# Patient Record
Sex: FEMALE | Race: WHITE | NOT HISPANIC OR LATINO | Employment: OTHER | URBAN - METROPOLITAN AREA
[De-identification: names, ages, dates, MRNs, and addresses within clinical notes are randomized per-mention and may not be internally consistent; named-entity substitution may affect disease eponyms.]

---

## 2017-01-12 ENCOUNTER — OFFICE VISIT (OUTPATIENT)
Dept: URGENT CARE | Facility: CLINIC | Age: 72
End: 2017-01-12
Payer: MEDICARE

## 2017-01-12 VITALS
RESPIRATION RATE: 18 BRPM | BODY MASS INDEX: 32.94 KG/M2 | HEIGHT: 62 IN | DIASTOLIC BLOOD PRESSURE: 100 MMHG | HEART RATE: 74 BPM | OXYGEN SATURATION: 95 % | WEIGHT: 179 LBS | SYSTOLIC BLOOD PRESSURE: 140 MMHG | TEMPERATURE: 97.2 F

## 2017-01-12 DIAGNOSIS — Z86.19 HISTORY OF CANDIDAL VULVOVAGINITIS: ICD-10-CM

## 2017-01-12 DIAGNOSIS — M54.50 ACUTE BILATERAL LOW BACK PAIN WITHOUT SCIATICA: ICD-10-CM

## 2017-01-12 DIAGNOSIS — K57.52 DIVERTICULITIS OF BOTH SMALL AND LARGE INTESTINE WITHOUT PERFORATION OR ABSCESS WITHOUT BLEEDING: Primary | ICD-10-CM

## 2017-01-12 PROCEDURE — 99214 OFFICE O/P EST MOD 30 MIN: CPT | Performed by: PHYSICIAN ASSISTANT

## 2017-01-12 RX ORDER — FLUCONAZOLE 150 MG/1
150 TABLET ORAL ONCE
Qty: 1 TAB | Refills: 0 | Status: SHIPPED | OUTPATIENT
Start: 2017-01-12 | End: 2017-01-12

## 2017-01-12 RX ORDER — OXYCODONE HYDROCHLORIDE AND ACETAMINOPHEN 5; 325 MG/1; MG/1
1-2 TABLET ORAL EVERY 4 HOURS PRN
Qty: 15 TAB | Refills: 0 | Status: SHIPPED | OUTPATIENT
Start: 2017-01-12 | End: 2017-01-15

## 2017-01-12 RX ORDER — CIPROFLOXACIN 500 MG/1
500 TABLET, FILM COATED ORAL 2 TIMES DAILY
Qty: 10 TAB | Refills: 0 | Status: SHIPPED | OUTPATIENT
Start: 2017-01-12 | End: 2017-01-17

## 2017-01-12 RX ORDER — METRONIDAZOLE 500 MG/1
500 TABLET ORAL EVERY 8 HOURS
Qty: 30 TAB | Refills: 0 | Status: SHIPPED | OUTPATIENT
Start: 2017-01-12 | End: 2017-01-22

## 2017-01-12 ASSESSMENT — ENCOUNTER SYMPTOMS: ABDOMINAL PAIN: 1

## 2017-01-12 ASSESSMENT — CROHNS DISEASE ACTIVITY INDEX (CDAI): CDAI SCORE: 0

## 2017-01-12 NOTE — PATIENT INSTRUCTIONS
Diverticulitis  Diverticulitis is inflammation or infection of small pouches in your colon that form when you have a condition called diverticulosis. The pouches in your colon are called diverticula. Your colon, or large intestine, is where water is absorbed and stool is formed.  Complications of diverticulitis can include:  · Bleeding.  · Severe infection.  · Severe pain.  · Perforation of your colon.  · Obstruction of your colon.  CAUSES   Diverticulitis is caused by bacteria.  Diverticulitis happens when stool becomes trapped in diverticula. This allows bacteria to grow in the diverticula, which can lead to inflammation and infection.  RISK FACTORS  People with diverticulosis are at risk for diverticulitis. Eating a diet that does not include enough fiber from fruits and vegetables may make diverticulitis more likely to develop.  SYMPTOMS   Symptoms of diverticulitis may include:  · Abdominal pain and tenderness. The pain is normally located on the left side of the abdomen, but may occur in other areas.  · Fever and chills.  · Bloating.  · Cramping.  · Nausea.  · Vomiting.  · Constipation.  · Diarrhea.  · Blood in your stool.  DIAGNOSIS   Your health care provider will ask you about your medical history and do a physical exam. You may need to have tests done because many medical conditions can cause the same symptoms as diverticulitis. Tests may include:  · Blood tests.  · Urine tests.  · Imaging tests of the abdomen, including X-rays and CT scans.  When your condition is under control, your health care provider may recommend that you have a colonoscopy. A colonoscopy can show how severe your diverticula are and whether something else is causing your symptoms.  TREATMENT   Most cases of diverticulitis are mild and can be treated at home. Treatment may include:  · Taking over-the-counter pain medicines.  · Following a clear liquid diet.  · Taking antibiotic medicines by mouth for 7-10 days.  More severe cases may  be treated at a hospital. Treatment may include:  · Not eating or drinking.  · Taking prescription pain medicine.  · Receiving antibiotic medicines through an IV tube.  · Receiving fluids and nutrition through an IV tube.  · Surgery.  HOME CARE INSTRUCTIONS   · Follow your health care provider's instructions carefully.  · Follow a full liquid diet or other diet as directed by your health care provider. After your symptoms improve, your health care provider may tell you to change your diet. He or she may recommend you eat a high-fiber diet. Fruits and vegetables are good sources of fiber. Fiber makes it easier to pass stool.  · Take fiber supplements or probiotics as directed by your health care provider.  · Only take medicines as directed by your health care provider.  · Keep all your follow-up appointments.  SEEK MEDICAL CARE IF:   · Your pain does not improve.  · You have a hard time eating food.  · Your bowel movements do not return to normal.  SEEK IMMEDIATE MEDICAL CARE IF:   · Your pain becomes worse.  · Your symptoms do not get better.  · Your symptoms suddenly get worse.  · You have a fever.  · You have repeated vomiting.  · You have bloody or black, tarry stools.  MAKE SURE YOU:   · Understand these instructions.  · Will watch your condition.  · Will get help right away if you are not doing well or get worse.     This information is not intended to replace advice given to you by your health care provider. Make sure you discuss any questions you have with your health care provider.     Document Released: 09/27/2006 Document Revised: 12/23/2014 Document Reviewed: 11/12/2014  [employer] Interactive Patient Education ©2016 [employer]

## 2017-01-12 NOTE — PROGRESS NOTES
Subjective:      PT is a 71 y.o. female who presents with Abdominal Pain            Abdominal Pain  This is a recurrent problem. The current episode started yesterday. The onset quality is sudden. The problem occurs constantly. The problem has been gradually worsening. The pain is located in the generalized abdominal region. The pain is at a severity of 6/10. The pain is moderate. The quality of the pain is dull, a sensation of fullness and cramping. The abdominal pain radiates to the back, left flank and right flank. The pain is aggravated by palpation, eating and deep breathing. The pain is relieved by nothing. She has tried antacids for the symptoms. The treatment provided no relief. Her past medical history is significant for abdominal surgery and gallstones. There is no history of colon cancer, Crohn's disease, GERD, irritable bowel syndrome, pancreatitis, PUD or ulcerative colitis. diverticulitis   Pt states she feels she triggered another bout of diverticulitis which she states she has had a PMH with. Pt notes nausea and low back pain. She states it is more powerful then prior episodes and notes she has been hospitalized with SBO in the past. Pt states she defers going to ED or getting outpt CT today. Pt has not taken any Rx medications for this condition. Pt states the pain is a 7/10, aching in nature and worse at night. Pt denies CP, SOB, paresthesias, headaches, dizziness, change in vision, hives, or other joint pain. The pt's medication list, problem list, and allergies have been evaluated and reviewed during today's visit. Pt notes hx of candidal infection of vagina on abx therapy.    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 at SURGERY [facility]   • Mikala by laparoscopy  2015     Performed by Gaudencio Johnson M.D. at SURGERY SAME DAY [facility]       Fam Hx:    family history includes Cancer in her father, mother, and sister; Cancer (age of onset: 40) in her sister; Diabetes in her father; GI in her sister; Lung Disease in her mother.  Family Status   Relation Status Death Age   • Mother     • Father     • Sister     • Sister     • Sister Alive        Soc HX:  Social History     Social History   • Marital Status:      Spouse Name: N/A   • Number of Children: 2   • Years of Education: HS     Occupational History   •  Other     Social History Main Topics   • Smoking status: Former Smoker -- 1.00 packs/day for 45 years     Types: Cigarettes     Start date: 1964     Quit date: 2012   • Smokeless tobacco: Never Used   • Alcohol Use: No   • Drug Use: Yes     Special: Oral, Marijuana      Comment: Pot daily   • Sexual Activity:     Partners: Female     Other Topics Concern   • Not on file     Social History Narrative    Moved to [place] after couldn't find work in [place]. Then mother had increasing health problems. Now stays in [place] to care for mom. Siblings caregive on Weekends. 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    Spouse (female) 25 years.    2 children. 1 grandchild.     : mother .           Medications:    Current outpatient prescriptions:   •  fluconazole (DIFLUCAN) 150 MG tablet, Take 1 Tab by mouth Once for 1 dose., Disp: 1 Tab, Rfl: 0  •  metronidazole (FLAGYL) 500 MG Tab, Take 1 Tab by mouth every 8 hours for 10 days., Disp: 30 Tab, Rfl: 0  •  ciprofloxacin (CIPRO) 500 MG Tab, Take 1 Tab by mouth 2 times a day for 5 days., Disp: 10 Tab, Rfl: 0  •  oxycodone-acetaminophen (PERCOCET) 5-325 MG Tab, Take 1-2 Tabs by mouth every four hours as needed for up to 3 days., Disp: 15 Tab, Rfl: 0  •  lorazepam (ATIVAN) 1 MG Tab, Take 1 Tab by mouth at bedtime as needed., Disp: 30 Tab, Rfl: 1  •  sertraline (ZOLOFT) 50 MG Tab, Take 3 Tabs by mouth every day., Disp: 135 Tab, Rfl: 1  •  pantoprazole (PROTONIX) 40 MG Tablet Delayed Response, Take 1 Tab by mouth every day., Disp: 90 Tab, Rfl: 3  •  vitamin D (CHOLECALCIFEROL) 1000 UNIT TABS, Take 1,000 Units by mouth every day., Disp: , Rfl:   •  Probiotic Product (PROBIOTIC PO), Take 1 Cap by mouth every day., Disp: , Rfl:   •  meloxicam (MOBIC) 15 MG tablet, Take 1 Tab by mouth every day., Disp: 30 Tab, Rfl: 5  •  alendronate (FOSAMAX) 70 MG Tab, Take 1 Tab by mouth every 7 days. Take on empty stomach, with full glass water. Don't lay down or take any food or drink for next 30 minutes, Disp: 12 Tab, Rfl: 3      Allergies:  Penicillins; Codeine; Morphine; and Vicodin        Review of Systems   Gastrointestinal: Positive for abdominal pain.   Constitutional: Negative for fever, chills and malaise/fatigue.   HENT: Negative for congestion and sore throat.    Eyes: Negative for blurred vision, double vision and photophobia.   Respiratory: Negative for cough and shortness of breath.    Cardiovascular: Negative for chest pain and palpitations.   Gastrointestinal continued: POS nausea, vomiting, abdominal pain, NEG diarrhea and constipation.   Genitourinary: Negative for dysuria and flank pain.   Musculoskeletal: Negative for joint pain and myalgias. +LBP  Skin:  "Negative for itching and rash.   Neurological: Negative for dizziness, tingling and headaches.   Endo/Heme/Allergies: Does not bruise/bleed easily.   Psychiatric/Behavioral: Negative for depression. The patient is not nervous/anxious.             Objective:     /100 mmHg  Pulse 74  Temp(Src) 36.2 °C (97.2 °F)  Resp 18  Ht 1.562 m (5' 1.5\")  Wt 81.194 kg (179 lb)  BMI 33.28 kg/m2  SpO2 95%  LMP 07/12/1986     Physical Exam   Abdominal: Soft. Bowel sounds are normal. She exhibits no shifting dullness, no distension, no pulsatile liver, no fluid wave, no abdominal bruit, no ascites, no pulsatile midline mass and no mass. There is no hepatosplenomegaly. There is generalized tenderness. There is guarding. There is no rigidity, no rebound, no CVA tenderness, no tenderness at McBurney's point and negative Vincent's sign. No hernia.              Constitutional: PT is oriented to person, place, and time. PT appears well-developed and well-nourished. No distress.   HENT:   Head: Normocephalic and atraumatic.   Mouth/Throat: Oropharynx is clear and moist. No oropharyngeal exudate.   Eyes: Conjunctivae normal and EOM are normal. Pupils are equal, round, and reactive to light.   Neck: Normal range of motion. Neck supple. No thyromegaly present.   Cardiovascular: Normal rate, regular rhythm, normal heart sounds and intact distal pulses.  Exam reveals no gallop and no friction rub.    No murmur heard.  Pulmonary/Chest: Effort normal and breath sounds normal. No respiratory distress. PT has no wheezes. PT has no rales. Pt exhibits no tenderness.   Musculoskeletal: Normal range of motion. PT exhibits no edema and no tenderness. +TTP low back region  Neurological: PT is alert and oriented to person, place, and time. PT has normal reflexes. No cranial nerve deficit.   Skin: Skin is warm and dry. No rash noted. PT is not diaphoretic. No erythema.       Psychiatric: PT has a normal mood and affect. PT behavior is normal. " Judgment and thought content normal.        Assessment/Plan:     1. Diverticulitis of both small and large intestine without perforation or abscess without bleeding    - metronidazole (FLAGYL) 500 MG Tab; Take 1 Tab by mouth every 8 hours for 10 days.  Dispense: 30 Tab; Refill: 0  - ciprofloxacin (CIPRO) 500 MG Tab; Take 1 Tab by mouth 2 times a day for 5 days.  Dispense: 10 Tab; Refill: 0  - oxycodone-acetaminophen (PERCOCET) 5-325 MG Tab; Take 1-2 Tabs by mouth every four hours as needed for up to 3 days.  Dispense: 15 Tab; Refill: 0    2. History of candidal vulvovaginitis    - fluconazole (DIFLUCAN) 150 MG tablet; Take 1 Tab by mouth Once for 1 dose.  Dispense: 1 Tab; Refill: 0    3. Acute bilateral low back pain without sciatica    PT defers ED or outpt CT scan  STRICT ER precautions discussed  [facility] Aware web site evaluation: I have obtained and reviewed patient utilization report from [facility] pharmacy database prior to writing prescription for controlled substance II, III or IV per [place] bill . Based on the report and my clinical assessment the prescription is medically necessary.   NSAIDs for pain 1-5, Percocet for pain 6-10 or to help get to sleep.  Rest, fluids encouraged.  BRAT diet  AVS with medical info given.  Pt was in full understanding and agreement with the plan.  Follow-up as needed if symptoms worsen or fail to improve.

## 2017-01-12 NOTE — MR AVS SNAPSHOT
"        Dina Adorno Mars   2017 8:15 AM   Office Visit   MRN: [id number]    Department:  [facility]   Dept Phone:  [phone]    Description:  Female : 1945   Provider:  Leonidas Jacobson PA-C           Reason for Visit     Abdominal Pain lower back pain, started last night, nausea      Allergies as of 2017     Allergen Noted Reactions    Penicillins 2010   Rash, Unspecified    Itchy rash    Codeine 2014   Vomiting    Morphine 2010   Rash    Localized red rash after morphine administration    Vicodin [Hydrocodone-Acetaminophen] 10/09/2012   Itching      You were diagnosed with     Diverticulitis of both small and large intestine without perforation or abscess without bleeding   [783077]  -  Primary     History of candidal vulvovaginitis   [503377]       Acute bilateral low back pain without sciatica   [8112656]         Vital Signs     Blood Pressure Pulse Temperature Respirations Height Weight    140/100 mmHg 74 36.2 °C (97.2 °F) 18 1.562 m (5' 1.5\") 81.194 kg (179 lb)    Body Mass Index Oxygen Saturation Last Menstrual Period Smoking Status          33.28 kg/m2 95% 1986 Former Smoker        Basic Information     Date Of Birth Sex Race Ethnicity Preferred Language    1945 Female White Non- English      Your appointments     2017  3:00 PM   New Patient with Marivel Ball M.D.   [facility] (--)    [address], [address]  [place] [id number] [phone]           Please bring Photo ID, Insurance Cards, All Medication Bottles and copies of any legal documents (such as Living Will, Power of ) If speaking a language besides English please bring an adult . Please arrive 30 minutes prior for check in and registration. You will be receiving a confirmation call a few days before your appointment from our automated call confirmation system.            2017  9:30 AM   Established Patient with Maria G Díaz, " M.D.   [address] ([facility])    [address] [place] [id number] [phone]           You will be receiving a confirmation call a few days before your appointment from our automated call confirmation system.              Problem List              ICD-10-CM Priority Class Noted - Resolved    Vitamin D insufficiency E55.9   4/25/2011 - Present    Preventative health care Z00.00   4/25/2011 - Present    Personal history of skin cancer Z85.828   4/25/2011 - Present    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   10/19/2011 - Present    Hyperlipidemia E78.5   10/19/2011 - Present    GERD (gastroesophageal reflux disease) K21.9   10/29/2011 - Present    Major depression F32.9   7/16/2012 - Present    Osteoporosis, post-menopausal M81.0   8/12/2012 - Present    H/O diverticulitis of colon Z87.19   4/24/2013 - Present    JUNIOR (generalized anxiety disorder) F41.1   12/3/2013 - Present    S/P partial colectomy Z90.49   4/22/2014 - Present    Asthma, mild intermittent J45.20   6/20/2014 - Present    Obesity (BMI 30.0-34.9) E66.9   10/22/2014 - Present    Sleep disturbances G47.9   5/7/2015 - Present    Depression with anxiety F41.8   11/9/2015 - Present    IBS (irritable bowel syndrome) K58.9   11/9/2015 - Present    Hx of adenomatous colonic polyps Z86.010   12/5/2015 - Present    Neuropathy (HCC) G62.9   1/14/2016 - Present    Hair loss L65.9   3/17/2016 - Present    H/O small bowel obstruction Z87.19   5/3/2016 - Present      Health Maintenance        Date Due Completion Dates    IMM DTaP/Tdap/Td Vaccine (1 - Tdap) 7/12/1966 ---    IMM ZOSTER VACCINE 7/12/2007 ---    IMM INFLUENZA (1) 9/1/2016 ---    MAMMOGRAM 4/8/2017 4/8/2016, 11/7/2013, 8/8/2012    BONE DENSITY 8/9/2018 8/9/2016, 8/8/2012    COLONOSCOPY 12/2/2020 12/2/2015            Current Immunizations     13-VALENT PCV PREVNAR 8/1/2016    Pneumococcal polysaccharide vaccine (PPSV-23) 12/7/2012    Tetanus Vaccine 4/2/2010       Below and/or attached are the medications your provider expects you to take. Review all of your home medications and newly ordered medications with your provider and/or pharmacist. Follow medication instructions as directed by your provider and/or pharmacist. Please keep your medication list with you and share with your provider. Update the information when medications are discontinued, doses are changed, or new medications (including over-the-counter products) are added; and carry medication information at all times in the event of emergency situations     Allergies:  PENICILLINS - Rash,Unspecified     CODEINE - Vomiting     MORPHINE - Rash     VICODIN - Itching               Medications  Valid as of: January 12, 2017 -  8:45 AM    Generic Name Brand Name Tablet Size Instructions for use    Alendronate Sodium (Tab) FOSAMAX 70 MG Take 1 Tab by mouth every 7 days. Take on empty stomach, with full glass water. Don't lay down or take any food or drink for next 30 minutes        Cholecalciferol (Tab) cholecalciferol 1000 UNIT Take 1,000 Units by mouth every day.        Ciprofloxacin HCl (Tab) CIPRO 500 MG Take 1 Tab by mouth 2 times a day for 5 days.        Fluconazole (Tab) DIFLUCAN 150 MG Take 1 Tab by mouth Once for 1 dose.        LORazepam (Tab) ATIVAN 1 MG Take 1 Tab by mouth at bedtime as needed.        Meloxicam (Tab) MOBIC 15 MG Take 1 Tab by mouth every day.        MetroNIDAZOLE (Tab) FLAGYL 500 MG Take 1 Tab by mouth every 8 hours for 10 days.        Oxycodone-Acetaminophen (Tab) PERCOCET 5-325 MG Take 1-2 Tabs by mouth every four hours as needed for up to 3 days.        Pantoprazole Sodium (Tablet Delayed Response) PROTONIX 40 MG Take 1 Tab by mouth every day.        Probiotic Product   Take 1 Cap by mouth every day.        Sertraline HCl (Tab) ZOLOFT 50 MG Take 3 Tabs by mouth every day.        .                 Medicines prescribed today were sent to:     [facility] - [place], [place] - [address]    [address] [place] [address]    Phone: [phone] Fax: [phone]    Open 24 Hours?: No      Medication refill instructions:       If your prescription bottle indicates you have medication refills left, it is not necessary to call your provider’s office. Please contact your pharmacy and they will refill your medication.    If your prescription bottle indicates you do not have any refills left, you may request refills at any time through one of the following ways: The online [employer] system (except Urgent Care), by calling your provider’s office, or by asking your pharmacy to contact your provider’s office with a refill request. Medication refills are processed only during regular business hours and may not be available until the next business day. Your provider may request additional information or to have a follow-up visit with you prior to refilling your medication.   *Please Note: Medication refills are assigned a new Rx number when refilled electronically. Your pharmacy may indicate that no refills were authorized even though a new prescription for the same medication is available at the pharmacy. Please request the medicine by name with the pharmacy before contacting your provider for a refill.        Instructions    Diverticulitis  Diverticulitis is inflammation or infection of small pouches in your colon that form when you have a condition called diverticulosis. The pouches in your colon are called diverticula. Your colon, or large intestine, is where water is absorbed and stool is formed.  Complications of diverticulitis can include:  · Bleeding.  · Severe infection.  · Severe pain.  · Perforation of your colon.  · Obstruction of your colon.  CAUSES   Diverticulitis is caused by bacteria.  Diverticulitis happens when stool becomes trapped in diverticula. This allows bacteria to grow in the diverticula, which can lead to inflammation and infection.  RISK FACTORS  People with  diverticulosis are at risk for diverticulitis. Eating a diet that does not include enough fiber from fruits and vegetables may make diverticulitis more likely to develop.  SYMPTOMS   Symptoms of diverticulitis may include:  · Abdominal pain and tenderness. The pain is normally located on the left side of the abdomen, but may occur in other areas.  · Fever and chills.  · Bloating.  · Cramping.  · Nausea.  · Vomiting.  · Constipation.  · Diarrhea.  · Blood in your stool.  DIAGNOSIS   Your health care provider will ask you about your medical history and do a physical exam. You may need to have tests done because many medical conditions can cause the same symptoms as diverticulitis. Tests may include:  · Blood tests.  · Urine tests.  · Imaging tests of the abdomen, including X-rays and CT scans.  When your condition is under control, your health care provider may recommend that you have a colonoscopy. A colonoscopy can show how severe your diverticula are and whether something else is causing your symptoms.  TREATMENT   Most cases of diverticulitis are mild and can be treated at home. Treatment may include:  · Taking over-the-counter pain medicines.  · Following a clear liquid diet.  · Taking antibiotic medicines by mouth for 7-10 days.  More severe cases may be treated at a hospital. Treatment may include:  · Not eating or drinking.  · Taking prescription pain medicine.  · Receiving antibiotic medicines through an IV tube.  · Receiving fluids and nutrition through an IV tube.  · Surgery.  HOME CARE INSTRUCTIONS   · Follow your health care provider's instructions carefully.  · Follow a full liquid diet or other diet as directed by your health care provider. After your symptoms improve, your health care provider may tell you to change your diet. He or she may recommend you eat a high-fiber diet. Fruits and vegetables are good sources of fiber. Fiber makes it easier to pass stool.  · Take fiber supplements or probiotics  as directed by your health care provider.  · Only take medicines as directed by your health care provider.  · Keep all your follow-up appointments.  SEEK MEDICAL CARE IF:   · Your pain does not improve.  · You have a hard time eating food.  · Your bowel movements do not return to normal.  SEEK IMMEDIATE MEDICAL CARE IF:   · Your pain becomes worse.  · Your symptoms do not get better.  · Your symptoms suddenly get worse.  · You have a fever.  · You have repeated vomiting.  · You have bloody or black, tarry stools.  MAKE SURE YOU:   · Understand these instructions.  · Will watch your condition.  · Will get help right away if you are not doing well or get worse.     This information is not intended to replace advice given to you by your health care provider. Make sure you discuss any questions you have with your health care provider.     Document Released: 09/27/2006 Document Revised: 12/23/2014 Document Reviewed: 11/12/2014  [employer] Interactive Patient Education ©2016 [employer]            [employer] Access Code: Activation code not generated  Current V I O Status: Active

## 2017-01-19 ENCOUNTER — OFFICE VISIT (OUTPATIENT)
Dept: RHEUMATOLOGY | Facility: PHYSICIAN GROUP | Age: 72
End: 2017-01-19
Payer: MEDICARE

## 2017-01-19 VITALS
OXYGEN SATURATION: 95 % | WEIGHT: 183 LBS | DIASTOLIC BLOOD PRESSURE: 84 MMHG | HEART RATE: 76 BPM | SYSTOLIC BLOOD PRESSURE: 140 MMHG | RESPIRATION RATE: 12 BRPM | BODY MASS INDEX: 33.68 KG/M2 | HEIGHT: 62 IN | TEMPERATURE: 98 F

## 2017-01-19 DIAGNOSIS — M25.50 ARTHRALGIA, UNSPECIFIED JOINT: ICD-10-CM

## 2017-01-19 DIAGNOSIS — E55.9 VITAMIN D INSUFFICIENCY: ICD-10-CM

## 2017-01-19 DIAGNOSIS — M81.0 OSTEOPOROSIS, POST-MENOPAUSAL: ICD-10-CM

## 2017-01-19 PROCEDURE — 99205 OFFICE O/P NEW HI 60 MIN: CPT | Performed by: INTERNAL MEDICINE

## 2017-01-19 RX ORDER — MELOXICAM 7.5 MG/1
TABLET ORAL
Qty: 60 TAB | Refills: 0 | Status: SHIPPED | OUTPATIENT
Start: 2017-01-19 | End: 2017-03-31 | Stop reason: SDUPTHER

## 2017-01-19 RX ORDER — IBUPROFEN 600 MG/1
600 TABLET ORAL EVERY 6 HOURS PRN
COMMUNITY
End: 2017-03-31

## 2017-01-19 NOTE — MR AVS SNAPSHOT
"        Dina Adorno Mars   2017 3:00 PM   Office Visit   MRN: [id number]    Department:  Rheumatology [facility]   Dept Phone:  [phone]    Description:  Female : 1945   Provider:  Marivel Ball M.D.           Reason for Visit     New Patient new patient      Allergies as of 2017     Allergen Noted Reactions    Penicillins 2010   Rash, Unspecified    Itchy rash    Codeine 2014   Vomiting    Morphine 2010   Rash    Localized red rash after morphine administration    Vicodin [Hydrocodone-Acetaminophen] 10/09/2012   Itching      You were diagnosed with     Arthralgia, unspecified joint   [7741426]       Osteoporosis, post-menopausal   [[id number]]       Vitamin D insufficiency   [216496]         Vital Signs     Blood Pressure Pulse Temperature Respirations Height Weight    140/84 mmHg 76 36.7 °C (98 °F) 12 1.575 m (5' 2\") 83.008 kg (183 lb)    Body Mass Index Oxygen Saturation Last Menstrual Period Smoking Status          33.46 kg/m2 95% 1986 Former Smoker        Basic Information     Date Of Birth Sex Race Ethnicity Preferred Language    1945 Female White Non- English      Your appointments     2017  2:30 PM   Follow Up Visit with Marivel Ball M.D.   [facility] (--)    [address] [place] [id number] [phone]           You will be receiving a confirmation call a few days before your appointment from our automated call confirmation system.              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   10/19/2011 - Present    Hyperlipidemia E78.5   10/19/2011 - Present    GERD (gastroesophageal reflux disease) K21.9   10/29/2011 - Present    Major depression F32.9   2012 " - Present    Osteoporosis, post-menopausal M81.0   8/12/2012 - Present    H/O diverticulitis of colon Z87.19   4/24/2013 - Present    JUNIOR (generalized anxiety disorder) F41.1   12/3/2013 - Present    S/P partial colectomy Z90.49   4/22/2014 - Present    Asthma, mild intermittent J45.20   6/20/2014 - Present    Obesity (BMI 30.0-34.9) E66.9   10/22/2014 - Present    Sleep disturbances G47.9   5/7/2015 - Present    Depression with anxiety F41.8   11/9/2015 - Present    IBS (irritable bowel syndrome) K58.9   11/9/2015 - Present    Hx of adenomatous colonic polyps Z86.010   12/5/2015 - Present    Neuropathy (CMS-HCC) G62.9   1/14/2016 - Present    Hair loss L65.9   3/17/2016 - Present    H/O small bowel obstruction Z87.19   5/3/2016 - Present      Health Maintenance        Date Due Completion Dates    IMM DTaP/Tdap/Td Vaccine (1 - Tdap) 7/12/1966 ---    IMM ZOSTER VACCINE 7/12/2007 ---    IMM INFLUENZA (1) 9/1/2016 ---    MAMMOGRAM 4/8/2017 4/8/2016, 11/7/2013, 8/8/2012    BONE DENSITY 8/9/2018 8/9/2016, 8/8/2012    COLONOSCOPY 12/2/2020 12/2/2015            Current Immunizations     13-VALENT PCV PREVNAR 8/1/2016    Pneumococcal polysaccharide vaccine (PPSV-23) 12/7/2012    Tetanus Vaccine 4/2/2010      Below and/or attached are the medications your provider expects you to take. Review all of your home medications and newly ordered medications with your provider and/or pharmacist. Follow medication instructions as directed by your provider and/or pharmacist. Please keep your medication list with you and share with your provider. Update the information when medications are discontinued, doses are changed, or new medications (including over-the-counter products) are added; and carry medication information at all times in the event of emergency situations     Allergies:  PENICILLINS - Rash,Unspecified     CODEINE - Vomiting     MORPHINE - Rash     VICODIN - Itching               Medications  Valid as of: January 19, 2017  -  4:00 PM    Generic Name Brand Name Tablet Size Instructions for use    Alendronate Sodium (Tab) FOSAMAX 70 MG Take 1 Tab by mouth every 7 days. Take on empty stomach, with full glass water. Don't lay down or take any food or drink for next 30 minutes        Cholecalciferol (Tab) cholecalciferol 1000 UNIT Take 1,000 Units by mouth every day.        Ibuprofen (Tab) MOTRIN 600 MG Take 600 mg by mouth every 6 hours as needed.        LORazepam (Tab) ATIVAN 1 MG Take 1 Tab by mouth at bedtime as needed.        Meloxicam (Tab) MOBIC 15 MG Take 1 Tab by mouth every day.        MetroNIDAZOLE (Tab) FLAGYL 500 MG Take 1 Tab by mouth every 8 hours for 10 days.        Pantoprazole Sodium (Tablet Delayed Response) PROTONIX 40 MG Take 1 Tab by mouth every day.        Probiotic Product   Take 1 Cap by mouth every day.        Sertraline HCl (Tab) ZOLOFT 50 MG Take 3 Tabs by mouth every day.        .                 Medicines prescribed today were sent to:     [facility] [address] [place] [address], [place] [address]    [address] [facility] [address]    Phone: [phone] Fax: [phone]    Open 24 Hours?: No      Medication refill instructions:       If your prescription bottle indicates you have medication refills left, it is not necessary to call your provider’s office. Please contact your pharmacy and they will refill your medication.    If your prescription bottle indicates you do not have any refills left, you may request refills at any time through one of the following ways: The online eXenSa system (except Urgent Care), by calling your provider’s office, or by asking your pharmacy to contact your provider’s office with a refill request. Medication refills are processed only during regular business hours and may not be available until the next business day. Your provider may request additional information or to have a follow-up visit with you prior to refilling your medication.   *Please Note: Medication refills  are assigned a new Rx number when refilled electronically. Your pharmacy may indicate that no refills were authorized even though a new prescription for the same medication is available at the pharmacy. Please request the medicine by name with the pharmacy before contacting your provider for a refill.        Your To Do List     Future Labs/Procedures Complete By Expires    CRP QUANTITIVE (NON-CARDIAC)  1/31/2017 1/19/2018    DX-JOINT SURVEY-FEET SINGLE VIEW  As directed 1/19/2018    DX-JOINT SURVEY-HANDS SINGLE VIEW  As directed 1/19/2018    FOLATE  As directed 1/19/2018    RHEUMATOID ARTHRITIS FACTOR  As directed 1/19/2018    SSA 52 AND 60 (RO)(MARK) AB, IGG  As directed 1/19/2018    SSB(LA)(MARK)IGG AB  As directed 1/19/2018    VITAMIN B12  As directed 1/19/2018    WESTERGREN SED RATE  As directed 1/19/2018         TapTalentst Access Code: Activation code not generated  Current PubNub Status: Active

## 2017-01-19 NOTE — Clinical Note
[facility]   [address]  [place] [address]  Phone: [phone]  Fax: [phone]              Encounter Date: 1/19/2017    Dear Dr. Díaz,    It was a pleasure seeing your patient, Dina Crews, on 1/19/2017. Diagnoses of Arthralgia, unspecified joint, Osteoporosis, post-menopausal, and Vitamin D insufficiency were pertinent to this visit.     Please find attached progress note which includes the history I obtained from Ms. Crews, my physical examination findings, my impression and recommendations.      Once again, it was a pleasure participating in your patient's care.  Please feel free to contact me if you have any questions or if I can be of any further assistance to your patients.      Sincerely,    Marivel Ball M.D.  Electronically Signed          PROGRESS NOTE:  Chief Complaint- polyarthralgia    Chief Complaint   Patient presents with   • New Patient     new patient     Dina Crews is a 71 y.o. female here as a new Rheumatology Consult referred by Maria G Díaz M.D. for consultation regarding polyarthralgia    HPI:   Ms. Dina Crews presents with arthralgias with multiple sites.    She reports a chronic history of finger swelling and hurting with sharp pains through the fingers.  She notes morning stiffness that is variable lasting 1 hour.  She also reports limited .  In her feet she also is noting sharp pains.  With increase use her hand pain does progress.  For her hand swelling she notes this occurs daily in the morning.  She also notes pain in her feet.  She describes this as neuropathy with stinging pain in her toes,  The pain is worse at night or when her feet gets cold.  She denies dactylitis.  She also reports back pain in the lumbar region and often with no aggravated activity she will have increase pain.      To manage her pain she will take ibuprofen.  It does help.  She has also been started meloxicam but does not take  it.  For the ibuprofen she takes 600 mg po qHS.      Osteoporosis  Started alendronate but cause bone pain and so has stopped.  Vitamin D was 23 in 2016  She take vitamin D.  She does not take calcium because it constipates her .    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 Hysterectomy, primary  with repeat . Shoulder arthroscopy, cholecystectomy by laparoscopy.  She had two miscarriage that was her first pregnancy and ended within the first trimester.  The also had a second miscarriage in the first trimester.    Family history: Father had cancer, mother had heart failure. She had a sibling with cancer. She reports arthritis is a family history.  Father use to get gold shots for his arthritis.  She has cousins with Rheumatoid arthritis    Social History: does not smoke, does not drink, she is retired in her job previously she used her hands in crafts, electronics,     She reports morning stiffness and the pain does seem to be worse at the end of the day. She reports stiff hands. She reports swollen joints. She reports unusual hair loss. She denies any sensitivity,eye inflammation, skin rash, pleurisy. She admits to night sweats. She notes weight gain, dizziness, weakness, blurred vision that is corrected with glasses, cavities, bleeding gums, sinus trouble, drainage in back of throat, soreness of her neck, swollen glands most prominent on the right side, constipation and diarrhea (has IBS).    She does endorse only dry mouth no dry eyes.  She manages her xerostomia with water and mouthwash.  She denies rashes.  She also reports dizziness  and feels with standing or rapid movements she has transient dizziness but no vertigo manifestations.  She denies Raynauds.  Her hair loss has been ongoing x 8 months. At time she has tingling in her feet      Current medicines (including changes today)  Current Outpatient Prescriptions   Medication Sig Dispense Refill   • ibuprofen (MOTRIN) 600 MG Tab Take 600 mg by mouth every 6 hours as needed.     • meloxicam (MOBIC) 7.5 MG Tab Take 1-2 tabs as needed daily.  DO NOT take more or other NSAIDS (aleve, motrin, ibuprofen etc) 60 Tab 0   • lorazepam (ATIVAN) 1 MG Tab Take 1 Tab by mouth at bedtime as needed. 30 Tab 1   • sertraline (ZOLOFT) 50 MG Tab Take 3 Tabs by mouth every day. 135 Tab 1   • pantoprazole (PROTONIX) 40 MG Tablet Delayed Response Take 1 Tab by mouth every day. 90 Tab 3   • vitamin D (CHOLECALCIFEROL) 1000 UNIT TABS Take 1,000 Units by mouth every day.     • Probiotic Product (PROBIOTIC PO) Take 1 Cap by mouth every day.     • metronidazole (FLAGYL) 500 MG Tab Take 1 Tab by mouth every 8 hours for 10 days. 30 Tab 0   • alendronate (FOSAMAX) 70 MG Tab Take 1 Tab by mouth every 7 days. Take on empty stomach, with full glass water. Don't lay down or take any food or drink for next 30 minutes 12 Tab 3     No current facility-administered medications for this visit.     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  She  has past surgical history that includes hysterectomy,  "vaginal; primary c section; repeat c section; shoulder arthroscopy; low anterior resection robotic (2014); and malik by laparoscopy (2015).  Family History   Problem Relation Age of Onset   • Lung Disease Mother      COPD   • Cancer Mother      Thyroid cancer   • Cancer Father      d. 72 Stomach cancer   • Diabetes Father    • Cancer Sister 40     Breast cancer   • GI Sister      diverticulitis   • Cancer Sister      breast     Family Status   Relation Status Death Age   • Mother     • Father     • Sister     • Sister     • Sister Alive      Social History   Substance Use Topics   • Smoking status: Former Smoker -- 1.00 packs/day for 45 years     Types: Cigarettes     Start date: 1964     Quit date: 2012   • Smokeless tobacco: Never Used   • Alcohol Use: No     Social History     Social History Narrative    Moved to [place] after couldn't find work in [place]. Then mother had increasing health problems. Now stays in [place] to care for mom. Siblings caregive on Weekends. 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    Spouse (female) 25 years.    2 children. 1 grandchild.     : mother .          ROS  Constitutional ROS: Positive for fatigue , weight gain  Eye ROS: No eye pain, redness, discharge  Ear ROS: No recent change in hearing  Mouth/Throat ROS: dry mouth  Neck ROS: Positive for swollen glands  Pulmonary ROS: No shortness of breath, No recent change in breathing  Cardiovascular ROS: No chest pain, No shortness of breath  Gastrointestinal ROS: No change in bowel habits  Musculoskeletal/Extremities ROS: Positive for arthritis , backache , stiffness , swelling   Hematologic/Lymphatic ROS: Positive for swollen nodes   Skin/Integumentary ROS: color, texture and temperature normal  Neurologic ROS: Normal development       Objective:     Blood pressure 140/84, pulse 76, temperature 36.7 °C (98 °F), resp. rate 12, height 1.575 m (5' 2\"), weight 83.008 kg (183 " "lb), last menstrual period 07/12/1986, SpO2 95 %. Body mass index is 33.46 kg/(m^2).  Physical Exam:    Filed Vitals:    01/19/17 1445   BP: 140/84   Pulse: 76   Temp: 36.7 °C (98 °F)   Resp: 12   Height: 1.575 m (5' 2\")   Weight: 83.008 kg (183 lb)   SpO2: 95%    Body mass index is 33.46 kg/(m^2).    General/Constitutional: NAD not diaphoretic, comfortable  Eyes: clear conjunctiva, no scleral icterus, EOMI, PERRL  Ears, Nose, Mouth,Throat: no oral ulcers, poor dentition, moderate dry mucous membranes, no discharge from ears bilaterally  Cardiovascular: regular rate and rhythm.  No murmurs, gallops, rubs  Respiratory: normal effort, unlabored respiration.  On auscultation no wheezes, rales, rhonchi.  Clear to auscultation.  GI: soft, NTTP no hepatosplenomegaly, nondistended  Musculoskeletal  Axial:  Thoracic: no midline tenderness  Lumbar no midline tenderness  Upper Extremities:  No synovitis of the PIP, DIP, MCP  [place]bon nodes appreciated at the DIP.  No notable unruly nodes  There is a nodule on the right hand third digit at the radial aspect of the PIP  Wrists and Elbows have good ROM  Muscle Strength: 5/5 in deltoids, biceps, triceps, finger , wrists extension bilateral  Lower Extremities:  No knee effusion bilateral, No crepitus bilateral  No MTP tenderness bilateral  Muscle Strength: 5/5 in dorsiflexion, plantarflexion, knee extension, knee flexion, and hip flexion bilateral  Gait is normal  Skin: Limited skin exam.  no rashes, no digital ulcerations, no alopecia, no tophi, no skin thickening, no nodules.  On the lower extremity she has mild excoriations bilateral  Neuro: CN II-XII grossly intact, Alert, Oriented x 3, moves all four extremities  Psych: normal affect, normal mood, judgement appropriate, follows commands, responses are appropritae  Heme/Lymph: tenderness on palpation for cervical adenopath    Labs:     No results found for: QNTTBGOLD  No results found for: HEPBCORTOT, HEPBCORIGM, " HEPBSAG  Lab Results   Component Value Date/Time    HEPATITIS C ANTIBODY Negative 03/11/2015 11:00 AM     Lab Results   Component Value Date/Time    SODIUM 142 05/05/2016 07:25 AM    POTASSIUM 3.6 05/05/2016 07:25 AM    CHLORIDE 107 05/05/2016 07:25 AM    CO2 29 05/05/2016 07:25 AM    GLUCOSE 108* 05/05/2016 07:25 AM    BUN <5* 05/05/2016 07:25 AM    CREATININE 0.76 05/05/2016 07:25 AM      Lab Results   Component Value Date/Time    WBC 7.7 10/07/2016 02:32 PM    RBC 4.66 10/07/2016 02:32 PM    HEMOGLOBIN 13.9 10/07/2016 02:32 PM    HEMATOCRIT 40.1 10/07/2016 02:32 PM    MCV 86.1 10/07/2016 02:32 PM    MCH 29.8 10/07/2016 02:32 PM    MCHC 34.7 10/07/2016 02:32 PM    MPV 10.1 10/07/2016 02:32 PM    NEUTROPHILS-POLYS 66.70 10/07/2016 02:32 PM    LYMPHOCYTES 24.40 10/07/2016 02:32 PM    MONOCYTES 5.80 10/07/2016 02:32 PM    EOSINOPHILS 2.20 10/07/2016 02:32 PM    BASOPHILS 0.60 10/07/2016 02:32 PM    HYPOCHROMIA 1+ 01/24/2014 09:53 AM      Lab Results   Component Value Date/Time    CALCIUM 9.1 05/05/2016 07:25 AM    AST(SGOT) 37 05/04/2016 03:55 AM    ALT(SGPT) 38 05/04/2016 03:55 AM    ALKALINE PHOSPHATASE 82 05/04/2016 03:55 AM    TOTAL BILIRUBIN 1.1 05/04/2016 03:55 AM    ALBUMIN 4.0 05/05/2016 07:25 AM    TOTAL PROTEIN 6.9 05/04/2016 03:55 AM     Lab Results   Component Value Date/Time    CCP ANTIBODIES 3 10/07/2016 02:32 PM    ANTINUCLEAR ANTIBODY None Detected 10/07/2016 02:31 PM     Lab Results   Component Value Date/Time    SED RATE WESTERGREN 29 10/07/2016 02:32 PM    STAT C-REACTIVE PROTEIN 0.68 10/07/2016 02:32 PM     No results found for: RUSSELVIPER, DRVVTINTERP  No results found for: H4UBUUMOYAN, H0CSAUHPSWM, IGGCARDIOLI, IGMCARDIOLI, IGACARDIOLI, CRYOGLOBULIN  No results found for: ANADIRECT, ANTIDNADS, RNPAB, SMITHAB, EOFPEAJ64, SSAROAB, SSBLAAB, NMTHXY7AR, CENTROMBAB  No results found for: ANTIDNADS, DSDNA, AGBMAB, GBMABA, ANCAIGG, G4VZCLKYYXR, JO1AB, RNPAB, ANTISSASJ, ANTISSBSJ  Lab Results      Component Value Date/Time    COLOR Yellow 05/03/2016 02:30 AM    SPECIFIC GRAVITY 1.023 05/03/2016 02:30 AM    PH 5.0 05/03/2016 02:30 AM    GLUCOSE Negative 05/03/2016 02:30 AM    KETONES Trace* 05/03/2016 02:30 AM    PROTEIN Negative 05/03/2016 02:30 AM     No results found for: TOTPROTUR, TOTALVOLUME, UHONJCCX48  No results found for: SSA60, SSA52  Lab Results   Component Value Date/Time    GLYCOHEMOGLOBIN 6.6* 06/29/2016 10:10 AM     No results found for: CPKTOTAL  No results found for: G6PD  No results found for: KOCX21DNQE  No results found for: ACESERUM  Lab Results   Component Value Date/Time    25-HYDROXY   VITAMIN D 25 23* 06/29/2016 10:10 AM     No results found for: TSH, FREEDIR  Lab Results   Component Value Date/Time    TSH 2.960 06/29/2016 10:10 AM    FREE T-4 0.71 12/03/2013 04:15 AM     No results found for: MICROSOMALA, ANTITHYROGL  No results found for: IGGLYMEABS  No results found for: ANTIMITOCHO, FACTIN  Lab Results   Component Value Date/Time    IMMUNOGLOBULIN A 248 03/11/2015 11:00 AM    T-TG IGA 7 03/11/2015 11:00 AM     No results found for: FLTYPE, CRYSTALSBDF  No results found for: ISTATICAL  No results found for: ISTATCREAT  No results found for: CTELOPEP  No results found for: GBMABG  No results found for: PTHINTACT          Results for orders placed during the hospital encounter of 08/09/16   DS-BONE DENSITY STUDY (DEXA)    Impression According to the World Health Organization classification, bone mineral density of this patient is osteoporosis with high fracture risk.        10-year Probability of Fracture:  Major Osteoporotic     19.2%  Hip     3.8%  Population      [place] ()    Based on left femur neck BMD          INTERPRETING LOCATION:  [address] [place], [place] [address], [address]            Assessment and Plan:  Polyarthralgia with involvement of the hands, feet, wrists.  Likely osteoarthritis. Hand x-rays to evaluate for erosive osteoarthritis. She is not reporting  significant inflammatory symptoms but does report some symptoms of prolonged morning stiffness although this is not a daily occurrence. Her CCP is negative. I would like to check a rheumatoid factor.  She is currently taking ibuprofen. We discussed the use of meloxicam. Patient is open to 8 advised to stop or do not take further than meloxicam 15 mg tablet. I have offered her a 7.5 mg for which she can take 1-2 tabs daily as needed. Patient is open to trying this medication.    She does not describe any red hot swollen joints every consistent with acute gout flare we will check for hyperuricemia and possibility this is polyarticular gout.    Neuropathy  Although intermittent we will check for B12 and folate level.    Dry mouth and history of parotid gland surgery  I would like to review the pathology report from [facility] in 2011 when she had an excision of her mass.  We will ask for records.  Her FLORA is negative. We will check an SSA and SSB antibody.  On exam she does have dry mouth. And we should evaluate if there is a possible picture Sjogren's with chronic inflammation leading to parotid gland malignancy.    Osteoporosis  Intolerant of Fosamax. Currently not on therapy. Currently not on calcium replacement  Today we discussed importance of calcium supplementation and vitamin D supplementation.  She will discuss with her dentist in the upcoming surgery.  We briefly discussed the benefits of bisphosphonate or Prolia therapy. We will reevaluate at next visit. In the interim patient will discuss with her dentist any potential upcoming surgery.  We also discussed calcium supplementation patient states she will buy over-the-counter calcium supplements.    adenopathy  At next visit will discuss benefit of soft tissue ultrasound to evaluate for adenopathy    1. Arthralgia, unspecified joint  URIC ACID, SERUM    DX-JOINT SURVEY-HANDS SINGLE VIEW    DX-JOINT SURVEY-FEET SINGLE VIEW    RHEUMATOID ARTHRITIS FACTOR     FOLATE    VITAMIN B12    SSA 52 AND 60 (RO)(MARK) AB, IGG    SSB(LA)(MARK)IGG AB    CRP QUANTITIVE (NON-CARDIAC)    WESTERGREN SED RATE    meloxicam (MOBIC) 7.5 MG Tab   2. Osteoporosis, post-menopausal  URIC ACID, SERUM    DX-JOINT SURVEY-HANDS SINGLE VIEW    DX-JOINT SURVEY-FEET SINGLE VIEW    RHEUMATOID ARTHRITIS FACTOR    FOLATE    VITAMIN B12    SSA 52 AND 60 (RO)(MARK) AB, IGG    SSB(LA)(MARK)IGG AB    CRP QUANTITIVE (NON-CARDIAC)    WESTERGREN SED RATE   3. Vitamin D insufficiency  URIC ACID, SERUM    DX-JOINT SURVEY-HANDS SINGLE VIEW    DX-JOINT SURVEY-FEET SINGLE VIEW    RHEUMATOID ARTHRITIS FACTOR    FOLATE    VITAMIN B12    SSA 52 AND 60 (RO)(MARK) AB, IGG    SSB(LA)(MARK)IGG AB    CRP QUANTITIVE (NON-CARDIAC)    WESTERGREN SED RATE         Followup: Return in about 2 weeks (around 2/2/2017). or sooner prn  Patient was seen 60 minutes face-to-face (excluding time for procedures)  of which more than 50% the time was spent counseling the patient regarding  rheumatological conditions and care. Therapy was discussed in detail.  Thank you for this referral.

## 2017-01-19 NOTE — PROGRESS NOTES
Chief Complaint- polyarthralgia    Chief Complaint   Patient presents with   • New Patient     new patient     Dina Crews is a 71 y.o. female here as a new Rheumatology Consult referred by aMria G Díaz M.D. for consultation regarding polyarthralgia    HPI:   Ms. Dina Crews presents with arthralgias with multiple sites.    She reports a chronic history of finger swelling and hurting with sharp pains through the fingers.  She notes morning stiffness that is variable lasting 1 hour.  She also reports limited .  In her feet she also is noting sharp pains.  With increase use her hand pain does progress.  For her hand swelling she notes this occurs daily in the morning.  She also notes pain in her feet.  She describes this as neuropathy with stinging pain in her toes,  The pain is worse at night or when her feet gets cold.  She denies dactylitis.  She also reports back pain in the lumbar region and often with no aggravated activity she will have increase pain.      To manage her pain she will take ibuprofen.  It does help.  She has also been started meloxicam but does not take it.  For the ibuprofen she takes 600 mg po qHS.      Osteoporosis  Started alendronate but cause bone pain and so has stopped.  Vitamin D was 23 in 2016  She take vitamin D.  She does not take calcium because it constipates her .    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 Hysterectomy, primary  with repeat . Shoulder arthroscopy, cholecystectomy by laparoscopy.  She had two miscarriage that was her first pregnancy and ended within the first  trimester.  The also had a second miscarriage in the first trimester.    Family history: Father had cancer, mother had heart failure. She had a sibling with cancer. She reports arthritis is a family history.  Father use to get gold shots for his arthritis.  She has cousins with Rheumatoid arthritis    Social History: does not smoke, does not drink, she is retired in her job previously she used her hands in crafts, electronics,     She reports morning stiffness and the pain does seem to be worse at the end of the day. She reports stiff hands. She reports swollen joints. She reports unusual hair loss. She denies any sensitivity,eye inflammation, skin rash, pleurisy. She admits to night sweats. She notes weight gain, dizziness, weakness, blurred vision that is corrected with glasses, cavities, bleeding gums, sinus trouble, drainage in back of throat, soreness of her neck, swollen glands most prominent on the right side, constipation and diarrhea (has IBS).    She does endorse only dry mouth no dry eyes.  She manages her xerostomia with water and mouthwash.  She denies rashes.  She also reports dizziness and feels with standing or rapid movements she has transient dizziness but no vertigo manifestations.  She denies Raynauds.  Her hair loss has been ongoing x 8 months. At time she has tingling in her feet      Current medicines (including changes today)  Current Outpatient Prescriptions   Medication Sig Dispense Refill   • ibuprofen (MOTRIN) 600 MG Tab Take 600 mg by mouth every 6 hours as needed.     • meloxicam (MOBIC) 7.5 MG Tab Take 1-2 tabs as needed daily.  DO NOT take more or other NSAIDS (aleve, motrin, ibuprofen etc) 60 Tab 0   • lorazepam (ATIVAN) 1 MG Tab Take 1 Tab by mouth at bedtime as needed. 30 Tab 1   • sertraline (ZOLOFT) 50 MG Tab Take 3 Tabs by mouth every day. 135 Tab 1   • pantoprazole (PROTONIX) 40 MG Tablet Delayed Response Take 1 Tab by mouth every day. 90 Tab 3   • vitamin D (CHOLECALCIFEROL)  1000 UNIT TABS Take 1,000 Units by mouth every day.     • Probiotic Product (PROBIOTIC PO) Take 1 Cap by mouth every day.     • metronidazole (FLAGYL) 500 MG Tab Take 1 Tab by mouth every 8 hours for 10 days. 30 Tab 0   • alendronate (FOSAMAX) 70 MG Tab Take 1 Tab by mouth every 7 days. Take on empty stomach, with full glass water. Don't lay down or take any food or drink for next 30 minutes 12 Tab 3     No current facility-administered medications for this visit.     She  has a past medical history of Perennial allergic rhinitis (2011); Vitamin d deficiency (2011); Tobacco abuse (2011); Personal history of skin cancer (2011); History of malignant neoplasm of parotid gland (10/19/2011); Hyperlipidemia (10/19/2011); GERD (gastroesophageal reflux disease) (10/29/2011); TMJ tenderness (10/29/2011); Osteopenia (2012); Diverticulitis (3/1/2013); Chronic constipation (10/12/2013); S/P partial colectomy (2014); CATARACT; Major depression (2012); Anxiety disorder; and Chronic maxillary sinusitis (2015).  She  has past surgical history that includes hysterectomy, vaginal; primary c section; repeat c section; shoulder arthroscopy; low anterior resection robotic (2014); and malik by laparoscopy (2015).  Family History   Problem Relation Age of Onset   • Lung Disease Mother      COPD   • Cancer Mother      Thyroid cancer   • Cancer Father      d. 72 Stomach cancer   • Diabetes Father    • Cancer Sister 40     Breast cancer   • GI Sister      diverticulitis   • Cancer Sister      breast     Family Status   Relation Status Death Age   • Mother     • Father     • Sister     • Sister     • Sister Alive      Social History   Substance Use Topics   • Smoking status: Former Smoker -- 1.00 packs/day for 45 years     Types: Cigarettes     Start date: 1964     Quit date: 2012   • Smokeless tobacco: Never Used   • Alcohol Use: No     Social  "History     Social History Narrative    Moved to [place] after couldn't find work in [place]. Then mother had increasing health problems. Now stays in [place] M-F to care for mom. Siblings caregive on Weekends. 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    Spouse (female) 25 years.    2 children. 1 grandchild.     2013: mother .          ROS  Constitutional ROS: Positive for fatigue , weight gain  Eye ROS: No eye pain, redness, discharge  Ear ROS: No recent change in hearing  Mouth/Throat ROS: dry mouth  Neck ROS: Positive for swollen glands  Pulmonary ROS: No shortness of breath, No recent change in breathing  Cardiovascular ROS: No chest pain, No shortness of breath  Gastrointestinal ROS: No change in bowel habits  Musculoskeletal/Extremities ROS: Positive for arthritis , backache , stiffness , swelling   Hematologic/Lymphatic ROS: Positive for swollen nodes   Skin/Integumentary ROS: color, texture and temperature normal  Neurologic ROS: Normal development       Objective:     Blood pressure 140/84, pulse 76, temperature 36.7 °C (98 °F), resp. rate 12, height 1.575 m (5' 2\"), weight 83.008 kg (183 lb), last menstrual period 1986, SpO2 95 %. Body mass index is 33.46 kg/(m^2).  Physical Exam:    Filed Vitals:    17 1445   BP: 140/84   Pulse: 76   Temp: 36.7 °C (98 °F)   Resp: 12   Height: 1.575 m (5' 2\")   Weight: 83.008 kg (183 lb)   SpO2: 95%    Body mass index is 33.46 kg/(m^2).    General/Constitutional: NAD not diaphoretic, comfortable  Eyes: clear conjunctiva, no scleral icterus, EOMI, PERRL  Ears, Nose, Mouth,Throat: no oral ulcers, poor dentition, moderate dry mucous membranes, no discharge from ears bilaterally  Cardiovascular: regular rate and rhythm.  No murmurs, gallops, rubs  Respiratory: normal effort, unlabored respiration.  On auscultation no wheezes, rales, rhonchi.  Clear to auscultation.  GI: soft, NTTP no hepatosplenomegaly, nondistended  Musculoskeletal  Axial:  Thoracic: no midline " tenderness  Lumbar no midline tenderness  Upper Extremities:  No synovitis of the PIP, DIP, MCP  [place] nodes appreciated at the DIP.  No notable unruly nodes  There is a nodule on the right hand third digit at the radial aspect of the PIP  Wrists and Elbows have good ROM  Muscle Strength: 5/5 in deltoids, biceps, triceps, finger , wrists extension bilateral  Lower Extremities:  No knee effusion bilateral, No crepitus bilateral  No MTP tenderness bilateral  Muscle Strength: 5/5 in dorsiflexion, plantarflexion, knee extension, knee flexion, and hip flexion bilateral  Gait is normal  Skin: Limited skin exam.  no rashes, no digital ulcerations, no alopecia, no tophi, no skin thickening, no nodules.  On the lower extremity she has mild excoriations bilateral  Neuro: CN II-XII grossly intact, Alert, Oriented x 3, moves all four extremities  Psych: normal affect, normal mood, judgement appropriate, follows commands, responses are appropritae  Heme/Lymph: tenderness on palpation for cervical adenopath    Labs:     No results found for: QNTTBGOLD  No results found for: HEPBCORTOT, HEPBCORIGM, HEPBSAG  Lab Results   Component Value Date/Time    HEPATITIS C ANTIBODY Negative 03/11/2015 11:00 AM     Lab Results   Component Value Date/Time    SODIUM 142 05/05/2016 07:25 AM    POTASSIUM 3.6 05/05/2016 07:25 AM    CHLORIDE 107 05/05/2016 07:25 AM    CO2 29 05/05/2016 07:25 AM    GLUCOSE 108* 05/05/2016 07:25 AM    BUN <5* 05/05/2016 07:25 AM    CREATININE 0.76 05/05/2016 07:25 AM      Lab Results   Component Value Date/Time    WBC 7.7 10/07/2016 02:32 PM    RBC 4.66 10/07/2016 02:32 PM    HEMOGLOBIN 13.9 10/07/2016 02:32 PM    HEMATOCRIT 40.1 10/07/2016 02:32 PM    MCV 86.1 10/07/2016 02:32 PM    MCH 29.8 10/07/2016 02:32 PM    MCHC 34.7 10/07/2016 02:32 PM    MPV 10.1 10/07/2016 02:32 PM    NEUTROPHILS-POLYS 66.70 10/07/2016 02:32 PM    LYMPHOCYTES 24.40 10/07/2016 02:32 PM    MONOCYTES 5.80 10/07/2016 02:32 PM     EOSINOPHILS 2.20 10/07/2016 02:32 PM    BASOPHILS 0.60 10/07/2016 02:32 PM    HYPOCHROMIA 1+ 01/24/2014 09:53 AM      Lab Results   Component Value Date/Time    CALCIUM 9.1 05/05/2016 07:25 AM    AST(SGOT) 37 05/04/2016 03:55 AM    ALT(SGPT) 38 05/04/2016 03:55 AM    ALKALINE PHOSPHATASE 82 05/04/2016 03:55 AM    TOTAL BILIRUBIN 1.1 05/04/2016 03:55 AM    ALBUMIN 4.0 05/05/2016 07:25 AM    TOTAL PROTEIN 6.9 05/04/2016 03:55 AM     Lab Results   Component Value Date/Time    CCP ANTIBODIES 3 10/07/2016 02:32 PM    ANTINUCLEAR ANTIBODY None Detected 10/07/2016 02:31 PM     Lab Results   Component Value Date/Time    SED RATE WESTERGREN 29 10/07/2016 02:32 PM    STAT C-REACTIVE PROTEIN 0.68 10/07/2016 02:32 PM     No results found for: RUSSELVIPER, DRVVTINTERP  No results found for: Y2ICPJTQWUB, W6RLEZLMHTE, IGGCARDIOLI, IGMCARDIOLI, IGACARDIOLI, CRYOGLOBULIN  No results found for: ANADIRECT, ANTIDNADS, RNPAB, SMITHAB, [id number], SSAROAB, SSBLAAB, MVUIAB8UD, CENTROMBAB  No results found for: ANTIDNADS, DSDNA, AGBMAB, GBMABA, ANCAIGG, I4AQQSLKQBO, JO1AB, RNPAB, ANTISSASJ, ANTISSBSJ  Lab Results   Component Value Date/Time    COLOR Yellow 05/03/2016 02:30 AM    SPECIFIC GRAVITY 1.023 05/03/2016 02:30 AM    PH 5.0 05/03/2016 02:30 AM    GLUCOSE Negative 05/03/2016 02:30 AM    KETONES Trace* 05/03/2016 02:30 AM    PROTEIN Negative 05/03/2016 02:30 AM     No results found for: TOTPROTUR, TOTALVOLUME, [id number]  No results found for: SSA60, SSA52  Lab Results   Component Value Date/Time    GLYCOHEMOGLOBIN 6.6* 06/29/2016 10:10 AM     No results found for: CPKTOTAL  No results found for: G6PD  No results found for: [id number]  No results found for: ACESERUM  Lab Results   Component Value Date/Time    25-HYDROXY   VITAMIN D 25 23* 06/29/2016 10:10 AM     No results found for: TSH, FREEDIR  Lab Results   Component Value Date/Time    TSH 2.960 06/29/2016 10:10 AM    FREE T-4 0.71 12/03/2013 04:15 AM     No results found for:  MICROSOMALA, ANTITHYROGL  No results found for: IGGLYMEABS  No results found for: ANTIMITOCHO, FACTIN  Lab Results   Component Value Date/Time    IMMUNOGLOBULIN A 248 03/11/2015 11:00 AM    T-TG IGA 7 03/11/2015 11:00 AM     No results found for: FLTYPE, CRYSTALSBDF  No results found for: ISTATICAL  No results found for: ISTATCREAT  No results found for: CTELOPEP  No results found for: GBMABG  No results found for: PTHINTACT          Results for orders placed during the hospital encounter of 08/09/16   DS-BONE DENSITY STUDY (DEXA)    Impression According to the World Health Organization classification, bone mineral density of this patient is osteoporosis with high fracture risk.        10-year Probability of Fracture:  Major Osteoporotic     19.2%  Hip     3.8%  Population      [place] ()    Based on left femur neck BMD          INTERPRETING LOCATION:  [address] [place] [address], [place], [id number]            Assessment and Plan:  Polyarthralgia with involvement of the hands, feet, wrists.  Likely osteoarthritis. Hand x-rays to evaluate for erosive osteoarthritis. She is not reporting significant inflammatory symptoms but does report some symptoms of prolonged morning stiffness although this is not a daily occurrence. Her CCP is negative. I would like to check a rheumatoid factor.  She is currently taking ibuprofen. We discussed the use of meloxicam. Patient is open to 8 advised to stop or do not take further than meloxicam 15 mg tablet. I have offered her a 7.5 mg for which she can take 1-2 tabs daily as needed. Patient is open to trying this medication.    She does not describe any red hot swollen joints every consistent with acute gout flare we will check for hyperuricemia and possibility this is polyarticular gout.    Neuropathy  Although intermittent we will check for B12 and folate level.    Dry mouth and history of parotid gland surgery  I would like to review the pathology report from [facility] in 2011 when  she had an excision of her mass.  We will ask for records.  Her FLORA is negative. We will check an SSA and SSB antibody.  On exam she does have dry mouth. And we should evaluate if there is a possible picture Sjogren's with chronic inflammation leading to parotid gland malignancy.    Osteoporosis  Intolerant of Fosamax. Currently not on therapy. Currently not on calcium replacement  Today we discussed importance of calcium supplementation and vitamin D supplementation.  She will discuss with her dentist in the upcoming surgery.  We briefly discussed the benefits of bisphosphonate or Prolia therapy. We will reevaluate at next visit. In the interim patient will discuss with her dentist any potential upcoming surgery.  We also discussed calcium supplementation patient states she will buy over-the-counter calcium supplements.    adenopathy  At next visit will discuss benefit of soft tissue ultrasound to evaluate for adenopathy    1. Arthralgia, unspecified joint  URIC ACID, SERUM    DX-JOINT SURVEY-HANDS SINGLE VIEW    DX-JOINT SURVEY-FEET SINGLE VIEW    RHEUMATOID ARTHRITIS FACTOR    FOLATE    VITAMIN B12    SSA 52 AND 60 (RO)(MARK) AB, IGG    SSB(LA)(MARK)IGG AB    CRP QUANTITIVE (NON-CARDIAC)    WESTERGREN SED RATE    meloxicam (MOBIC) 7.5 MG Tab   2. Osteoporosis, post-menopausal  URIC ACID, SERUM    DX-JOINT SURVEY-HANDS SINGLE VIEW    DX-JOINT SURVEY-FEET SINGLE VIEW    RHEUMATOID ARTHRITIS FACTOR    FOLATE    VITAMIN B12    SSA 52 AND 60 (RO)(MARK) AB, IGG    SSB(LA)(MARK)IGG AB    CRP QUANTITIVE (NON-CARDIAC)    WESTERGREN SED RATE   3. Vitamin D insufficiency  URIC ACID, SERUM    DX-JOINT SURVEY-HANDS SINGLE VIEW    DX-JOINT SURVEY-FEET SINGLE VIEW    RHEUMATOID ARTHRITIS FACTOR    FOLATE    VITAMIN B12    SSA 52 AND 60 (RO)(MARK) AB, IGG    SSB(LA)(MARK)IGG AB    CRP QUANTITIVE (NON-CARDIAC)    WESTERGREN SED RATE         Followup: Return in about 2 weeks (around 2/2/2017). or sooner prn  Patient was seen 60 minutes  face-to-face (excluding time for procedures)  of which more than 50% the time was spent counseling the patient regarding  rheumatological conditions and care. Therapy was discussed in detail.  Thank you for this referral.

## 2017-01-20 ENCOUNTER — APPOINTMENT (OUTPATIENT)
Dept: MEDICAL GROUP | Age: 72
End: 2017-01-20
Payer: MEDICARE

## 2017-01-26 ENCOUNTER — HOSPITAL ENCOUNTER (OUTPATIENT)
Dept: RADIOLOGY | Facility: MEDICAL CENTER | Age: 72
End: 2017-01-26
Attending: INTERNAL MEDICINE
Payer: MEDICARE

## 2017-01-26 ENCOUNTER — HOSPITAL ENCOUNTER (OUTPATIENT)
Dept: LAB | Facility: MEDICAL CENTER | Age: 72
End: 2017-01-26
Attending: INTERNAL MEDICINE
Payer: MEDICARE

## 2017-01-26 DIAGNOSIS — M81.0 OSTEOPOROSIS, POST-MENOPAUSAL: ICD-10-CM

## 2017-01-26 DIAGNOSIS — E55.9 VITAMIN D INSUFFICIENCY: ICD-10-CM

## 2017-01-26 DIAGNOSIS — M25.50 ARTHRALGIA, UNSPECIFIED JOINT: ICD-10-CM

## 2017-01-26 LAB
CRP SERPL HS-MCNC: 0.43 MG/DL (ref 0–0.75)
ERYTHROCYTE [SEDIMENTATION RATE] IN BLOOD BY WESTERGREN METHOD: 18 MM/HOUR (ref 0–30)
FOLATE SERPL-MCNC: >23.6 NG/ML
RHEUMATOID FACT SERPL-ACNC: <10 IU/ML (ref 0–14)
URATE SERPL-MCNC: 5.1 MG/DL (ref 1.9–8.2)
VIT B12 SERPL-MCNC: 426 PG/ML (ref 211–911)

## 2017-01-26 PROCEDURE — 77077 JOINT SURVEY SINGLE VIEW: CPT

## 2017-01-28 LAB
ENA SS-B IGG SER IA-ACNC: 0 AU/ML (ref 0–40)
SSA52 R0ENA AB IGG Q0420: 12 AU/ML (ref 0–40)
SSA60 R0ENA AB IGG Q0419: 1 AU/ML (ref 0–40)

## 2017-02-14 ENCOUNTER — APPOINTMENT (OUTPATIENT)
Dept: RHEUMATOLOGY | Facility: PHYSICIAN GROUP | Age: 72
End: 2017-02-14
Payer: MEDICARE

## 2017-02-24 ENCOUNTER — OFFICE VISIT (OUTPATIENT)
Dept: MEDICAL GROUP | Age: 72
End: 2017-02-24
Payer: MEDICARE

## 2017-02-24 VITALS
DIASTOLIC BLOOD PRESSURE: 92 MMHG | TEMPERATURE: 98.1 F | HEART RATE: 87 BPM | WEIGHT: 180 LBS | OXYGEN SATURATION: 93 % | BODY MASS INDEX: 33.13 KG/M2 | SYSTOLIC BLOOD PRESSURE: 150 MMHG | HEIGHT: 62 IN

## 2017-02-24 DIAGNOSIS — M79.10 GENERALIZED MUSCLE ACHE: ICD-10-CM

## 2017-02-24 DIAGNOSIS — J01.40 ACUTE NON-RECURRENT PANSINUSITIS: ICD-10-CM

## 2017-02-24 DIAGNOSIS — B37.31 VAGINAL YEAST INFECTION: ICD-10-CM

## 2017-02-24 DIAGNOSIS — J10.1 INFLUENZA DUE TO INFLUENZA VIRUS, TYPE B: ICD-10-CM

## 2017-02-24 LAB
FLUAV+FLUBV AG SPEC QL IA: NORMAL
INT CON NEG: NEGATIVE
INT CON POS: POSITIVE

## 2017-02-24 PROCEDURE — 87804 INFLUENZA ASSAY W/OPTIC: CPT | Performed by: INTERNAL MEDICINE

## 2017-02-24 PROCEDURE — G8484 FLU IMMUNIZE NO ADMIN: HCPCS | Performed by: INTERNAL MEDICINE

## 2017-02-24 PROCEDURE — 1101F PT FALLS ASSESS-DOCD LE1/YR: CPT | Performed by: INTERNAL MEDICINE

## 2017-02-24 PROCEDURE — 99214 OFFICE O/P EST MOD 30 MIN: CPT | Performed by: INTERNAL MEDICINE

## 2017-02-24 PROCEDURE — 3017F COLORECTAL CA SCREEN DOC REV: CPT | Performed by: INTERNAL MEDICINE

## 2017-02-24 PROCEDURE — 4040F PNEUMOC VAC/ADMIN/RCVD: CPT | Performed by: INTERNAL MEDICINE

## 2017-02-24 PROCEDURE — G8432 DEP SCR NOT DOC, RNG: HCPCS | Performed by: INTERNAL MEDICINE

## 2017-02-24 PROCEDURE — 3014F SCREEN MAMMO DOC REV: CPT | Performed by: INTERNAL MEDICINE

## 2017-02-24 PROCEDURE — G8419 CALC BMI OUT NRM PARAM NOF/U: HCPCS | Performed by: INTERNAL MEDICINE

## 2017-02-24 PROCEDURE — 1036F TOBACCO NON-USER: CPT | Performed by: INTERNAL MEDICINE

## 2017-02-24 RX ORDER — FLUTICASONE PROPIONATE 50 MCG
2 SPRAY, SUSPENSION (ML) NASAL DAILY
Qty: 16 G | Refills: 3 | Status: SHIPPED | OUTPATIENT
Start: 2017-02-24 | End: 2017-06-01

## 2017-02-24 RX ORDER — OSELTAMIVIR PHOSPHATE 75 MG/1
75 CAPSULE ORAL 2 TIMES DAILY
Qty: 10 CAP | Refills: 0 | Status: SHIPPED | OUTPATIENT
Start: 2017-02-24 | End: 2017-03-01

## 2017-02-24 RX ORDER — DOXYCYCLINE HYCLATE 100 MG
100 TABLET ORAL 2 TIMES DAILY
Qty: 20 TAB | Refills: 0 | Status: SHIPPED | OUTPATIENT
Start: 2017-02-24 | End: 2017-03-31

## 2017-02-24 RX ORDER — FLUCONAZOLE 150 MG/1
150 TABLET ORAL DAILY
Qty: 2 TAB | Refills: 0 | Status: SHIPPED | OUTPATIENT
Start: 2017-02-24 | End: 2017-02-25

## 2017-02-24 ASSESSMENT — PAIN SCALES - GENERAL: PAINLEVEL: 8=MODERATE-SEVERE PAIN

## 2017-02-24 NOTE — ASSESSMENT & PLAN NOTE
Patient has generalized body ache for 3 days. She reported subjective fever and chills at home. She denied nausea, vomiting, diarrhea, abdominal pain, chest pain, short of breath.

## 2017-02-24 NOTE — PROGRESS NOTES
Subjective:   Dina Crews is a 71 y.o. female here today for evaluation and management of:      Acute non-recurrent pansinusitis  Patient reported runny nose with yellowish mucous and sinus pressure and congestion for 3 days. She reported postnasal drip, scratchy throat. She reported swelling and pain on neck glands as well.      Generalized muscle ache  Patient has generalized body ache for 3 days. She reported subjective fever and chills at home. She denied nausea, vomiting, diarrhea, abdominal pain, chest pain, short of breath.    Vaginal yeast infection  Patient reported that she used to have yeast infection every time she was treated with antibiotic. She wanted to have Diflucan on hand. She will take it if she has any infection from antibiotic treatment. She is also taking probiotic daily.         Current medicines (including changes today)  Current Outpatient Prescriptions   Medication Sig Dispense Refill   • doxycycline (VIBRAMYCIN) 100 MG Tab Take 1 Tab by mouth 2 times a day. 20 Tab 0   • fluconazole (DIFLUCAN) 150 MG tablet Take 1 Tab by mouth every day for 1 dose. 2 Tab 0   • fluticasone (FLONASE) 50 MCG/ACT nasal spray Spray 2 Sprays in nose every day. 16 g 3   • oseltamivir (TAMIFLU) 75 MG Cap Take 1 Cap by mouth 2 times a day for 5 days. 10 Cap 0   • ibuprofen (MOTRIN) 600 MG Tab Take 600 mg by mouth every 6 hours as needed.     • meloxicam (MOBIC) 7.5 MG Tab Take 1-2 tabs as needed daily.  DO NOT take more or other NSAIDS (aleve, motrin, ibuprofen etc) 60 Tab 0   • lorazepam (ATIVAN) 1 MG Tab Take 1 Tab by mouth at bedtime as needed. 30 Tab 1   • sertraline (ZOLOFT) 50 MG Tab Take 3 Tabs by mouth every day. 135 Tab 1   • pantoprazole (PROTONIX) 40 MG Tablet Delayed Response Take 1 Tab by mouth every day. 90 Tab 3   • vitamin D (CHOLECALCIFEROL) 1000 UNIT TABS Take 1,000 Units by mouth every day.     • Probiotic Product (PROBIOTIC PO) Take 1 Cap by mouth every day.       No current  "facility-administered medications for this visit.     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    ROS   No chest pain, no shortness of breath, no abdominal pain       Objective:     Blood pressure 150/92, pulse 87, temperature 36.7 °C (98.1 °F), height 1.575 m (5' 2\"), weight 81.647 kg (180 lb), last menstrual period 07/12/1986, SpO2 93 %. Body mass index is 32.91 kg/(m^2).   Physical Exam:  General: Alert, oriented and no acute distress.  Eye contact is good, speech goal directed, affect calm  HEENT: conjunctiva non-injected, sclera non-icteric. Erythema and swelling on both nasal cavities with yellowish mucus.   Oral mucous membranes pink and moist with no lesions.  Pinna normal. TM pearly gray.   Neck: Has bilateral submandibular lymph nodes enlargement and tender on palpation. No supraclavicular, submental lymphadenopathy.  Lungs: Normal respiratory effort, clear to auscultation bilaterally with good excursion.  CV: regular rate and rhythm. No murmurs.   Abdomen: soft, non distended, nontender, Bowel sound normal.  Ext: no edema, color normal, vascularity normal, temperature normal      Assessment and Plan:   The following treatment plan was discussed     1. Generalized muscle ache  - Recommend to take Tylenol 500 mg 3 times a day when necessary for body ache and fever.  - Recommend to increase water intake and fluid intake.  - POCT Influenza A/B    2. Acute non-recurrent " pansinusitis  - She is allergic to penicillin. Prescribed doxycycline 100 mg twice a day. Reviewed side effects of doxycycline with patient. Recommend to take probiotic.  - Discussed at length to rinse sinuses with over the counter nasal wash or Netti pot once or twice a day and then use flonase nasal spray once or twice a day after rinsing flonase nasal spray.  - Advised to try nasal steam therapy.   - doxycycline (VIBRAMYCIN) 100 MG Tab; Take 1 Tab by mouth 2 times a day.  Dispense: 20 Tab; Refill: 0  - fluticasone (FLONASE) 50 MCG/ACT nasal spray; Spray 2 Sprays in nose every day.  Dispense: 16 g; Refill: 3    3. Vaginal yeast infection  - Prescribed Diflucan as patient demands on it. Recommend to take Diflucan only one she has symptoms of vaginal yeast infection. Advised to take probiotics daily.  - fluconazole (DIFLUCAN) 150 MG tablet; Take 1 Tab by mouth every day for 1 dose.  Dispense: 2 Tab; Refill: 0    4. Influenza due to influenza virus, type B  - Positive for influenza B in clinic today. Prescribed Tamiflu 75 mg twice a day for 5 days. Discussed general precaution for transmission of infection.  - Reported to CDC.   - oseltamivir (TAMIFLU) 75 MG Cap; Take 1 Cap by mouth 2 times a day for 5 days.  Dispense: 10 Cap; Refill: 0    - Discussed ED precautions with patient: seek emergency evaluation for symptoms including but not limited to: worsening symptoms or developing any new symptoms, crushing chest pain, chest pain associated with difficulty breathing, nausea, or sweats, heart rate irregular or too fast to count.   - Any change or worsening of signs or symptoms, patient encouraged to follow-up or report to emergency room for further evaluation. Patient understands and agrees      Followup: Return in about 4 weeks (around 3/24/2017), or if symptoms worsen or fail to improve, for hyperlipidemia, obesity, vitamin D insufficiency, GERD, osteoporosis.      Please note that this dictation was created using  voice recognition software. I have made every reasonable attempt to correct obvious errors, but I expect that there may have unintended errors in text, spelling, punctuation, or grammar that I did not discover.

## 2017-02-24 NOTE — MR AVS SNAPSHOT
"Dina Adorno Mars   2017 9:40 AM   Office Visit   MRN: [id number]    Department:  [address] [place] [address]   Dept Phone:  [phone]    Description:  Female : 1945   Provider:  Clau Mays M.D.           Reason for Visit     Swollen Glands x 3 days/bodyaches x3 days      Allergies as of 2017     Allergen Noted Reactions    Penicillins 2010   Rash, Unspecified    Itchy rash    Codeine 2014   Vomiting    Morphine 2010   Rash    Localized red rash after morphine administration    Vicodin [Hydrocodone-Acetaminophen] 10/09/2012   Itching      You were diagnosed with     Generalized muscle ache   [317357]       Acute non-recurrent pansinusitis   [4603577]       Vaginal yeast infection   [292662]       Influenza due to influenza virus, type B   [741289]         Vital Signs     Blood Pressure Pulse Temperature Height Weight Body Mass Index    150/92 mmHg 87 36.7 °C (98.1 °F) 1.575 m (5' 2\") 81.647 kg (180 lb) 32.91 kg/m2    Oxygen Saturation Last Menstrual Period Smoking Status             93% 1986 Former Smoker         Basic Information     Date Of Birth Sex Race Ethnicity Preferred Language    1945 Female White Non- English      Your appointments     Mar 31, 2017  9:50 AM   Established Patient with Maria G Díaz M.D.   [address] [address] [phone]           You will be receiving a confirmation call a few days before your appointment from our automated call confirmation system.              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   10/19/2011 - Present    Hyperlipidemia E78.5   10/19/2011 - Present    GERD (gastroesophageal " reflux disease) K21.9   10/29/2011 - Present    Major depression F32.9   7/16/2012 - Present    Osteoporosis, post-menopausal M81.0   8/12/2012 - Present    H/O diverticulitis of colon Z87.19   4/24/2013 - Present    JUNIOR (generalized anxiety disorder) F41.1   12/3/2013 - Present    S/P partial colectomy Z90.49   4/22/2014 - Present    Asthma, mild intermittent J45.20   6/20/2014 - Present    Obesity (BMI 30.0-34.9) E66.9   10/22/2014 - Present    Sleep disturbances G47.9   5/7/2015 - Present    Depression with anxiety F41.8   11/9/2015 - Present    IBS (irritable bowel syndrome) K58.9   11/9/2015 - Present    Hx of adenomatous colonic polyps Z86.010   12/5/2015 - Present    Neuropathy (CMS-HCC) G62.9   1/14/2016 - Present    Hair loss L65.9   3/17/2016 - Present    H/O small bowel obstruction Z87.19   5/3/2016 - Present    Generalized muscle ache M79.1   2/24/2017 - Present    Acute non-recurrent pansinusitis J01.40   2/24/2017 - Present    Vaginal yeast infection B37.3   2/24/2017 - Present      Health Maintenance        Date Due Completion Dates    IMM DTaP/Tdap/Td Vaccine (1 - Tdap) 7/12/1966 ---    IMM ZOSTER VACCINE 7/12/2007 ---    IMM INFLUENZA (1) 9/1/2016 ---    MAMMOGRAM 4/8/2017 4/8/2016, 11/7/2013, 8/8/2012    BONE DENSITY 8/9/2018 8/9/2016, 8/8/2012    COLONOSCOPY 12/2/2020 12/2/2015            Results     POCT Influenza A/B      Component    Rapid Influenza A-B    pos    Comment:     Positive B    Internal Control Positive    Positive    Internal Control Negative    Negative                        Current Immunizations     13-VALENT PCV PREVNAR 8/1/2016    Pneumococcal polysaccharide vaccine (PPSV-23) 12/7/2012    Tetanus Vaccine 4/2/2010      Below and/or attached are the medications your provider expects you to take. Review all of your home medications and newly ordered medications with your provider and/or pharmacist. Follow medication instructions as directed by your provider and/or pharmacist.  Please keep your medication list with you and share with your provider. Update the information when medications are discontinued, doses are changed, or new medications (including over-the-counter products) are added; and carry medication information at all times in the event of emergency situations     Allergies:  PENICILLINS - Rash,Unspecified     CODEINE - Vomiting     MORPHINE - Rash     VICODIN - Itching               Medications  Valid as of: February 24, 2017 - 10:49 AM    Generic Name Brand Name Tablet Size Instructions for use    Cholecalciferol (Tab) cholecalciferol 1000 UNIT Take 1,000 Units by mouth every day.        Doxycycline Hyclate (Tab) VIBRAMYCIN 100 MG Take 1 Tab by mouth 2 times a day.        Fluconazole (Tab) DIFLUCAN 150 MG Take 1 Tab by mouth every day for 1 dose.        Fluticasone Propionate (Suspension) FLONASE 50 MCG/ACT Spray 2 Sprays in nose every day.        Ibuprofen (Tab) MOTRIN 600 MG Take 600 mg by mouth every 6 hours as needed.        LORazepam (Tab) ATIVAN 1 MG Take 1 Tab by mouth at bedtime as needed.        Meloxicam (Tab) MOBIC 7.5 MG Take 1-2 tabs as needed daily.  DO NOT take more or other NSAIDS (aleve, motrin, ibuprofen etc)        Oseltamivir Phosphate (Cap) TAMIFLU 75 MG Take 1 Cap by mouth 2 times a day for 5 days.        Pantoprazole Sodium (Tablet Delayed Response) PROTONIX 40 MG Take 1 Tab by mouth every day.        Probiotic Product   Take 1 Cap by mouth every day.        Sertraline HCl (Tab) ZOLOFT 50 MG Take 3 Tabs by mouth every day.        .                 Medicines prescribed today were sent to:     [facility] [place] - [address] [facility]    [address] [facility] [place] [address]    Phone: [phone] Fax: [phone]    Open 24 Hours?: No      Medication refill instructions:       If your prescription bottle indicates you have medication refills left, it is not necessary to call your provider’s office. Please contact your pharmacy and they  will refill your medication.    If your prescription bottle indicates you do not have any refills left, you may request refills at any time through one of the following ways: The online [facility] system (except Urgent Care), by calling your provider’s office, or by asking your pharmacy to contact your provider’s office with a refill request. Medication refills are processed only during regular business hours and may not be available until the next business day. Your provider may request additional information or to have a follow-up visit with you prior to refilling your medication.   *Please Note: Medication refills are assigned a new Rx number when refilled electronically. Your pharmacy may indicate that no refills were authorized even though a new prescription for the same medication is available at the pharmacy. Please request the medicine by name with the pharmacy before contacting your provider for a refill.           [facility] Access Code: Activation code not generated  Current [facility] Status: Active

## 2017-02-24 NOTE — ASSESSMENT & PLAN NOTE
Patient reported that she used to have yeast infection every time she was treated with antibiotic. She wanted to have Diflucan on hand. She will take it if she has any infection from antibiotic treatment. She is also taking probiotic daily.

## 2017-03-10 ENCOUNTER — PATIENT MESSAGE (OUTPATIENT)
Dept: MEDICAL GROUP | Age: 72
End: 2017-03-10

## 2017-03-10 DIAGNOSIS — F41.1 GAD (GENERALIZED ANXIETY DISORDER): ICD-10-CM

## 2017-03-13 DIAGNOSIS — F41.1 GAD (GENERALIZED ANXIETY DISORDER): ICD-10-CM

## 2017-03-13 NOTE — TELEPHONE ENCOUNTER
Was the patient seen in the last year in this department? Yes     Does patient have an active prescription for medications requested? No     Received Request Via: Patient

## 2017-03-14 NOTE — TELEPHONE ENCOUNTER
Phone Number Called: [phone] (home)     Message: Patient notified of rx sent to pharmacy    Left Message for patient to call back: no

## 2017-03-31 ENCOUNTER — OFFICE VISIT (OUTPATIENT)
Dept: MEDICAL GROUP | Age: 72
End: 2017-03-31
Payer: MEDICARE

## 2017-03-31 VITALS
TEMPERATURE: 97 F | BODY MASS INDEX: 33.34 KG/M2 | WEIGHT: 181.2 LBS | OXYGEN SATURATION: 94 % | SYSTOLIC BLOOD PRESSURE: 134 MMHG | HEART RATE: 74 BPM | DIASTOLIC BLOOD PRESSURE: 86 MMHG | HEIGHT: 62 IN

## 2017-03-31 DIAGNOSIS — E78.00 PURE HYPERCHOLESTEROLEMIA: ICD-10-CM

## 2017-03-31 DIAGNOSIS — K21.9 GASTROESOPHAGEAL REFLUX DISEASE, ESOPHAGITIS PRESENCE NOT SPECIFIED: ICD-10-CM

## 2017-03-31 DIAGNOSIS — F41.1 GAD (GENERALIZED ANXIETY DISORDER): ICD-10-CM

## 2017-03-31 DIAGNOSIS — R10.10 UPPER ABDOMINAL PAIN: ICD-10-CM

## 2017-03-31 DIAGNOSIS — R73.01 IFG (IMPAIRED FASTING GLUCOSE): ICD-10-CM

## 2017-03-31 DIAGNOSIS — M25.50 ARTHRALGIA, UNSPECIFIED JOINT: ICD-10-CM

## 2017-03-31 DIAGNOSIS — E55.9 VITAMIN D INSUFFICIENCY: ICD-10-CM

## 2017-03-31 DIAGNOSIS — H26.9 CATARACT OF BOTH EYES: ICD-10-CM

## 2017-03-31 PROCEDURE — G8484 FLU IMMUNIZE NO ADMIN: HCPCS | Performed by: FAMILY MEDICINE

## 2017-03-31 PROCEDURE — 3014F SCREEN MAMMO DOC REV: CPT | Performed by: FAMILY MEDICINE

## 2017-03-31 PROCEDURE — G8432 DEP SCR NOT DOC, RNG: HCPCS | Performed by: FAMILY MEDICINE

## 2017-03-31 PROCEDURE — 99214 OFFICE O/P EST MOD 30 MIN: CPT | Performed by: FAMILY MEDICINE

## 2017-03-31 PROCEDURE — G8419 CALC BMI OUT NRM PARAM NOF/U: HCPCS | Performed by: FAMILY MEDICINE

## 2017-03-31 PROCEDURE — 1036F TOBACCO NON-USER: CPT | Performed by: FAMILY MEDICINE

## 2017-03-31 PROCEDURE — 1101F PT FALLS ASSESS-DOCD LE1/YR: CPT | Performed by: FAMILY MEDICINE

## 2017-03-31 PROCEDURE — 4040F PNEUMOC VAC/ADMIN/RCVD: CPT | Performed by: FAMILY MEDICINE

## 2017-03-31 RX ORDER — PANTOPRAZOLE SODIUM 40 MG/1
40 TABLET, DELAYED RELEASE ORAL DAILY
Qty: 90 TAB | Refills: 3 | Status: ON HOLD | OUTPATIENT
Start: 2017-03-31 | End: 2018-06-26 | Stop reason: SDUPTHER

## 2017-03-31 RX ORDER — MELOXICAM 7.5 MG/1
TABLET ORAL
Qty: 60 TAB | Refills: 5 | Status: SHIPPED | OUTPATIENT
Start: 2017-03-31 | End: 2017-06-01

## 2017-03-31 RX ORDER — LORAZEPAM 1 MG/1
1 TABLET ORAL NIGHTLY PRN
Qty: 30 TAB | Refills: 1 | Status: SHIPPED | OUTPATIENT
Start: 2017-03-31 | End: 2017-05-22 | Stop reason: SDUPTHER

## 2017-03-31 NOTE — MR AVS SNAPSHOT
"        Dina Adorno Mars   3/31/2017 9:50 AM   Office Visit   MRN: [id number]    Department:  [address] [place] [address]   Dept Phone:  [phone]    Description:  Female : 1945   Provider:  Maria G Díaz M.D.           Reason for Visit     Follow-Up 5 week FV      Allergies as of 3/31/2017     Allergen Noted Reactions    Penicillins 2010   Rash, Unspecified    Itchy rash    Codeine 2014   Vomiting    Morphine 2010   Rash    Localized red rash after morphine administration    Vicodin [Hydrocodone-Acetaminophen] 10/09/2012   Itching      You were diagnosed with     Cataract of both eyes   [013279]       JUNIOR (generalized anxiety disorder)   [694424]   Discontinue imipramine and increase sertraline. Short interval recheck advised    Arthralgia, unspecified joint   [5653973]       Gastroesophageal reflux disease, esophagitis presence not specified   [7274798]       JUNIOR (generalized anxiety disorder)   [720107]   Trial higher dose of imipramine.    Vitamin D insufficiency   [718486]       Pure hypercholesterolemia   [272.0.ICD-9-CM]       IFG (impaired fasting glucose)   [872966]       Upper abdominal pain   [410281]         Vital Signs     Blood Pressure Pulse Temperature Height Weight Body Mass Index    134/86 mmHg 74 36.1 °C (97 °F) 1.575 m (5' 2.01\") 82.192 kg (181 lb 3.2 oz) 33.13 kg/m2    Oxygen Saturation Last Menstrual Period Smoking Status             94% 1986 Former Smoker         Basic Information     Date Of Birth Sex Race Ethnicity Preferred Language    1945 Female White Non- English      Your appointments     2017  1:00 PM   Access New To You with Britt Hernandez M.D.   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   10/19/2011 - Present    Hyperlipidemia E78.5   10/19/2011 - Present    GERD (gastroesophageal reflux disease) K21.9   10/29/2011 - Present    Major depression F32.9   7/16/2012 - Present    Osteoporosis, post-menopausal M81.0   8/12/2012 - Present    H/O diverticulitis of colon Z87.19   4/24/2013 - Present    JUNIOR (generalized anxiety disorder) F41.1   12/3/2013 - Present    S/P partial colectomy Z90.49   4/22/2014 - Present    Asthma, mild intermittent J45.20   6/20/2014 - Present    Obesity (BMI 30.0-34.9) E66.9   10/22/2014 - Present    Sleep disturbances G47.9   5/7/2015 - Present    Depression with anxiety F41.8   11/9/2015 - Present    IBS (irritable bowel syndrome) K58.9   11/9/2015 - Present    Hx of adenomatous colonic polyps Z86.010   12/5/2015 - Present    Neuropathy (CMS-HCC) G62.9   1/14/2016 - Present    Hair loss L65.9   3/17/2016 - Present    H/O small bowel obstruction Z87.19   5/3/2016 - Present    Generalized muscle ache M79.1   2/24/2017 - Present    Acute non-recurrent pansinusitis J01.40   2/24/2017 - Present    Vaginal yeast infection B37.3   2/24/2017 - Present      Health Maintenance        Date Due Completion Dates    IMM DTaP/Tdap/Td Vaccine (1 - Tdap) 7/12/1966 ---    IMM ZOSTER VACCINE 7/12/2007 ---    IMM INFLUENZA (1) 9/1/2016 ---    MAMMOGRAM 4/8/2017 4/8/2016, 11/7/2013, 8/8/2012    BONE DENSITY 8/9/2018 8/9/2016, 8/8/2012    COLONOSCOPY 12/2/2020 12/2/2015            Current Immunizations     13-VALENT PCV PREVNAR 8/1/2016    Pneumococcal polysaccharide vaccine (PPSV-23) 12/7/2012    Tetanus Vaccine 4/2/2010      Below and/or attached are the medications your provider expects you to take. Review all of your home medications and newly ordered medications with your provider and/or pharmacist. Follow medication instructions as directed by your provider and/or  pharmacist. Please keep your medication list with you and share with your provider. Update the information when medications are discontinued, doses are changed, or new medications (including over-the-counter products) are added; and carry medication information at all times in the event of emergency situations     Allergies:  PENICILLINS - Rash,Unspecified     CODEINE - Vomiting     MORPHINE - Rash     VICODIN - Itching               Medications  Valid as of: March 31, 2017 - 10:24 AM    Generic Name Brand Name Tablet Size Instructions for use    Cholecalciferol (Tab) cholecalciferol 1000 UNIT Take 1,000 Units by mouth every day.        Fluticasone Propionate (Suspension) FLONASE 50 MCG/ACT Spray 2 Sprays in nose every day.        LORazepam (Tab) ATIVAN 1 MG Take 1 Tab by mouth at bedtime as needed.        Meloxicam (Tab) MOBIC 7.5 MG Take 1-2 tabs as needed daily.  DO NOT take more or other NSAIDS (aleve, motrin, ibuprofen etc)        Pantoprazole Sodium (Tablet Delayed Response) PROTONIX 40 MG Take 1 Tab by mouth every day.        Probiotic Product   Take 1 Cap by mouth every day.        Sertraline HCl (Tab) ZOLOFT 50 MG Take 3 Tabs by mouth every day.        .                 Medicines prescribed today were sent to:     [facility] [address] [place] [address], [place] - [address] [facility]    [address] [facility] [place] [address]    Phone: [phone] Fax: [phone]    Open 24 Hours?: No      Medication refill instructions:       If your prescription bottle indicates you have medication refills left, it is not necessary to call your provider’s office. Please contact your pharmacy and they will refill your medication.    If your prescription bottle indicates you do not have any refills left, you may request refills at any time through one of the following ways: The online [employer] system (except Urgent Care), by calling your provider’s office, or by asking your pharmacy to contact your provider’s office with a refill  request. Medication refills are processed only during regular business hours and may not be available until the next business day. Your provider may request additional information or to have a follow-up visit with you prior to refilling your medication.   *Please Note: Medication refills are assigned a new Rx number when refilled electronically. Your pharmacy may indicate that no refills were authorized even though a new prescription for the same medication is available at the pharmacy. Please request the medicine by name with the pharmacy before contacting your provider for a refill.        Your To Do List     Future Labs/Procedures Complete By Expires    CBC WITH DIFFERENTIAL  As directed 4/1/2018    COMP METABOLIC PANEL  As directed 4/1/2018    DX-CHEST-2 VIEWS  As directed 3/31/2018    HEMOGLOBIN A1C  As directed 4/1/2018    LIPID PROFILE  As directed 4/1/2018    MICROALBUMIN CREAT RATIO URINE  As directed 4/1/2018    TSH WITH REFLEX TO FT4  As directed 3/31/2018      Referral     A referral request has been sent to our patient care coordination department. Please allow 3-5 business days for us to process this request and contact you either by phone or mail. If you do not hear from us by the 5th business day, please call us at [phone].           [facility] Access Code: Activation code not generated  Current [facility] Status: Active

## 2017-04-01 PROBLEM — J01.40 ACUTE NON-RECURRENT PANSINUSITIS: Status: RESOLVED | Noted: 2017-02-24 | Resolved: 2017-04-01

## 2017-04-01 PROBLEM — B37.31 VAGINAL YEAST INFECTION: Status: RESOLVED | Noted: 2017-02-24 | Resolved: 2017-04-01

## 2017-04-01 NOTE — PROGRESS NOTES
Subjective:     Chief Complaint   Patient presents with   • Arthritis     5 week FV   • Anxiety   • Gastrophageal Reflux     Dina Crews is a 71 y.o. female here today for issues listed below     a last visit she was complaining of arthralgias. She's had some improvement with meloxicam. She has chronic acid reflux. Continues to take PPI daily. No change in symptoms with addition of meloxicam. She does know she continues to have upper abdominal discomfort which seems to be worse when she bends at the waist. It feels as though there's pressure or bubble from her upper abdomen into her lower chest. Previous abdominal imaging showed no  Significant abnormalities   Cataract of both eyes -  Overdue for ophthalmologic examination. Records vision is not particularly crisp but no significant changes.   JUNIOR (generalized anxiety disorder)  Chronic condition which appears to be stable and adequately controlled with current dose of sertraline. Denies any side effects. Denies  Symptoms suggestive of ailyn.   Vitamin D insufficiency -  Not consistent with supplement. Denies any side effects.   Pure hypercholesterolemia On previous labs. Improved with dietary modification.. Currently due for recheck.   IFG (impaired fasting glucose)  No current medications are monitoring. Denies postprandial nausea, polydipsia, polyuria       Allergies: Penicillins; Codeine; Morphine; and Vicodin  Current medicines (including changes today)  Current Outpatient Prescriptions   Medication Sig Dispense Refill   • sertraline (ZOLOFT) 50 MG Tab Take 3 Tabs by mouth every day. 135 Tab 2   • meloxicam (MOBIC) 7.5 MG Tab Take 1-2 tabs as needed daily.  DO NOT take more or other NSAIDS (aleve, motrin, ibuprofen etc) 60 Tab 5   • pantoprazole (PROTONIX) 40 MG Tablet Delayed Response Take 1 Tab by mouth every day. 90 Tab 3   • lorazepam (ATIVAN) 1 MG Tab Take 1 Tab by mouth at bedtime as needed. 30 Tab 1   • fluticasone (FLONASE) 50 MCG/ACT nasal  "spray Spray 2 Sprays in nose every day. 16 g 3   • vitamin D (CHOLECALCIFEROL) 1000 UNIT TABS Take 1,000 Units by mouth every day.     • Probiotic Product (PROBIOTIC PO) Take 1 Cap by mouth every day.       No current facility-administered medications for this visit.       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    ROS  Constitutional: Negative for fever, chills/sweats   Respiratory: Negative for shortness of breath, BROWN  Cardiovascular: Negative for chest pain or pressure  GI/: Negative for diarrhea/constipation / urinary difficulty  All other systems reviewed and are negative except as per HPI.        Objective:     Blood pressure 134/86, pulse 74, temperature 36.1 °C (97 °F), height 1.575 m (5' 2.01\"), weight 82.192 kg (181 lb 3.2 oz), last menstrual period 07/12/1986, SpO2 94 %. Body mass index is 33.13 kg/(m^2).  Physical Exam:  Alert, oriented in no acute distress.  Eye contact is good, speech goal directed, affect calm  HEENT: conjunctiva non-injected, sclera non-icteric.  Pinna normal without skin lesions. TM pearly gray.   Oral mucous membranes pink and moist with no lesions.  Neck No adenopathy or masses in the neck or supraclavicular regions.  No carotid bruits. No thyromegaly  Lungs: clear to auscultation bilaterally with good excursion.  CV: regular rate and rhythm.  Abdomen No CVAT. Soft, nondistended, mildly tender in epigastrium without rebound regarding. No HSM.  Ext: " no edema, no tenderness to palpation over shins    Results reviewed from  January 2017.  Sedimentation rate and rheumatologic labs all normal.    Assessment and Plan:      Chronic conditions stable and well-controlled   further evaluation started for upper abdominal discomfort    Treatment Changes: None. Small amount of lorazepam prescribed. Patient advised this is not intended to be a daily medication.  Dina was seen today for arthritis, anxiety and gastrophageal reflux.    Diagnoses and all orders for this visit:    Cataract of both eyes  Comments:   patient advised to follow up with ophthalmology.  Orders:  -     REFERRAL TO OPHTHALMOLOGY    JUNIOR (generalized anxiety disorder)  Comments:   Continue sertraline.   Orders:  -     sertraline (ZOLOFT) 50 MG Tab; Take 3 Tabs by mouth every day.  -     lorazepam (ATIVAN) 1 MG Tab; Take 1 Tab by mouth at bedtime as needed.    Arthralgia, unspecified joint  Comments:   somewhat improved with meloxicam. Continue medication.  Orders:  -     meloxicam (MOBIC) 7.5 MG Tab; Take 1-2 tabs as needed daily.  DO NOT take more or other NSAIDS (aleve, motrin, ibuprofen etc)  -     TSH WITH REFLEX TO FT4; Future    Gastroesophageal reflux disease, esophagitis presence not specified  Comments:   Continue daily PPI  Orders:  -     pantoprazole (PROTONIX) 40 MG Tablet Delayed Response; Take 1 Tab by mouth every day.  -     CBC WITH DIFFERENTIAL; Future    JUNIOR (generalized anxiety disorder)  Comments:   continue sertraline.  Orders:  -     sertraline (ZOLOFT) 50 MG Tab; Take 3 Tabs by mouth every day.  -     lorazepam (ATIVAN) 1 MG Tab; Take 1 Tab by mouth at bedtime as needed.    Vitamin D insufficiency  Comments:   Continue to supplement.    Pure hypercholesterolemia  Comments:   Continue diet and exercise efforts. Monitor regularly  Orders:  -     LIPID PROFILE; Future    IFG (impaired fasting glucose)  Comments:   discussed risk of progression to diabetes. Recommended consistent  carbohydrate diet and increased exercise.  Orders:  -     COMP METABOLIC PANEL; Future  -     HEMOGLOBIN A1C; Future  -     MICROALBUMIN CREAT RATIO URINE; Future    Upper abdominal pain  Comments:   Positional without guarding. Abdominal imaging normal. Check chest x-ray.  Orders:  -     DX-CHEST-2 VIEWS; Future        Followup: Return in about 3 months (around 7/14/2017) for  with Health  - and new PCP - Dr Hernandez. sooner should new symptoms or problems arise.

## 2017-04-13 ENCOUNTER — OFFICE VISIT (OUTPATIENT)
Dept: MEDICAL GROUP | Age: 72
End: 2017-04-13
Payer: MEDICARE

## 2017-04-13 VITALS
WEIGHT: 178 LBS | TEMPERATURE: 97.7 F | HEIGHT: 62 IN | BODY MASS INDEX: 32.76 KG/M2 | DIASTOLIC BLOOD PRESSURE: 80 MMHG | OXYGEN SATURATION: 96 % | HEART RATE: 88 BPM | SYSTOLIC BLOOD PRESSURE: 134 MMHG

## 2017-04-13 DIAGNOSIS — G62.9 NEUROPATHY: Primary | ICD-10-CM

## 2017-04-13 DIAGNOSIS — Z12.31 ENCOUNTER FOR SCREENING MAMMOGRAM FOR BREAST CANCER: ICD-10-CM

## 2017-04-13 DIAGNOSIS — M81.0 OSTEOPOROSIS, POST-MENOPAUSAL: ICD-10-CM

## 2017-04-13 DIAGNOSIS — F17.210 SMOKING GREATER THAN 30 PACK YEARS: ICD-10-CM

## 2017-04-13 DIAGNOSIS — F33.1 MODERATE EPISODE OF RECURRENT MAJOR DEPRESSIVE DISORDER (HCC): ICD-10-CM

## 2017-04-13 DIAGNOSIS — F41.1 GAD (GENERALIZED ANXIETY DISORDER): ICD-10-CM

## 2017-04-13 DIAGNOSIS — E66.9 OBESITY (BMI 30.0-34.9): ICD-10-CM

## 2017-04-13 PROCEDURE — 99215 OFFICE O/P EST HI 40 MIN: CPT | Performed by: FAMILY MEDICINE

## 2017-04-13 RX ORDER — IBANDRONATE SODIUM 150 MG/1
150 TABLET, FILM COATED ORAL
Qty: 3 TAB | Refills: 0 | Status: SHIPPED | OUTPATIENT
Start: 2017-04-13 | End: 2017-06-01

## 2017-04-13 RX ORDER — GABAPENTIN 100 MG/1
100 CAPSULE ORAL 3 TIMES DAILY
Qty: 90 CAP | Refills: 1 | Status: SHIPPED | OUTPATIENT
Start: 2017-04-13 | End: 2017-06-01

## 2017-04-13 NOTE — MR AVS SNAPSHOT
"        Dina Adorno Mars   2017 1:00 PM   Office Visit   MRN: [id number]    Department:  [address] [place] [address]   Dept Phone:  [phone]    Description:  Female : 1945   Provider:  Britt Hernandez M.D.           Reason for Visit     Establish Care would like to talk about her arthritis       Allergies as of 2017     Allergen Noted Reactions    Penicillins 2010   Rash, Unspecified    Itchy rash    Codeine 2014   Vomiting    Morphine 2010   Rash    Localized red rash after morphine administration    Vicodin [Hydrocodone-Acetaminophen] 10/09/2012   Itching      You were diagnosed with     Neuropathy (CMS-HCC)   [222951]       Osteoporosis, post-menopausal   [309535]       JUNIOR (generalized anxiety disorder)   [653549]       Depression with anxiety   [776353]       JUNIOR (generalized anxiety disorder)   [604681]    Continue sertraline.     Obesity (BMI 30.0-34.9)   [192774]       Obesity (BMI 30-39.9)   [326485]       Encounter for screening mammogram for breast cancer   [6413760]       Encounter for screening for lung cancer   [4671251]         Vital Signs     Blood Pressure Pulse Temperature Height Weight Body Mass Index    134/80 mmHg 88 36.5 °C (97.7 °F) 1.575 m (5' 2.01\") 80.74 kg (178 lb) 32.55 kg/m2    Oxygen Saturation Last Menstrual Period Smoking Status             96% 1986 Former Smoker         Basic Information     Date Of Birth Sex Race Ethnicity Preferred Language    1945 Female White Non- English      Your appointments     2017 11:20 AM   Established Patient with Britt Hernandez M.D.   [address])    [address] [place] [id number] [phone]           You will be receiving a confirmation call a few days before your appointment from our automated call confirmation system.              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   10/19/2011 - Present    Hyperlipidemia E78.5   10/19/2011 - Present    GERD (gastroesophageal reflux disease) K21.9   10/29/2011 - Present    Osteoporosis, post-menopausal M81.0   8/12/2012 - Present    H/O diverticulitis of colon Z87.19   4/24/2013 - Present    JUNIOR (generalized anxiety disorder) F41.1   12/3/2013 - Present    S/P partial colectomy Z90.49   4/22/2014 - Present    Asthma, mild intermittent J45.20   6/20/2014 - Present    Obesity (BMI 30.0-34.9) E66.9   10/22/2014 - Present    Sleep disturbances G47.9   5/7/2015 - Present    Depression with anxiety F41.8   11/9/2015 - Present    IBS (irritable bowel syndrome) K58.9   11/9/2015 - Present    Hx of adenomatous colonic polyps Z86.010   12/5/2015 - Present    Neuropathy (CMS-HCC) G62.9   1/14/2016 - Present    H/O small bowel obstruction Z87.19   5/3/2016 - Present    Generalized muscle ache M79.1   2/24/2017 - Present    Obesity (BMI 30-39.9) E66.9   4/13/2017 - Present      Health Maintenance        Date Due Completion Dates    IMM DTaP/Tdap/Td Vaccine (1 - Tdap) 7/12/1966 ---    IMM ZOSTER VACCINE 7/12/2007 ---    MAMMOGRAM 4/8/2017 4/8/2016, 11/7/2013, 8/8/2012    BONE DENSITY 8/9/2018 8/9/2016, 8/8/2012    COLONOSCOPY 12/2/2020 12/2/2015            Current Immunizations     13-VALENT PCV PREVNAR 8/1/2016    Pneumococcal polysaccharide vaccine (PPSV-23) 12/7/2012    Tetanus Vaccine 4/2/2010      Below and/or attached are the medications your provider expects you to take. Review all of your home medications and newly ordered medications with your provider and/or pharmacist. Follow medication instructions as directed by your provider and/or pharmacist. Please keep your medication list with you and share with your provider. Update the information when medications are discontinued, doses are changed, or new medications (including  over-the-counter products) are added; and carry medication information at all times in the event of emergency situations     Allergies:  PENICILLINS - Rash,Unspecified     CODEINE - Vomiting     MORPHINE - Rash     VICODIN - Itching               Medications  Valid as of: April 13, 2017 -  1:39 PM    Generic Name Brand Name Tablet Size Instructions for use    Cholecalciferol (Tab) cholecalciferol 1000 UNIT Take 1,000 Units by mouth every day.        Fluticasone Propionate (Suspension) FLONASE 50 MCG/ACT Spray 2 Sprays in nose every day.        Gabapentin (Cap) NEURONTIN 100 MG Take 1 Cap by mouth 3 times a day.        Ibandronate Sodium (Tab) BONIVA 150 MG Take 1 Tab by mouth every 30 days.        LORazepam (Tab) ATIVAN 1 MG Take 1 Tab by mouth at bedtime as needed.        Meloxicam (Tab) MOBIC 7.5 MG Take 1-2 tabs as needed daily.  DO NOT take more or other NSAIDS (aleve, motrin, ibuprofen etc)        Pantoprazole Sodium (Tablet Delayed Response) PROTONIX 40 MG Take 1 Tab by mouth every day.        Probiotic Product   Take 1 Cap by mouth every day.        Sertraline HCl (Tab) ZOLOFT 50 MG Take 3 Tabs by mouth every day.        .                 Medicines prescribed today were sent to:     [facility] [address] [place] [address], [place] - [address] [facility]    [address] [facility] [address]    Phone: [phone] Fax: [phone]    Open 24 Hours?: No      Medication refill instructions:       If your prescription bottle indicates you have medication refills left, it is not necessary to call your provider’s office. Please contact your pharmacy and they will refill your medication.    If your prescription bottle indicates you do not have any refills left, you may request refills at any time through one of the following ways: The online [employer] system (except Urgent Care), by calling your provider’s office, or by asking your pharmacy to contact your provider’s office with a refill request. Medication refills are  processed only during regular business hours and may not be available until the next business day. Your provider may request additional information or to have a follow-up visit with you prior to refilling your medication.   *Please Note: Medication refills are assigned a new Rx number when refilled electronically. Your pharmacy may indicate that no refills were authorized even though a new prescription for the same medication is available at the pharmacy. Please request the medicine by name with the pharmacy before contacting your provider for a refill.           [employer] Access Code: Activation code not generated  Current [employer] Status: Active

## 2017-04-13 NOTE — PROGRESS NOTES
Subjective:   CC: establish care, neuropathy    HPI:     Dina Crews is a 71 y.o. female, established patient of the clinic, presents with the following concerns:     1. Neuropathy (CMS-HCC)  C/o chronic numbness and tingling on hands and feet.   Hx of elevated fasting blood glucose, but denies hx of DM.   Folate, B12, and thyroid functions are normal.   Rheumatology evaluation was normal as well.     2. Osteoporosis, post-menopausal  Chronic, previously taking Fosamax, but unable to tolerate side effects: nausea, worsening bone pain.   Pt is interested to try something else  She is taking vitamin D supplement  Does not take calcium due to constipation.   She is active, but does not do weight bearing exercise due to osteoarthritic pain.     3. JUNIOR (generalized anxiety disorder)  Chronic, currently taking Zoloft daily and Ativan PRN for intermittent flares.   Pt states that she requires 3-4 Ativan every weeks.   She understands that this medication is addictive and tries not to take it unless it is necessary.   She denies alcohol or narcotics abuse.     4. MDD  Chronic, stable to Zoloft,   Denies side effects, suicidal ideation or plans.   Also takes Ativan for anxiety as above    5. Obesity (BMI 30.0-34.9)  Chronic, working on dietary modification, unable to exercise much due to chronic osteoarthritis.     6. Smoking greater than 30 pack years  Used to smoke 1 ppd x 45 years, able to cut down to couple cigs per day now.   Denies SOB, chronic productive cough, BROWN, unusual edema, chest pain.   Pt is interested in lung cancer screening program.     Current medicines (including changes today)  Current Outpatient Prescriptions   Medication Sig Dispense Refill   • gabapentin (NEURONTIN) 100 MG Cap Take 1 Cap by mouth 3 times a day. 90 Cap 1   • ibandronate (BONIVA) 150 MG tablet Take 1 Tab by mouth every 30 days. 3 Tab 0   • sertraline (ZOLOFT) 50 MG Tab Take 3 Tabs by mouth every day. 135 Tab 2   • meloxicam  "(MOBIC) 7.5 MG Tab Take 1-2 tabs as needed daily.  DO NOT take more or other NSAIDS (aleve, motrin, ibuprofen etc) 60 Tab 5   • pantoprazole (PROTONIX) 40 MG Tablet Delayed Response Take 1 Tab by mouth every day. 90 Tab 3   • lorazepam (ATIVAN) 1 MG Tab Take 1 Tab by mouth at bedtime as needed. 30 Tab 1   • fluticasone (FLONASE) 50 MCG/ACT nasal spray Spray 2 Sprays in nose every day. 16 g 3   • vitamin D (CHOLECALCIFEROL) 1000 UNIT TABS Take 1,000 Units by mouth every day.     • Probiotic Product (PROBIOTIC PO) Take 1 Cap by mouth every day.       No current facility-administered medications for this visit.     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    I personally reviewed patient's problem list, allergies, medications, family hx, social hx with patient and update EPIC.     REVIEW OF SYSTEMS:  CONSTITUTIONAL:  Denies night sweats, malaise, lethargy, fever or chills.  RESPIRATORY:  Denies cough, wheeze, hemoptysis, or shortness of breath.  CARDIOVASCULAR:  Denies chest pains, palpitations, pedal edema  GASTROINTESTINAL:  Denies abdominal pain, nausea or vomiting, diarrhea, constipation, hematemesis, hematochezia, melena.  GENITOURINARY:  Denies urinary urgency, frequency, dysuria, or hematuria.       Objective:     Blood pressure 134/80, pulse 88, temperature 36.5 °C (97.7 °F), height 1.575 m (5' 2.01\"), weight 80.74 kg (178 lb), last menstrual period 07/12/1986, SpO2 96 %. Body mass " index is 32.55 kg/(m^2).    Physical Exam:  Constitutional: awake, alert, in no distress.  Skin: Warm, dry, good turgor, no rashes, bruises, ulcers in visible areas.  Eye: conjunctiva clear, lids neg for edema or lesions.  Neck: Trachea midline, no masses, no thyromegaly. No cervical or supraclavicular lymphadenopathy  Respiratory: Unlabored respiratory effort, lungs clear to auscultation, no wheezes, no rhonchi.  Cardiovascular: Normal S1, S2, no murmur, no pedal edema.  Abdomen: Soft, non-tender to palpation, no hernia, no hepatosplenomegaly.  Neuro: CN2-12 grossly intact. Strength 5/5, reflexes 2/4 in all extremities, no sensory deficits.   Psych: Oriented x3, affect and mood wnl, intact judgement and insight.       Assessment and Plan:   The following treatment plan was discussed    1. Neuropathy (CMS-HCC)  Chronic, idiopathic neuropathy, workups done by previous PCP negative for B12 deficiency, thyroid was normal, negative autoimmune problems, pt is not diabetic. Plan:   - gabapentin (NEURONTIN) 100 MG Cap; Take 1 Cap by mouth 3 times a day.  Dispense: 90 Cap; Refill: 1  - discussed side effects of Gabapentin with patient.   - f/u in 3 months.     2. Osteoporosis, post-menopausal  Chronic, unable to tolerate Fosamax due to side effects. Will try Boniva. Plan:   - ibandronate (BONIVA) 150 MG tablet; Take 1 Tab by mouth every 30 days.  Dispense: 3 Tab; Refill: 0  - Calcium and Vitamin D as tolerated  - Weight bearing exercise as tolerated.     3. JUNIOR (generalized anxiety disorder)  Chronic, responding well to Zoloft and PRN Ativan  Discussed risks and benefits of chronic Ativan uses, especially with advanced age  Advised pt to use Ativan with cautions and on PRN basis only to avoid addiction/depedence and oversedation.     4. MDD  Chronic, responding well to Zoloft, denies suicidal ideation or plans. Will continue Zoloft    5. Obesity (BMI 30.0-34.9)  - Patient identified as having weight management issue.   Appropriate orders and counseling given.    6. Encounter for screening mammogram for breast cancer  - MA-SCREEN MAMMO W/CAD-BILAT    7. Smoking greater than 30 pack years  - REFERRAL TO LUNG CANCER SCREENING PROGRAM    Total 40 minutes face-to-face time spent with patient, with greater than 50% of the total time discussing patient's issues and symptoms as listed above in assessment and plan, as well as managing coordination of care for future evaluation and treatment.        Britt Hernandez M.D.      Followup: Return in about 3 months (around 7/13/2017) for Multiple issues.    Please note that this dictation was created using voice recognition software. I have made every reasonable attempt to correct obvious errors, but I expect that there are errors of grammar and possibly content that I did not discover before finalizing the note.

## 2017-04-14 PROBLEM — M15.9 PRIMARY OSTEOARTHRITIS INVOLVING MULTIPLE JOINTS: Status: ACTIVE | Noted: 2017-04-14

## 2017-04-14 PROBLEM — M15.0 PRIMARY OSTEOARTHRITIS INVOLVING MULTIPLE JOINTS: Status: ACTIVE | Noted: 2017-04-14

## 2017-04-14 PROBLEM — F17.210 SMOKING GREATER THAN 30 PACK YEARS: Status: ACTIVE | Noted: 2017-04-14

## 2017-04-27 PROBLEM — H26.9 CATARACT OF BOTH EYES: Status: ACTIVE | Noted: 2017-04-27

## 2017-05-03 ENCOUNTER — TELEPHONE (OUTPATIENT)
Dept: HEMATOLOGY ONCOLOGY | Facility: MEDICAL CENTER | Age: 72
End: 2017-05-03

## 2017-05-03 ENCOUNTER — DOCUMENTATION (OUTPATIENT)
Dept: HEMATOLOGY ONCOLOGY | Facility: MEDICAL CENTER | Age: 72
End: 2017-05-03

## 2017-05-03 NOTE — TELEPHONE ENCOUNTER
Left voicemail for patient requesting a return call to schedule shared decision making visit for lung screening program with  Lelia BURGESS Ref:Ly T Vu, Dx:Smoking greater than 30 pack years

## 2017-05-05 ENCOUNTER — OFFICE VISIT (OUTPATIENT)
Dept: URGENT CARE | Facility: CLINIC | Age: 72
End: 2017-05-05
Payer: MEDICARE

## 2017-05-05 ENCOUNTER — APPOINTMENT (OUTPATIENT)
Dept: RADIOLOGY | Facility: IMAGING CENTER | Age: 72
End: 2017-05-05
Attending: EMERGENCY MEDICINE
Payer: MEDICARE

## 2017-05-05 VITALS
BODY MASS INDEX: 32.55 KG/M2 | RESPIRATION RATE: 18 BRPM | WEIGHT: 178 LBS | DIASTOLIC BLOOD PRESSURE: 78 MMHG | OXYGEN SATURATION: 96 % | TEMPERATURE: 98.8 F | HEART RATE: 88 BPM | SYSTOLIC BLOOD PRESSURE: 136 MMHG

## 2017-05-05 DIAGNOSIS — M25.561 ACUTE PAIN OF RIGHT KNEE: ICD-10-CM

## 2017-05-05 PROCEDURE — G8419 CALC BMI OUT NRM PARAM NOF/U: HCPCS | Performed by: EMERGENCY MEDICINE

## 2017-05-05 PROCEDURE — E0114 CRUTCH UNDERARM PAIR NO WOOD: HCPCS | Mod: NU | Performed by: EMERGENCY MEDICINE

## 2017-05-05 PROCEDURE — 73562 X-RAY EXAM OF KNEE 3: CPT | Mod: TC,RT | Performed by: EMERGENCY MEDICINE

## 2017-05-05 PROCEDURE — G8432 DEP SCR NOT DOC, RNG: HCPCS | Performed by: EMERGENCY MEDICINE

## 2017-05-05 PROCEDURE — 3014F SCREEN MAMMO DOC REV: CPT | Performed by: EMERGENCY MEDICINE

## 2017-05-05 PROCEDURE — 1036F TOBACCO NON-USER: CPT | Performed by: EMERGENCY MEDICINE

## 2017-05-05 PROCEDURE — 1101F PT FALLS ASSESS-DOCD LE1/YR: CPT | Performed by: EMERGENCY MEDICINE

## 2017-05-05 PROCEDURE — 4040F PNEUMOC VAC/ADMIN/RCVD: CPT | Performed by: EMERGENCY MEDICINE

## 2017-05-05 PROCEDURE — 99214 OFFICE O/P EST MOD 30 MIN: CPT | Performed by: EMERGENCY MEDICINE

## 2017-05-05 RX ORDER — OXYCODONE HYDROCHLORIDE AND ACETAMINOPHEN 5; 325 MG/1; MG/1
0.5 TABLET ORAL EVERY 6 HOURS PRN
Qty: 15 TAB | Refills: 0 | Status: SHIPPED | OUTPATIENT
Start: 2017-05-05 | End: 2017-06-01

## 2017-05-05 RX ORDER — OXYCODONE AND ACETAMINOPHEN 2.5; 325 MG/1; MG/1
1 TABLET ORAL EVERY 8 HOURS PRN
Qty: 30 TAB | Refills: 0 | Status: SHIPPED | OUTPATIENT
Start: 2017-05-05 | End: 2017-06-01

## 2017-05-05 ASSESSMENT — ENCOUNTER SYMPTOMS
MYALGIAS: 0
BACK PAIN: 0
ABDOMINAL PAIN: 0
NAUSEA: 0
JOINT SWELLING: 1
CHILLS: 0
EYE DISCHARGE: 0
DIARRHEA: 0
VOMITING: 0
NERVOUS/ANXIOUS: 0
NECK PAIN: 0
HEADACHES: 0
FEVER: 0
SENSORY CHANGE: 0
EYE REDNESS: 0

## 2017-05-05 NOTE — MR AVS SNAPSHOT
Dina Adorno Hucferny   2017 1:00 PM   Office Visit   MRN: [id number]    Department:  [facility]   Dept Phone:  [phone]    Description:  Female : 1945   Provider:  NATHALIE Estrada M.D.           Reason for Visit     Knee Pain right knee pain, swoolen and hard to walk x 1 week      Allergies as of 2017     Allergen Noted Reactions    Penicillins 2010   Rash, Unspecified    Itchy rash    Codeine 2014   Vomiting    Morphine 2010   Rash    Localized red rash after morphine administration    Vicodin [Hydrocodone-Acetaminophen] 10/09/2012   Itching      You were diagnosed with     Acute pain of right knee   [[id number]]         Vital Signs     Blood Pressure Pulse Temperature Respirations Weight Oxygen Saturation    136/78 mmHg 88 37.1 °C (98.8 °F) 18 80.74 kg (178 lb) 96%    Last Menstrual Period Smoking Status                1986 Former Smoker          Basic Information     Date Of Birth Sex Race Ethnicity Preferred Language    1945 Female White Non- English      Your appointments     May 18, 2017 10:00 AM   MR BODY WITH/WITHOUT (60) with [facility] MRI 1   [facility])    [address] [place] [address]   [phone]            2017 11:20 AM   Established Patient with Britt Hernandez M.D.   [facility] [address] [facility])    [address] [place] [id number] [phone]           You will be receiving a confirmation call a few days before your appointment from our automated call confirmation system.              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   10/19/2011 - Present    Hyperlipidemia E78.5   10/19/2011 - Present    GERD (gastroesophageal reflux disease) K21.9   10/29/2011 - Present    Osteoporosis, post-menopausal M81.0   2012 - Present    H/O diverticulitis of  colon Z87.19   4/24/2013 - Present    JNUIOR (generalized anxiety disorder) F41.1   12/3/2013 - Present    S/P partial colectomy Z90.49   4/22/2014 - Present    Asthma, mild intermittent J45.20   6/20/2014 - Present    Obesity (BMI 30.0-34.9) E66.9   10/22/2014 - Present    Sleep disturbances G47.9   5/7/2015 - Present    Moderate episode of recurrent major depressive disorder (CMS-HCC) F33.1   11/9/2015 - Present    IBS (irritable bowel syndrome) K58.9   11/9/2015 - Present    Hx of adenomatous colonic polyps Z86.010   12/5/2015 - Present    Neuropathy (CMS-HCC) G62.9   1/14/2016 - Present    H/O small bowel obstruction Z87.19   5/3/2016 - Present    Generalized muscle ache M79.1   2/24/2017 - Present    Smoking greater than 30 pack years F17.210   4/14/2017 - Present    Primary osteoarthritis involving multiple joints M15.0   4/14/2017 - Present    History of total hysterectomy Z90.710   9/8/2008 - Present    History of malignant neoplasm of skin Z85.828   1/7/2008 - Present    Cataract of both eyes, managed by Dr. Caicedo H26.9   4/27/2017 - Present      Health Maintenance        Date Due Completion Dates    IMM DTaP/Tdap/Td Vaccine (1 - Tdap) 7/12/1966 ---    IMM ZOSTER VACCINE 7/12/2007 ---    MAMMOGRAM 4/8/2017 4/8/2016, 11/7/2013, 8/8/2012    BONE DENSITY 8/9/2018 8/9/2016, 8/8/2012    COLONOSCOPY 12/2/2020 12/2/2015            Current Immunizations     13-VALENT PCV PREVNAR 8/1/2016    Pneumococcal polysaccharide vaccine (PPSV-23) 12/7/2012    Tetanus Vaccine 4/2/2010      Below and/or attached are the medications your provider expects you to take. Review all of your home medications and newly ordered medications with your provider and/or pharmacist. Follow medication instructions as directed by your provider and/or pharmacist. Please keep your medication list with you and share with your provider. Update the information when medications are discontinued, doses are changed, or new medications (including  over-the-counter products) are added; and carry medication information at all times in the event of emergency situations     Allergies:  PENICILLINS - Rash,Unspecified     CODEINE - Vomiting     MORPHINE - Rash     VICODIN - Itching               Medications  Valid as of: May 05, 2017 -  2:26 PM    Generic Name Brand Name Tablet Size Instructions for use    Cholecalciferol (Tab) cholecalciferol 1000 UNIT Take 1,000 Units by mouth every day.        Fluticasone Propionate (Suspension) FLONASE 50 MCG/ACT Spray 2 Sprays in nose every day.        Gabapentin (Cap) NEURONTIN 100 MG Take 1 Cap by mouth 3 times a day.        Ibandronate Sodium (Tab) BONIVA 150 MG Take 1 Tab by mouth every 30 days.        LORazepam (Tab) ATIVAN 1 MG Take 1 Tab by mouth at bedtime as needed.        Meloxicam (Tab) MOBIC 7.5 MG Take 1-2 tabs as needed daily.  DO NOT take more or other NSAIDS (aleve, motrin, ibuprofen etc)        Oxycodone-Acetaminophen (Tab) PERCOCET 2.5-325 MG Take 1 Tab by mouth every 8 hours as needed for Severe Pain.        Pantoprazole Sodium (Tablet Delayed Response) PROTONIX 40 MG Take 1 Tab by mouth every day.        Probiotic Product   Take 1 Cap by mouth every day.        Sertraline HCl (Tab) ZOLOFT 50 MG Take 3 Tabs by mouth every day.        .                 Medicines prescribed today were sent to:     [facility] [address] [place] [address], [place] - [address] [facility] [address]    [address] [facility] [place] [address]    Phone: [phone] Fax: [phone]    Open 24 Hours?: No      Medication refill instructions:       If your prescription bottle indicates you have medication refills left, it is not necessary to call your provider’s office. Please contact your pharmacy and they will refill your medication.    If your prescription bottle indicates you do not have any refills left, you may request refills at any time through one of the following ways: The online [employer] system (except Urgent Care), by calling your provider’s office,  or by asking your pharmacy to contact your provider’s office with a refill request. Medication refills are processed only during regular business hours and may not be available until the next business day. Your provider may request additional information or to have a follow-up visit with you prior to refilling your medication.   *Please Note: Medication refills are assigned a new Rx number when refilled electronically. Your pharmacy may indicate that no refills were authorized even though a new prescription for the same medication is available at the pharmacy. Please request the medicine by name with the pharmacy before contacting your provider for a refill.        Your To Do List     Future Labs/Procedures Complete By Expires    DX-KNEE 3 VIEWS RIGHT  As directed 5/5/2018      Referral     A referral request has been sent to our patient care coordination department. Please allow 3-5 business days for us to process this request and contact you either by phone or mail. If you do not hear from us by the 5th business day, please call us at [phone].           [employer] Access Code: Activation code not generated  Current [employer] Status: Active

## 2017-05-05 NOTE — PROGRESS NOTES
Subjective:      Dina Crews is a 71 y.o. female who presents with Knee Pain            Knee Pain  This is a new problem. The current episode started 1 to 4 weeks ago (patient has a pulse one right knee for the past week and a half. She gives no history of overt trauma but the pain started on the medial aspect of her knee when she first started on her morning walk. There was no injury that she can recall no history of go). The problem has been waxing and waning. Associated symptoms include joint swelling (patient's minimally swollen slightly tender on the medial aspect of her right knee.). Pertinent negatives include no abdominal pain, chest pain, chills, fever, headaches, myalgias, nausea, neck pain, rash or vomiting.     Allergies   Allergen Reactions   • Penicillins Rash and Unspecified     Itchy rash   • Codeine Vomiting   • Morphine Rash     Localized red rash after morphine administration   • Vicodin [Hydrocodone-Acetaminophen] Itching     Social History     Social History   • Marital Status:      Spouse Name: N/A   • Number of Children: 2   • Years of Education:      Occupational History   •  Other     Social History Main Topics   • Smoking status: Former Smoker -- 1.00 packs/day for 45 years     Types: Cigarettes     Start date: 1964     Quit date: 2012   • Smokeless tobacco: Never Used   • Alcohol Use: No   • Drug Use: Yes     Special: Oral, Marijuana      Comment: Pot daily   • Sexual Activity:     Partners: Female     Other Topics Concern   • Not on file     Social History Narrative    Moved to [place] after couldn't find work in [place]. Then mother had increasing health problems. Now stays in [place] M-F to care for mom. Siblings caregive on Weekends. 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    Spouse (female) 25 years.    2 children. 1 grandchild.     2013: mother .      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 90 Cap 1   • ibandronate (BONIVA) 150 MG tablet Take 1 Tab by mouth every 30 days. 3 Tab 0   • sertraline (ZOLOFT) 50 MG Tab Take 3 Tabs by mouth every day. 135 Tab 2   • meloxicam (MOBIC) 7.5 MG Tab Take 1-2 tabs as needed daily.  DO NOT take more or other NSAIDS (aleve, motrin, ibuprofen etc) 60 Tab 5   • pantoprazole (PROTONIX) 40 MG Tablet Delayed Response Take 1 Tab by mouth every day. 90 Tab 3   • lorazepam (ATIVAN) 1 MG Tab Take 1 Tab by mouth at bedtime as needed. 30 Tab 1   • fluticasone (FLONASE) 50 MCG/ACT nasal spray Spray 2 Sprays in nose every day. 16 g 3   • vitamin D (CHOLECALCIFEROL) 1000 UNIT TABS Take 1,000 Units by mouth every day.     • Probiotic Product (PROBIOTIC PO) Take 1 Cap by mouth every day.       No current facility-administered medications on file prior to visit.     Family History   Problem Relation Age of Onset   • Lung Disease Mother      COPD   • Cancer Mother      Thyroid cancer   • Cancer Father      d. 72 Stomach cancer   • Diabetes Father    • Cancer Sister 40     Breast cancer   • GI Sister      diverticulitis   • Cancer Sister      breast       Review of Systems   Constitutional: Negative for fever and chills.   Eyes: Negative for discharge and redness.   Cardiovascular: Negative for chest pain.    Gastrointestinal: Negative for nausea, vomiting, abdominal pain and diarrhea.   Musculoskeletal: Positive for joint pain and joint swelling (patient's minimally swollen slightly tender on the medial aspect of her right knee.). Negative for myalgias, back pain and neck pain.   Skin: Negative for rash.   Neurological: Negative for sensory change and headaches.   Psychiatric/Behavioral: The patient is not nervous/anxious.           Objective:     /78 mmHg  Pulse 88  Temp(Src) 37.1 °C (98.8 °F)  Resp 18  Wt 80.74 kg (178 lb)  SpO2 96%  LMP 07/12/1986     Physical Exam   Constitutional: She appears well-developed and well-nourished. No distress.   HENT:   Head: Normocephalic and atraumatic.   Right Ear: External ear normal.   Eyes: Conjunctivae are normal. Right eye exhibits no discharge. Left eye exhibits no discharge.   Neck: Normal range of motion.   Cardiovascular: Normal rate and regular rhythm.    Pulmonary/Chest: Effort normal and breath sounds normal.   Musculoskeletal: Normal range of motion.   Patient has medial tenderness on the joint line of her right anterior medial joint line. This is same area of the anserine bursa as well as potentially the area that passed the calcification.   Neurological: She is alert. No cranial nerve deficit. Coordination normal.   Skin: Skin is warm. She is not diaphoretic. No erythema.   Psychiatric: She has a normal mood and affect. Her behavior is normal.   Vitals reviewed.             260    Patient has slight swelling and calcified body on the area of the anserine bursa.  Assessment/Plan:     1. Acute pain of right knee    - DX-KNEE 3 VIEWS RIGHT; Future  - CRUTCHES  Patient has positive calcification over the medial aspect of his right knee and to be consistent with anserine bursitis.  Patient will be given a set of crutches to ambulate on as well as referral to orthopedic surgery.  use Advil 400 mg 3 times a day with meals in addition given her a very small  dose of Percocet (since has had only pain medicine she can take. She will take 2.5 mg by mouth every 8 hours when necessary pain primarily at night she will not drive in the medications.

## 2017-05-09 ENCOUNTER — TELEPHONE (OUTPATIENT)
Dept: HEMATOLOGY ONCOLOGY | Facility: MEDICAL CENTER | Age: 72
End: 2017-05-09

## 2017-05-09 NOTE — TELEPHONE ENCOUNTER
2nd attempt     Left voicemail for patient requesting a return call to schedule shared decision making visit for lung screening program with  Lelia Mancini APRN Ref:Ly T Vu, Dx:Smoking greater than 30 pack years

## 2017-05-10 ENCOUNTER — TELEPHONE (OUTPATIENT)
Dept: MEDICAL GROUP | Age: 72
End: 2017-05-10

## 2017-05-10 NOTE — TELEPHONE ENCOUNTER
ESTABLISHED PATIENT PRE-VISIT PLANNING     Note: Patient will not be contacted if there is no indication to call.     1.  Reviewed note from last office visit with PCP and/or other med group provider: Yes    2.  If any orders were placed at last visit, do we have Results/Consult Notes?        •  Labs - Labs were not ordered at last office visit.       •  Imaging - Imaging ordered, NOT completed. Patient advised to complete prior to next appointment.       •  Referrals - Referral ordered, patient has NOT been seen.    3.  Immunizations were updated in Epic using WebIZ?: Epic matches WebIZ       •  Web Iz Recommendations: HEPATITIS A  HEPATITIS B ZOSTAVAX (Shingles)    4.  Patient is due for the following Health Maintenance Topics:   Health Maintenance Due   Topic Date Due   • IMM DTaP/Tdap/Td Vaccine (1 - Tdap) 07/12/1966   • LUNG CANCER SCREENING  07/12/2002   • IMM ZOSTER VACCINE  07/12/2007   • MAMMOGRAM  04/08/2017           5.  Patient was not informed to arrive 15 min prior to their scheduled appointment and bring in their medication bottles.

## 2017-05-11 ENCOUNTER — OFFICE VISIT (OUTPATIENT)
Dept: MEDICAL GROUP | Age: 72
End: 2017-05-11
Payer: MEDICARE

## 2017-05-11 VITALS
BODY MASS INDEX: 32.76 KG/M2 | HEART RATE: 74 BPM | TEMPERATURE: 97.9 F | RESPIRATION RATE: 16 BRPM | SYSTOLIC BLOOD PRESSURE: 140 MMHG | WEIGHT: 178 LBS | DIASTOLIC BLOOD PRESSURE: 80 MMHG | OXYGEN SATURATION: 97 % | HEIGHT: 62 IN

## 2017-05-11 DIAGNOSIS — M25.561 ACUTE PAIN OF RIGHT KNEE: ICD-10-CM

## 2017-05-11 PROCEDURE — 99214 OFFICE O/P EST MOD 30 MIN: CPT | Performed by: FAMILY MEDICINE

## 2017-05-11 PROCEDURE — 1101F PT FALLS ASSESS-DOCD LE1/YR: CPT | Performed by: FAMILY MEDICINE

## 2017-05-11 PROCEDURE — 1036F TOBACCO NON-USER: CPT | Performed by: FAMILY MEDICINE

## 2017-05-11 PROCEDURE — 4040F PNEUMOC VAC/ADMIN/RCVD: CPT | Performed by: FAMILY MEDICINE

## 2017-05-11 PROCEDURE — G8419 CALC BMI OUT NRM PARAM NOF/U: HCPCS | Performed by: FAMILY MEDICINE

## 2017-05-11 PROCEDURE — G8432 DEP SCR NOT DOC, RNG: HCPCS | Performed by: FAMILY MEDICINE

## 2017-05-11 PROCEDURE — 3014F SCREEN MAMMO DOC REV: CPT | Performed by: FAMILY MEDICINE

## 2017-05-11 RX ORDER — OXYCODONE HYDROCHLORIDE AND ACETAMINOPHEN 5; 325 MG/1; MG/1
1 TABLET ORAL 2 TIMES DAILY PRN
Qty: 60 TAB | Refills: 0 | Status: SHIPPED | OUTPATIENT
Start: 2017-05-11 | End: 2017-06-01

## 2017-05-11 ASSESSMENT — PAIN SCALES - GENERAL: PAINLEVEL: 8=MODERATE-SEVERE PAIN

## 2017-05-11 NOTE — PROGRESS NOTES
This medical record contains text that has been entered with the assistance of computer voice recognition and dictation software.  Therefore, it may contain unintended errors in text, spelling, punctuation, or grammar    Chief Complaint   Patient presents with   • Knee Pain     right knee pain x 2 weeks        Dina Crews is a 71 y.o. female here evaluation and management of: right knee pain x 2 months      HPI:     Right knee pain  About 2 months ago patient began to complain of right knee pain. She states that she was seen in urgent care about 3 weeks ago and treated for her pain with Percocet. She states that she has some instability, has failed twice as a result of the knee pain. Denies any popping or locking, does not recall any precipitating traumatic events. She states that NSAIDs are not touching the pain on the Percocet helps. Plain film of the knee revealed no pathology.    DX Right Knee 5/5/2017        Impression        No acute fracture identified.  Calcification adjacent to the medial femoral epicondyle consistent with old ligamentous injury.           Current medicines (including changes today)  Current Outpatient Prescriptions   Medication Sig Dispense Refill   • oxycodone-acetaminophen (PERCOCET) 5-325 MG Tab Take 1 Tab by mouth 2 times a day as needed. 60 Tab 0   • oxycodone-acetaminophen (PERCOCET) 2.5-325 MG per tablet Take 1 Tab by mouth every 8 hours as needed for Severe Pain. 30 Tab 0   • oxycodone-acetaminophen (PERCOCET) 5-325 MG Tab Take 0.5 Tabs by mouth every 6 hours as needed. 15 Tab 0   • gabapentin (NEURONTIN) 100 MG Cap Take 1 Cap by mouth 3 times a day. 90 Cap 1   • ibandronate (BONIVA) 150 MG tablet Take 1 Tab by mouth every 30 days. 3 Tab 0   • sertraline (ZOLOFT) 50 MG Tab Take 3 Tabs by mouth every day. 135 Tab 2   • meloxicam (MOBIC) 7.5 MG Tab Take 1-2 tabs as needed daily.  DO NOT take more or other NSAIDS (aleve, motrin, ibuprofen etc) 60 Tab 5   • pantoprazole  (PROTONIX) 40 MG Tablet Delayed Response Take 1 Tab by mouth every day. 90 Tab 3   • lorazepam (ATIVAN) 1 MG Tab Take 1 Tab by mouth at bedtime as needed. 30 Tab 1   • fluticasone (FLONASE) 50 MCG/ACT nasal spray Spray 2 Sprays in nose every day. 16 g 3   • vitamin D (CHOLECALCIFEROL) 1000 UNIT TABS Take 1,000 Units by mouth every day.     • Probiotic Product (PROBIOTIC PO) Take 1 Cap by mouth every day.       No current facility-administered medications for this visit.     She  has a past medical history of Perennial allergic rhinitis (2011); Vitamin d deficiency (2011); Tobacco abuse (2011); Personal history of skin cancer (2011); History of malignant neoplasm of parotid gland (10/19/2011); Hyperlipidemia (10/19/2011); GERD (gastroesophageal reflux disease) (10/29/2011); TMJ tenderness (10/29/2011); Osteopenia (2012); Diverticulitis (3/1/2013); Chronic constipation (10/12/2013); S/P partial colectomy (2014); CATARACT; Major depression (CMS-[facility]) (2012); Anxiety disorder; and Chronic maxillary sinusitis (2015).  She  has past surgical history that includes hysterectomy, vaginal; primary c section; repeat c section; shoulder arthroscopy; low anterior resection robotic (2014); and malik by laparoscopy (2015).  Social History   Substance Use Topics   • Smoking status: Former Smoker -- 1.00 packs/day for 45 years     Types: Cigarettes     Start date: 1964     Quit date: 2012   • Smokeless tobacco: Never Used   • Alcohol Use: No     Social History     Social History Narrative    Moved to [place] after couldn't find work in [place]. Then mother had increasing health problems. Now stays in [place] M-F to care for mom. Siblings caregive on Weekends. 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    Spouse (female) 25 years.    2 children. 1 grandchild.     2013: mother .      Family History   Problem Relation Age of Onset   • Lung Disease Mother      COPD   • Cancer  "Mother      Thyroid cancer   • Cancer Father      d. 72 Stomach cancer   • Diabetes Father    • Cancer Sister 40     Breast cancer   • GI Sister      diverticulitis   • Cancer Sister      breast     Family Status   Relation Status Death Age   • Mother     • Father     • Sister     • Sister     • Sister Alive          ROS  Please see hpi    All other systems reviewed and are negative     Objective:     Blood pressure 140/80, pulse 74, temperature 36.6 °C (97.9 °F), resp. rate 16, height 1.575 m (5' 2.01\"), weight 80.74 kg (178 lb), last menstrual period 1986, SpO2 97 %. Body mass index is 32.55 kg/(m^2).  Physical Exam:    Constitutional: Alert, no distress.  Skin: Warm, dry, good turgor, no rashes in visible areas.  Eye: Equal, round and reactive, conjunctiva clear, lids normal.  ENMT: Lips without lesions, good dentition, oropharynx clear.  Neck: Trachea midline, no masses, no thyromegaly. No cervical or supraclavicular lymphadenopathy.  Respiratory: Unlabored respiratory effort, lungs clear to auscultation, no wheezes, no ronchi.  Cardiovascular: Normal S1, S2, no murmur, no edema.  Abdomen: Soft, non-tender, no masses, no hepatosplenomegaly.  Psych: Alert and oriented x3, normal affect and mood.      Right Knee    negative anterior drawer, negative posterior drawer, negative lachmann, + joint line tenderness, cannot perform Thessalay test, normal gait, no asymetry of quadriceps,   negative valgus and varus stress,  + Anthony, Negative Pivot, negative Paradise test          Assessment and Plan:   The following treatment plan was discussed, again this medical record contains text that has been entered with the assistance of computer voice recognition and dictation software.  Therefore, it may contain unintended errors in text, spelling, punctuation, or grammar      1. Acute pain of right knee  Physical exam C/W lateral meniscal tear  Will obtain MR  Patient was instructed on " activity modification ×2 weeks  Use the brace that  was given in clinic for 2 weeks with activity  NSAIDs when necessary  Ice when necessary and compression    - MR-KNEE-W/O RIGHT; Future  - oxycodone-acetaminophen (PERCOCET) 5-325 MG Tab; Take 1 Tab by mouth 2 times a day as needed.  Dispense: 60 Tab; Refill: 0      HEALTH MAINTENANCE:    Followup: No Follow-up on file.

## 2017-05-11 NOTE — ASSESSMENT & PLAN NOTE
About 2 months ago patient began to complain of right knee pain. She states that she was seen in urgent care about 3 weeks ago and treated for her pain with Percocet. She states that she has some instability, has failed twice as a result of the knee pain. Denies any popping or locking, does not recall any precipitating traumatic events. She states that NSAIDs are not touching the pain on the Percocet helps. Plain film of the knee revealed no pathology.    DX Right Knee 5/5/2017        Impression        No acute fracture identified.  Calcification adjacent to the medial femoral epicondyle consistent with old ligamentous injury.

## 2017-05-11 NOTE — MR AVS SNAPSHOT
"        Dina Adorno Mars   2017 11:40 AM   Office Visit   MRN: [id number]    Department:  [address] [place] [address]   Dept Phone:  [phone]    Description:  Female : 1945   Provider:  Edwina Fernando M.D.           Reason for Visit     Knee Pain right knee pain x 2 weeks       Allergies as of 2017     Allergen Noted Reactions    Penicillins 2010   Rash, Unspecified    Itchy rash    Codeine 2014   Vomiting    Morphine 2010   Rash    Localized red rash after morphine administration    Vicodin [Hydrocodone-Acetaminophen] 10/09/2012   Itching      You were diagnosed with     Acute pain of right knee   [[id number]]         Vital Signs     Blood Pressure Pulse Temperature Respirations Height Weight    140/80 mmHg 74 36.6 °C (97.9 °F) 16 1.575 m (5' 2.01\") 80.74 kg (178 lb)    Body Mass Index Oxygen Saturation Last Menstrual Period Smoking Status          32.55 kg/m2 97% 1986 Former Smoker        Basic Information     Date Of Birth Sex Race Ethnicity Preferred Language    1945 Female White Non- English      Your appointments     May 18, 2017 10:00 AM   MR BODY WITH/WITHOUT (60) with [facility]   [facility])     [facility] [address]   [phone]            2017 11:20 AM   Established Patient with Britt Hernandez M.D.   [address])     [facility] [id number] [phone]           You will be receiving a confirmation call a few days before your appointment from our automated call confirmation system.              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   10/19/2011 - Present    Hyperlipidemia E78.5   10/19/2011 - Present    GERD (gastroesophageal reflux disease) K21.9   10/29/2011 - Present    Osteoporosis, post-menopausal M81.0   2012 - " Present    H/O diverticulitis of colon Z87.19   4/24/2013 - Present    JUNIOR (generalized anxiety disorder) F41.1   12/3/2013 - Present    S/P partial colectomy Z90.49   4/22/2014 - Present    Asthma, mild intermittent J45.20   6/20/2014 - Present    Obesity (BMI 30.0-34.9) E66.9   10/22/2014 - Present    Sleep disturbances G47.9   5/7/2015 - Present    Moderate episode of recurrent major depressive disorder (CMS-HCC) F33.1   11/9/2015 - Present    IBS (irritable bowel syndrome) K58.9   11/9/2015 - Present    Hx of adenomatous colonic polyps Z86.010   12/5/2015 - Present    Neuropathy (CMS-HCC) G62.9   1/14/2016 - Present    H/O small bowel obstruction Z87.19   5/3/2016 - Present    Generalized muscle ache M79.1   2/24/2017 - Present    Smoking greater than 30 pack years F17.210   4/14/2017 - Present    Primary osteoarthritis involving multiple joints M15.0   4/14/2017 - Present    History of total hysterectomy Z90.710   9/8/2008 - Present    History of malignant neoplasm of skin Z85.828   1/7/2008 - Present    Cataract of both eyes, managed by Dr. Caicedo H26.9   4/27/2017 - Present    Right knee pain M25.561   5/11/2017 - Present      Health Maintenance        Date Due Completion Dates    IMM DTaP/Tdap/Td Vaccine (1 - Tdap) 7/12/1966 ---    IMM ZOSTER VACCINE 7/12/2007 ---    MAMMOGRAM 4/8/2017 4/8/2016, 11/7/2013, 8/8/2012    BONE DENSITY 8/9/2018 8/9/2016, 8/8/2012    COLONOSCOPY 12/2/2020 12/2/2015            Current Immunizations     13-VALENT PCV PREVNAR 8/1/2016    Pneumococcal polysaccharide vaccine (PPSV-23) 12/7/2012    Tetanus Vaccine 4/2/2010      Below and/or attached are the medications your provider expects you to take. Review all of your home medications and newly ordered medications with your provider and/or pharmacist. Follow medication instructions as directed by your provider and/or pharmacist. Please keep your medication list with you and share with your provider. Update the information when  medications are discontinued, doses are changed, or new medications (including over-the-counter products) are added; and carry medication information at all times in the event of emergency situations     Allergies:  PENICILLINS - Rash,Unspecified     CODEINE - Vomiting     MORPHINE - Rash     VICODIN - Itching               Medications  Valid as of: May 11, 2017 - 12:45 PM    Generic Name Brand Name Tablet Size Instructions for use    Cholecalciferol (Tab) cholecalciferol 1000 UNIT Take 1,000 Units by mouth every day.        Fluticasone Propionate (Suspension) FLONASE 50 MCG/ACT Spray 2 Sprays in nose every day.        Gabapentin (Cap) NEURONTIN 100 MG Take 1 Cap by mouth 3 times a day.        Ibandronate Sodium (Tab) BONIVA 150 MG Take 1 Tab by mouth every 30 days.        LORazepam (Tab) ATIVAN 1 MG Take 1 Tab by mouth at bedtime as needed.        Meloxicam (Tab) MOBIC 7.5 MG Take 1-2 tabs as needed daily.  DO NOT take more or other NSAIDS (aleve, motrin, ibuprofen etc)        Oxycodone-Acetaminophen (Tab) PERCOCET 2.5-325 MG Take 1 Tab by mouth every 8 hours as needed for Severe Pain.        Oxycodone-Acetaminophen (Tab) PERCOCET 5-325 MG Take 0.5 Tabs by mouth every 6 hours as needed.        Oxycodone-Acetaminophen (Tab) PERCOCET 5-325 MG Take 1 Tab by mouth 2 times a day as needed.        Pantoprazole Sodium (Tablet Delayed Response) PROTONIX 40 MG Take 1 Tab by mouth every day.        Probiotic Product   Take 1 Cap by mouth every day.        Sertraline HCl (Tab) ZOLOFT 50 MG Take 3 Tabs by mouth every day.        .                 Medicines prescribed today were sent to:     [facility] [address] [place] [address], [place] - [address]    [address] [facility] [address]    Phone: [phone] Fax: [phone]    Open 24 Hours?: No      Medication refill instructions:       If your prescription bottle indicates you have medication refills left, it is not necessary to call your provider’s office. Please  contact your pharmacy and they will refill your medication.    If your prescription bottle indicates you do not have any refills left, you may request refills at any time through one of the following ways: The online OpenBook system (except Urgent Care), by calling your provider’s office, or by asking your pharmacy to contact your provider’s office with a refill request. Medication refills are processed only during regular business hours and may not be available until the next business day. Your provider may request additional information or to have a follow-up visit with you prior to refilling your medication.   *Please Note: Medication refills are assigned a new Rx number when refilled electronically. Your pharmacy may indicate that no refills were authorized even though a new prescription for the same medication is available at the pharmacy. Please request the medicine by name with the pharmacy before contacting your provider for a refill.        Your To Do List     Future Labs/Procedures Complete By Expires    MR-KNEE-W/O RIGHT  As directed 11/11/2017         OpenBook Access Code: Activation code not generated  Current OpenBook Status: Active

## 2017-05-15 ENCOUNTER — TELEPHONE (OUTPATIENT)
Dept: HEMATOLOGY ONCOLOGY | Facility: MEDICAL CENTER | Age: 72
End: 2017-05-15

## 2017-05-15 NOTE — TELEPHONE ENCOUNTER
I called and spoke with the patient regarding the referral the we received to the Lung Cancer Scrrning Program. Patient stated that she dose not want to proceed with the referral at this time. Thank you

## 2017-05-18 ENCOUNTER — HOSPITAL ENCOUNTER (OUTPATIENT)
Dept: LAB | Facility: MEDICAL CENTER | Age: 72
End: 2017-05-18
Attending: FAMILY MEDICINE
Payer: MEDICARE

## 2017-05-18 ENCOUNTER — HOSPITAL ENCOUNTER (OUTPATIENT)
Dept: LAB | Facility: MEDICAL CENTER | Age: 72
End: 2017-05-18
Attending: OTOLARYNGOLOGY
Payer: MEDICARE

## 2017-05-18 DIAGNOSIS — R73.01 IFG (IMPAIRED FASTING GLUCOSE): ICD-10-CM

## 2017-05-18 DIAGNOSIS — K21.9 GASTROESOPHAGEAL REFLUX DISEASE, ESOPHAGITIS PRESENCE NOT SPECIFIED: ICD-10-CM

## 2017-05-18 DIAGNOSIS — E78.00 PURE HYPERCHOLESTEROLEMIA: ICD-10-CM

## 2017-05-18 DIAGNOSIS — M25.50 ARTHRALGIA, UNSPECIFIED JOINT: ICD-10-CM

## 2017-05-18 LAB
ALBUMIN SERPL BCP-MCNC: 4.5 G/DL (ref 3.2–4.9)
ALBUMIN/GLOB SERPL: 1.6 G/DL
ALP SERPL-CCNC: 95 U/L (ref 30–99)
ALT SERPL-CCNC: 18 U/L (ref 2–50)
ANION GAP SERPL CALC-SCNC: 10 MMOL/L (ref 0–11.9)
AST SERPL-CCNC: 22 U/L (ref 12–45)
BASOPHILS # BLD AUTO: 0.8 % (ref 0–1.8)
BASOPHILS # BLD: 0.07 K/UL (ref 0–0.12)
BILIRUB SERPL-MCNC: 0.4 MG/DL (ref 0.1–1.5)
BUN SERPL-MCNC: 16 MG/DL (ref 8–22)
BUN SERPL-MCNC: 16 MG/DL (ref 8–22)
CALCIUM SERPL-MCNC: 9.6 MG/DL (ref 8.5–10.5)
CHLORIDE SERPL-SCNC: 105 MMOL/L (ref 96–112)
CHOLEST SERPL-MCNC: 218 MG/DL (ref 100–199)
CO2 SERPL-SCNC: 24 MMOL/L (ref 20–33)
CREAT SERPL-MCNC: 0.66 MG/DL (ref 0.5–1.4)
CREAT SERPL-MCNC: 0.68 MG/DL (ref 0.5–1.4)
CREAT UR-MCNC: 188.5 MG/DL
EOSINOPHIL # BLD AUTO: 0.22 K/UL (ref 0–0.51)
EOSINOPHIL NFR BLD: 2.4 % (ref 0–6.9)
ERYTHROCYTE [DISTWIDTH] IN BLOOD BY AUTOMATED COUNT: 44.5 FL (ref 35.9–50)
EST. AVERAGE GLUCOSE BLD GHB EST-MCNC: 131 MG/DL
GFR SERPL CREATININE-BSD FRML MDRD: >60 ML/MIN/1.73 M 2
GFR SERPL CREATININE-BSD FRML MDRD: >60 ML/MIN/1.73 M 2
GLOBULIN SER CALC-MCNC: 2.8 G/DL (ref 1.9–3.5)
GLUCOSE SERPL-MCNC: 109 MG/DL (ref 65–99)
HBA1C MFR BLD: 6.2 % (ref 0–5.6)
HCT VFR BLD AUTO: 43.2 % (ref 37–47)
HDLC SERPL-MCNC: 42 MG/DL
HGB BLD-MCNC: 14.1 G/DL (ref 12–16)
IMM GRANULOCYTES # BLD AUTO: 0.03 K/UL (ref 0–0.11)
IMM GRANULOCYTES NFR BLD AUTO: 0.3 % (ref 0–0.9)
LDLC SERPL CALC-MCNC: 148 MG/DL
LYMPHOCYTES # BLD AUTO: 1.69 K/UL (ref 1–4.8)
LYMPHOCYTES NFR BLD: 18.4 % (ref 22–41)
MCH RBC QN AUTO: 29.7 PG (ref 27–33)
MCHC RBC AUTO-ENTMCNC: 32.6 G/DL (ref 33.6–35)
MCV RBC AUTO: 90.9 FL (ref 81.4–97.8)
MICROALBUMIN UR-MCNC: 0.8 MG/DL
MICROALBUMIN/CREAT UR: 4 MG/G (ref 0–30)
MONOCYTES # BLD AUTO: 0.59 K/UL (ref 0–0.85)
MONOCYTES NFR BLD AUTO: 6.4 % (ref 0–13.4)
NEUTROPHILS # BLD AUTO: 6.59 K/UL (ref 2–7.15)
NEUTROPHILS NFR BLD: 71.7 % (ref 44–72)
NRBC # BLD AUTO: 0 K/UL
NRBC BLD AUTO-RTO: 0 /100 WBC
PLATELET # BLD AUTO: 285 K/UL (ref 164–446)
PMV BLD AUTO: 11 FL (ref 9–12.9)
POTASSIUM SERPL-SCNC: 3.6 MMOL/L (ref 3.6–5.5)
PROT SERPL-MCNC: 7.3 G/DL (ref 6–8.2)
RBC # BLD AUTO: 4.75 M/UL (ref 4.2–5.4)
SODIUM SERPL-SCNC: 139 MMOL/L (ref 135–145)
T4 FREE SERPL-MCNC: 0.76 NG/DL (ref 0.53–1.43)
TRIGL SERPL-MCNC: 141 MG/DL (ref 0–149)
TSH SERPL DL<=0.005 MIU/L-ACNC: 4.07 UIU/ML (ref 0.3–3.7)
WBC # BLD AUTO: 9.2 K/UL (ref 4.8–10.8)

## 2017-05-18 PROCEDURE — 82565 ASSAY OF CREATININE: CPT

## 2017-05-18 PROCEDURE — 84520 ASSAY OF UREA NITROGEN: CPT | Mod: 59

## 2017-05-18 PROCEDURE — 36415 COLL VENOUS BLD VENIPUNCTURE: CPT

## 2017-05-22 ENCOUNTER — PATIENT MESSAGE (OUTPATIENT)
Dept: MEDICAL GROUP | Age: 72
End: 2017-05-22

## 2017-05-22 ENCOUNTER — TELEPHONE (OUTPATIENT)
Dept: MEDICAL GROUP | Age: 72
End: 2017-05-22

## 2017-05-22 DIAGNOSIS — F41.1 GAD (GENERALIZED ANXIETY DISORDER): ICD-10-CM

## 2017-05-22 NOTE — TELEPHONE ENCOUNTER
1. Caller Name: Dina                                         Call Back Number: [phone] (home)       Patient approves a detailed voicemail message: no    Patient left VM requesting valium 10 mg for her MRI on Thursday.  Please advise.

## 2017-05-23 RX ORDER — DIAZEPAM 5 MG/1
10 TABLET ORAL ONCE
Qty: 2 TAB | Refills: 0 | Status: SHIPPED
Start: 2017-05-23 | End: 2017-05-23

## 2017-05-23 RX ORDER — DIAZEPAM 5 MG/1
10 TABLET ORAL ONCE
Qty: 2 TAB | Refills: 0 | Status: SHIPPED | OUTPATIENT
Start: 2017-05-23 | End: 2017-05-23 | Stop reason: SDUPTHER

## 2017-05-23 RX ORDER — LORAZEPAM 1 MG/1
1 TABLET ORAL NIGHTLY PRN
Qty: 30 TAB | Refills: 0 | Status: SHIPPED
Start: 2017-05-23 | End: 2017-07-05 | Stop reason: SDUPTHER

## 2017-05-23 NOTE — TELEPHONE ENCOUNTER
Phone Number Called: [phone] (home)     Message: Left message for the patient to call us back regarding the note below.      Left Message for patient to call back: yes

## 2017-05-24 NOTE — TELEPHONE ENCOUNTER
Phone Number Called: [phone] (home)     Message: Pt notified that Dr. Hernandez faxed rx for valium to pt's pharmacy.    Left Message for patient to call back: no

## 2017-05-25 ENCOUNTER — HOSPITAL ENCOUNTER (OUTPATIENT)
Dept: RADIOLOGY | Facility: MEDICAL CENTER | Age: 72
End: 2017-05-25
Attending: OTOLARYNGOLOGY
Payer: MEDICARE

## 2017-05-25 ENCOUNTER — HOSPITAL ENCOUNTER (OUTPATIENT)
Dept: RADIOLOGY | Facility: MEDICAL CENTER | Age: 72
End: 2017-05-25
Attending: FAMILY MEDICINE
Payer: MEDICARE

## 2017-05-25 DIAGNOSIS — D49.0 PAROTID TUMOR: ICD-10-CM

## 2017-05-25 DIAGNOSIS — M25.561 ACUTE PAIN OF RIGHT KNEE: ICD-10-CM

## 2017-05-25 PROCEDURE — 70543 MRI ORBT/FAC/NCK W/O &W/DYE: CPT

## 2017-05-25 PROCEDURE — 700117 HCHG RX CONTRAST REV CODE 255: Performed by: OTOLARYNGOLOGY

## 2017-05-25 PROCEDURE — A9579 GAD-BASE MR CONTRAST NOS,1ML: HCPCS | Performed by: OTOLARYNGOLOGY

## 2017-05-25 PROCEDURE — 73721 MRI JNT OF LWR EXTRE W/O DYE: CPT | Mod: RT

## 2017-05-25 RX ADMIN — GADODIAMIDE 17 ML: 287 INJECTION INTRAVENOUS at 11:15

## 2017-05-30 DIAGNOSIS — S83.206D ACUTE MENISCAL TEAR OF KNEE, RIGHT, SUBSEQUENT ENCOUNTER: ICD-10-CM

## 2017-05-30 PROBLEM — R79.89 ELEVATED TSH: Status: ACTIVE | Noted: 2017-05-30

## 2017-05-31 ENCOUNTER — TELEPHONE (OUTPATIENT)
Dept: MEDICAL GROUP | Age: 72
End: 2017-05-31

## 2017-05-31 NOTE — TELEPHONE ENCOUNTER
1. Caller Name: Sarah                                         Call Back Number: [phone] (home)         Patient approves a detailed voicemail message: no    Patient left VM requesting medication for her diverticulitis stating she does not want to go to the .  Please advise.

## 2017-06-01 ENCOUNTER — APPOINTMENT (OUTPATIENT)
Dept: RADIOLOGY | Facility: MEDICAL CENTER | Age: 72
DRG: 392 | End: 2017-06-01
Attending: EMERGENCY MEDICINE
Payer: MEDICARE

## 2017-06-01 ENCOUNTER — HOSPITAL ENCOUNTER (INPATIENT)
Facility: MEDICAL CENTER | Age: 72
LOS: 2 days | DRG: 392 | End: 2017-06-03
Attending: EMERGENCY MEDICINE | Admitting: FAMILY MEDICINE
Payer: MEDICARE

## 2017-06-01 ENCOUNTER — RESOLUTE PROFESSIONAL BILLING HOSPITAL PROF FEE (OUTPATIENT)
Dept: HOSPITALIST | Facility: MEDICAL CENTER | Age: 72
End: 2017-06-01
Payer: MEDICARE

## 2017-06-01 DIAGNOSIS — R31.9 URINARY TRACT INFECTION WITH HEMATURIA, SITE UNSPECIFIED: ICD-10-CM

## 2017-06-01 DIAGNOSIS — K57.32 DIVERTICULITIS OF LARGE INTESTINE WITHOUT PERFORATION OR ABSCESS WITHOUT BLEEDING: ICD-10-CM

## 2017-06-01 DIAGNOSIS — N39.0 URINARY TRACT INFECTION WITH HEMATURIA, SITE UNSPECIFIED: ICD-10-CM

## 2017-06-01 PROBLEM — K57.92 ACUTE DIVERTICULITIS: Status: ACTIVE | Noted: 2017-06-01

## 2017-06-01 LAB
ALBUMIN SERPL BCP-MCNC: 4.2 G/DL (ref 3.2–4.9)
ALBUMIN/GLOB SERPL: 1.2 G/DL
ALP SERPL-CCNC: 93 U/L (ref 30–99)
ALT SERPL-CCNC: 23 U/L (ref 2–50)
ANION GAP SERPL CALC-SCNC: 8 MMOL/L (ref 0–11.9)
APPEARANCE UR: ABNORMAL
AST SERPL-CCNC: 24 U/L (ref 12–45)
BACTERIA #/AREA URNS HPF: ABNORMAL /HPF
BASOPHILS # BLD AUTO: 0.6 % (ref 0–1.8)
BASOPHILS # BLD: 0.08 K/UL (ref 0–0.12)
BILIRUB SERPL-MCNC: 0.6 MG/DL (ref 0.1–1.5)
BILIRUB UR QL CFM: NEGATIVE
BUN SERPL-MCNC: 9 MG/DL (ref 8–22)
CALCIUM SERPL-MCNC: 9.6 MG/DL (ref 8.4–10.2)
CHLORIDE SERPL-SCNC: 102 MMOL/L (ref 96–112)
CO2 SERPL-SCNC: 23 MMOL/L (ref 20–33)
COLOR UR: YELLOW
CREAT SERPL-MCNC: 0.62 MG/DL (ref 0.5–1.4)
EOSINOPHIL # BLD AUTO: 0.18 K/UL (ref 0–0.51)
EOSINOPHIL NFR BLD: 1.5 % (ref 0–6.9)
EPI CELLS #/AREA URNS HPF: ABNORMAL /HPF
ERYTHROCYTE [DISTWIDTH] IN BLOOD BY AUTOMATED COUNT: 42.8 FL (ref 35.9–50)
GFR SERPL CREATININE-BSD FRML MDRD: >60 ML/MIN/1.73 M 2
GLOBULIN SER CALC-MCNC: 3.5 G/DL (ref 1.9–3.5)
GLUCOSE SERPL-MCNC: 105 MG/DL (ref 65–99)
GLUCOSE UR STRIP.AUTO-MCNC: NEGATIVE MG/DL
HCT VFR BLD AUTO: 41.8 % (ref 37–47)
HGB BLD-MCNC: 14.4 G/DL (ref 12–16)
IMM GRANULOCYTES # BLD AUTO: 0.05 K/UL (ref 0–0.11)
IMM GRANULOCYTES NFR BLD AUTO: 0.4 % (ref 0–0.9)
KETONES UR STRIP.AUTO-MCNC: NEGATIVE MG/DL
LACTATE BLD-SCNC: 1.78 MMOL/L (ref 0.5–2)
LEUKOCYTE ESTERASE UR QL STRIP.AUTO: ABNORMAL
LYMPHOCYTES # BLD AUTO: 2.46 K/UL (ref 1–4.8)
LYMPHOCYTES NFR BLD: 19.8 % (ref 22–41)
MCH RBC QN AUTO: 30.1 PG (ref 27–33)
MCHC RBC AUTO-ENTMCNC: 34.4 G/DL (ref 33.6–35)
MCV RBC AUTO: 87.4 FL (ref 81.4–97.8)
MICRO URNS: ABNORMAL
MONOCYTES # BLD AUTO: 0.58 K/UL (ref 0–0.85)
MONOCYTES NFR BLD AUTO: 4.7 % (ref 0–13.4)
MUCOUS THREADS #/AREA URNS HPF: ABNORMAL /HPF
NEUTROPHILS # BLD AUTO: 9.06 K/UL (ref 2–7.15)
NEUTROPHILS NFR BLD: 73 % (ref 44–72)
NITRITE UR QL STRIP.AUTO: POSITIVE
NRBC # BLD AUTO: 0 K/UL
NRBC BLD AUTO-RTO: 0 /100 WBC
PH UR STRIP.AUTO: 5.5 [PH]
PLATELET # BLD AUTO: 337 K/UL (ref 164–446)
PMV BLD AUTO: 10.8 FL (ref 9–12.9)
POTASSIUM SERPL-SCNC: 3.3 MMOL/L (ref 3.6–5.5)
PROT SERPL-MCNC: 7.7 G/DL (ref 6–8.2)
PROT UR QL STRIP: 30 MG/DL
RBC # BLD AUTO: 4.78 M/UL (ref 4.2–5.4)
RBC # URNS HPF: ABNORMAL /HPF
RBC UR QL AUTO: ABNORMAL
SODIUM SERPL-SCNC: 133 MMOL/L (ref 135–145)
SP GR UR STRIP.AUTO: 1.02
SPECIMEN DRAWN FROM PATIENT: NORMAL
WBC # BLD AUTO: 12.4 K/UL (ref 4.8–10.8)
WBC #/AREA URNS HPF: ABNORMAL /HPF

## 2017-06-01 PROCEDURE — 96361 HYDRATE IV INFUSION ADD-ON: CPT

## 2017-06-01 PROCEDURE — 83605 ASSAY OF LACTIC ACID: CPT

## 2017-06-01 PROCEDURE — 87086 URINE CULTURE/COLONY COUNT: CPT

## 2017-06-01 PROCEDURE — 74177 CT ABD & PELVIS W/CONTRAST: CPT

## 2017-06-01 PROCEDURE — 87040 BLOOD CULTURE FOR BACTERIA: CPT | Mod: 91

## 2017-06-01 PROCEDURE — 700111 HCHG RX REV CODE 636 W/ 250 OVERRIDE (IP): Performed by: EMERGENCY MEDICINE

## 2017-06-01 PROCEDURE — A9270 NON-COVERED ITEM OR SERVICE: HCPCS | Performed by: FAMILY MEDICINE

## 2017-06-01 PROCEDURE — 96365 THER/PROPH/DIAG IV INF INIT: CPT

## 2017-06-01 PROCEDURE — 700111 HCHG RX REV CODE 636 W/ 250 OVERRIDE (IP): Performed by: INTERNAL MEDICINE

## 2017-06-01 PROCEDURE — 80053 COMPREHEN METABOLIC PANEL: CPT

## 2017-06-01 PROCEDURE — 700102 HCHG RX REV CODE 250 W/ 637 OVERRIDE(OP): Performed by: FAMILY MEDICINE

## 2017-06-01 PROCEDURE — 700101 HCHG RX REV CODE 250: Performed by: FAMILY MEDICINE

## 2017-06-01 PROCEDURE — 700117 HCHG RX CONTRAST REV CODE 255: Performed by: EMERGENCY MEDICINE

## 2017-06-01 PROCEDURE — 770001 HCHG ROOM/CARE - MED/SURG/GYN PRIV*

## 2017-06-01 PROCEDURE — 700111 HCHG RX REV CODE 636 W/ 250 OVERRIDE (IP): Performed by: FAMILY MEDICINE

## 2017-06-01 PROCEDURE — 700105 HCHG RX REV CODE 258: Performed by: EMERGENCY MEDICINE

## 2017-06-01 PROCEDURE — 85025 COMPLETE CBC W/AUTO DIFF WBC: CPT

## 2017-06-01 PROCEDURE — 99285 EMERGENCY DEPT VISIT HI MDM: CPT

## 2017-06-01 PROCEDURE — 700101 HCHG RX REV CODE 250: Performed by: EMERGENCY MEDICINE

## 2017-06-01 PROCEDURE — 96375 TX/PRO/DX INJ NEW DRUG ADDON: CPT

## 2017-06-01 PROCEDURE — 81001 URINALYSIS AUTO W/SCOPE: CPT

## 2017-06-01 PROCEDURE — 99223 1ST HOSP IP/OBS HIGH 75: CPT | Performed by: FAMILY MEDICINE

## 2017-06-01 PROCEDURE — 96367 TX/PROPH/DG ADDL SEQ IV INF: CPT

## 2017-06-01 PROCEDURE — 36415 COLL VENOUS BLD VENIPUNCTURE: CPT

## 2017-06-01 RX ORDER — SODIUM CHLORIDE 9 MG/ML
1000 INJECTION, SOLUTION INTRAVENOUS ONCE
Status: COMPLETED | OUTPATIENT
Start: 2017-06-01 | End: 2017-06-01

## 2017-06-01 RX ORDER — CEFTRIAXONE 2 G/1
2 INJECTION, POWDER, FOR SOLUTION INTRAMUSCULAR; INTRAVENOUS ONCE
Status: COMPLETED | OUTPATIENT
Start: 2017-06-01 | End: 2017-06-01

## 2017-06-01 RX ORDER — CIPROFLOXACIN 2 MG/ML
400 INJECTION, SOLUTION INTRAVENOUS EVERY 12 HOURS
Status: DISCONTINUED | OUTPATIENT
Start: 2017-06-01 | End: 2017-06-01

## 2017-06-01 RX ORDER — LORAZEPAM 1 MG/1
1 TABLET ORAL NIGHTLY PRN
Status: DISCONTINUED | OUTPATIENT
Start: 2017-06-01 | End: 2017-06-03 | Stop reason: HOSPADM

## 2017-06-01 RX ORDER — LEVOFLOXACIN 5 MG/ML
750 INJECTION, SOLUTION INTRAVENOUS EVERY 24 HOURS
Status: DISCONTINUED | OUTPATIENT
Start: 2017-06-01 | End: 2017-06-02

## 2017-06-01 RX ORDER — DEXTROSE MONOHYDRATE, SODIUM CHLORIDE, AND POTASSIUM CHLORIDE 50; 2.98; 9 G/1000ML; G/1000ML; G/1000ML
INJECTION, SOLUTION INTRAVENOUS CONTINUOUS
Status: DISCONTINUED | OUTPATIENT
Start: 2017-06-01 | End: 2017-06-02

## 2017-06-01 RX ORDER — OMEPRAZOLE 20 MG/1
20 CAPSULE, DELAYED RELEASE ORAL DAILY
Status: DISCONTINUED | OUTPATIENT
Start: 2017-06-01 | End: 2017-06-03 | Stop reason: HOSPADM

## 2017-06-01 RX ORDER — ONDANSETRON 2 MG/ML
4 INJECTION INTRAMUSCULAR; INTRAVENOUS EVERY 4 HOURS PRN
Status: DISCONTINUED | OUTPATIENT
Start: 2017-06-01 | End: 2017-06-03 | Stop reason: HOSPADM

## 2017-06-01 RX ORDER — KETOROLAC TROMETHAMINE 30 MG/ML
15 INJECTION, SOLUTION INTRAMUSCULAR; INTRAVENOUS EVERY 6 HOURS PRN
Status: DISCONTINUED | OUTPATIENT
Start: 2017-06-01 | End: 2017-06-03 | Stop reason: HOSPADM

## 2017-06-01 RX ORDER — OXYCODONE HYDROCHLORIDE AND ACETAMINOPHEN 5; 325 MG/1; MG/1
1 TABLET ORAL EVERY 8 HOURS PRN
Status: DISCONTINUED | OUTPATIENT
Start: 2017-06-01 | End: 2017-06-02

## 2017-06-01 RX ORDER — PANTOPRAZOLE SODIUM 40 MG/1
40 TABLET, DELAYED RELEASE ORAL DAILY
Status: DISCONTINUED | OUTPATIENT
Start: 2017-06-01 | End: 2017-06-01

## 2017-06-01 RX ORDER — DIPHENHYDRAMINE HCL 25 MG
25 TABLET ORAL EVERY 6 HOURS PRN
Status: DISCONTINUED | OUTPATIENT
Start: 2017-06-01 | End: 2017-06-03 | Stop reason: HOSPADM

## 2017-06-01 RX ORDER — DIPHENHYDRAMINE HYDROCHLORIDE 50 MG/ML
25 INJECTION INTRAMUSCULAR; INTRAVENOUS EVERY 6 HOURS PRN
Status: DISCONTINUED | OUTPATIENT
Start: 2017-06-01 | End: 2017-06-03 | Stop reason: HOSPADM

## 2017-06-01 RX ORDER — ZOLPIDEM TARTRATE 5 MG/1
5 TABLET ORAL
Status: DISCONTINUED | OUTPATIENT
Start: 2017-06-01 | End: 2017-06-03 | Stop reason: HOSPADM

## 2017-06-01 RX ORDER — OXYCODONE HYDROCHLORIDE AND ACETAMINOPHEN 5; 325 MG/1; MG/1
1 TABLET ORAL EVERY 8 HOURS PRN
COMMUNITY
End: 2017-10-07

## 2017-06-01 RX ORDER — LABETALOL HYDROCHLORIDE 5 MG/ML
10 INJECTION, SOLUTION INTRAVENOUS EVERY 4 HOURS PRN
Status: DISCONTINUED | OUTPATIENT
Start: 2017-06-01 | End: 2017-06-03 | Stop reason: HOSPADM

## 2017-06-01 RX ORDER — MORPHINE SULFATE 4 MG/ML
1 INJECTION, SOLUTION INTRAMUSCULAR; INTRAVENOUS EVERY 4 HOURS PRN
Status: DISCONTINUED | OUTPATIENT
Start: 2017-06-01 | End: 2017-06-01

## 2017-06-01 RX ORDER — ACETAMINOPHEN 325 MG/1
650 TABLET ORAL EVERY 6 HOURS PRN
Status: DISCONTINUED | OUTPATIENT
Start: 2017-06-01 | End: 2017-06-03 | Stop reason: HOSPADM

## 2017-06-01 RX ADMIN — POTASSIUM CHLORIDE, DEXTROSE MONOHYDRATE AND SODIUM CHLORIDE: 300; 5; 900 INJECTION, SOLUTION INTRAVENOUS at 08:36

## 2017-06-01 RX ADMIN — SODIUM CHLORIDE 1000 ML: 9 INJECTION, SOLUTION INTRAVENOUS at 05:21

## 2017-06-01 RX ADMIN — ONDANSETRON 4 MG: 2 INJECTION INTRAMUSCULAR; INTRAVENOUS at 21:22

## 2017-06-01 RX ADMIN — FENTANYL CITRATE 50 MCG: 50 INJECTION INTRAMUSCULAR; INTRAVENOUS at 05:24

## 2017-06-01 RX ADMIN — METRONIDAZOLE 500 MG: 500 INJECTION, SOLUTION INTRAVENOUS at 21:22

## 2017-06-01 RX ADMIN — METRONIDAZOLE 500 MG: 500 INJECTION, SOLUTION INTRAVENOUS at 14:29

## 2017-06-01 RX ADMIN — ENOXAPARIN SODIUM 40 MG: 100 INJECTION SUBCUTANEOUS at 10:14

## 2017-06-01 RX ADMIN — DIPHENHYDRAMINE HYDROCHLORIDE 25 MG: 50 INJECTION, SOLUTION INTRAMUSCULAR; INTRAVENOUS at 19:15

## 2017-06-01 RX ADMIN — CEFTRIAXONE SODIUM 2 G: 2 INJECTION, POWDER, FOR SOLUTION INTRAMUSCULAR; INTRAVENOUS at 05:31

## 2017-06-01 RX ADMIN — IOHEXOL 100 ML: 350 INJECTION, SOLUTION INTRAVENOUS at 05:23

## 2017-06-01 RX ADMIN — POTASSIUM CHLORIDE, DEXTROSE MONOHYDRATE AND SODIUM CHLORIDE: 300; 5; 900 INJECTION, SOLUTION INTRAVENOUS at 20:25

## 2017-06-01 RX ADMIN — METRONIDAZOLE 500 MG: 500 INJECTION, SOLUTION INTRAVENOUS at 06:40

## 2017-06-01 RX ADMIN — ZOLPIDEM TARTRATE 5 MG: 5 TABLET ORAL at 23:32

## 2017-06-01 RX ADMIN — OMEPRAZOLE 20 MG: 20 CAPSULE, DELAYED RELEASE ORAL at 10:16

## 2017-06-01 RX ADMIN — LEVOFLOXACIN 750 MG: 5 INJECTION, SOLUTION INTRAVENOUS at 10:15

## 2017-06-01 RX ADMIN — MORPHINE SULFATE 1 MG: 4 INJECTION INTRAVENOUS at 10:20

## 2017-06-01 RX ADMIN — MORPHINE SULFATE 1 MG: 4 INJECTION INTRAVENOUS at 19:05

## 2017-06-01 RX ADMIN — SERTRALINE 150 MG: 50 TABLET, FILM COATED ORAL at 10:15

## 2017-06-01 ASSESSMENT — LIFESTYLE VARIABLES
ALCOHOL_USE: NO
EVER_SMOKED: YES
DO YOU DRINK ALCOHOL: NO

## 2017-06-01 ASSESSMENT — PAIN SCALES - GENERAL
PAINLEVEL_OUTOF10: 7
PAINLEVEL_OUTOF10: 8

## 2017-06-01 NOTE — IP AVS SNAPSHOT
6/3/2017    Dina Adorno [facility]  [address] [place] [address]    Dear Dina:    [facility] wants to ensure your discharge home is safe and you or your loved ones have had all of your questions answered regarding your care after you leave the hospital.    Below is a list of resources and contact information should you have any questions regarding your hospital stay, follow-up instructions, or active medical symptoms.    Questions or Concerns Regarding… Contact   Medical Questions Related to Your Discharge  (7 days a week, 8am-5pm) Contact a Nurse Care Coordinator   [phone]   Medical Questions Not Related to Your Discharge  (24 hours a day / 7 days a week)  Contact the Nurse Health Line   [phone]    Medications or Discharge Instructions Refer to your discharge packet   or contact your [facility] Primary Care Provider   [phone]   Follow-up Appointment(s) Schedule your appointment via [employer]   or contact Scheduling [phone]   Billing Review your statement via [employer]  or contact Billing [phone]   Medical Records Review your records via [employer]   or contact Medical Records [phone]     You may receive a telephone call within two days of discharge. This call is to make certain you understand your discharge instructions and have the opportunity to have any questions answered. You can also easily access your medical information, test results and upcoming appointments via the [employer] free online health management tool. You can learn more and sign up at [employer]. For assistance setting up your [employer] account, please call [phone].    Once again, we want to ensure your discharge home is safe and that you have a clear understanding of any next steps in your care. If you have any questions or concerns, please do not hesitate to contact us, we are here for you. Thank you for choosing [facility] for your healthcare needs.    Sincerely,    Your [facility] Healthcare Team

## 2017-06-01 NOTE — ED NOTES
Pt here with c/o    Chief Complaint   Patient presents with   • Blood in Urine     painful urination and hematuria; started yesterday   • Diarrhea     hx of diverticulitis and colectomy; has had diarrhea x 1 week   • LLQ Pain     left lower quadrant radiating up        LMP 07/12/1986  .

## 2017-06-01 NOTE — H&P
HPI:    Patient is a 71 year old female who comes to the ER because of abdominal pain and dysuria. She states that for the past week, she's been having LLQ pain along with diarrhea. She denies any blood in her stools. Patient has a history of multiple episodes of diverticulitis and is s/p partial colectomy. In addition, she began to have painful urination that started yesterday. She also noted blood in her urine. She denies any fevers, chills, nausea, or vomiting. In the ER, CT abdomen and pelvis show an acute diverticulitis in the sigmoid colon. WBC is mildly elevated at 12,000 however she is afebrile and lactic acid is normal. UA is positive for a UTI. Potassium is low at 3.3.       ROS: Positive for LLQ pain, diarrhea, dysuria, hematuria. 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    SURGICAL HISTORY   has past surgical history that includes hysterectomy, vaginal; low anterior resection robotic (1/28/2014); malik by laparoscopy (4/20/2015); primary c section; repeat c section; other abdominal surgery; and shoulder arthroscopy.    FAMILY HISTORY   Family History     Family History    Problem  Relation  Age of Onset    •  Lung Disease  Mother          COPD    •  Cancer  Mother          Thyroid cancer    •  Cancer  Father          d. 72 Stomach cancer    •  Diabetes  Father      •  Cancer   Sister  40        Breast cancer    •  GI  Sister          diverticulitis    •  Cancer  Sister          breast           SOCIAL HISTORY   Social History     Social History        Social History    •  Marital Status:          Spouse Name:  N/A    •  Number of Children:  2    •  Years of Education:  HS        Occupational History    •    Other        Social History Main Topics    •  Smoking status:  Current Every Day Smoker -- 0.50 packs/day for 45 years        Types:  Cigarettes        Start date:  01/01/1964        Last Attempt to Quit:  01/01/2012    •  Smokeless tobacco:  Never Used    •  Alcohol Use:  No    •  Drug Use:  Yes        Special:  Oral, Marijuana          Comment: Pot daily-edibles    •  Sexual Activity:        Partners:  Female                 CURRENT MEDICATIONS  Home Medications       Reviewed by Savita Fregoso R.N. (Registered Nurse) on 06/01/17 at 0435   Med List Status: Complete      Medication  Last Dose  Status      lorazepam (ATIVAN) 1 MG Tab  5/31/2017  Active      oxycodone-acetaminophen (PERCOCET) 2.5-325 MG per tablet  5/30/2017  Active      pantoprazole (PROTONIX) 40 MG Tablet Delayed Response  5/31/2017  Active      sertraline (ZOLOFT) 50 MG Tab  5/31/2017  Active                    ALLERGIES  Allergies    Allergen  Reactions    •  Penicillins  Rash and Unspecified        Itchy rash    •  Codeine  Vomiting    •  Morphine  Rash        Localized red rash after morphine administration    •  Vicodin [Hydrocodone-Acetaminophen]  Itching              Physical Exam:  Gen: NAD, lying comfortably in bed  HEENT: NC/AT, MMM  Chest: symmetrical expansion, no costochondral tenderness on palpation  Lungs: CTA B/L, no w/r/r  CV: mild tenderness on palpation of LLQ  Abdomen: S/NT/ND, + BS x 4  Ext: no c/c/e, FROM x 4  Skin: dry, warm to touch   Neuro: CN II - XII intact, no focal motor or sensory deficits noted  Psych: A+O x 3, judgment is intact             Labs (CBC): Last 72 Hours  (Last 3  "results in the past 72 hours)             06/01/17   0455          WBC  12.4        Neutrophils-Polys  73.00        Basophils  0.60        RBC  4.78        Hemoglobin  14.4        Hematocrit  41.8        MCV  87.4        MCH  30.1        MCHC  34.4        Platelet Count  337        RDW  42.8        MPV  10.8        Neutrophils (Absolute)   9.06        Lymphs (Absolute)  2.46                   - Comment  - High             Labs (Metabolic): Last 72 Hours  (Last 3 results in the past 72 hours)             06/01/17   0455          Sodium  133        Potassium  3.3        Chloride  102        Co2  23        Glucose  105        Bun  9        Creatinine  0.62        Calcium  9.6        AST(SGOT)  24        ALT(SGPT)  23        Alkaline Phosphatase  93        Total Bilirubin  0.6        Albumin  4.2        Total Protein  7.7        Globulin  3.5        A-G Ratio  1.2        GFR If African American 60 mL/min/1.73 m 2  Lab: V\" class=\"rz_1m\" style=\"padding-left: 2px;\"> 60 mL/min/1.73 m 2  Lab: V\" class=\"rz_1n\" style=\"padding-left: 3px;\">>60        GFR If Non African American 60 mL/min/1.73 m 2  Lab: V\" class=\"rz_1m\" style=\"padding-left: 2px;\"> 60 mL/min/1.73 m 2  Lab: V\" class=\"rz_1n\" style=\"padding-left: 3px;\">>60        Anion Gap  8.0           6/1/2017 5:01 AM    HISTORY/REASON FOR EXAM:  LLQ Pain. Diarrhea. Dysuria and hematuria.    TECHNIQUE/EXAM DESCRIPTION:  CT scan of the abdomen and pelvis with contrast.    Contrast-enhanced helical scanning was obtained from the diaphragmatic domes through the pubic symphysis following the bolus administration of omn 350/100 mL of Omnipaque 350 without complication.    Low dose optimization technique was utilized for this CT exam including automated exposure control and adjustment of the mA and/or kV according to patient size.    COMPARISON: CT AP 9/8/2015    FINDINGS:  The visualized lung bases are clear.    CT Abdomen:  The liver appears normal.  No biliary dilatation.  The " gallbladder is absent.  The pancreas is unremarkable.    The spleen appears normal.  The adrenal glands are not enlarged and the kidneys enhance symmetrically.    There is no lymphadenopathy.    The aorta and IVC are normal in caliber.  The bowel is without obstruction.  The appendix is normal.  Sigmoid diverticulosis is present. There is minimal inflammatory change in the left lower quadrant near the left pelvic sidewall consistent with diverticulitis. No abscess or free air is identified.    CT Pelvis:  There is no lymphadenopathy.    No free fluid is present.  The bladder appears normal.    There is no acute inflammatory process.             Impression        1.  Inflammatory change in the mid sigmoid colon in the left lower quadrant consistent with a small area of acute or subacute diverticulitis. No abscess or free air is identified.    2.  No hydronephrosis or nephrolithiasis identified.    3.  Cholecystectomy.         Assessment/Plan:    Acute diverticulitis  UTI  Abdominal pain  Hypokalemia  Dyslipidemia  GERD  Depression  Anxiety      Patient with LLQ pain and diarrhea due to diverticulitis seen on CT. Will treat with IV Cipro and Flagyl. Keep NPO at this time. Continue IV fluids  UA shows a UTI. Continue IV Cipro. Urine cx is pending  Start IV Toradol prn pain. Zofran prn nausea  Will supplement with KCl via IV fluids.

## 2017-06-01 NOTE — IP AVS SNAPSHOT
Yantra Access Code: Activation code not generated  Current Yantra Status: Active    [employer]  A secure, online tool to manage your health information     [employer]’s Yantra® is a secure, online tool that connects you to your personalized health information from the privacy of your home -- day or night - making it very easy for you to manage your healthcare. Once the activation process is completed, you can even access your medical information using the Yantra danii, which is available for free in the Apple Danii store or Google Play store.     Yantra provides the following levels of access (as shown below):   My Chart Features   [facility] Primary Care Doctor [facility]  Specialists [facility]  Urgent  Care Non-[facility]  Primary Care  Doctor   Email your healthcare team securely and privately 24/7 X X X X   Manage appointments: schedule your next appointment; view details of past/upcoming appointments X      Request prescription refills. X      View recent personal medical records, including lab and immunizations X X X X   View health record, including health history, allergies, medications X X X X   Read reports about your outpatient visits, procedures, consult and ER notes X X X X   See your discharge summary, which is a recap of your hospital and/or ER visit that includes your diagnosis, lab results, and care plan. X X       How to register for Yantra:  1. Go to  [email or web address].  2. Click on the Sign Up Now box, which takes you to the New Member Sign Up page. You will need to provide the following information:  a. Enter your Yantra Access Code exactly as it appears at the top of this page. (You will not need to use this code after you’ve completed the sign-up process. If you do not sign up before the expiration date, you must request a new code.)   b. Enter your date of birth.   c. Enter your home email address.   d. Click Submit, and follow the next screen’s instructions.  3. Create a Yantra ID. This will  be your [employer] login ID and cannot be changed, so think of one that is secure and easy to remember.  4. Create a [employer] password. You can change your password at any time.  5. Enter your Password Reset Question and Answer. This can be used at a later time if you forget your password.   6. Enter your e-mail address. This allows you to receive e-mail notifications when new information is available in [employer].  7. Click Sign Up. You can now view your health information.    For assistance activating your [employer] account, call [phone]

## 2017-06-01 NOTE — ED NOTES
Med Rec completed per patient  Allergies reviewed    Patient stated she had some left over Flagyl at home and took 7 days worth and completed a week ago

## 2017-06-01 NOTE — PROGRESS NOTES
Requesting pain med other then toradol.  Also states she gets thrush from abx.  Notified .new orders received

## 2017-06-01 NOTE — TELEPHONE ENCOUNTER
Per chart review, pt has been admitted to the hospital for multiple infection as well as possible GI perforation. In general, I do not give medications for conditions that is self-diagnosed by patient.   All possible infection need to be seen by MD. When you receive this type of voicemail in the future, please call patients and advise them to get medical attention.   Thanks.

## 2017-06-01 NOTE — IP AVS SNAPSHOT
" Home Care Instructions                                                                                                                  Name:Dina Crews  Medical Record Number:[id number]  CSN: [id number]    YOB: 1945   Age: 71 y.o.  Sex: female  HT:1.6 m (5' 2.99\") WT: 78.9 kg (173 lb 15.1 oz)          Admit Date: 6/1/2017     Discharge Date:   Today's Date: 6/3/2017  Attending Doctor:  Harriett Hyatt M.D.                  Allergies:  Penicillins; Codeine; Morphine; and Vicodin            Discharge Instructions       Discharge Instructions    Discharged to home by car with relative. Discharged via wheelchair, hospital escort: Yes.  Special equipment needed: Not Applicable    Be sure to schedule a follow-up appointment with your primary care doctor or any specialists as instructed.     Discharge Plan:   Smoking Cessation Offered: Patient Refused  Influenza Vaccine Indication: Patient Refuses    I understand that a diet low in cholesterol, fat, and sodium is recommended for good health. Unless I have been given specific instructions below for another diet, I accept this instruction as my diet prescription.   Other diet: Home Diet    Special Instructions: None    · Is patient discharged on Warfarin / Coumadin?   No     · Is patient Post Blood Transfusion?  No    Diverticulitis  Diverticulitis is inflammation or infection of small pouches in your colon that form when you have a condition called diverticulosis. The pouches in your colon are called diverticula. Your colon, or large intestine, is where water is absorbed and stool is formed.  Complications of diverticulitis can include:  · Bleeding.  · Severe infection.  · Severe pain.  · Perforation of your colon.  · Obstruction of your colon.  CAUSES   Diverticulitis is caused by bacteria.  Diverticulitis happens when stool becomes trapped in diverticula. This allows bacteria to grow in the diverticula, which can lead to inflammation and infection.  RISK " FACTORS  People with diverticulosis are at risk for diverticulitis. Eating a diet that does not include enough fiber from fruits and vegetables may make diverticulitis more likely to develop.  SYMPTOMS   Symptoms of diverticulitis may include:  · Abdominal pain and tenderness. The pain is normally located on the left side of the abdomen, but may occur in other areas.  · Fever and chills.  · Bloating.  · Cramping.  · Nausea.  · Vomiting.  · Constipation.  · Diarrhea.  · Blood in your stool.  DIAGNOSIS   Your health care provider will ask you about your medical history and do a physical exam. You may need to have tests done because many medical conditions can cause the same symptoms as diverticulitis. Tests may include:  · Blood tests.  · Urine tests.  · Imaging tests of the abdomen, including X-rays and CT scans.  When your condition is under control, your health care provider may recommend that you have a colonoscopy. A colonoscopy can show how severe your diverticula are and whether something else is causing your symptoms.  TREATMENT   Most cases of diverticulitis are mild and can be treated at home. Treatment may include:  · Taking over-the-counter pain medicines.  · Following a clear liquid diet.  · Taking antibiotic medicines by mouth for 7-10 days.  More severe cases may be treated at a hospital. Treatment may include:  · Not eating or drinking.  · Taking prescription pain medicine.  · Receiving antibiotic medicines through an IV tube.  · Receiving fluids and nutrition through an IV tube.  · Surgery.  HOME CARE INSTRUCTIONS   · Follow your health care provider's instructions carefully.  · Follow a full liquid diet or other diet as directed by your health care provider. After your symptoms improve, your health care provider may tell you to change your diet. He or she may recommend you eat a high-fiber diet. Fruits and vegetables are good sources of fiber. Fiber makes it easier to pass stool.  · Take fiber  supplements or probiotics as directed by your health care provider.  · Only take medicines as directed by your health care provider.  · Keep all your follow-up appointments.  SEEK MEDICAL CARE IF:   · Your pain does not improve.  · You have a hard time eating food.  · Your bowel movements do not return to normal.  SEEK IMMEDIATE MEDICAL CARE IF:   · Your pain becomes worse.  · Your symptoms do not get better.  · Your symptoms suddenly get worse.  · You have a fever.  · You have repeated vomiting.  · You have bloody or black, tarry stools.  MAKE SURE YOU:   · Understand these instructions.  · Will watch your condition.  · Will get help right away if you are not doing well or get worse.     This information is not intended to replace advice given to you by your health care provider. Make sure you discuss any questions you have with your health care provider.     Document Released: 09/27/2006 Document Revised: 12/23/2014 Document Reviewed: 11/12/2014  [employer] Interactive Patient Education ©2016 [employer]      Depression / Suicide Risk    As you are discharged from this [facility] facility, it is important to learn how to keep safe from harming yourself.    Recognize the warning signs:  · Abrupt changes in personality, positive or negative- including increase in energy   · Giving away possessions  · Change in eating patterns- significant weight changes-  positive or negative  · Change in sleeping patterns- unable to sleep or sleeping all the time   · Unwillingness or inability to communicate  · Depression  · Unusual sadness, discouragement and loneliness  · Talk of wanting to die  · Neglect of personal appearance   · Rebelliousness- reckless behavior  · Withdrawal from people/activities they love  · Confusion- inability to concentrate     If you or a loved one observes any of these behaviors or has concerns about self-harm, here's what you can do:  · Talk about it- your feelings and reasons for harming  yourself  · Remove any means that you might use to hurt yourself (examples: pills, rope, extension cords, firearm)  · Get professional help from the community (Mental Health, Substance Abuse, psychological counseling)  · Do not be alone:Call your Safe Contact- someone whom you trust who will be there for you.  · Call your local CRISIS HOTLINE 883-8884 or 153-004-8942  · Call your local Children's Mobile Crisis Response Team [place] (686) 996-4570 or www[employer]  · Call the toll free National Suicide Prevention Hotlines   · National Suicide Prevention Lifeline 179-352-XELS (0159)  · National Hope Line Network 800-SUICIDE (038-8057)        Your appointments     Jul 13, 2017 11:20 AM   Established Patient with Britt Hernandez M.D.   [facility] ([facility]    [address]  [place] [address] [phone]           You will be receiving a confirmation call a few days before your appointment from our automated call confirmation system.              Follow-up Information     1. Follow up with Britt Hernandez M.D..    Specialty:  Family Medicine    Why:  As needed    Contact information    [address]  [place] [address] [phone]           Discharge Medication Instructions:    Below are the medications your physician expects you to take upon discharge:    Review all your home medications and newly ordered medications with your doctor and/or pharmacist. Follow medication instructions as directed by your doctor and/or pharmacist.    Please keep your medication list with you and share with your physician.               Medication List      START taking these medications        Instructions    Morning Afternoon Evening Bedtime    levofloxacin 250 MG Tabs   Last time this was given:  750 mg on 6/3/2017  8:11 AM   Commonly known as:  LEVAQUIN        Take 3 Tabs by mouth every day for 7 days.   Dose:  750 mg                        metronidazole 500 MG Tabs   Last time this was given:  500 mg on  6/3/2017  6:20 AM   Commonly known as:  FLAGYL        Take 1 Tab by mouth every 8 hours for 10 days.   Dose:  500 mg                        nystatin 021408 UNIT/ML Susp   Commonly known as:  MYCOSTATIN        Take 5 mL by mouth 3 times a day as needed (for oral rash, swash and spit) for up to 10 days.   Dose:  221830 Units                          CHANGE how you take these medications        Instructions    Morning Afternoon Evening Bedtime    * oxycodone-acetaminophen 5-325 MG Tabs   What changed:  Another medication with the same name was added. Make sure you understand how and when to take each.   Last time this was given:  1 Tab on 6/3/2017  6:20 AM   Commonly known as:  PERCOCET        Take 1 Tab by mouth every 8 hours as needed.   Dose:  1 Tab                        * oxycodone-acetaminophen 5-325 MG Tabs   What changed:  You were already taking a medication with the same name, and this prescription was added. Make sure you understand how and when to take each.   Last time this was given:  1 Tab on 6/3/2017  6:20 AM   Commonly known as:  PERCOCET        Take 1 Tab by mouth every 6 hours as needed for Moderate Pain or Severe Pain for up to 5 days.   Dose:  1 Tab                        * Notice:  This list has 2 medication(s) that are the same as other medications prescribed for you. Read the directions carefully, and ask your doctor or other care provider to review them with you.      CONTINUE taking these medications        Instructions    Morning Afternoon Evening Bedtime    lorazepam 1 MG Tabs   Commonly known as:  ATIVAN        Take 1 Tab by mouth at bedtime as needed.   Dose:  1 mg                        pantoprazole 40 MG Tbec   Commonly known as:  PROTONIX        Take 1 Tab by mouth every day.   Dose:  40 mg                        sertraline 50 MG Tabs   Last time this was given:  150 mg on 6/3/2017  8:11 AM   Commonly known as:  ZOLOFT        Take 3 Tabs by mouth every day.   Dose:  150 mg                              Where to Get Your Medications      These medications were sent to [id number] - [place], [place] - [address]  [address] [facility] [place] [address]     Phone:  [phone]    - levofloxacin 250 MG Tabs  - metronidazole 500 MG Tabs  - nystatin 969548 UNIT/ML Susp      You can get these medications from any pharmacy     Bring a paper prescription for each of these medications    - oxycodone-acetaminophen 5-325 MG Tabs            Instructions           Diet / Nutrition:    Follow any diet instructions given to you by your doctor or the dietician, including how much salt (sodium) you are allowed each day.    If you are overweight, talk to your doctor about a weight reduction plan.    Activity:    Remain physically active following your doctor's instructions about exercise and activity.    Rest often.     Any time you become even a little tired or short of breath, SIT DOWN and rest.    Worsening Symptoms:    Report any of the following signs and symptoms to the doctor's office immediately:    *Pain of jaw, arm, or neck  *Chest pain not relieved by medication                               *Dizziness or loss of consciousness  *Difficulty breathing even when at rest   *More tired than usual                                       *Bleeding drainage or swelling of surgical site  *Swelling of feet, ankles, legs or stomach                 *Fever (>100ºF)  *Pink or blood tinged sputum  *Weight gain (3lbs/day or 5lbs /week)           *Shock from internal defibrillator (if applicable)  *Palpitations or irregular heartbeats                *Cool and/or numb extremities    Stroke Awareness    Common Risk Factors for Stroke include:    Age  Atrial Fibrillation  Carotid Artery Stenosis  Diabetes Mellitus  Excessive alcohol consumption  High blood pressure  Overweight   Physical inactivity  Smoking    Warning signs and symptoms of a stroke include:    *Sudden numbness or weakness of the face, arm or leg  (especially on one side of the body).  *Sudden confusion, trouble speaking or understanding.  *Sudden trouble seeing in one or both eyes.  *Sudden trouble walking, dizziness, loss of balance or coordination.Sudden severe headache with no known cause.    It is very important to get treatment quickly when a stroke occurs. If you experience any of the above warning signs, call 911 immediately.                   Disclaimer         Quit Smoking / Tobacco Use:    I understand the use of any tobacco products increases my chance of suffering from future heart disease or stroke and could cause other illnesses which may shorten my life. Quitting the use of tobacco products is the single most important thing I can do to improve my health. For further information on smoking / tobacco cessation call a Toll Free Quit Line at 1-239.444.3464 (*National Cancer [place]) or 1-130.577.2039 (American Lung Association) or you can access the web based program at www.lungusa.org.    [place] Tobacco Users Help Line:  (509) 306-4682       Toll Free: 1-228.729.9820  Quit Tobacco Program [facility] (814)094-8531    Crisis Hotline:    [place] Crisis Hotline:  9-200-YHYBOWI or 1-381.661.3379    [place] Crisis Hotline:    1-155.848.3795 or 954-705-8403    Discharge Survey:   Thank you for choosing [facility]. We hope we did everything we could to make your hospital stay a pleasant one. You may be receiving a phone survey and we would appreciate your time and participation in answering the questions. Your input is very valuable to us in our efforts to improve our service to our patients and their families.        My signature on this form indicates that:    1. I have reviewed and understand the above information.  2. My questions regarding this information have been answered to my satisfaction.  3. I have formulated a plan with my discharge nurse to obtain my prescribed medications for home.                  Disclaimer          __________________________________                     __________       ________                       Patient Signature                                                 Date                    Time

## 2017-06-01 NOTE — IP AVS SNAPSHOT
" <p align=\"LEFT\"><IMG SRC=\"//[email or web address]\" alt=\"Image\" WIDTH=\"50%\" HEIGHT=\"200\" BORDER=\"\"></p>                   Name:Dina Crews  Medical Record Number:[id number]  CSN: [id number]    YOB: 1945   Age: 71 y.o.  Sex: female  HT:1.6 m (5' 2.99\") WT: 78.9 kg (173 lb 15.1 oz)          Admit Date: 6/1/2017     Discharge Date:   Today's Date: 6/3/2017  Attending Doctor:  Harriett Hyatt M.D.                  Allergies:  Penicillins; Codeine; Morphine; and Vicodin          Your appointments     Jul 13, 2017 11:20 AM   Established Patient with Britt Hernandez M.D.   [address] [address] [phone]           You will be receiving a confirmation call a few days before your appointment from our automated call confirmation system.              Follow-up Information     1. Follow up with Britt Hernandez M.D..    Specialty:  Family Medicine    Why:  As needed    Contact information    [address] [place], [place] [address] [place] [address] [phone]           Medication List      Take these Medications        Instructions    levofloxacin 250 MG Tabs   Commonly known as:  LEVAQUIN    Take 3 Tabs by mouth every day for 7 days.   Dose:  750 mg       lorazepam 1 MG Tabs   Commonly known as:  ATIVAN    Take 1 Tab by mouth at bedtime as needed.   Dose:  1 mg       metronidazole 500 MG Tabs   Commonly known as:  FLAGYL    Take 1 Tab by mouth every 8 hours for 10 days.   Dose:  500 mg       nystatin 667770 UNIT/ML Susp   Commonly known as:  MYCOSTATIN    Take 5 mL by mouth 3 times a day as needed (for oral rash, swash and spit) for up to 10 days.   Dose:  121423 Units       * oxycodone-acetaminophen 5-325 MG Tabs   What changed:  Another medication with the same name was added. Make sure you understand how and when to take each.   Commonly known as:  PERCOCET    Take 1 Tab by mouth every 8 hours as needed.   Dose:  1 Tab       * oxycodone-acetaminophen 5-325 MG Tabs   What " changed:  You were already taking a medication with the same name, and this prescription was added. Make sure you understand how and when to take each.   Commonly known as:  PERCOCET    Take 1 Tab by mouth every 6 hours as needed for Moderate Pain or Severe Pain for up to 5 days.   Dose:  1 Tab       pantoprazole 40 MG Tbec   Commonly known as:  PROTONIX    Take 1 Tab by mouth every day.   Dose:  40 mg       sertraline 50 MG Tabs   Commonly known as:  ZOLOFT    Take 3 Tabs by mouth every day.   Dose:  150 mg       * Notice:  This list has 2 medication(s) that are the same as other medications prescribed for you. Read the directions carefully, and ask your doctor or other care provider to review them with you.

## 2017-06-01 NOTE — ED PROVIDER NOTES
"ED Provider Note    ER PROVIDER NOTE    Scribed for Vernon Yanes M.D.  by Vernon Yanes. 6/1/2017 at 4:58 AM.    Primary Care Provider: Britt Hernandez M.D.  Means of Arrival: Self  History obtained from: Patient  History limited by: None    CHIEF COMPLAINT  Chief Complaint   Patient presents with   • Blood in Urine     painful urination and hematuria; started yesterday   • Diarrhea     hx of diverticulitis and colectomy; has had diarrhea x 1 week   • LLQ Pain     left lower quadrant radiating up        HPI  Dina Crews is a 71 y.o. female who presents to the emergency department complaining of \"diverticulitis\" and blood in her urine. Patient reports she has had some crampy left lower quadrant pain over the last week as well as some diarrhea consistent with past episodes of diverticulitis, no blood. Denies any fevers or chills. No nausea or vomiting. Yesterday she began to develop some dysuria, and then tonight noticed blood in her urine. She denies any flank or back pain    REVIEW OF SYSTEMS  Pertinent positives include hematuria, dysuria. Pertinent negatives include no fever. See HPI for details. All other systems reviewed and are negative.    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    SURGICAL HISTORY   has past surgical history that includes hysterectomy, vaginal; low anterior resection robotic (1/28/2014); " malik by laparoscopy (2015); primary c section; repeat c section; other abdominal surgery; and shoulder arthroscopy.    FAMILY HISTORY  Family History   Problem Relation Age of Onset   • Lung Disease Mother      COPD   • Cancer Mother      Thyroid cancer   • Cancer Father      d. 72 Stomach cancer   • Diabetes Father    • Cancer Sister 40     Breast cancer   • GI Sister      diverticulitis   • Cancer Sister      breast       SOCIAL HISTORY  Social History     Social History   • Marital Status:      Spouse Name: N/A   • Number of Children: 2   • Years of Education:      Occupational History   •  Other     Social History Main Topics   • Smoking status: Current Every Day Smoker -- 0.50 packs/day for 45 years     Types: Cigarettes     Start date: 1964     Last Attempt to Quit: 2012   • Smokeless tobacco: Never Used   • Alcohol Use: No   • Drug Use: Yes     Special: Oral, Marijuana      Comment: Pot daily-edibles   • Sexual Activity:     Partners: Female     Other Topics Concern   • None     Social History Narrative    Moved to [place] after couldn't find work in [place]. Then mother had increasing health problems. Now stays in [place]- to care for mom. Siblings caregive on Weekends. Pt drives [place] every week.    Spouse (female) 25 years.    2 children. 1 grandchild.     : mother .       History   Drug Use   • Yes   • Special: Oral, Marijuana     Comment: Pot daily-edibles       CURRENT MEDICATIONS  Home Medications     Reviewed by Savita Fregoso R.N. (Registered Nurse) on 17 at 0435  Med List Status: Complete    Medication Last Dose Status    lorazepam (ATIVAN) 1 MG Tab 2017 Active    oxycodone-acetaminophen (PERCOCET) 2.5-325 MG per tablet 2017 Active    pantoprazole (PROTONIX) 40 MG Tablet Delayed Response 2017 Active    sertraline (ZOLOFT) 50 MG Tab 2017 Active                ALLERGIES  Allergies   Allergen Reactions   • Penicillins Rash and  "Unspecified     Itchy rash   • Codeine Vomiting   • Morphine Rash     Localized red rash after morphine administration   • Vicodin [Hydrocodone-Acetaminophen] Itching       PHYSICAL EXAM  VITAL SIGNS: /81 mmHg  Pulse 80  Temp(Src) 36.4 °C (97.5 °F)  Resp 18  Ht 1.6 m (5' 2.99\")  Wt 78.9 kg (173 lb 15.1 oz)  BMI 30.82 kg/m2  SpO2 95%  LMP 07/12/1986  Pulse ox interpretation: I interpret this pulse ox as normal.    Constitutional: Alert in no apparent distress.  HENT: No signs of trauma, Bilateral external ears normal, Nose normal. Mucous membranes dry  Eyes: Pupils are equal and reactive, Conjunctiva normal, Non-icteric.   Neck: Normal range of motion, No tenderness, Supple, No stridor.   Lymphatic: No lymphadenopathy noted.   Cardiovascular: Regular rate and rhythm, no murmurs.   Thorax & Lungs: Normal breath sounds, No respiratory distress, No wheezing, No chest tenderness.   Abdomen: Bowel sounds normal, Soft, moderate left lower quadrant tenderness tenderness, No masses, No pulsatile masses. No peritoneal signs.  Skin: Warm, Dry, No erythema, No rash.   Back: No bony tenderness, No CVA tenderness.   Extremities: Intact distal pulses, No edema, No tenderness, No cyanosis,   Musculoskeletal: Good range of motion in all major joints. No tenderness to palpation or major deformities noted.   Neurologic: Alert , Normal motor function, Normal sensory function, No focal deficits noted.   Psychiatric: Affect normal, Judgment normal, Mood normal.     DIAGNOSTIC STUDIES / PROCEDURES        LABS  Results for orders placed or performed during the hospital encounter of 06/01/17   CBC WITH DIFFERENTIAL   Result Value Ref Range    WBC 12.4 (H) 4.8 - 10.8 K/uL    RBC 4.78 4.20 - 5.40 M/uL    Hemoglobin 14.4 12.0 - 16.0 g/dL    Hematocrit 41.8 37.0 - 47.0 %    MCV 87.4 81.4 - 97.8 fL    MCH 30.1 27.0 - 33.0 pg    MCHC 34.4 33.6 - 35.0 g/dL    RDW 42.8 35.9 - 50.0 fL    Platelet Count 337 164 - 446 K/uL    MPV 10.8 " 9.0 - 12.9 fL    Neutrophils-Polys 73.00 (H) 44.00 - 72.00 %    Lymphocytes 19.80 (L) 22.00 - 41.00 %    Monocytes 4.70 0.00 - 13.40 %    Eosinophils 1.50 0.00 - 6.90 %    Basophils 0.60 0.00 - 1.80 %    Immature Granulocytes 0.40 0.00 - 0.90 %    Nucleated RBC 0.00 /100 WBC    Neutrophils (Absolute) 9.06 (H) 2.00 - 7.15 K/uL    Lymphs (Absolute) 2.46 1.00 - 4.80 K/uL    Monos (Absolute) 0.58 0.00 - 0.85 K/uL    Eos (Absolute) 0.18 0.00 - 0.51 K/uL    Baso (Absolute) 0.08 0.00 - 0.12 K/uL    Immature Granulocytes (abs) 0.05 0.00 - 0.11 K/uL    NRBC (Absolute) 0.00 K/uL   COMP METABOLIC PANEL   Result Value Ref Range    Sodium 133 (L) 135 - 145 mmol/L    Potassium 3.3 (L) 3.6 - 5.5 mmol/L    Chloride 102 96 - 112 mmol/L    Co2 23 20 - 33 mmol/L    Anion Gap 8.0 0.0 - 11.9    Glucose 105 (H) 65 - 99 mg/dL    Bun 9 8 - 22 mg/dL    Creatinine 0.62 0.50 - 1.40 mg/dL    Calcium 9.6 8.4 - 10.2 mg/dL    AST(SGOT) 24 12 - 45 U/L    ALT(SGPT) 23 2 - 50 U/L    Alkaline Phosphatase 93 30 - 99 U/L    Total Bilirubin 0.6 0.1 - 1.5 mg/dL    Albumin 4.2 3.2 - 4.9 g/dL    Total Protein 7.7 6.0 - 8.2 g/dL    Globulin 3.5 1.9 - 3.5 g/dL    A-G Ratio 1.2 g/dL   LACTIC ACID   Result Value Ref Range    Lactic Acid 1.78 0.50 - 2.00 mmol/L    Specimen Venous    URINALYSIS   Result Value Ref Range    Micro Urine Req Microscopic     Color Yellow     Character Cloudy (A)     Specific Gravity 1.025 <1.035    Ph 5.5 5.0-8.0    Glucose Negative Negative mg/dL    Ketones Negative Negative mg/dL    Protein 30 (A) Negative mg/dL    Nitrite Positive (A) Negative    Leukocyte Esterase Moderate (A) Negative    Occult Blood Large (A) Negative   URINE MICROSCOPIC (W/UA)   Result Value Ref Range    WBC Packed (A) /hpf    -150 (A) /hpf    Bacteria Few (A) None /hpf    Epithelial Cells Few Few /hpf    Mucous Threads Few /hpf   UR BILI ICTOTEST   Result Value Ref Range    Bilirubin Negative Negative   ESTIMATED GFR   Result Value Ref Range    GFR If  African American >60 >60 mL/min/1.73 m 2    GFR If Non African American >60 >60 mL/min/1.73 m 2       All labs reviewed by me.    RADIOLOGY  CT-ABDOMEN-PELVIS WITH   Final Result      1.  Inflammatory change in the mid sigmoid colon in the left lower quadrant consistent with a small area of acute or subacute diverticulitis. No abscess or free air is identified.      2.  No hydronephrosis or nephrolithiasis identified.      3.  Cholecystectomy.        The radiologist's interpretation of all radiological studies have been reviewed by me.    COURSE & MEDICAL DECISION MAKING  Nursing notes, VS, PMSFHx reviewed in chart.    4:58 AM Patient seen and examined at bedside. Patient will be treated with IV fluids as she does appear dehydrated on exam and with diarrhea, as well as IV fentanyl. Ordered for sepsis bundle,  CT scan to evaluate her symptoms.     5:16 AM  CTX ordered    updated patient on results, more comfortable after pain medication    5:42 AM  Patient reevaluated, updated on results of CT scan, plan for admission. Hospitalist paged    5:46 AM  Discussed case with Dr. Ortiz who will admit the patient    Flagyl ordered    Decision Making:  This is a 71 y.o. Female presenting with hematuria, dysuria, as well as some abdominal pain and diarrhea. She does appear to have two areas of infection with a significant bladder infection as well as her diverticulitis. She does not currently appear septic, but does have a leukocytosis. Given her prior history of diverticulitis and abdominal surgeries as well as nature of her symptoms to consider potential for perforation, abscess or even rectovesicular fistula although CT does not demonstrate any of these.      Patient is admitted in stable condition    FINAL IMPRESSION  1. Diverticulitis of large intestine without perforation or abscess without bleeding    2. Urinary tract infection with hematuria, site unspecified              The note accurately reflects work and decisions  made by me.  Vernon Yanes  6/1/2017  6:01 AM

## 2017-06-02 PROBLEM — M31.0: Status: ACTIVE | Noted: 2017-06-02

## 2017-06-02 PROBLEM — E87.6 HYPOKALEMIA: Status: ACTIVE | Noted: 2017-06-02

## 2017-06-02 LAB
ANION GAP SERPL CALC-SCNC: 5 MMOL/L (ref 0–11.9)
BASOPHILS # BLD AUTO: 0.8 % (ref 0–1.8)
BASOPHILS # BLD: 0.05 K/UL (ref 0–0.12)
BUN SERPL-MCNC: <5 MG/DL (ref 8–22)
CALCIUM SERPL-MCNC: 8.8 MG/DL (ref 8.4–10.2)
CHLORIDE SERPL-SCNC: 109 MMOL/L (ref 96–112)
CO2 SERPL-SCNC: 24 MMOL/L (ref 20–33)
CREAT SERPL-MCNC: 0.54 MG/DL (ref 0.5–1.4)
EOSINOPHIL # BLD AUTO: 0.14 K/UL (ref 0–0.51)
EOSINOPHIL NFR BLD: 2.2 % (ref 0–6.9)
ERYTHROCYTE [DISTWIDTH] IN BLOOD BY AUTOMATED COUNT: 43 FL (ref 35.9–50)
GFR SERPL CREATININE-BSD FRML MDRD: >60 ML/MIN/1.73 M 2
GLUCOSE SERPL-MCNC: 122 MG/DL (ref 65–99)
HCT VFR BLD AUTO: 38 % (ref 37–47)
HGB BLD-MCNC: 13 G/DL (ref 12–16)
IMM GRANULOCYTES # BLD AUTO: 0.05 K/UL (ref 0–0.11)
IMM GRANULOCYTES NFR BLD AUTO: 0.8 % (ref 0–0.9)
LYMPHOCYTES # BLD AUTO: 1.21 K/UL (ref 1–4.8)
LYMPHOCYTES NFR BLD: 19 % (ref 22–41)
MCH RBC QN AUTO: 30.1 PG (ref 27–33)
MCHC RBC AUTO-ENTMCNC: 34.2 G/DL (ref 33.6–35)
MCV RBC AUTO: 88 FL (ref 81.4–97.8)
MONOCYTES # BLD AUTO: 0.37 K/UL (ref 0–0.85)
MONOCYTES NFR BLD AUTO: 5.8 % (ref 0–13.4)
NEUTROPHILS # BLD AUTO: 4.56 K/UL (ref 2–7.15)
NEUTROPHILS NFR BLD: 71.4 % (ref 44–72)
NRBC # BLD AUTO: 0 K/UL
NRBC BLD AUTO-RTO: 0 /100 WBC
PLATELET # BLD AUTO: 247 K/UL (ref 164–446)
PMV BLD AUTO: 10.5 FL (ref 9–12.9)
POTASSIUM SERPL-SCNC: 4.2 MMOL/L (ref 3.6–5.5)
RBC # BLD AUTO: 4.32 M/UL (ref 4.2–5.4)
SODIUM SERPL-SCNC: 138 MMOL/L (ref 135–145)
WBC # BLD AUTO: 6.4 K/UL (ref 4.8–10.8)

## 2017-06-02 PROCEDURE — A9270 NON-COVERED ITEM OR SERVICE: HCPCS | Performed by: INTERNAL MEDICINE

## 2017-06-02 PROCEDURE — 700101 HCHG RX REV CODE 250: Performed by: FAMILY MEDICINE

## 2017-06-02 PROCEDURE — 700102 HCHG RX REV CODE 250 W/ 637 OVERRIDE(OP): Performed by: INTERNAL MEDICINE

## 2017-06-02 PROCEDURE — 700102 HCHG RX REV CODE 250 W/ 637 OVERRIDE(OP): Performed by: FAMILY MEDICINE

## 2017-06-02 PROCEDURE — 80048 BASIC METABOLIC PNL TOTAL CA: CPT

## 2017-06-02 PROCEDURE — A9270 NON-COVERED ITEM OR SERVICE: HCPCS | Performed by: NURSE PRACTITIONER

## 2017-06-02 PROCEDURE — 700111 HCHG RX REV CODE 636 W/ 250 OVERRIDE (IP): Performed by: FAMILY MEDICINE

## 2017-06-02 PROCEDURE — 700111 HCHG RX REV CODE 636 W/ 250 OVERRIDE (IP): Performed by: INTERNAL MEDICINE

## 2017-06-02 PROCEDURE — 99232 SBSQ HOSP IP/OBS MODERATE 35: CPT | Performed by: INTERNAL MEDICINE

## 2017-06-02 PROCEDURE — 85025 COMPLETE CBC W/AUTO DIFF WBC: CPT

## 2017-06-02 PROCEDURE — 36415 COLL VENOUS BLD VENIPUNCTURE: CPT

## 2017-06-02 PROCEDURE — A9270 NON-COVERED ITEM OR SERVICE: HCPCS | Performed by: FAMILY MEDICINE

## 2017-06-02 PROCEDURE — 99406 BEHAV CHNG SMOKING 3-10 MIN: CPT

## 2017-06-02 PROCEDURE — 700102 HCHG RX REV CODE 250 W/ 637 OVERRIDE(OP): Performed by: NURSE PRACTITIONER

## 2017-06-02 PROCEDURE — 770001 HCHG ROOM/CARE - MED/SURG/GYN PRIV*

## 2017-06-02 RX ORDER — OXYCODONE HYDROCHLORIDE AND ACETAMINOPHEN 5; 325 MG/1; MG/1
1 TABLET ORAL EVERY 6 HOURS PRN
Status: DISCONTINUED | OUTPATIENT
Start: 2017-06-02 | End: 2017-06-03 | Stop reason: HOSPADM

## 2017-06-02 RX ORDER — METRONIDAZOLE 500 MG/1
500 TABLET ORAL EVERY 8 HOURS
Status: DISCONTINUED | OUTPATIENT
Start: 2017-06-02 | End: 2017-06-03 | Stop reason: HOSPADM

## 2017-06-02 RX ORDER — LEVOFLOXACIN 250 MG/1
750 TABLET, FILM COATED ORAL DAILY
Status: DISCONTINUED | OUTPATIENT
Start: 2017-06-03 | End: 2017-06-03 | Stop reason: HOSPADM

## 2017-06-02 RX ADMIN — OXYCODONE HYDROCHLORIDE AND ACETAMINOPHEN 1 TABLET: 5; 325 TABLET ORAL at 19:18

## 2017-06-02 RX ADMIN — OMEPRAZOLE 20 MG: 20 CAPSULE, DELAYED RELEASE ORAL at 08:09

## 2017-06-02 RX ADMIN — METRONIDAZOLE 500 MG: 500 TABLET ORAL at 21:47

## 2017-06-02 RX ADMIN — DIPHENHYDRAMINE HCL 25 MG: 25 TABLET ORAL at 13:20

## 2017-06-02 RX ADMIN — LEVOFLOXACIN 750 MG: 5 INJECTION, SOLUTION INTRAVENOUS at 07:59

## 2017-06-02 RX ADMIN — POTASSIUM CHLORIDE, DEXTROSE MONOHYDRATE AND SODIUM CHLORIDE: 300; 5; 900 INJECTION, SOLUTION INTRAVENOUS at 05:27

## 2017-06-02 RX ADMIN — METRONIDAZOLE 500 MG: 500 INJECTION, SOLUTION INTRAVENOUS at 05:26

## 2017-06-02 RX ADMIN — OXYCODONE HYDROCHLORIDE AND ACETAMINOPHEN 1 TABLET: 5; 325 TABLET ORAL at 07:59

## 2017-06-02 RX ADMIN — METRONIDAZOLE 500 MG: 500 TABLET ORAL at 13:23

## 2017-06-02 RX ADMIN — OXYCODONE HYDROCHLORIDE AND ACETAMINOPHEN 1 TABLET: 5; 325 TABLET ORAL at 13:20

## 2017-06-02 RX ADMIN — ZOLPIDEM TARTRATE 5 MG: 5 TABLET ORAL at 21:47

## 2017-06-02 RX ADMIN — ENOXAPARIN SODIUM 40 MG: 100 INJECTION SUBCUTANEOUS at 07:59

## 2017-06-02 RX ADMIN — SERTRALINE 150 MG: 50 TABLET, FILM COATED ORAL at 08:07

## 2017-06-02 ASSESSMENT — ENCOUNTER SYMPTOMS
NECK PAIN: 0
BLURRED VISION: 0
PND: 0
CHILLS: 0
SEIZURES: 0
CONSTIPATION: 0
FEVER: 0
EYE PAIN: 0
DIZZINESS: 0
WHEEZING: 0
WEIGHT LOSS: 0
NAUSEA: 0
SPUTUM PRODUCTION: 0
BACK PAIN: 0
ABDOMINAL PAIN: 1
SHORTNESS OF BREATH: 0
HEADACHES: 0
DEPRESSION: 0
DIARRHEA: 0
FALLS: 0
SPEECH CHANGE: 0
VOMITING: 0
COUGH: 0
PALPITATIONS: 0
TREMORS: 0
WEAKNESS: 0
HEARTBURN: 0

## 2017-06-02 ASSESSMENT — PAIN SCALES - GENERAL: PAINLEVEL_OUTOF10: 7

## 2017-06-02 ASSESSMENT — LIFESTYLE VARIABLES: SUBSTANCE_ABUSE: 0

## 2017-06-02 NOTE — PROGRESS NOTES
Just spoke with CHELSY Sanderson for patient, 24g IV to right hand patent, able to infuse antibiotics, patient switched to oral antibiotic therapy at this time.

## 2017-06-02 NOTE — RESPIRATORY CARE
"COPD Education by COPD Clinical Educator  6/2/2017 at 10:50 AM by Lexi Nice    Patient interviewed by COPD education team.  Patient refused full COPD program at this time, but agreed to short intervention. Short intervention was completed with this patient covering: What is COPD (how the lungs work), daily medications rescue and maintenance, breathing techniques, infection prevention and oxygen safety were covered in detail.  A comprehensive packet including information about COPD, treatments, and oxygen safety was given.           Smoking Cessation Intervention 3 -10 minutes completed.     Provided smoking cessation packet with \"Tips to Quit\" and flyer for \"Free Smoking Cessation Classes\". Provided \"Quit Card\" with the phone number to [employer] Quit Line for free nicotine replacement therapy.    Provided coupon for [employer].  "

## 2017-06-02 NOTE — CARE PLAN
Problem: Safety  Goal: Will remain free from injury  Outcome: PROGRESSING AS EXPECTED  Injury free at present  Hourly rounding  Call light in place    Problem: Knowledge Deficit  Goal: Knowledge of disease process/condition, treatment plan, diagnostic tests, and medications will improve  Outcome: PROGRESSING AS EXPECTED  Pt updated on poc  All questions answered

## 2017-06-02 NOTE — PROGRESS NOTES
Pt tearful and upset regarding numerous attempts at iv placement,and oma iv's are placed they infiltrate after a short time.  picc nurse was able to get a # 24 in pt's rt hand-sl at present.  md updated iv abx changed to po  Pt c/o llq abd pain and h/a.percocet given and benadryl given for itching

## 2017-06-02 NOTE — PROGRESS NOTES
Hospital Medicine Interval Note  Date of Service:  6/2/2017    Chief Complaint  71 y.o.-year-old female admitted 6/1/2017 with abd pain and diverticulitis    Interval Problem Update  6/2 still abd pain, Patient's pain is local, 4-6/10, intermittent and does not radiate to other location, sharp and with some tingling. Can be controlled by pain meds. Wants to advance diet to GI soft      Consultants/Specialty  none    Disposition  Home when stable     Review of Systems   Constitutional: Negative for fever, chills and weight loss.   HENT: Negative for congestion, ear discharge, ear pain, hearing loss and nosebleeds.    Eyes: Negative for blurred vision and pain.   Respiratory: Negative for cough, sputum production, shortness of breath and wheezing.    Cardiovascular: Negative for chest pain, palpitations, leg swelling and PND.   Gastrointestinal: Positive for abdominal pain. Negative for heartburn, nausea, vomiting, diarrhea and constipation.   Genitourinary: Negative for dysuria, frequency and hematuria.   Musculoskeletal: Negative for back pain, joint pain, falls and neck pain.   Skin: Negative for rash.   Neurological: Negative for dizziness, tremors, speech change, seizures, weakness and headaches.   Psychiatric/Behavioral: Negative for depression, suicidal ideas and substance abuse.      Physical Exam Laboratory/Imaging   Filed Vitals:    06/01/17 1400 06/01/17 2000 06/02/17 0200 06/02/17 0725   BP: 147/84 144/79 109/55 122/58   Pulse: 59 66 65 64   Temp: 36.6 °C (97.9 °F) 36.7 °C (98 °F) 36.9 °C (98.4 °F) 36.6 °C (97.8 °F)   Resp: 18 20 18 18   Height:       Weight:       SpO2: 92% 91% 91% 95%     Physical Exam   Constitutional: She is oriented to person, place, and time. She appears well-developed and well-nourished.   HENT:   Head: Normocephalic.   Nose: Nose normal.   Mouth/Throat: No oropharyngeal exudate.   Eyes: EOM are normal. Pupils are equal, round, and reactive to light.   Neck: Normal range of motion.  Neck supple. No JVD present. No thyromegaly present.   Cardiovascular: Normal rate, regular rhythm and normal heart sounds.  Exam reveals no gallop and no friction rub.    No murmur heard.  Pulmonary/Chest: Effort normal and breath sounds normal. No respiratory distress. She has no wheezes. She has no rales.   Abdominal: Soft. Bowel sounds are normal. She exhibits no distension and no mass. There is tenderness. There is no rebound and no guarding.   Musculoskeletal: Normal range of motion. She exhibits no edema or tenderness.   Lymphadenopathy:     She has no cervical adenopathy.   Neurological: She is alert and oriented to person, place, and time. No cranial nerve deficit.   Skin: Skin is warm. No rash noted.   Psychiatric: Her behavior is normal.    Lab Results   Component Value Date/Time    WBC 6.4 06/02/2017 07:45 AM    HEMOGLOBIN 13.0 06/02/2017 07:45 AM    HEMATOCRIT 38.0 06/02/2017 07:45 AM    PLATELET COUNT 247 06/02/2017 07:45 AM     Lab Results   Component Value Date/Time    SODIUM 138 06/02/2017 07:45 AM    POTASSIUM 4.2 06/02/2017 07:45 AM    CHLORIDE 109 06/02/2017 07:45 AM    CO2 24 06/02/2017 07:45 AM    GLUCOSE 122* 06/02/2017 07:45 AM    BUN <5* 06/02/2017 07:45 AM    CREATININE 0.54 06/02/2017 07:45 AM      Assessment/Plan    * Acute diverticulitis  Assessment & Plan  CT confirmed  abd pain still present  Wants to advance diet  Cont iv anx and ivf  Pain control      GERD (gastroesophageal reflux disease) (present on admission)  Assessment & Plan  Cont with PPI    History of total hysterectomy (present on admission)  Assessment & Plan  stable    UTI (urinary tract infection)  Assessment & Plan  No synmptoms   Cont iv abx for now    Leucocytosis  Assessment & Plan  Iv abx  resolved    Hypokalemia  Assessment & Plan  Po replaced  resolved     Labs reviewed, Radiology images reviewed and Medications reviewed  Butler catheter: No Butler        DVT prophylaxis - mechanical: SCDs  Ulcer prophylaxis: Not  indicated  Antibiotics: Treating active infection/contamination beyond 24 hours perioperative coverage  Assessed for rehab: Patient returned to prior level of function, rehabilitation not indicated at this time    For complexity-based billing, please refer to the history, exam, and decison making above. In addition, I spent 35 minutes caring for the patient today. More than 50% of the time was spent counseling and coordinating care.    I have discussed with RN and LISA and SW and other consultants about patient's plan.

## 2017-06-02 NOTE — CARE PLAN
Problem: Safety  Goal: Will remain free from injury  Outcome: PROGRESSING AS EXPECTED  Pt injury free at present  Call light in reach  Hourly rounding

## 2017-06-02 NOTE — DISCHARGE PLANNING
Care Transition Team Assessment    Met with pt at bedside. Pt states she will discharge home with no needs. Pt drives and has significant other for support.    Information Source  Orientation : Oriented x 4  Information Given By: Patient  Informant's Name: Dina Crews  Who is responsible for making decisions for patient? : Patient    Readmission Evaluation  Is this a readmission?: No    Elopement Risk  Legal Hold: No  Ambulatory or Self Mobile in Wheelchair: Yes  Disoriented: No  Psychiatric Symptoms: None  History of Wandering: No  Elopement this Admit: No  Vocalizing Wanting to Leave: No  Displays Behaviors, Body Language Wanting to Leave: No-Not at Risk for Elopement  Elopement Risk: Not at Risk for Elopement    Interdisciplinary Discharge Planning  Does Admitting Nurse Feel This Could be a Complex Discharge?: No  Primary Care Physician:   Lives with - Patient's Self Care Capacity: Spouse  Support Systems: Spouse / Significant Other  Do You Take your Prescribed Medications Regularly: Yes  Able to Return to Previous ADL's: Yes  Mobility Issues: Yes  Patient Expects to be Discharged to:: home  Assistance Needed: No  Durable Medical Equipment: Other - Specify (used cane at home)    Discharge Preparedness  What is your plan after discharge?: Home with help  Prior Functional Level: Ambulatory, Drives Self, Independent with Activities of Daily Living, Independent with Medication Management  Difficulity with ADLs: None  Difficulity with IADLs: None    Functional Assesment  Prior Functional Level: Ambulatory, Drives Self, Independent with Activities of Daily Living, Independent with Medication Management    Finances  Financial Barriers to Discharge: No  Prescription Coverage: Yes (Pharmacy: [facility], [place])    Vision / Hearing Impairment  Vision Impairment : Yes  Right Eye Vision: Impaired, Wears Glasses  Left Eye Vision: Impaired, Wears Glasses  Hearing Impairment : No         Advance Directive  Advance  Directive?: DPOA for Health Care    Domestic Abuse  Have you ever been the victim of abuse or violence?: No    Psychological Assessment  History of Substance Abuse: None  History of Psychiatric Problems: No  Non-compliant with Treatment: No  Newly Diagnosed Illness: No    Discharge Risks or Barriers  Discharge risks or barriers?: No    Anticipated Discharge Information  Anticipated discharge disposition: Home  [facility] [address] [place] [address]  Discharge Contact Phone Number: [phone]

## 2017-06-02 NOTE — PROGRESS NOTES
Patient states she is allergic to morphine and is requesting to take Percocet which she takes at home.  Morphine given X2 since admission to the unit and she is having redness and itching at the IV site.  Benadryl given IV and patient states improvement.  Hospitalist paged.

## 2017-06-03 VITALS
SYSTOLIC BLOOD PRESSURE: 135 MMHG | BODY MASS INDEX: 30.82 KG/M2 | OXYGEN SATURATION: 93 % | DIASTOLIC BLOOD PRESSURE: 69 MMHG | HEART RATE: 63 BPM | TEMPERATURE: 98.5 F | HEIGHT: 63 IN | WEIGHT: 173.94 LBS | RESPIRATION RATE: 18 BRPM

## 2017-06-03 PROBLEM — D72.829 LEUCOCYTOSIS: Status: RESOLVED | Noted: 2017-06-03 | Resolved: 2017-06-03

## 2017-06-03 PROBLEM — N39.0 UTI (URINARY TRACT INFECTION): Status: RESOLVED | Noted: 2017-06-01 | Resolved: 2017-06-03

## 2017-06-03 PROBLEM — D72.829 LEUCOCYTOSIS: Status: ACTIVE | Noted: 2017-06-03

## 2017-06-03 PROBLEM — K57.92 ACUTE DIVERTICULITIS: Status: RESOLVED | Noted: 2017-06-01 | Resolved: 2017-06-03

## 2017-06-03 LAB
ANION GAP SERPL CALC-SCNC: 11 MMOL/L (ref 0–11.9)
BACTERIA UR CULT: NORMAL
BASOPHILS # BLD AUTO: 0.9 % (ref 0–1.8)
BASOPHILS # BLD: 0.06 K/UL (ref 0–0.12)
BUN SERPL-MCNC: 7 MG/DL (ref 8–22)
CALCIUM SERPL-MCNC: 9.9 MG/DL (ref 8.4–10.2)
CHLORIDE SERPL-SCNC: 106 MMOL/L (ref 96–112)
CO2 SERPL-SCNC: 24 MMOL/L (ref 20–33)
CREAT SERPL-MCNC: 0.65 MG/DL (ref 0.5–1.4)
EOSINOPHIL # BLD AUTO: 0.15 K/UL (ref 0–0.51)
EOSINOPHIL NFR BLD: 2.2 % (ref 0–6.9)
ERYTHROCYTE [DISTWIDTH] IN BLOOD BY AUTOMATED COUNT: 41.8 FL (ref 35.9–50)
GFR SERPL CREATININE-BSD FRML MDRD: >60 ML/MIN/1.73 M 2
GLUCOSE SERPL-MCNC: 111 MG/DL (ref 65–99)
HCT VFR BLD AUTO: 40 % (ref 37–47)
HGB BLD-MCNC: 13.7 G/DL (ref 12–16)
IMM GRANULOCYTES # BLD AUTO: 0.02 K/UL (ref 0–0.11)
IMM GRANULOCYTES NFR BLD AUTO: 0.3 % (ref 0–0.9)
LYMPHOCYTES # BLD AUTO: 1.26 K/UL (ref 1–4.8)
LYMPHOCYTES NFR BLD: 18.4 % (ref 22–41)
MCH RBC QN AUTO: 29.8 PG (ref 27–33)
MCHC RBC AUTO-ENTMCNC: 34.3 G/DL (ref 33.6–35)
MCV RBC AUTO: 87.1 FL (ref 81.4–97.8)
MONOCYTES # BLD AUTO: 0.37 K/UL (ref 0–0.85)
MONOCYTES NFR BLD AUTO: 5.4 % (ref 0–13.4)
NEUTROPHILS # BLD AUTO: 4.97 K/UL (ref 2–7.15)
NEUTROPHILS NFR BLD: 72.8 % (ref 44–72)
NRBC # BLD AUTO: 0 K/UL
NRBC BLD AUTO-RTO: 0 /100 WBC
PLATELET # BLD AUTO: 270 K/UL (ref 164–446)
PMV BLD AUTO: 10.6 FL (ref 9–12.9)
POTASSIUM SERPL-SCNC: 4.2 MMOL/L (ref 3.6–5.5)
RBC # BLD AUTO: 4.59 M/UL (ref 4.2–5.4)
SIGNIFICANT IND 70042: NORMAL
SITE SITE: NORMAL
SODIUM SERPL-SCNC: 141 MMOL/L (ref 135–145)
SOURCE SOURCE: NORMAL
WBC # BLD AUTO: 6.8 K/UL (ref 4.8–10.8)

## 2017-06-03 PROCEDURE — 700102 HCHG RX REV CODE 250 W/ 637 OVERRIDE(OP): Performed by: FAMILY MEDICINE

## 2017-06-03 PROCEDURE — 700111 HCHG RX REV CODE 636 W/ 250 OVERRIDE (IP): Performed by: FAMILY MEDICINE

## 2017-06-03 PROCEDURE — 80048 BASIC METABOLIC PNL TOTAL CA: CPT

## 2017-06-03 PROCEDURE — A9270 NON-COVERED ITEM OR SERVICE: HCPCS | Performed by: INTERNAL MEDICINE

## 2017-06-03 PROCEDURE — 36415 COLL VENOUS BLD VENIPUNCTURE: CPT

## 2017-06-03 PROCEDURE — 99239 HOSP IP/OBS DSCHRG MGMT >30: CPT | Performed by: INTERNAL MEDICINE

## 2017-06-03 PROCEDURE — 700102 HCHG RX REV CODE 250 W/ 637 OVERRIDE(OP): Performed by: INTERNAL MEDICINE

## 2017-06-03 PROCEDURE — 85025 COMPLETE CBC W/AUTO DIFF WBC: CPT

## 2017-06-03 PROCEDURE — A9270 NON-COVERED ITEM OR SERVICE: HCPCS | Performed by: FAMILY MEDICINE

## 2017-06-03 RX ORDER — LEVOFLOXACIN 250 MG/1
750 TABLET, FILM COATED ORAL DAILY
Qty: 21 TAB | Refills: 0 | Status: SHIPPED | OUTPATIENT
Start: 2017-06-03 | End: 2017-06-10

## 2017-06-03 RX ORDER — METRONIDAZOLE 500 MG/1
500 TABLET ORAL EVERY 8 HOURS
Qty: 30 TAB | Refills: 0 | Status: SHIPPED | OUTPATIENT
Start: 2017-06-03 | End: 2017-06-13

## 2017-06-03 RX ORDER — OXYCODONE HYDROCHLORIDE AND ACETAMINOPHEN 5; 325 MG/1; MG/1
1 TABLET ORAL EVERY 6 HOURS PRN
Qty: 15 TAB | Refills: 0 | Status: SHIPPED | OUTPATIENT
Start: 2017-06-03 | End: 2017-06-08

## 2017-06-03 RX ADMIN — SERTRALINE 150 MG: 50 TABLET, FILM COATED ORAL at 08:11

## 2017-06-03 RX ADMIN — OMEPRAZOLE 20 MG: 20 CAPSULE, DELAYED RELEASE ORAL at 08:11

## 2017-06-03 RX ADMIN — LEVOFLOXACIN 750 MG: 250 TABLET, FILM COATED ORAL at 08:11

## 2017-06-03 RX ADMIN — OXYCODONE HYDROCHLORIDE AND ACETAMINOPHEN 1 TABLET: 5; 325 TABLET ORAL at 06:20

## 2017-06-03 RX ADMIN — ONDANSETRON 4 MG: 2 INJECTION INTRAMUSCULAR; INTRAVENOUS at 06:15

## 2017-06-03 RX ADMIN — METRONIDAZOLE 500 MG: 500 TABLET ORAL at 06:20

## 2017-06-03 ASSESSMENT — PAIN SCALES - GENERAL: PAINLEVEL_OUTOF10: 6

## 2017-06-03 NOTE — DISCHARGE INSTRUCTIONS
Discharge Instructions    Discharged to home by car with relative. Discharged via wheelchair, hospital escort: Yes.  Special equipment needed: Not Applicable    Be sure to schedule a follow-up appointment with your primary care doctor or any specialists as instructed.     Discharge Plan:   Smoking Cessation Offered: Patient Refused  Influenza Vaccine Indication: Patient Refuses    I understand that a diet low in cholesterol, fat, and sodium is recommended for good health. Unless I have been given specific instructions below for another diet, I accept this instruction as my diet prescription.   Other diet: Home Diet    Special Instructions: None    · Is patient discharged on Warfarin / Coumadin?   No     · Is patient Post Blood Transfusion?  No    Diverticulitis  Diverticulitis is inflammation or infection of small pouches in your colon that form when you have a condition called diverticulosis. The pouches in your colon are called diverticula. Your colon, or large intestine, is where water is absorbed and stool is formed.  Complications of diverticulitis can include:  · Bleeding.  · Severe infection.  · Severe pain.  · Perforation of your colon.  · Obstruction of your colon.  CAUSES   Diverticulitis is caused by bacteria.  Diverticulitis happens when stool becomes trapped in diverticula. This allows bacteria to grow in the diverticula, which can lead to inflammation and infection.  RISK FACTORS  People with diverticulosis are at risk for diverticulitis. Eating a diet that does not include enough fiber from fruits and vegetables may make diverticulitis more likely to develop.  SYMPTOMS   Symptoms of diverticulitis may include:  · Abdominal pain and tenderness. The pain is normally located on the left side of the abdomen, but may occur in other areas.  · Fever and chills.  · Bloating.  · Cramping.  · Nausea.  · Vomiting.  · Constipation.  · Diarrhea.  · Blood in your stool.  DIAGNOSIS   Your health care provider will  ask you about your medical history and do a physical exam. You may need to have tests done because many medical conditions can cause the same symptoms as diverticulitis. Tests may include:  · Blood tests.  · Urine tests.  · Imaging tests of the abdomen, including X-rays and CT scans.  When your condition is under control, your health care provider may recommend that you have a colonoscopy. A colonoscopy can show how severe your diverticula are and whether something else is causing your symptoms.  TREATMENT   Most cases of diverticulitis are mild and can be treated at home. Treatment may include:  · Taking over-the-counter pain medicines.  · Following a clear liquid diet.  · Taking antibiotic medicines by mouth for 7-10 days.  More severe cases may be treated at a hospital. Treatment may include:  · Not eating or drinking.  · Taking prescription pain medicine.  · Receiving antibiotic medicines through an IV tube.  · Receiving fluids and nutrition through an IV tube.  · Surgery.  HOME CARE INSTRUCTIONS   · Follow your health care provider's instructions carefully.  · Follow a full liquid diet or other diet as directed by your health care provider. After your symptoms improve, your health care provider may tell you to change your diet. He or she may recommend you eat a high-fiber diet. Fruits and vegetables are good sources of fiber. Fiber makes it easier to pass stool.  · Take fiber supplements or probiotics as directed by your health care provider.  · Only take medicines as directed by your health care provider.  · Keep all your follow-up appointments.  SEEK MEDICAL CARE IF:   · Your pain does not improve.  · You have a hard time eating food.  · Your bowel movements do not return to normal.  SEEK IMMEDIATE MEDICAL CARE IF:   · Your pain becomes worse.  · Your symptoms do not get better.  · Your symptoms suddenly get worse.  · You have a fever.  · You have repeated vomiting.  · You have bloody or black, tarry  stools.  MAKE SURE YOU:   · Understand these instructions.  · Will watch your condition.  · Will get help right away if you are not doing well or get worse.     This information is not intended to replace advice given to you by your health care provider. Make sure you discuss any questions you have with your health care provider.     Document Released: 09/27/2006 Document Revised: 12/23/2014 Document Reviewed: 11/12/2014  [employer] [facility] Interactive Patient Education ©2016 [employer]      Depression / Suicide Risk    As you are discharged from this [facility] facility, it is important to learn how to keep safe from harming yourself.    Recognize the warning signs:  · Abrupt changes in personality, positive or negative- including increase in energy   · Giving away possessions  · Change in eating patterns- significant weight changes-  positive or negative  · Change in sleeping patterns- unable to sleep or sleeping all the time   · Unwillingness or inability to communicate  · Depression  · Unusual sadness, discouragement and loneliness  · Talk of wanting to die  · Neglect of personal appearance   · Rebelliousness- reckless behavior  · Withdrawal from people/activities they love  · Confusion- inability to concentrate     If you or a loved one observes any of these behaviors or has concerns about self-harm, here's what you can do:  · Talk about it- your feelings and reasons for harming yourself  · Remove any means that you might use to hurt yourself (examples: pills, rope, extension cords, firearm)  · Get professional help from the community (Mental Health, Substance Abuse, psychological counseling)  · Do not be alone:Call your Safe Contact- someone whom you trust who will be there for you.  · Call your local CRISIS HOTLINE 020-5203 or 746-345-2614  · Call your local Children's Mobile Crisis Response Team [place] (818) 830-4875 or www.[employer]  · Call the toll free National Suicide Prevention Hotlines    · National Suicide Prevention Lifeline 473-176-UMYJ (1711)  · National Hope Line Network 800-SUICIDE (340-5580)

## 2017-06-03 NOTE — CARE PLAN
Problem: Safety  Goal: Will remain free from injury  Outcome: PROGRESSING AS EXPECTED  Pt is injury-free at this moment  Fall precautions in place. Bed locked & at lowest position. Bed alarm on.   Call light & personal belongings within reach.   Continue to monitor     Problem: Pain Management  Goal: Pain level will decrease to patient’s comfort goal  Outcome: PROGRESSING AS EXPECTED  Pt currently receiving prn Percocet 5-325mg for pain relief  Continue to medicate pain meds around the clock to keep pain under control   Monitor VS prior to give pain meds

## 2017-06-03 NOTE — CARE PLAN
Problem: Venous Thromboembolism (VTW)/Deep Vein Thrombosis (DVT) Prevention:  Goal: Patient will participate in Venous Thrombosis (VTE)/Deep Vein Thrombosis (DVT)Prevention Measures    06/03/17 0800   OTHER   Risk Assessment Score 2   VTE RISK Moderate   Pharmacologic Prophylaxis Used LMWH: Enoxaparin(Lovenox)         Problem: Knowledge Deficit  Goal: Knowledge of disease process/condition, treatment plan, diagnostic tests, and medications will improve  Intervention: Assess knowledge level of disease process/condition, treatment plan, diagnostic tests, and medications  The plan of care for today discussed with patient (Rest, medication administration, probable discharge home today )

## 2017-06-03 NOTE — PROGRESS NOTES
Late Entry    6/3/17, 0715- shift report received from off going RN. Patient resting in room. No distress noted at this time. The plan of care for today discussed with patient. Call bell at side and patient advised to call for needs as they arise.     6/3/17, 0830- AM medications given as charted in MAR.     6/3/17, 1055- IV discontinued as patient given discharge order for home. Patient states that discharge instruction understood.    6/3/17, 1102- Patient discharged home via wheelchair without incident.

## 2017-06-03 NOTE — PROGRESS NOTES
Received report from day shift nurse. Assumed pt care at 1915. Pt is A&Ox4, resting comfortably in bed. Pt on r.a. No signs of SOB/respiratory distress noted. Assessment completed, Labs noted, VSS. Pt c/o no pain at this moment. Given Ambien per pt request for sleeping. Fall precautions in place. Bed at lowest position. Bed locked. Call light and personal belongings within reach. Continue to monitor

## 2017-06-03 NOTE — DISCHARGE SUMMARY
Hospital Medicine Discharge Note     Admit Date:  6/1/2017       Discharge Date:   6/3/2017    Attending Physician:  Harriett Hyatt     Diagnoses (includes active and resolved):   Principal Problem (Resolved):    Acute diverticulitis POA: Unknown  Active Problems:    GERD (gastroesophageal reflux disease) POA: Yes    History of total hysterectomy POA: Yes    Hypokalemia POA: Unknown  Resolved Problems:    UTI (urinary tract infection) POA: Unknown    Leucocytosis POA: Unknown    Chief Complain  abd pain     Hosiery of Present Illness  Patient is a 71 year old female who comes to the ER because of   abdominal pain and dysuria. She states that for the past week,   she's been having LLQ pain along with diarrhea.  Detailed info pls see H&P by Dr. Ortiz    [facility] Summary (Brief Narrative):       Patient was admitted after CT  was found to have diverticulitis. IV antibiotic treatment using   Levaquin and Flagyl was started. Diet was slowly advanced as tolerated. Patient was stable   and her electrolytes was monitored and corrected. Patient remained stable and her leucocytosis   resolved. Eventually the pt improved and was discharged on oral antibiotics.         Consultants:      none    Procedures:        none    Discharge Medications:        (x)  Medication Reconciliation Completed       Medication List      START taking these medications       Instructions    levofloxacin 250 MG Tabs   Last time this was given:  750 mg on 6/3/2017  8:11 AM   Commonly known as:  LEVAQUIN    Take 3 Tabs by mouth every day for 7 days.   Dose:  750 mg       metronidazole 500 MG Tabs   Last time this was given:  500 mg on 6/3/2017  6:20 AM   Commonly known as:  FLAGYL    Take 1 Tab by mouth every 8 hours for 10 days.   Dose:  500 mg       nystatin [id number] UNIT/ML Susp   Commonly known as:  MYCOSTATIN    Take 5 mL by mouth 3 times a day as needed (for oral rash, swash and spit) for up to 10 days.   Dose:  [id number] Units         CHANGE how you take  these medications       Instructions    * oxycodone-acetaminophen 5-325 MG Tabs   What changed:  Another medication with the same name was added. Make sure you understand how and when to take each.   Last time this was given:  1 Tab on 6/3/2017  6:20 AM   Commonly known as:  PERCOCET    Take 1 Tab by mouth every 8 hours as needed.   Dose:  1 Tab       * oxycodone-acetaminophen 5-325 MG Tabs   What changed:  You were already taking a medication with the same name, and this prescription was added. Make sure you understand how and when to take each.   Last time this was given:  1 Tab on 6/3/2017  6:20 AM   Commonly known as:  PERCOCET    Take 1 Tab by mouth every 6 hours as needed for Moderate Pain or Severe Pain for up to 5 days.   Dose:  1 Tab       * Notice:  This list has 2 medication(s) that are the same as other medications prescribed for you. Read the directions carefully, and ask your doctor or other care provider to review them with you.      CONTINUE taking these medications       Instructions    lorazepam 1 MG Tabs   Commonly known as:  ATIVAN    Take 1 Tab by mouth at bedtime as needed.   Dose:  1 mg       pantoprazole 40 MG Tbec   Commonly known as:  PROTONIX    Take 1 Tab by mouth every day.   Dose:  40 mg       sertraline 50 MG Tabs   Last time this was given:  150 mg on 6/3/2017  8:11 AM   Commonly known as:  ZOLOFT    Take 3 Tabs by mouth every day.   Dose:  150 mg             Disposition:   Discharge home    Diet:   Advance slowly    Activity:   As tolerated    Code status:   Full code    Primary Care Provider:    Britt Hernandez M.D.    Follow up appointment details :      PCP in 2 weeks  [address] [place] [id number]  [phone]      As needed    Future Appointments  Date Time Provider Department Center   6/13/2017 8:20 AM Britt Hernandez M.D. [facility]   7/13/2017 11:20 AM Britt Hernandez M.D. [facility]       Pending Studies:        None    Time spent on discharge day patient visit: >35  minutes    #################################################    Interval history/exam for day of discharge:    Filed Vitals:    06/02/17 1347 06/02/17 2000 06/03/17 0141 06/03/17 0800   BP: 134/64 124/71 118/69 135/69   Pulse: 73 64 56 63   Temp: 36.9 °C (98.5 °F) 36.5 °C (97.7 °F) 36.9 °C (98.4 °F) 36.9 °C (98.5 °F)   Resp: 18 18 18 18   Height:       Weight:       SpO2: 98% 95% 91% 93%     Weight/BMI: Body mass index is 30.82 kg/(m^2).  Pulse Oximetry: 93 %, O2 (LPM): 0, O2 Delivery: None (Room Air)    Gen: AAOx3, NAD  Eyes: PELLA  Neck: no JVD, no lymphadenopathy  Cardia: RRR, no mrg  Lungs: CTAB, no rales, rhonci or wheezing  Abd: NABS, soft, non extended, no mass  EXT: No C/C/E, peripheral pulse 2+ b/l  Neuro: CN II-XII intact, non focal, reflex 2+ symmetrical  Skin: Intact, no lesion, warm  Psych: Appropriate.    Most Recent Labs:    Lab Results   Component Value Date/Time    WBC 6.8 06/03/2017 05:47 AM    RBC 4.59 06/03/2017 05:47 AM    HEMOGLOBIN 13.7 06/03/2017 05:47 AM    HEMATOCRIT 40.0 06/03/2017 05:47 AM    MCV 87.1 06/03/2017 05:47 AM    MCH 29.8 06/03/2017 05:47 AM    MCHC 34.3 06/03/2017 05:47 AM    MPV 10.6 06/03/2017 05:47 AM    NEUTROPHILS-POLYS 72.80* 06/03/2017 05:47 AM    LYMPHOCYTES 18.40* 06/03/2017 05:47 AM    MONOCYTES 5.40 06/03/2017 05:47 AM    EOSINOPHILS 2.20 06/03/2017 05:47 AM    BASOPHILS 0.90 06/03/2017 05:47 AM    HYPOCHROMIA 1+ 01/24/2014 09:53 AM      Lab Results   Component Value Date/Time    SODIUM 141 06/03/2017 05:47 AM    POTASSIUM 4.2 06/03/2017 05:47 AM    CHLORIDE 106 06/03/2017 05:47 AM    CO2 24 06/03/2017 05:47 AM    GLUCOSE 111* 06/03/2017 05:47 AM    BUN 7* 06/03/2017 05:47 AM    CREATININE 0.65 06/03/2017 05:47 AM      Lab Results   Component Value Date/Time    ALT(SGPT) 23 06/01/2017 04:55 AM    AST(SGOT) 24 06/01/2017 04:55 AM    ALKALINE PHOSPHATASE 93 06/01/2017 04:55 AM    TOTAL BILIRUBIN 0.6 06/01/2017 04:55 AM    DIRECT BILIRUBIN <0.1 03/11/2015 11:00 AM     LIPASE 18 05/03/2016 02:00 AM    ALBUMIN 4.2 06/01/2017 04:55 AM    GLOBULIN 3.5 06/01/2017 04:55 AM    INR 1.10 12/02/2013 11:44 AM     Lab Results   Component Value Date/Time    PT 12.3 12/02/2013 11:44 AM    INR 1.10 12/02/2013 11:44 AM        Imaging/ Testing:      CT-ABDOMEN-PELVIS WITH   Final Result      1.  Inflammatory change in the mid sigmoid colon in the left lower quadrant consistent with a small area of acute or subacute diverticulitis. No abscess or free air is identified.      2.  No hydronephrosis or nephrolithiasis identified.      3.  Cholecystectomy.          Instructions:      The patient was instructed to return to the ER in the event of worsening symptoms. I have counseled the patient on the importance of compliance and the patient has agreed to proceed with all medical recommendations and follow up plan indicated above.   The patient understands that all medications come with benefits and risks. Risks may include permanent injury or death and these risks can be minimized with close reassessment and monitoring.

## 2017-06-06 LAB
BACTERIA BLD CULT: NORMAL
BACTERIA BLD CULT: NORMAL
SIGNIFICANT IND 70042: NORMAL
SIGNIFICANT IND 70042: NORMAL
SITE SITE: NORMAL
SITE SITE: NORMAL
SOURCE SOURCE: NORMAL
SOURCE SOURCE: NORMAL

## 2017-07-05 ENCOUNTER — OFFICE VISIT (OUTPATIENT)
Dept: MEDICAL GROUP | Facility: MEDICAL CENTER | Age: 72
End: 2017-07-05
Payer: MEDICARE

## 2017-07-05 VITALS
OXYGEN SATURATION: 97 % | SYSTOLIC BLOOD PRESSURE: 130 MMHG | BODY MASS INDEX: 30.73 KG/M2 | TEMPERATURE: 97.1 F | WEIGHT: 167 LBS | HEART RATE: 71 BPM | DIASTOLIC BLOOD PRESSURE: 82 MMHG | HEIGHT: 62 IN

## 2017-07-05 DIAGNOSIS — Z86.010 HX OF ADENOMATOUS COLONIC POLYPS: ICD-10-CM

## 2017-07-05 DIAGNOSIS — G62.9 NEUROPATHY: ICD-10-CM

## 2017-07-05 DIAGNOSIS — Z87.19 H/O DIVERTICULITIS OF COLON: ICD-10-CM

## 2017-07-05 DIAGNOSIS — Z87.19 H/O SMALL BOWEL OBSTRUCTION: ICD-10-CM

## 2017-07-05 DIAGNOSIS — F33.1 MODERATE EPISODE OF RECURRENT MAJOR DEPRESSIVE DISORDER (HCC): ICD-10-CM

## 2017-07-05 DIAGNOSIS — K58.9 IRRITABLE BOWEL SYNDROME WITHOUT DIARRHEA: ICD-10-CM

## 2017-07-05 DIAGNOSIS — F41.1 GAD (GENERALIZED ANXIETY DISORDER): ICD-10-CM

## 2017-07-05 DIAGNOSIS — K57.20 DIVERTICULITIS OF LARGE INTESTINE WITH PERFORATION WITHOUT BLEEDING: ICD-10-CM

## 2017-07-05 PROCEDURE — 99214 OFFICE O/P EST MOD 30 MIN: CPT | Performed by: FAMILY MEDICINE

## 2017-07-05 RX ORDER — LORAZEPAM 1 MG/1
1 TABLET ORAL NIGHTLY PRN
Qty: 30 TAB | Refills: 1 | Status: SHIPPED | OUTPATIENT
Start: 2017-07-05 | End: 2017-10-12 | Stop reason: SDUPTHER

## 2017-07-05 RX ORDER — GABAPENTIN 300 MG/1
CAPSULE ORAL
Qty: 60 CAP | Refills: 0 | Status: SHIPPED | OUTPATIENT
Start: 2017-07-05 | End: 2017-08-16 | Stop reason: SDUPTHER

## 2017-07-05 RX ORDER — ESCITALOPRAM OXALATE 20 MG/1
20 TABLET ORAL DAILY
Qty: 30 TAB | Refills: 3 | Status: SHIPPED | OUTPATIENT
Start: 2017-07-05 | End: 2018-01-02 | Stop reason: SDUPTHER

## 2017-07-05 NOTE — ASSESSMENT & PLAN NOTE
Patient reports that she has a lot of joint pain as well as pain in her feet that is burning in nature. It's worse at night. She has tried Neurontin in the past at 100 mg and she thinks it helps some. She stopped taking it but would like to retry it. She denies any injury to the feet.

## 2017-07-05 NOTE — PROGRESS NOTES
Chief Complaint   Patient presents with   • Establish Care         Dina Crews is a 71 y.o. female here to establish care and for evaluation and management of:    Patient of Dr. Rosenthal.    HPI:    JUNIOR (generalized anxiety disorder)  She has been under a lot of stress with her wife.  She has autoimmune hemolytic anemia and is scheduled for open heart surgery 7/14/17.  Patient needs a right knee replacement but is putting it on hold because of her wife.  She taking Zoloft 150 mg and Ativan 1 mg 1-2 times a week.  Was on Prozac for 20 years.  Been on Zoloft for 2 years.    IBS (irritable bowel syndrome)  Patient complains of generalized abdominal pain, frequent diarrhea alternating with constipation. She's had recurrent small bowel obstructions including a partial colon resection. She had adenomatous polyps 2 years ago. She would like to see a gastroenterologist to talk about her irritable bowels and better ways to treat it. She knows that it worsens when she is stressed. She denies any black or bloody stools.    Neuropathy (CMS-HCC)  Patient reports that she has a lot of joint pain as well as pain in her feet that is burning in nature. It's worse at night. She has tried Neurontin in the past at 100 mg and she thinks it helps some. She stopped taking it but would like to retry it. She denies any injury to the feet.        Allergies   Allergen Reactions   • Penicillins Rash and Unspecified     Itchy rash   • Codeine Vomiting   • Morphine Rash     Localized red rash after morphine administration   • Vicodin [Hydrocodone-Acetaminophen] Itching       Current medicines (including changes today)  Current Outpatient Prescriptions   Medication Sig Dispense Refill   • escitalopram (LEXAPRO) 20 MG tablet Take 1 Tab by mouth every day. 30 Tab 3   • gabapentin (NEURONTIN) 300 MG Cap One at bedtime, may increase to BID after one  week 60 Cap 0   • lorazepam (ATIVAN) 1 MG Tab Take 1 Tab by mouth at bedtime as needed. 30 Tab  1   • oxycodone-acetaminophen (PERCOCET) 5-325 MG Tab Take 1 Tab by mouth every 8 hours as needed.     • pantoprazole (PROTONIX) 40 MG Tablet Delayed Response Take 1 Tab by mouth every day. 90 Tab 3     No current facility-administered medications for this visit.     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  She  has past surgical history that includes hysterectomy, vaginal; low anterior resection robotic (2014); malik by laparoscopy (2015); primary c section; repeat c section; other abdominal surgery; shoulder arthroscopy; and abdominal hysterectomy total.  Social History   Substance Use Topics   • Smoking status: Current Every Day Smoker -- 0.50 packs/day for 45 years     Types: Cigarettes     Start date: 1964     Last Attempt to Quit: 2012   • Smokeless tobacco: Never Used   • Alcohol Use: No     Social History     Social History Narrative    Moved to [place] after couldn't find work in [place]. Then mother had increasing health problems. Now stays in [place] to care for mom. Siblings caregive on Weekends. 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    Spouse (female) 25 years.    2 children. 1 grandchild.     2013: mother .      Family History   Problem Relation Age of Onset   • Lung Disease Mother      COPD   • Cancer Mother      Thyroid cancer   • Cancer Father      d. 72 Stomach cancer   • Diabetes Father    •  "Cancer Sister 40     Breast cancer   • GI Sister      diverticulitis   • Cancer Sister      breast     Family Status   Relation Status Death Age   • Mother     • Father     • Sister     • Sister     • Sister Alive          ROS  No fever or chills.  No nausea or vomiting.  No chest pain or palpitations.  No cough or SOB.  No pain with urination or hematuria.  No black or bloody stools.  All other systems reviewed and are negative     Objective:     Blood pressure 130/82, pulse 71, temperature 36.2 °C (97.1 °F), height 1.575 m (5' 2.01\"), weight 75.751 kg (167 lb), last menstrual period 1986, SpO2 97 %, not currently breastfeeding. Body mass index is 30.54 kg/(m^2).  Physical Exam:      Well developed, well nourished.  Alert, oriented in no acute distress.  Psych: Eye contact is good, speech goal directed, affect calm  Eyes: conjunctiva non-injected, sclera non-icteric.  Neck Supple.  No adenopathy or masses in the neck or supraclavicular regions. No thyromegaly  Lungs: clear to auscultation bilaterally with good excursion. No wheezes or rhonchi  CV: regular rate and rhythm. No murmur  Abdomen: soft, nontender, no masses or organomegaly.  No rebound or guaring        Assessment and Plan:   The following treatment plan was discussed    1. JUNIOR (generalized anxiety disorder)  We will switch the patient to escitalopram 20 mg daily. She is to stop the sertraline. Lorazepam refilled. Controlled substance agreement signed. [place] PM. Reviewed -   escitalopram (LEXAPRO) 20 MG tablet; Take 1 Tab by mouth every day.  Dispense: 30 Tab; Refill: 3  - lorazepam (ATIVAN) 1 MG Tab; Take 1 Tab by mouth at bedtime as needed.  Dispense: 30 Tab; Refill: 1  - CONTROLLED SUBSTANCE TREATMENT AGREEMENT    2. Diverticulitis of large intestine with perforation without bleeding (CMS-HCC)  refer to gastroenterology for further evaluation and treatment    - REFERRAL TO GASTROENTEROLOGY    3. H/O small bowel " obstruction  refer to gastroenterology for further evaluation and treatment    - REFERRAL TO GASTROENTEROLOGY    4. H/O diverticulitis of colon  refer to gastroenterology for further evaluation and treatment    - REFERRAL TO GASTROENTEROLOGY    5. Hx of adenomatous colonic polyps  refer to gastroenterology for further evaluation and treatment    - REFERRAL TO GASTROENTEROLOGY    6. Irritable bowel syndrome without diarrhea  refer to gastroenterology for further evaluation and treatment    - REFERRAL TO GASTROENTEROLOGY    7. Moderate episode of recurrent major depressive disorder (CMS-HCC)  We will switch the patient to escitalopram 20 mg daily. She is to stop the sertraline. Lorazepam refilled. Controlled substance agreement signed. [place] PM. [place]    8. Neuropathy (CMS-HCC)  Increase Neurontin dose. A 300 mg at bedtime after week she can increase to twice a day. She's got a recheck in one month  - gabapentin (NEURONTIN) 300 MG Cap; One at bedtime, may increase to BID after one  week  Dispense: 60 Cap; Refill: 0      Records requested.  Any change or worsening of signs or symptoms, patient encouraged to follow-up or report to the emergency room for further evaluation. Patient understands and agrees.    Followup: Return in about 4 weeks (around 8/2/2017).

## 2017-07-05 NOTE — ASSESSMENT & PLAN NOTE
Patient complains of generalized abdominal pain, frequent diarrhea alternating with constipation. She's had recurrent small bowel obstructions including a partial colon resection. She had adenomatous polyps 2 years ago. She would like to see a gastroenterologist to talk about her irritable bowels and better ways to treat it. She knows that it worsens when she is stressed. She denies any black or bloody stools.

## 2017-07-10 ENCOUNTER — PATIENT OUTREACH (OUTPATIENT)
Dept: HEALTH INFORMATION MANAGEMENT | Facility: OTHER | Age: 72
End: 2017-07-10

## 2017-07-10 NOTE — PROGRESS NOTES
Outcome: No Answer-VM Not Set Up    WebIZ Checked & Epic Updated:  yes    [facility] Verified: yes    Attempt # 1

## 2017-07-12 ENCOUNTER — OFFICE VISIT (OUTPATIENT)
Dept: MEDICAL GROUP | Facility: MEDICAL CENTER | Age: 72
End: 2017-07-12
Payer: MEDICARE

## 2017-07-12 VITALS
WEIGHT: 172 LBS | DIASTOLIC BLOOD PRESSURE: 90 MMHG | OXYGEN SATURATION: 96 % | BODY MASS INDEX: 31.65 KG/M2 | HEIGHT: 62 IN | HEART RATE: 77 BPM | SYSTOLIC BLOOD PRESSURE: 124 MMHG | TEMPERATURE: 97.9 F

## 2017-07-12 DIAGNOSIS — S83.8X1A ACUTE MENISCAL INJURY OF RIGHT KNEE, INITIAL ENCOUNTER: ICD-10-CM

## 2017-07-12 PROCEDURE — 99214 OFFICE O/P EST MOD 30 MIN: CPT | Performed by: FAMILY MEDICINE

## 2017-07-12 NOTE — MR AVS SNAPSHOT
"        Dina Adorno Mars   2017 10:40 AM   Office Visit   MRN: [id number]    Department:  [facility]   Dept Phone:  [phone]    Description:  Female : 1945   Provider:  Romy Ivy M.D.           Reason for Visit     Knee Pain right side    Hip Pain right side      Allergies as of 2017     Allergen Noted Reactions    Penicillins 2010   Rash, Unspecified    Itchy rash    Codeine 2014   Vomiting    Morphine 2010   Rash    Localized red rash after morphine administration    Vicodin [Hydrocodone-Acetaminophen] 10/09/2012   Itching      You were diagnosed with     Acute meniscal injury of right knee, initial encounter   [[id number]]         Vital Signs     Blood Pressure Pulse Temperature Height Weight Body Mass Index    124/90 mmHg 77 36.6 °C (97.9 °F) 1.575 m (5' 2.01\") 78.019 kg (172 lb) 31.45 kg/m2    Oxygen Saturation Last Menstrual Period Smoking Status             96% 1986 Current Every Day Smoker         Basic Information     Date Of Birth Sex Race Ethnicity Preferred Language    1945 Female White Non- English      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   10/19/2011 - Present    Hyperlipidemia E78.5   10/19/2011 - Present    GERD (gastroesophageal reflux disease) K21.9   10/29/2011 - Present    Osteoporosis, post-menopausal M81.0   2012 - Present    H/O diverticulitis of colon Z87.19   2013 - Present    JUNIOR (generalized anxiety disorder) F41.1   12/3/2013 - Present    S/P partial colectomy Z90.49   2014 - Present    Obesity (BMI 30.0-34.9) E66.9   10/22/2014 - Present    Sleep disturbances G47.9   2015 - Present    Moderate episode of recurrent major depressive disorder (CMS-HCC) F33.1   2015 - Present    IBS (irritable bowel syndrome) K58.9   2015 - Present    Hx of " adenomatous colonic polyps Z86.010   12/5/2015 - Present    Neuropathy (CMS-HCC) G62.9   1/14/2016 - Present    H/O small bowel obstruction Z87.19   5/3/2016 - Present    Generalized muscle ache M79.1   2/24/2017 - Present    Smoking greater than 30 pack years F17.210   4/14/2017 - Present    Primary osteoarthritis involving multiple joints M15.0   4/14/2017 - Present    History of total hysterectomy Z90.710   9/8/2008 - Present    History of malignant neoplasm of skin Z85.828   1/7/2008 - Present    Cataract of both eyes, managed by Dr. Caicedo H26.9   4/27/2017 - Present    Right knee pain M25.561   5/11/2017 - Present    Elevated TSH R94.6   5/30/2017 - Present    Hypokalemia E87.6   6/2/2017 - Present    Acute meniscal injury of right knee S83.8X1A   7/12/2017 - Present      Health Maintenance        Date Due Completion Dates    IMM DTaP/Tdap/Td Vaccine (1 - Tdap) 7/12/1966 ---    IMM ZOSTER VACCINE 7/12/2007 ---    MAMMOGRAM 4/8/2017 4/8/2016, 11/7/2013, 8/8/2012    IMM INFLUENZA (1) 9/1/2017 ---    BONE DENSITY 8/9/2018 8/9/2016, 8/8/2012    COLONOSCOPY 12/2/2020 12/2/2015            Current Immunizations     13-VALENT PCV PREVNAR 8/1/2016    Pneumococcal polysaccharide vaccine (PPSV-23) 12/7/2012    Tetanus Vaccine 4/2/2010      Below and/or attached are the medications your provider expects you to take. Review all of your home medications and newly ordered medications with your provider and/or pharmacist. Follow medication instructions as directed by your provider and/or pharmacist. Please keep your medication list with you and share with your provider. Update the information when medications are discontinued, doses are changed, or new medications (including over-the-counter products) are added; and carry medication information at all times in the event of emergency situations     Allergies:  PENICILLINS - Rash,Unspecified     CODEINE - Vomiting     MORPHINE - Rash     VICODIN - Itching                  Medications  Valid as of: July 12, 2017 - 12:19 PM    Generic Name Brand Name Tablet Size Instructions for use    Diclofenac Sodium (Gel) Diclofenac Sodium 1 % Apply thin film to affected area q 8 hrs prn        Escitalopram Oxalate (Tab) LEXAPRO 20 MG Take 1 Tab by mouth every day.        Gabapentin (Cap) NEURONTIN 300 MG One at bedtime, may increase to BID after one  week        LORazepam (Tab) ATIVAN 1 MG Take 1 Tab by mouth at bedtime as needed.        Oxycodone-Acetaminophen (Tab) PERCOCET 5-325 MG Take 1 Tab by mouth every 8 hours as needed.        Pantoprazole Sodium (Tablet Delayed Response) PROTONIX 40 MG Take 1 Tab by mouth every day.        .                 Medicines prescribed today were sent to:     [facility] [address] [place] [address], [place] - [address]    [address] [place] [address]    Phone: [phone] Fax: [phone]    Open 24 Hours?: No      Medication refill instructions:       If your prescription bottle indicates you have medication refills left, it is not necessary to call your provider’s office. Please contact your pharmacy and they will refill your medication.    If your prescription bottle indicates you do not have any refills left, you may request refills at any time through one of the following ways: The online [employer] system (except Urgent Care), by calling your provider’s office, or by asking your pharmacy to contact your provider’s office with a refill request. Medication refills are processed only during regular business hours and may not be available until the next business day. Your provider may request additional information or to have a follow-up visit with you prior to refilling your medication.   *Please Note: Medication refills are assigned a new Rx number when refilled electronically. Your pharmacy may indicate that no refills were authorized even though a new prescription for the same medication is available at the pharmacy. Please request the medicine by  name with the pharmacy before contacting your provider for a refill.           MyChart Access Code: Activation code not generated  Current MyChart Status: Active          Quit Tobacco Information     Do you want to quit using tobacco?    Quitting tobacco decreases risks of cancer, heart and lung disease, increases life expectancy, improves sense of taste and smell, and increases spending money, among other benefits.    If you are thinking about quitting, we can help.  • [facility] Quit Tobacco Program: [phone]  o Program occurs weekly for four weeks and includes pharmacist consultation on products to support quitting smoking or chewing tobacco. A provider referral is needed for pharmacist consultation.  • Tobacco Users Help Hotline: 5-566-QUIT-NOW (963-5978) or https://[place].quitlogix.org/  o Free, confidential telephone and online coaching for [place] residents. Sessions are designed on a schedule that is convenient for you. Eligible clients receive free nicotine replacement therapy.  • Nationally: www.smokefree.gov  o Information and professional assistance to support both immediate and long-term needs as you become, and remain, a non-smoker. Smokefree.gov allows you to choose the help that best fits your needs.

## 2017-07-14 NOTE — PROGRESS NOTES
Subjective:     Chief Complaint   Patient presents with   • Knee Pain     right side   • Hip Pain     right side       Dina Crews is a 72 y.o. female here today for evaluation and management of:    Acute meniscal injury of right knee  Patient had an acute meniscal injury of her right knee. She had an MRI that showed:  1.  Horizontal type tear involving the posterior horn of the medial meniscus with truncation of the free edge. There is also some fluid seen interposed between the deep and superficial components of the medial collateral ligament adjacent to the medial   meniscus posteriorly suggesting meniscocapsular injury.    2.  Sprain of the medial collateral ligament.    3.  Marrow edema with subchondral irregularity and cartilaginous irregularity of the proximal tibia medially.    4.  Chondromalacia patella.    5.  Joint effusion.    Patient has seen the orthopedist but can't skip the knee surgery done because her wife is getting ready to have heart surgery later this week. She is experiencing a lot of pain. The orthopedist gave her Pacolet which she said didn't work, Percocet the only thing that works. She did not call the orthopedist back to ask for the new medication. She does have a brace that she hasn't been wearing it regularly because her leg has been swollen and it cuts into her skin a bit. She has not been icing it or elevating it.       Allergies   Allergen Reactions   • Penicillins Rash and Unspecified     Itchy rash   • Codeine Vomiting   • Morphine Rash     Localized red rash after morphine administration   • Vicodin [Hydrocodone-Acetaminophen] Itching       Current medicines (including changes today)  Current Outpatient Prescriptions   Medication Sig Dispense Refill   • Diclofenac Sodium 1 % Gel Apply thin film to affected area q 8 hrs prn 60 g 1   • escitalopram (LEXAPRO) 20 MG tablet Take 1 Tab by mouth every day. 30 Tab 3   • gabapentin (NEURONTIN) 300 MG Cap One at bedtime, may  increase to BID after one  week 60 Cap 0   • lorazepam (ATIVAN) 1 MG Tab Take 1 Tab by mouth at bedtime as needed. 30 Tab 1   • oxycodone-acetaminophen (PERCOCET) 5-325 MG Tab Take 1 Tab by mouth every 8 hours as needed.     • pantoprazole (PROTONIX) 40 MG Tablet Delayed Response Take 1 Tab by mouth every day. 90 Tab 3     No current facility-administered medications for this visit.       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    Patient Active Problem List    Diagnosis Date Noted   • Acute meniscal injury of right knee 07/12/2017   • Hypokalemia 06/02/2017   • Elevated TSH 05/30/2017   • Right knee pain 05/11/2017   • Cataract of both eyes, managed by Dr. Caicedo 04/27/2017   • Smoking greater than 30 pack years 04/14/2017   • Primary osteoarthritis involving multiple joints 04/14/2017   • Generalized muscle ache 02/24/2017   • H/O small bowel obstruction 05/03/2016   • Neuropathy (CMS-[facility]) 01/14/2016   • Hx of adenomatous colonic polyps 12/05/2015   • Moderate episode of recurrent major depressive disorder (CMS-HCC) 11/09/2015   • IBS (irritable bowel syndrome) 11/09/2015   • Sleep disturbances 05/07/2015   • Obesity (BMI 30.0-34.9) 10/22/2014   • S/P partial colectomy 04/22/2014   • JUNIOR (generalized anxiety disorder) 12/03/2013   • H/O diverticulitis of colon 04/24/2013   • Osteoporosis, post-menopausal 08/12/2012   • GERD  "(gastroesophageal reflux disease) 10/29/2011   • 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  No nausea or vomiting.  No chest pain or palpitations.  No cough or SOB.  No pain with urination or hematuria.  No black or bloody stools.       Objective:     Blood pressure 124/90, pulse 77, temperature 36.6 °C (97.9 °F), height 1.575 m (5' 2.01\"), weight 78.019 kg (172 lb), last menstrual period 07/12/1986, SpO2 96 %. Body mass index is 31.45 kg/(m^2).   Physical Exam:  Well developed, well nourished.  Alert, oriented in mild acute distress.  Eye contact is good, speech goal directed, affect tearful  Eyes: conjunctiva non-injected, sclera non-icteric.  Knees: No erythema or ecchymosis., but an effusion is present.  Tenderness to palpation on MCL.  Joint line tenderness medially. Patellar grind test , Lachman's and Anthony's not done secondary to patient's effusion and pain. ROM intact. 5/5 strength bilaterally. 2+ deep tendon reflexes bilaterally.        Assessment and Plan:   The following treatment plan was discussed    1. Acute meniscal injury of right knee, initial encounter  Patient and I discussed this at length. I advised her that we only want one physician managing her pain and that would be the orthopedist. I have advised her to give the office a call. Diclofenac gel as directed. We discussed wearing a barrier between her skin and brace and using the brace more. Recommend ice and elevation. Follow-up with orthopedics as scheduled  - Diclofenac Sodium 1 % Gel; Apply thin film to affected area q 8 hrs prn  Dispense: 60 g; Refill: 1    Any change or worsening of signs or symptoms, patient encouraged to follow-up or report to the emergency room for further evaluation. Patient understands and " agrees.    Followup: Return if symptoms worsen or fail to improve.

## 2017-07-14 NOTE — ASSESSMENT & PLAN NOTE
Patient had an acute meniscal injury of her right knee. She had an MRI that showed:  1.  Horizontal type tear involving the posterior horn of the medial meniscus with truncation of the free edge. There is also some fluid seen interposed between the deep and superficial components of the medial collateral ligament adjacent to the medial   meniscus posteriorly suggesting meniscocapsular injury.    2.  Sprain of the medial collateral ligament.    3.  Marrow edema with subchondral irregularity and cartilaginous irregularity of the proximal tibia medially.    4.  Chondromalacia patella.    5.  Joint effusion.    Patient has seen the orthopedist but can't skip the knee surgery done because her wife is getting ready to have heart surgery later this week. She is experiencing a lot of pain. The orthopedist gave her Liberty which she said didn't work, Percocet the only thing that works. She did not call the orthopedist back to ask for the new medication. She does have a brace that she hasn't been wearing it regularly because her leg has been swollen and it cuts into her skin a bit. She has not been icing it or elevating it.

## 2017-07-28 NOTE — PROGRESS NOTES
Attempt #:3    WebIZ Checked & Epic Updated: yes  [facility] Verified: yes  Verify PCP: yes    Communication Preference Obtained: yes     Review Care Team: yes    Annual Wellness Visit Scheduling  1. Scheduling Status:Scheduled    Care Gap Scheduling (Attempt to Schedule EACH Overdue Care Gap!)     Health Maintenance Due   Topic Date Due   • IMM DTaP/Tdap/Td Vaccine (1 - Tdap) Scheduled   • LUNG CANCER SCREENING  07/12/2002   • IMM ZOSTER VACCINE  Scheduled   • MAMMOGRAM  Scheduled         [employer] Activation: already active  [employer] Danii: no  Virtual Visits: no  Opt In to Text Messages: no

## 2017-08-09 ENCOUNTER — TELEPHONE (OUTPATIENT)
Dept: MEDICAL GROUP | Facility: MEDICAL CENTER | Age: 72
End: 2017-08-09

## 2017-08-09 NOTE — TELEPHONE ENCOUNTER
ANNUAL WELLNESS VISIT PRE-VISIT PLANNING     1.  Reviewed note from last office visit with PCP: YES 7/12/17    2.  If any orders were placed at last visit, do we have Results/Consult Notes?        •  Labs - n/a       •  Imaging - Imaging was not ordered at last office visit.       •  Referrals - No referrals were ordered at last office visit.    3.  Immunizations were updated in Epic using WebIZ?: Yes    6.  Patient was informed to arrive 15 min prior to their scheduled appointment and bring in their medication bottles.

## 2017-08-16 ENCOUNTER — OFFICE VISIT (OUTPATIENT)
Dept: MEDICAL GROUP | Facility: MEDICAL CENTER | Age: 72
End: 2017-08-16
Payer: MEDICARE

## 2017-08-16 VITALS
DIASTOLIC BLOOD PRESSURE: 64 MMHG | HEIGHT: 62 IN | WEIGHT: 168.2 LBS | TEMPERATURE: 97.8 F | SYSTOLIC BLOOD PRESSURE: 110 MMHG | HEART RATE: 72 BPM | BODY MASS INDEX: 30.95 KG/M2 | OXYGEN SATURATION: 97 %

## 2017-08-16 DIAGNOSIS — E66.9 OBESITY (BMI 30.0-34.9): ICD-10-CM

## 2017-08-16 DIAGNOSIS — K21.9 GASTROESOPHAGEAL REFLUX DISEASE WITHOUT ESOPHAGITIS: ICD-10-CM

## 2017-08-16 DIAGNOSIS — G62.9 NEUROPATHY: ICD-10-CM

## 2017-08-16 DIAGNOSIS — Z23 NEED FOR VACCINATION: ICD-10-CM

## 2017-08-16 DIAGNOSIS — R79.89 ELEVATED TSH: ICD-10-CM

## 2017-08-16 DIAGNOSIS — E78.5 HYPERLIPIDEMIA, UNSPECIFIED HYPERLIPIDEMIA TYPE: ICD-10-CM

## 2017-08-16 DIAGNOSIS — F33.1 MODERATE EPISODE OF RECURRENT MAJOR DEPRESSIVE DISORDER (HCC): ICD-10-CM

## 2017-08-16 DIAGNOSIS — M15.9 PRIMARY OSTEOARTHRITIS INVOLVING MULTIPLE JOINTS: ICD-10-CM

## 2017-08-16 DIAGNOSIS — E87.6 HYPOKALEMIA: ICD-10-CM

## 2017-08-16 DIAGNOSIS — H26.9 CATARACT OF BOTH EYES, UNSPECIFIED CATARACT TYPE: ICD-10-CM

## 2017-08-16 DIAGNOSIS — F41.1 GAD (GENERALIZED ANXIETY DISORDER): ICD-10-CM

## 2017-08-16 DIAGNOSIS — E55.9 VITAMIN D INSUFFICIENCY: ICD-10-CM

## 2017-08-16 DIAGNOSIS — F17.210 SMOKING GREATER THAN 30 PACK YEARS: ICD-10-CM

## 2017-08-16 DIAGNOSIS — Z00.00 MEDICARE ANNUAL WELLNESS VISIT, SUBSEQUENT: ICD-10-CM

## 2017-08-16 DIAGNOSIS — K58.9 IRRITABLE BOWEL SYNDROME WITHOUT DIARRHEA: ICD-10-CM

## 2017-08-16 DIAGNOSIS — E78.00 PURE HYPERCHOLESTEROLEMIA: ICD-10-CM

## 2017-08-16 DIAGNOSIS — M81.0 OSTEOPOROSIS, POST-MENOPAUSAL: ICD-10-CM

## 2017-08-16 PROBLEM — M25.561 RIGHT KNEE PAIN: Status: RESOLVED | Noted: 2017-05-11 | Resolved: 2017-08-16

## 2017-08-16 PROBLEM — S83.8X1A ACUTE MENISCAL INJURY OF RIGHT KNEE: Status: RESOLVED | Noted: 2017-07-12 | Resolved: 2017-08-16

## 2017-08-16 PROCEDURE — 90715 TDAP VACCINE 7 YRS/> IM: CPT | Performed by: FAMILY MEDICINE

## 2017-08-16 PROCEDURE — 90471 IMMUNIZATION ADMIN: CPT | Performed by: FAMILY MEDICINE

## 2017-08-16 PROCEDURE — G0439 PPPS, SUBSEQ VISIT: HCPCS | Mod: 25 | Performed by: FAMILY MEDICINE

## 2017-08-16 RX ORDER — GABAPENTIN 300 MG/1
600 CAPSULE ORAL
Qty: 180 CAP | Refills: 3 | Status: SHIPPED | OUTPATIENT
Start: 2017-08-16 | End: 2017-10-12 | Stop reason: SDUPTHER

## 2017-08-16 ASSESSMENT — PATIENT HEALTH QUESTIONNAIRE - PHQ9
SUM OF ALL RESPONSES TO PHQ QUESTIONS 1-9: 2
CLINICAL INTERPRETATION OF PHQ2 SCORE: 1
5. POOR APPETITE OR OVEREATING: 0 - NOT AT ALL

## 2017-08-16 NOTE — PROGRESS NOTES
Chief Complaint   Patient presents with   • Annual Wellness Visit         HPI:  Dina is a 72 y.o. here for Medicare Annual Wellness Visit        Patient Active Problem List    Diagnosis Date Noted   • Hypokalemia 06/02/2017   • Elevated TSH 05/30/2017   • Cataract of both eyes, managed by Dr. Caicedo 04/27/2017   • Smoking greater than 30 pack years 04/14/2017   • Primary osteoarthritis involving multiple joints 04/14/2017   • H/O small bowel obstruction 05/03/2016   • Neuropathy (CMS-HCC) 01/14/2016   • Hx of adenomatous colonic polyps 12/05/2015   • Moderate episode of recurrent major depressive disorder (CMS-HCC) 11/09/2015   • IBS (irritable bowel syndrome) 11/09/2015   • Obesity (BMI 30.0-34.9) 10/22/2014   • S/P partial colectomy 04/22/2014   • JUNIOR (generalized anxiety disorder) 12/03/2013   • H/O diverticulitis of colon 04/24/2013   • Osteoporosis, post-menopausal 08/12/2012   • GERD (gastroesophageal reflux disease) 10/29/2011   • History of malignant neoplasm of parotid gland 10/19/2011   • Hyperlipidemia 10/19/2011   • Vitamin D insufficiency 04/25/2011   • History of total hysterectomy 09/08/2008   • History of malignant neoplasm of skin 01/07/2008       Current Outpatient Prescriptions   Medication Sig Dispense Refill   • gabapentin (NEURONTIN) 300 MG Cap Take 2 Caps by mouth every bedtime. 180 Cap 3   • Diclofenac Sodium 1 % Gel Apply thin film to affected area q 8 hrs prn 60 g 1   • escitalopram (LEXAPRO) 20 MG tablet Take 1 Tab by mouth every day. 30 Tab 3   • lorazepam (ATIVAN) 1 MG Tab Take 1 Tab by mouth at bedtime as needed. 30 Tab 1   • oxycodone-acetaminophen (PERCOCET) 5-325 MG Tab Take 1 Tab by mouth every 8 hours as needed.     • pantoprazole (PROTONIX) 40 MG Tablet Delayed Response Take 1 Tab by mouth every day. 90 Tab 3     No current facility-administered medications for this visit.        Patient is taking medications as noted in medication list.  Current supplements as per medication  list.     Allergies: Penicillins; Codeine; Morphine; and Vicodin    Current social contact/activities: pt keeps herself busy gardening and other activities.      Is patient current with immunizations? No, due for TDAP. Patient is interested in receiving TDAP today.    She  reports that she has been smoking Cigarettes.  She started smoking about 53 years ago. She has a 22.5 pack-year smoking history. She has never used smokeless tobacco. She reports that she uses illicit drugs (Oral and Marijuana). She reports that she does not drink alcohol.  Ready to quit: Yes  Counseling given: Not Answered        DPA/Advanced directive: Patient does not have an Advanced Directive.  A packet and workshop information was given on Advanced Directives.    ROS:    Gait: Uses a cane prn    Ostomy: no   Other tubes: no   Amputations: no   Chronic oxygen use no   Last eye exam 3 months ago    Wears hearing aids: no   : Denies incontinence.       Depression Screening    Little interest or pleasure in doing things?  0 - not at all  Feeling down, depressed, or hopeless? 1 - several days  Trouble falling or staying asleep, or sleeping too much?  0 - not at all  Feeling tired or having little energy?  0 - not at all  Poor appetite or overeating?  0 - not at all  Feeling bad about yourself - or that you are a failure or have let yourself or your family down? 0 - not at all  Trouble concentrating on things, such as reading the newspaper or watching television? 1 - several days  Moving or speaking so slowly that other people could have noticed.  Or the opposite - being so fidgety or restless that you have been moving around a lot more than usual?  0 - not at all  Thoughts that you would be better off dead, or of hurting yourself?  0 - not at all  Patient Health Questionnaire Score: 2      If depressive symptoms identified deferred to follow up visit unless specifically addressed in assessment and plan.    Interpretation of PHQ-9 Total Score    Score Severity   1-4 No Depression   5-9 Mild Depression   10-14 Moderate Depression   15-19 Moderately Severe Depression   20-27 Severe Depression      Screening for Cognitive Impairment    Three Minute Recall (apple, watch, andrés)  3/3    Draw clock face with all 12 numbers set to the hand to show 10 minutes past 11 o'clock  1 5/5  If cognitive concerns identified, deferred for follow up unless specifically addressed in assessment and plan.    Fall Risk Assessment    Has the patient had two or more falls in the last year or any fall with injury in the last year?  Yes  If fall risk identified, deferred for follow up unless specifically addressed in assessment and plan.      Safety Assessment    Throw rugs on floor.  Yes  Handrails on all stairs.  Yes  Good lighting in all hallways.  Yes  Difficulty hearing.  No  Patient counseled about all safety risks that were identified.    Functional Assessment ADLs    Are there any barriers preventing you from cooking for yourself or meeting nutritional needs?  No.    Are there any barriers preventing you from driving safely or obtaining transportation?  No.    Are there any barriers preventing you from using a telephone or calling for help?  No.    Are there any barriers preventing you from shopping?  No.    Are there any barriers preventing you from taking care of your own finances?  No.    Are there any barriers preventing you from managing your medications?  No.    Are you currently engaging any exercise or physical activity?  No.       Health Maintenance Summary                IMM DTaP/Tdap/Td Vaccine Overdue 7/12/1966     LUNG CANCER SCREENING Overdue 7/12/2002     IMM ZOSTER VACCINE Overdue 7/12/2007     MAMMOGRAM Overdue 4/8/2017      Done 4/8/2016 MA-SCREEN MAMMO W/CAD-BILAT     Patient has more history with this topic...    Annual Wellness Visit Overdue 8/2/2017      Done 8/1/2016 Visit Dx: Medicare annual wellness visit, subsequent    IMM INFLUENZA Next Due  9/1/2017     BONE DENSITY Next Due 8/9/2018      Done 8/9/2016 DS-BONE DENSITY STUDY (DEXA)     Patient has more history with this topic...    COLONOSCOPY Next Due 12/2/2020      Done 12/2/2015 AMB REFERRAL TO GI FOR COLONOSCOPY          Patient Care Team:  Romy Ivy M.D. as PCP - General (Family Medicine)  Bert Mays M.D. as Consulting Physician (Gastroenterology)  Marylin Turner M.D. as Consulting Physician (Dermatology)  Sapphire Carbajal M.D. as Consulting Physician (Otolaryngology)      Social History   Substance Use Topics   • Smoking status: Current Every Day Smoker -- 0.50 packs/day for 45 years     Types: Cigarettes     Start date: 01/01/1964     Last Attempt to Quit: 01/01/2012   • Smokeless tobacco: Never Used   • Alcohol Use: No     Family History   Problem Relation Age of Onset   • Lung Disease Mother      COPD   • Cancer Mother      Thyroid cancer   • Cancer Father      d. 72 Stomach cancer   • Diabetes Father    • Cancer Sister 40     Breast cancer   • GI Sister      diverticulitis   • Cancer Sister      breast     She  has a past medical history of Perennial allergic rhinitis (4/25/2011); Vitamin d deficiency (4/25/2011); Tobacco abuse (4/25/2011); Personal history of skin cancer (4/25/2011); History of malignant neoplasm of parotid gland (10/19/2011); Hyperlipidemia (10/19/2011); GERD (gastroesophageal reflux disease) (10/29/2011); TMJ tenderness (10/29/2011); Osteopenia (8/12/2012); Diverticulitis (3/1/2013); Chronic constipation (10/12/2013); S/P partial colectomy (4/22/2014); CATARACT; Chronic maxillary sinusitis (12/30/2015); Elevated TSH (5/30/2017); Major depression (CMS-HCC) (7/16/2012); Anxiety disorder; and Torn meniscus.   Past Surgical History   Procedure Laterality Date   • Hysterectomy, vaginal       and BSO   • Low anterior resection robotic  1/28/2014     Performed by Ismael Rocha M.D. at SURGERY [facility]   • Mikala by laparoscopy  4/20/2015     Performed by  "Gaudencio Johnson M.D. at SURGERY SAME DAY [facility] ORS   • Primary c section     • Repeat c section     • Other abdominal surgery       partial colectomy   • Shoulder arthroscopy     • Abdominal hysterectomy total           Exam:     Blood pressure 110/64, pulse 72, temperature 36.6 °C (97.8 °F), height 1.575 m (5' 2\"), weight 76.295 kg (168 lb 3.2 oz), last menstrual period 07/12/1986, SpO2 97 %. Body mass index is 30.76 kg/(m^2).    Hearing good.    Dentition good  Alert, oriented in no acute distress.  Eye contact is good, speech goal directed, affect calm      Assessment and Plan. The following treatment and monitoring plan is recommended:    1. Medicare annual wellness visit, subsequent  Routine anticipatory guidance. No special services needed this time.    2. JUNIOR (generalized anxiety disorder)  Discussed relaxation techniques and stress management skills. Lorazepam okay for occasional use.    3. Gastroesophageal reflux disease without esophagitis  Good control. Continue Protonix daily    4. Pure hypercholesterolemia  Stable. Continue dietary modifications and increased exercise.    5. Hypokalemia  Continue high potassium diet    6. Irritable bowel syndrome without diarrhea  Continue fiber supplement.    7. Moderate episode of recurrent major depressive disorder (CMS-HCC)  Improved control. Continue escitalopram    8. Neuropathy (CMS-HCC)  Patient cannot tolerate the gabapentin twice a day. We will increase to 600 mg at bedtime  - gabapentin (NEURONTIN) 300 MG Cap; Take 2 Caps by mouth every bedtime.  Dispense: 180 Cap; Refill: 3    9. Osteoporosis, post-menopausal  Continue weightbearing exercise and vitamin D supplementation    10. Primary osteoarthritis involving multiple joints  Gabapentin as directed    11. Smoking greater than 30 pack years  Patient  would like to quit smoking after her wife surgery next month. Counseling done. Cessation techniques discussed. Patient does not want medications at this " time.    12. Vitamin D insufficiency  Vitamin D 2000 units daily    13. Need for vaccination  Updated  - TDAP VACCINE =>6YO IM    14. Obesity (BMI 30.0-34.9)  Encourage weight loss    15. Cataract of both eyes, unspecified cataract type  Continue follow-up with ophthalmology    16. Elevated TSH  Recheck labs in 6 months      Services suggested: No services needed at this time  Health Care Screening recommendations as per orders if indicated.  Referrals offered: PT/OT/Nutrition counseling/Behavioral Health/Smoking cessation as per orders if indicated.    Discussion today about general wellness and lifestyle habits:    · Prevent falls and reduce trip hazards; Cautioned about securing or removing rugs.  · Have a working fire alarm and carbon monoxide detector;   · Engage in regular physical activity and social activities       Follow-up: Return in about 3 months (around 11/16/2017).

## 2017-08-16 NOTE — MR AVS SNAPSHOT
"        Dina Adorno Hucferny   2017 11:20 AM   Office Visit   MRN: [id number]    Department:  [facility]   Dept Phone:  [phone]    Description:  Female : 1945   Provider:  Romy Ivy M.D.; [facility]            Reason for Visit     Annual Wellness Visit           Allergies as of 2017     Allergen Noted Reactions    Penicillins 2010   Rash, Unspecified    Itchy rash    Codeine 2014   Vomiting    Morphine 2010   Rash    Localized red rash after morphine administration    Vicodin [Hydrocodone-Acetaminophen] 10/09/2012   Itching      You were diagnosed with     Medicare annual wellness visit, subsequent   [888889]       JUNIOR (generalized anxiety disorder)   [355926]       Gastroesophageal reflux disease without esophagitis   [280177]       Pure hypercholesterolemia   [272.0.ICD-9-CM]       Hypokalemia   [635317]       Irritable bowel syndrome without diarrhea   [594722]       Moderate episode of recurrent major depressive disorder (CMS-[facility])   [8854814]       Neuropathy (CMS-[facility])   [910098]       Osteoporosis, post-menopausal   [048581]       Primary osteoarthritis involving multiple joints   [0481595]       Smoking greater than 30 pack years   [654853]       Vitamin D insufficiency   [376730]       Need for vaccination   [456227]         Vital Signs     Blood Pressure Pulse Temperature Height Weight Body Mass Index    110/64 mmHg 72 36.6 °C (97.8 °F) 1.575 m (5' 2\") 76.295 kg (168 lb 3.2 oz) 30.76 kg/m2    Oxygen Saturation Last Menstrual Period Smoking Status             97% 1986 Current Every Day Smoker         Basic Information     Date Of Birth Sex Race Ethnicity Preferred Language    1945 Female White Non- English      Your appointments     Sep 12, 2017  1:40 PM   MA [id number] with GEOVANNA MESSINA MG 1   [facility]    [address] [facility] [address]  [place] [address] [phone]      "    No deodorant, powder, perfume or lotion under the arm or breast area.            Nov 16, 2017 11:00 AM   Established Patient with Romy Ivy M.D.   [facility]    [address]  [place] [address]   [phone]           You will be receiving a confirmation call a few days before your appointment from our automated call confirmation system.              Problem List              ICD-10-CM Priority Class Noted - Resolved    Vitamin D insufficiency E55.9   4/25/2011 - Present    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   10/19/2011 - Present    Hyperlipidemia E78.5   10/19/2011 - Present    GERD (gastroesophageal reflux disease) K21.9   10/29/2011 - Present    Osteoporosis, post-menopausal M81.0   8/12/2012 - Present    H/O diverticulitis of colon Z87.19   4/24/2013 - Present    JUNIOR (generalized anxiety disorder) F41.1   12/3/2013 - Present    S/P partial colectomy Z90.49   4/22/2014 - Present    Obesity (BMI 30.0-34.9) E66.9   10/22/2014 - Present    Sleep disturbances G47.9   5/7/2015 - Present    Moderate episode of recurrent major depressive disorder (CMS-HCC) F33.1   11/9/2015 - Present    IBS (irritable bowel syndrome) K58.9   11/9/2015 - Present    Hx of adenomatous colonic polyps Z86.010   12/5/2015 - Present    Neuropathy (CMS-HCC) G62.9   1/14/2016 - Present    H/O small bowel obstruction Z87.19   5/3/2016 - Present    Generalized muscle ache M79.1   2/24/2017 - Present    Smoking greater than 30 pack years F17.210   4/14/2017 - Present    Primary osteoarthritis involving multiple joints M15.0   4/14/2017 - Present    History of total hysterectomy Z90.710   9/8/2008 - Present    History of malignant neoplasm of skin Z85.828   1/7/2008 - Present    Cataract of both eyes, managed by Dr. Caicedo H26.9   4/27/2017 - Present    Elevated TSH R94.6   5/30/2017 - Present    Hypokalemia E87.6   6/2/2017 - Present      Health Maintenance         Date Due Completion Dates    IMM DTaP/Tdap/Td Vaccine (1 - Tdap) 7/12/1966 ---    IMM ZOSTER VACCINE 7/12/2007 ---    MAMMOGRAM 4/8/2017 4/8/2016, 11/7/2013, 8/8/2012    IMM INFLUENZA (1) 9/1/2017 ---    BONE DENSITY 8/9/2018 8/9/2016, 8/8/2012    COLONOSCOPY 12/2/2020 12/2/2015            Current Immunizations     13-VALENT PCV PREVNAR 8/1/2016    Pneumococcal polysaccharide vaccine (PPSV-23) 12/7/2012    Tdap Vaccine 8/16/2017    Tetanus Vaccine 4/2/2010      Below and/or attached are the medications your provider expects you to take. Review all of your home medications and newly ordered medications with your provider and/or pharmacist. Follow medication instructions as directed by your provider and/or pharmacist. Please keep your medication list with you and share with your provider. Update the information when medications are discontinued, doses are changed, or new medications (including over-the-counter products) are added; and carry medication information at all times in the event of emergency situations     Allergies:  PENICILLINS - Rash,Unspecified     CODEINE - Vomiting     MORPHINE - Rash     VICODIN - Itching               Medications  Valid as of: August 16, 2017 - 12:05 PM    Generic Name Brand Name Tablet Size Instructions for use    Diclofenac Sodium (Gel) Diclofenac Sodium 1 % Apply thin film to affected area q 8 hrs prn        Escitalopram Oxalate (Tab) LEXAPRO 20 MG Take 1 Tab by mouth every day.        Gabapentin (Cap) NEURONTIN 300 MG Take 2 Caps by mouth every bedtime.        LORazepam (Tab) ATIVAN 1 MG Take 1 Tab by mouth at bedtime as needed.        Oxycodone-Acetaminophen (Tab) PERCOCET 5-325 MG Take 1 Tab by mouth every 8 hours as needed.        Pantoprazole Sodium (Tablet Delayed Response) PROTONIX 40 MG Take 1 Tab by mouth every day.        .                 Medicines prescribed today were sent to:     [facility] - [place], [place] - [address]    [address]  [place] [address]    Phone: [phone] Fax: [phone]    Open 24 Hours?: No      Medication refill instructions:       If your prescription bottle indicates you have medication refills left, it is not necessary to call your provider’s office. Please contact your pharmacy and they will refill your medication.    If your prescription bottle indicates you do not have any refills left, you may request refills at any time through one of the following ways: The online [facility] system (except Urgent Care), by calling your provider’s office, or by asking your pharmacy to contact your provider’s office with a refill request. Medication refills are processed only during regular business hours and may not be available until the next business day. Your provider may request additional information or to have a follow-up visit with you prior to refilling your medication.   *Please Note: Medication refills are assigned a new Rx number when refilled electronically. Your pharmacy may indicate that no refills were authorized even though a new prescription for the same medication is available at the pharmacy. Please request the medicine by name with the pharmacy before contacting your provider for a refill.           [facility] Access Code: Activation code not generated  Current [facility] Status: Active          Quit Tobacco Information     Do you want to quit using tobacco?    Quitting tobacco decreases risks of cancer, heart and lung disease, increases life expectancy, improves sense of taste and smell, and increases spending money, among other benefits.    If you are thinking about quitting, we can help.  • [facility] Quit Tobacco Program: [phone]  o Program occurs weekly for four weeks and includes pharmacist consultation on products to support quitting smoking or chewing tobacco. A provider referral is needed for pharmacist consultation.  • Tobacco Users Help Hotline: 5-800-QUIT-NOW (644-8677) or https://[place].quitlogix.org/  o Free,  confidential telephone and online coaching for [place] residents. Sessions are designed on a schedule that is convenient for you. Eligible clients receive free nicotine replacement therapy.  • Nationally: www.smokefree.gov  o Information and professional assistance to support both immediate and long-term needs as you become, and remain, a non-smoker. Smokefree.gov allows you to choose the help that best fits your needs.

## 2017-08-17 PROBLEM — M79.10 GENERALIZED MUSCLE ACHE: Status: RESOLVED | Noted: 2017-02-24 | Resolved: 2017-08-17

## 2017-10-07 ENCOUNTER — OFFICE VISIT (OUTPATIENT)
Dept: URGENT CARE | Facility: CLINIC | Age: 72
End: 2017-10-07
Payer: MEDICARE

## 2017-10-07 VITALS
DIASTOLIC BLOOD PRESSURE: 80 MMHG | BODY MASS INDEX: 30.73 KG/M2 | RESPIRATION RATE: 20 BRPM | HEART RATE: 80 BPM | OXYGEN SATURATION: 96 % | SYSTOLIC BLOOD PRESSURE: 120 MMHG | TEMPERATURE: 97.1 F | WEIGHT: 168 LBS

## 2017-10-07 DIAGNOSIS — M25.561 ACUTE PAIN OF RIGHT KNEE: ICD-10-CM

## 2017-10-07 PROCEDURE — 99214 OFFICE O/P EST MOD 30 MIN: CPT | Performed by: FAMILY MEDICINE

## 2017-10-07 RX ORDER — OXYCODONE HYDROCHLORIDE AND ACETAMINOPHEN 5; 325 MG/1; MG/1
1 TABLET ORAL EVERY 6 HOURS PRN
Qty: 30 TAB | Refills: 0 | Status: SHIPPED | OUTPATIENT
Start: 2017-10-07 | End: 2017-10-12

## 2017-10-07 ASSESSMENT — ENCOUNTER SYMPTOMS
FOCAL WEAKNESS: 0
FEVER: 0
DIZZINESS: 0
FALLS: 0
SENSORY CHANGE: 1
CHILLS: 0

## 2017-10-07 NOTE — PROGRESS NOTES
Subjective:      Dina Crews is a 72 y.o. female who presents with Knee Pain (Right knee pain .)    Chief Complaint   Patient presents with   • Knee Pain     Right knee pain .        - This is a very pleasant 72 y.o. female with complaints of Rt medial knee pain x 1wk. No trauma/fever. Has ongoing knee pain past year and meniscus tear/soem OA. Is in process of trying to get in w/ Ortho. Says only thing helps her pain is Percs. Discussed I can give her a 1 time Rx and then she will need to see her prvt Dr after this for any future pain management           ALLERGIES:  Penicillins; Codeine; Morphine; and Vicodin [hydrocodone-acetaminophen]     PMH:  Past Medical History:   Diagnosis Date   • Elevated TSH 5/30/2017   • Chronic maxillary sinusitis 12/30/2015   • S/P partial colectomy 4/22/2014   • Chronic constipation 10/12/2013   • Diverticulitis 3/1/2013   • Osteopenia 8/12/2012   • Major depression 7/16/2012   • GERD (gastroesophageal reflux disease) 10/29/2011   • TMJ tenderness 10/29/2011   • History of malignant neoplasm of parotid gland 10/19/2011   • Hyperlipidemia 10/19/2011   • Perennial allergic rhinitis 4/25/2011   • Vitamin d deficiency 4/25/2011   • Tobacco abuse 4/25/2011   • Personal history of skin cancer 4/25/2011   • Anxiety disorder    • CATARACT     early stages   • Torn meniscus     right knee        MEDS:    Current Outpatient Prescriptions:   •  oxycodone-acetaminophen (PERCOCET) 5-325 MG Tab, Take 1 Tab by mouth every 6 hours as needed., Disp: 30 Tab, Rfl: 0  •  gabapentin (NEURONTIN) 300 MG Cap, Take 2 Caps by mouth every bedtime., Disp: 180 Cap, Rfl: 3  •  Diclofenac Sodium 1 % Gel, Apply thin film to affected area q 8 hrs prn, Disp: 60 g, Rfl: 1  •  escitalopram (LEXAPRO) 20 MG tablet, Take 1 Tab by mouth every day., Disp: 30 Tab, Rfl: 3  •  lorazepam (ATIVAN) 1 MG Tab, Take 1 Tab by mouth at bedtime as needed., Disp: 30 Tab, Rfl: 1  •  pantoprazole (PROTONIX) 40 MG Tablet Delayed  Response, Take 1 Tab by mouth every day., Disp: 90 Tab, Rfl: 3    ** I have documented what I find to be significant in regards to past medical, social, family and surgical history  in my HPI or under PMH/PSH/FH review section, otherwise it is contributory **           HPI    Review of Systems   Constitutional: Negative for chills and fever.   Musculoskeletal: Positive for joint pain. Negative for falls.   Neurological: Positive for sensory change. Negative for dizziness and focal weakness.          Objective:     /80   Pulse 80   Temp 36.2 °C (97.1 °F)   Resp 20   Wt 76.2 kg (168 lb)   LMP 05/30/1992   SpO2 96%   BMI 30.73 kg/m²      Physical Exam   Constitutional: She appears well-developed. No distress.   HENT:   Head: Normocephalic and atraumatic.   Eyes: Conjunctivae are normal.   Neck: Neck supple.   Cardiovascular: Regular rhythm.    No murmur heard.  Pulmonary/Chest: Effort normal. No respiratory distress.   Neurological: She is alert. She exhibits normal muscle tone.   Skin: Skin is warm and dry.   Psychiatric: She has a normal mood and affect. Judgment normal.   Nursing note and vitals reviewed.  Rt knee: trace edema, decrease rom due to pain, TTP medial joint line             Assessment/Plan:         1. Acute pain of right knee  oxycodone-acetaminophen (PERCOCET) 5-325 MG Tab             Dx & d/c instructions discussed w/ patient and/or family members. Follow up w/ Prvt Dr or here in 3-4 days if not getting better, sooner if needed,  ER if worse and UC/PCP unavailable.        Possible side effects (i.e. Rash, GI upset/constipation, sedation, elevation of BP or sugars) of any medications given discussed.

## 2017-10-11 NOTE — ASSESSMENT & PLAN NOTE
She has been under a lot of stress with her wife.  She has autoimmune hemolytic anemia and is scheduled for open heart surgery 7/14/17.  Patient needs a right knee replacement but is putting it on hold because of her wife.  She taking Zoloft 150 mg and Ativan 1 mg 1-2 times a week.  Was on Prozac for 20 years.  Been on Zoloft for 2 years.   Tess Kim : 10/25/1982     HPI:   Patient presents with:  Headache: pt states she was referred to come in to find a beeter medication treatment for her cluster headaches. right now shes taking inderal, and its not working at this time.  pt states th regular. She is not pregnant. Current Outpatient Prescriptions:  topiramate (TOPAMAX) 25 MG Oral Tab Take 1 tablet (25 mg total) by mouth 2 (two) times daily. Start with 1 tab at bedtime for 1 week.  Disp: 60 tablet Rfl: 3   SUMAtriptan Succinate (IMI headache     Dx at age 27   • OTHER DISEASES     Insulin resistance - seeing Dr. Veronica Bedoya - endocrinologist out of [facility] office; Metformin - 500 mg TID   • Streptococcus infection in conditions classified elsewhere and of unspecified site May 2010 denies chest pain or MANUEL; no palpitations   GI: denies nausea, vomiting, constipation, diarrhea; no heartburn  GENITAL/: no dysuria, urgency or frequency; no nocturia  MUSCULOSKELETAL: no joint complaints upper or lower extremities  PSYCHE:no depression She does get some relief from over-the-counter medicines, but the daily headaches are bothering her. We discussed treatment options, and elected to start her on Topamax 25 mg at bedtime, with plans to increase to twice daily.   I discussed the side effect

## 2017-10-12 ENCOUNTER — OFFICE VISIT (OUTPATIENT)
Dept: MEDICAL GROUP | Facility: MEDICAL CENTER | Age: 72
End: 2017-10-12
Payer: MEDICARE

## 2017-10-12 VITALS
TEMPERATURE: 97.1 F | RESPIRATION RATE: 16 BRPM | DIASTOLIC BLOOD PRESSURE: 84 MMHG | HEART RATE: 65 BPM | OXYGEN SATURATION: 95 % | SYSTOLIC BLOOD PRESSURE: 118 MMHG | WEIGHT: 169.8 LBS | BODY MASS INDEX: 31.25 KG/M2 | HEIGHT: 62 IN

## 2017-10-12 DIAGNOSIS — G89.29 CHRONIC PAIN OF RIGHT KNEE: ICD-10-CM

## 2017-10-12 DIAGNOSIS — G62.9 NEUROPATHY: ICD-10-CM

## 2017-10-12 DIAGNOSIS — M25.561 CHRONIC PAIN OF RIGHT KNEE: ICD-10-CM

## 2017-10-12 DIAGNOSIS — M72.2 BILATERAL PLANTAR FASCIITIS: ICD-10-CM

## 2017-10-12 DIAGNOSIS — F41.1 GAD (GENERALIZED ANXIETY DISORDER): ICD-10-CM

## 2017-10-12 DIAGNOSIS — E66.9 OBESITY (BMI 30-39.9): ICD-10-CM

## 2017-10-12 PROCEDURE — 99214 OFFICE O/P EST MOD 30 MIN: CPT | Performed by: FAMILY MEDICINE

## 2017-10-12 RX ORDER — LORAZEPAM 1 MG/1
1 TABLET ORAL NIGHTLY PRN
Qty: 30 TAB | Refills: 1 | Status: SHIPPED
Start: 2017-10-12 | End: 2018-01-02 | Stop reason: SDUPTHER

## 2017-10-12 RX ORDER — PREDNISONE 20 MG/1
40 TABLET ORAL DAILY
Qty: 10 TAB | Refills: 0 | Status: SHIPPED | OUTPATIENT
Start: 2017-10-12 | End: 2017-10-17

## 2017-10-12 RX ORDER — GABAPENTIN 300 MG/1
900 CAPSULE ORAL 2 TIMES DAILY
Qty: 180 CAP | Refills: 11 | Status: SHIPPED | OUTPATIENT
Start: 2017-10-12 | End: 2018-06-27

## 2017-10-12 NOTE — ASSESSMENT & PLAN NOTE
Uses ativan 1 mg for sleep and anxiety, last 30 tablet prescription was 3 months ago. She is also using Lexapro 20 mg daily, occasionally she wakes up crying, but that is not often.

## 2017-10-12 NOTE — ASSESSMENT & PLAN NOTE
Knee pain has been on and off. X-ray done in 5 months showed mild osteoarthritis. Has stopped hurting recently, but was walking more when she went to [place] and pain restarted.

## 2017-10-12 NOTE — ASSESSMENT & PLAN NOTE
Very painful neuropathy and gabapentin 600 mg at night was not working.  Using gabapentin 900 mg in the morning and 900 mg at night, which she increased on her own.  She is a smoker.

## 2017-10-12 NOTE — PROGRESS NOTES
Subjective:   Dina Crews is a 72 y.o. female here today for neuropathy    Neuropathy (CMS-[facility])  Very painful neuropathy and gabapentin 600 mg at night was not working.  Using gabapentin 900 mg in the morning and 900 mg at night, which she increased on her own.  She is a smoker.    JUNIOR (generalized anxiety disorder)  Uses ativan 1 mg for sleep and anxiety, last 30 tablet prescription was 3 months ago. She is also using Lexapro 20 mg daily, occasionally she wakes up crying, but that is not often.    Chronic pain of right knee  Knee pain has been on and off. X-ray done in 5 months showed mild osteoarthritis. Has stopped hurting recently, but was walking more when she went to [place] and pain restarted.    Bilateral plantar fasciitis  Bilateral plantar fasciitis that she was taking ibuprofen, which helps for a while. She has been told that she bone spurs on each heel.         Current medicines (including changes today)  Current Outpatient Prescriptions   Medication Sig Dispense Refill   • gabapentin (NEURONTIN) 300 MG Cap Take 3 Caps by mouth 2 Times a Day. 180 Cap 11   • lorazepam (ATIVAN) 1 MG Tab Take 1 Tab by mouth at bedtime as needed. 30 Tab 1   • predniSONE (DELTASONE) 20 MG Tab Take 2 Tabs by mouth every day for 5 days. 10 Tab 0   • Diclofenac Sodium 1 % Gel Apply thin film to affected area q 8 hrs prn 60 g 1   • escitalopram (LEXAPRO) 20 MG tablet Take 1 Tab by mouth every day. 30 Tab 3   • pantoprazole (PROTONIX) 40 MG Tablet Delayed Response Take 1 Tab by mouth every day. 90 Tab 3     No current facility-administered medications for this visit.      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    ROS   No chest pain, no shortness of breath       Objective:     Blood pressure 118/84, pulse 65, temperature 36.2 °C (97.1 °F), resp. rate 16, height 1.575 m (5' 2\"), weight 77 kg (169 lb 12.8 oz), last menstrual period 05/30/1992, SpO2 95 %. Body mass index is 31.06 kg/m².   Physical Exam:  Constitutional: Alert, no distress.  Skin: Warm, dry, good turgor, no rashes in visible areas.  Eye: Equal, round and reactive, conjunctiva clear, lids normal.  Psych: Alert and oriented x3, normal affect and mood.        Assessment and Plan:   The following treatment plan was discussed    1. Bilateral plantar fasciitis  Trial of prednisone 40 mg for 5 days.    2. Chronic pain of right knee  Mild osteoarthritis. Trial of prednisone 40 mg for 5 days.    3. Obesity (BMI 30-39.9)  - Patient identified as having weight management issue.  Appropriate orders and counseling given.    4. Neuropathy (CMS-HCC)  Improved with gabapentin 900 mg twice daily. Check ultrasound of lower extremities for PVD.  - gabapentin (NEURONTIN) 300 MG Cap; Take 3 Caps by mouth 2 Times a Day.  Dispense: 180 Cap; Refill: 11  - US-EXTREMITY LOWER ARTERY BILATERAL; Future    5. JUNIOR (generalized anxiety disorder)  Controlled. Continue as needed ativan.  - lorazepam (ATIVAN) 1 MG Tab; Take 1 Tab by mouth at bedtime as needed.  Dispense: 30 Tab; Refill: 1      Followup: Return in about 3 months (around 1/12/2018) for pain, Short.         "

## 2017-10-12 NOTE — ASSESSMENT & PLAN NOTE
Bilateral plantar fasciitis that she was taking ibuprofen, which helps for a while. She has been told that she bone spurs on each heel.

## 2017-10-19 ENCOUNTER — HOSPITAL ENCOUNTER (OUTPATIENT)
Dept: RADIOLOGY | Facility: MEDICAL CENTER | Age: 72
End: 2017-10-19
Attending: FAMILY MEDICINE
Payer: MEDICARE

## 2017-10-19 DIAGNOSIS — Z12.31 ENCOUNTER FOR SCREENING MAMMOGRAM FOR HIGH-RISK PATIENT: ICD-10-CM

## 2017-10-19 PROCEDURE — G0202 SCR MAMMO BI INCL CAD: HCPCS

## 2017-10-27 NOTE — ADDENDUM NOTE
Encounter addended by: Lelia Connelly on: 10/27/2017  4:02 PM<BR>    Actions taken: Flowsheet accepted

## 2018-01-02 DIAGNOSIS — F41.1 GAD (GENERALIZED ANXIETY DISORDER): ICD-10-CM

## 2018-01-02 RX ORDER — LORAZEPAM 1 MG/1
1 TABLET ORAL NIGHTLY PRN
Qty: 30 TAB | Refills: 2 | Status: SHIPPED
Start: 2018-01-02 | End: 2018-03-05 | Stop reason: SDUPTHER

## 2018-01-02 RX ORDER — ESCITALOPRAM OXALATE 20 MG/1
20 TABLET ORAL DAILY
Qty: 90 TAB | Refills: 3 | Status: SHIPPED | OUTPATIENT
Start: 2018-01-02 | End: 2018-10-31 | Stop reason: SDUPTHER

## 2018-01-02 NOTE — TELEPHONE ENCOUNTER
Was the patient seen in the last year in this department? Yes     Does patient have an active prescription for medications requested? No     Received Request Via: Patient through [employer]

## 2018-01-08 ENCOUNTER — OFFICE VISIT (OUTPATIENT)
Dept: URGENT CARE | Facility: CLINIC | Age: 73
End: 2018-01-08
Payer: MEDICARE

## 2018-01-08 VITALS
HEART RATE: 64 BPM | TEMPERATURE: 97.7 F | RESPIRATION RATE: 16 BRPM | WEIGHT: 165 LBS | DIASTOLIC BLOOD PRESSURE: 72 MMHG | BODY MASS INDEX: 30.36 KG/M2 | SYSTOLIC BLOOD PRESSURE: 130 MMHG | HEIGHT: 62 IN | OXYGEN SATURATION: 95 %

## 2018-01-08 DIAGNOSIS — K57.12 DIVERTICULITIS OF SMALL INTESTINE WITHOUT PERFORATION OR ABSCESS WITHOUT BLEEDING: ICD-10-CM

## 2018-01-08 PROCEDURE — 99214 OFFICE O/P EST MOD 30 MIN: CPT | Performed by: NURSE PRACTITIONER

## 2018-01-08 RX ORDER — FLUCONAZOLE 150 MG/1
150 TABLET ORAL DAILY
Qty: 1 TAB | Refills: 0 | Status: SHIPPED | OUTPATIENT
Start: 2018-01-08 | End: 2018-03-23

## 2018-01-08 RX ORDER — METRONIDAZOLE 500 MG/1
500 TABLET ORAL 3 TIMES DAILY
Qty: 30 TAB | Refills: 0 | Status: SHIPPED | OUTPATIENT
Start: 2018-01-08 | End: 2018-01-18

## 2018-01-08 RX ORDER — CIPROFLOXACIN 500 MG/1
500 TABLET, FILM COATED ORAL 2 TIMES DAILY
Qty: 20 TAB | Refills: 0 | Status: SHIPPED | OUTPATIENT
Start: 2018-01-08 | End: 2018-01-16 | Stop reason: SINTOL

## 2018-01-08 RX ORDER — OXYCODONE HYDROCHLORIDE AND ACETAMINOPHEN 5; 325 MG/1; MG/1
1 TABLET ORAL EVERY 6 HOURS PRN
Qty: 8 TAB | Refills: 0 | Status: SHIPPED | OUTPATIENT
Start: 2018-01-08 | End: 2018-01-10

## 2018-01-08 ASSESSMENT — ENCOUNTER SYMPTOMS
CHILLS: 0
FEVER: 0
ABDOMINAL PAIN: 1

## 2018-01-08 ASSESSMENT — PAIN SCALES - GENERAL: PAINLEVEL: 8=MODERATE-SEVERE PAIN

## 2018-01-09 NOTE — PROGRESS NOTES
Subjective:      Dina Crews is a 72 y.o. female who presents with Diverticulitis (x 4 days pt has had before)    Past Medical History:   Diagnosis Date   • Anxiety disorder    • CATARACT     early stages   • Chronic constipation 10/12/2013   • Chronic maxillary sinusitis 2015   • Diverticulitis 3/1/2013   • Elevated TSH 2017   • GERD (gastroesophageal reflux disease) 10/29/2011   • History of malignant neoplasm of parotid gland 10/19/2011   • Hyperlipidemia 10/19/2011   • Major depression 2012   • Osteopenia 2012   • Perennial allergic rhinitis 2011   • Personal history of skin cancer 2011   • S/P partial colectomy 2014   • TMJ tenderness 10/29/2011   • Tobacco abuse 2011   • Torn meniscus     right knee   • Vitamin d deficiency 2011     Social History     Social History   • Marital status:      Spouse name: N/A   • Number of children: 2   • Years of education:      Occupational History   •  Other     Social History Main Topics   • Smoking status: Current Every Day Smoker     Packs/day: 0.50     Years: 45.00     Types: Cigarettes     Start date: 1964     Last attempt to quit: 2012   • Smokeless tobacco: Never Used   • Alcohol use No   • Drug use:      Types: Oral, Marijuana      Comment: Pot daily-edibles   • Sexual activity: Yes     Partners: Female     Other Topics Concern   • Not on file     Social History Narrative    Moved to [place] after couldn't find work in [place]. Then mother had increasing health problems. Now stays in [facility]- to care for mom. Siblings caregive on Weekends. 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    Spouse (female) 25 years.    2 children. 1 grandchild.     2013: mother .      Family History   Problem Relation Age of Onset   • Lung Disease Mother      COPD   • Cancer Mother      Thyroid cancer   • Cancer Father      d. 72 Stomach cancer   • Diabetes Father    • Cancer Sister 40     Breast cancer   • GI Sister   "    diverticulitis   • Cancer Sister      breast     Allergies: Penicillins; Codeine; Morphine; and Vicodin [hydrocodone-acetaminophen]    This patient is a 72-year-old female who presents today with complaint of left lower quadrant abdominal pain over the last 2 days. She has a long-standing history of multiple episodes of diverticulitis. She denies any fever, aches, or chills she has been able to eat and drink. States that this episode is similar to previous episodes that she has had. Discussed possible follow-up in the ER and CT scan, however patient states that she has had multiple scans and would like to refrain from that if possible at this time.          Abdominal Pain   This is a recurrent problem. The current episode started in the past 7 days. The onset quality is sudden. The problem occurs constantly. The pain is located in the LLQ. The pain is moderate. The quality of the pain is aching. The abdominal pain radiates to the LLQ. Pertinent negatives include no fever. Nothing aggravates the pain. The pain is relieved by nothing. She has tried nothing for the symptoms. The treatment provided no relief. diverticulitis       Review of Systems   Constitutional: Positive for malaise/fatigue. Negative for chills and fever.   Gastrointestinal: Positive for abdominal pain.   Skin: Negative.    All other systems reviewed and are negative.         Objective:     /72   Pulse 64   Temp 36.5 °C (97.7 °F)   Resp 16   Ht 1.575 m (5' 2\")   Wt 74.8 kg (165 lb)   LMP 05/30/1992   SpO2 95%   BMI 30.18 kg/m²      Physical Exam   Constitutional: She is oriented to person, place, and time. She appears well-developed and well-nourished.   HENT:   Head: Normocephalic.   Cardiovascular: Normal rate and regular rhythm.    Abdominal: Soft. Bowel sounds are normal. She exhibits no distension and no mass. There is tenderness. There is no rebound and no guarding. No hernia.   Musculoskeletal: Normal range of motion. "   Neurological: She is alert and oriented to person, place, and time.   Skin: Skin is warm and dry. Capillary refill takes less than 2 seconds.   Psychiatric: She has a normal mood and affect. Her behavior is normal. Judgment and thought content normal.   Vitals reviewed.              Assessment/Plan:   LLQ pain  History of diverticulitis    -flagyl   -cipro   -percocet; clearly stated no driving or ETOH with this medication   -narcotic consent; ORT score 1; checked patient's  and find no evidence of narcotic misuse.    -Strict ER precautions for persistent or increasing pain.   There are no diagnoses linked to this encounter.

## 2018-01-12 ENCOUNTER — TELEPHONE (OUTPATIENT)
Dept: MEDICAL GROUP | Facility: MEDICAL CENTER | Age: 73
End: 2018-01-12

## 2018-01-15 NOTE — TELEPHONE ENCOUNTER
Please check to see how she is feeling?  If she is still having pain we can prescribe her Bactrim.  Kishore Baptiste M.D.

## 2018-01-16 ENCOUNTER — OFFICE VISIT (OUTPATIENT)
Dept: MEDICAL GROUP | Facility: MEDICAL CENTER | Age: 73
End: 2018-01-16
Payer: MEDICARE

## 2018-01-16 VITALS
SYSTOLIC BLOOD PRESSURE: 128 MMHG | HEART RATE: 86 BPM | WEIGHT: 161.4 LBS | DIASTOLIC BLOOD PRESSURE: 80 MMHG | BODY MASS INDEX: 29.7 KG/M2 | OXYGEN SATURATION: 94 % | HEIGHT: 62 IN | TEMPERATURE: 97.4 F

## 2018-01-16 DIAGNOSIS — K57.32 DIVERTICULITIS OF COLON: ICD-10-CM

## 2018-01-16 DIAGNOSIS — J00 ACUTE NASOPHARYNGITIS: ICD-10-CM

## 2018-01-16 DIAGNOSIS — M25.561 CHRONIC PAIN OF RIGHT KNEE: ICD-10-CM

## 2018-01-16 DIAGNOSIS — G89.29 CHRONIC PAIN OF RIGHT KNEE: ICD-10-CM

## 2018-01-16 LAB
FLUAV+FLUBV AG SPEC QL IA: NEGATIVE
INT CON NEG: NORMAL
INT CON POS: NORMAL

## 2018-01-16 PROCEDURE — 99214 OFFICE O/P EST MOD 30 MIN: CPT | Performed by: FAMILY MEDICINE

## 2018-01-16 PROCEDURE — 87804 INFLUENZA ASSAY W/OPTIC: CPT | Performed by: FAMILY MEDICINE

## 2018-01-16 RX ORDER — SULFAMETHOXAZOLE AND TRIMETHOPRIM 800; 160 MG/1; MG/1
1 TABLET ORAL 2 TIMES DAILY
Qty: 14 TAB | Refills: 0 | Status: SHIPPED | OUTPATIENT
Start: 2018-01-16 | End: 2018-01-23

## 2018-01-16 NOTE — ASSESSMENT & PLAN NOTE
Has history of right knee problems. Pain is all around the knee and radiates down to the right anterior knee. Knee pain is persistent and severe.  Has tried cortisone injection which helped for a week.  MRI showed medicus tear.

## 2018-01-16 NOTE — PROGRESS NOTES
Subjective:   Dina Crews is a 72 y.o. female here today for Diverticulitis, right knee pain, flu like symptoms    Chronic pain of right knee  Has history of right knee problems. Pain is all around the knee and radiates down to the right anterior knee. Knee pain is persistent and severe.  Has tried cortisone injection which helped for a week.  MRI showed medicus tear.    Diverticulitis of colon  Patient has history of recurrent diverticulitis. She came down with diverticulitis and was prescribed Flagyl and Cipro by the urgent care. However, she has been having difficulty with Cipro because it is causing dizziness and balance problems. She is requesting an antibiotic change.       2 days ago patient's came down with nasal congestion, body aches, fatigue, cough with clear mucus. She denies any fever.      Current medicines (including changes today)  Current Outpatient Prescriptions   Medication Sig Dispense Refill   • sulfamethoxazole-trimethoprim (BACTRIM DS) 800-160 MG tablet Take 1 Tab by mouth 2 times a day for 7 days. 14 Tab 0   • metronidazole (FLAGYL) 500 MG Tab Take 1 Tab by mouth 3 times a day for 10 days. 30 Tab 0   • fluconazole (DIFLUCAN) 150 MG tablet Take 1 Tab by mouth every day. 1 Tab 0   • escitalopram (LEXAPRO) 20 MG tablet Take 1 Tab by mouth every day. 90 Tab 3   • lorazepam (ATIVAN) 1 MG Tab Take 1 Tab by mouth at bedtime as needed for up to 30 days. 30 Tab 2   • gabapentin (NEURONTIN) 300 MG Cap Take 3 Caps by mouth 2 Times a Day. 180 Cap 11   • Diclofenac Sodium 1 % Gel Apply thin film to affected area q 8 hrs prn 60 g 1   • pantoprazole (PROTONIX) 40 MG Tablet Delayed Response Take 1 Tab by mouth every day. 90 Tab 3     No current facility-administered medications for this visit.      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    ROS   No chest pain, no shortness of breath       Objective:     Blood pressure 128/80, pulse 86, temperature 36.3 °C (97.4 °F), height 1.575 m (5' 2\"), weight 73.2 kg (161 lb 6.4 oz), last menstrual period 05/30/1992, SpO2 94 %. Body mass index is 29.52 kg/m².   Physical Exam:  Constitutional: Alert, no distress.  Skin: Warm, dry, good turgor, no rashes in visible areas.  Eye: Equal, round and reactive, conjunctiva clear, lids normal.  Respiratory: Unlabored respiratory effort, mild scattered wheeze  Cardiovascular: Normal S1, S2, no murmur, no edema.  Psych: Alert and oriented x3, normal affect and mood.        Assessment and Plan:   The following treatment plan was discussed    1. Chronic pain of right knee  Referral to orthopedics for evaluation as she has not done well with cortisone injection.  She states she cannot afford a trial of physical therapy.  - REFERRAL TO ORTHOPEDICS    2. Diverticulitis of colon  Change Cipro to Bactrim because of side effects with Cipro, allergy list update. Continue Flagyl.    3. Acute nasopharyngitis  Negative flu. Advised expected coursed. If she declines, then I have advised follow up.  - POCT Influenza A/B      Followup: Return if symptoms worsen or fail to improve.         "

## 2018-01-16 NOTE — ASSESSMENT & PLAN NOTE
Patient has history of recurrent diverticulitis. She came down with diverticulitis and was prescribed Flagyl and Cipro by the urgent care. However, she has been having difficulty with Cipro because it is causing dizziness and balance problems. She is requesting an antibiotic change.

## 2018-03-04 ENCOUNTER — PATIENT MESSAGE (OUTPATIENT)
Dept: MEDICAL GROUP | Facility: MEDICAL CENTER | Age: 73
End: 2018-03-04

## 2018-03-04 DIAGNOSIS — F41.1 GAD (GENERALIZED ANXIETY DISORDER): ICD-10-CM

## 2018-03-05 RX ORDER — LORAZEPAM 1 MG/1
1 TABLET ORAL NIGHTLY PRN
Qty: 30 TAB | Refills: 5 | Status: SHIPPED
Start: 2018-03-05 | End: 2018-04-04

## 2018-03-05 NOTE — TELEPHONE ENCOUNTER
From: Dina Crews  To: Kishore Baptiste M.D.  Sent: 3/4/2018 11:59 AM PST  Subject: Prescription Question    Hi Dr. Baptiste,    When I visited you in January, I forgot to ask you for a refill on my Lorazepam because I was almost out. I take 1MG dosage.     Thank you,  Dina Crews

## 2018-03-23 ENCOUNTER — TELEPHONE (OUTPATIENT)
Dept: MEDICAL GROUP | Facility: MEDICAL CENTER | Age: 73
End: 2018-03-23

## 2018-03-23 RX ORDER — FLUCONAZOLE 150 MG/1
150 TABLET ORAL DAILY
Qty: 2 TAB | Refills: 0 | Status: SHIPPED | OUTPATIENT
Start: 2018-03-23 | End: 2018-03-25

## 2018-03-23 NOTE — TELEPHONE ENCOUNTER
1. Caller Name: Dina Crews                                         Call Back Number: [phone]      Patient approves a detailed voicemail message: yes    Pt reports she had a mole removed which was infected recently. Pt was prescribed Keflex for the infection and now has a yeast infection and is requesting a Rx for Diflucan to be sent to her Pharmacy [facility].

## 2018-05-28 ENCOUNTER — PATIENT OUTREACH (OUTPATIENT)
Dept: HEALTH INFORMATION MANAGEMENT | Facility: OTHER | Age: 73
End: 2018-05-28

## 2018-05-28 NOTE — PROGRESS NOTES
Outcome: Left Message to schedule awv    Please transfer to Patient Outreach Team at [phone] when patient returns call.    WebIZ Checked & Epic Updated:  no    [facility] Verified: yes    Attempt # 1  scp outreach project

## 2018-06-04 ENCOUNTER — APPOINTMENT (OUTPATIENT)
Dept: RADIOLOGY | Facility: IMAGING CENTER | Age: 73
End: 2018-06-04
Attending: INTERNAL MEDICINE
Payer: MEDICARE

## 2018-06-04 ENCOUNTER — OFFICE VISIT (OUTPATIENT)
Dept: URGENT CARE | Facility: CLINIC | Age: 73
End: 2018-06-04
Payer: MEDICARE

## 2018-06-04 VITALS
HEART RATE: 66 BPM | TEMPERATURE: 97.2 F | HEIGHT: 62 IN | RESPIRATION RATE: 14 BRPM | SYSTOLIC BLOOD PRESSURE: 126 MMHG | DIASTOLIC BLOOD PRESSURE: 68 MMHG | OXYGEN SATURATION: 98 % | WEIGHT: 157 LBS | BODY MASS INDEX: 28.89 KG/M2

## 2018-06-04 DIAGNOSIS — S93.402A SPRAIN OF LEFT ANKLE, UNSPECIFIED LIGAMENT, INITIAL ENCOUNTER: ICD-10-CM

## 2018-06-04 PROCEDURE — 73610 X-RAY EXAM OF ANKLE: CPT | Mod: TC,FY,LT | Performed by: INTERNAL MEDICINE

## 2018-06-04 PROCEDURE — 99203 OFFICE O/P NEW LOW 30 MIN: CPT | Performed by: INTERNAL MEDICINE

## 2018-06-04 ASSESSMENT — ENCOUNTER SYMPTOMS
DIZZINESS: 0
MYALGIAS: 0
FEVER: 0
BACK PAIN: 1
FALLS: 1
NAUSEA: 0
CONSTITUTIONAL NEGATIVE: 1
WEIGHT LOSS: 0
BLOOD IN STOOL: 0
CHILLS: 0
HEADACHES: 0
SORE THROAT: 0
DIARRHEA: 0
EYES NEGATIVE: 1
VOMITING: 0

## 2018-06-05 NOTE — PROGRESS NOTES
"Subjective:      Dina Crews is a 72 y.o. female who presents with Ankle Pain (left ankle)            Patient is a 9-year-old female who presents today with left ankle pain. Patient states he was vacuuming in the garage when she got tangled up in the cord. She misstepped and ended up rolling her left ankle. Patient also does have a history of plantar fasciitis as well as gout of the affected extremity. Patient denies any other injuries. Patient states that she is able to bear weight        Review of Systems   Constitutional: Negative.  Negative for chills, fever and weight loss.   HENT: Negative for sore throat.    Eyes: Negative.    Gastrointestinal: Negative for blood in stool, diarrhea, nausea and vomiting.   Genitourinary: Negative for dysuria.   Musculoskeletal: Positive for back pain, falls and joint pain. Negative for myalgias.   Skin: Negative for itching and rash.   Neurological: Negative for dizziness (negative headache) and headaches.   All other systems reviewed and are negative.         Objective:     /68   Pulse 66   Temp 36.2 °C (97.2 °F)   Resp 14   Ht 1.575 m (5' 2\")   Wt 71.2 kg (157 lb)   LMP 05/30/1992   SpO2 98%   BMI 28.72 kg/m²      Physical Exam   Constitutional: She appears well-developed and well-nourished. No distress.   HENT:   Head: Normocephalic and atraumatic.   Nose: Nose normal.   Eyes: Conjunctivae are normal. Pupils are equal, round, and reactive to light.   Pulmonary/Chest: Effort normal. No respiratory distress.   Musculoskeletal:        Left ankle: She exhibits decreased range of motion and swelling. She exhibits no ecchymosis and no deformity. Tenderness. Lateral malleolus tenderness found. Achilles tendon normal.   Nursing note and vitals reviewed.    X-ray negative          Assessment/Plan:     1. Sprain of left ankle, unspecified ligament, initial encounter  Will place the patient in an ankle stirrup, patient to have limited weightbearing for the next " couple of days. Patient use ice and anti-inflammatories over-the-counter. Patient follow up with primary care doctor. Patient return to clinic for any worsening symptoms.

## 2018-06-26 DIAGNOSIS — K21.9 GASTROESOPHAGEAL REFLUX DISEASE, ESOPHAGITIS PRESENCE NOT SPECIFIED: ICD-10-CM

## 2018-06-27 ENCOUNTER — HOSPITAL ENCOUNTER (INPATIENT)
Facility: MEDICAL CENTER | Age: 73
LOS: 2 days | DRG: 389 | End: 2018-06-29
Attending: EMERGENCY MEDICINE | Admitting: INTERNAL MEDICINE
Payer: MEDICARE

## 2018-06-27 ENCOUNTER — APPOINTMENT (OUTPATIENT)
Dept: RADIOLOGY | Facility: MEDICAL CENTER | Age: 73
DRG: 389 | End: 2018-06-27
Attending: EMERGENCY MEDICINE
Payer: MEDICARE

## 2018-06-27 PROBLEM — K56.609 SBO (SMALL BOWEL OBSTRUCTION) (HCC): Status: ACTIVE | Noted: 2018-06-27

## 2018-06-27 PROBLEM — Z71.89 ADVANCED CARE PLANNING/COUNSELING DISCUSSION: Status: ACTIVE | Noted: 2018-06-27

## 2018-06-27 LAB
ALBUMIN SERPL BCP-MCNC: 5.1 G/DL (ref 3.2–4.9)
ALBUMIN/GLOB SERPL: 1.6 G/DL
ALP SERPL-CCNC: 104 U/L (ref 30–99)
ALT SERPL-CCNC: 11 U/L (ref 2–50)
ANION GAP SERPL CALC-SCNC: 12 MMOL/L (ref 0–11.9)
AST SERPL-CCNC: 23 U/L (ref 12–45)
BASOPHILS # BLD AUTO: 0.6 % (ref 0–1.8)
BASOPHILS # BLD: 0.1 K/UL (ref 0–0.12)
BILIRUB SERPL-MCNC: 1.1 MG/DL (ref 0.1–1.5)
BUN SERPL-MCNC: 12 MG/DL (ref 8–22)
CALCIUM SERPL-MCNC: 9.9 MG/DL (ref 8.4–10.2)
CHLORIDE SERPL-SCNC: 105 MMOL/L (ref 96–112)
CO2 SERPL-SCNC: 19 MMOL/L (ref 20–33)
CREAT SERPL-MCNC: 0.64 MG/DL (ref 0.5–1.4)
EOSINOPHIL # BLD AUTO: 0.13 K/UL (ref 0–0.51)
EOSINOPHIL NFR BLD: 0.8 % (ref 0–6.9)
ERYTHROCYTE [DISTWIDTH] IN BLOOD BY AUTOMATED COUNT: 42.6 FL (ref 35.9–50)
GLOBULIN SER CALC-MCNC: 3.2 G/DL (ref 1.9–3.5)
GLUCOSE SERPL-MCNC: 137 MG/DL (ref 65–99)
HCT VFR BLD AUTO: 47.8 % (ref 37–47)
HGB BLD-MCNC: 16.6 G/DL (ref 12–16)
IMM GRANULOCYTES # BLD AUTO: 0.05 K/UL (ref 0–0.11)
IMM GRANULOCYTES NFR BLD AUTO: 0.3 % (ref 0–0.9)
LIPASE SERPL-CCNC: 27 U/L (ref 7–58)
LYMPHOCYTES # BLD AUTO: 2.12 K/UL (ref 1–4.8)
LYMPHOCYTES NFR BLD: 13.3 % (ref 22–41)
MCH RBC QN AUTO: 31 PG (ref 27–33)
MCHC RBC AUTO-ENTMCNC: 34.7 G/DL (ref 33.6–35)
MCV RBC AUTO: 89.2 FL (ref 81.4–97.8)
MONOCYTES # BLD AUTO: 0.67 K/UL (ref 0–0.85)
MONOCYTES NFR BLD AUTO: 4.2 % (ref 0–13.4)
NEUTROPHILS # BLD AUTO: 12.89 K/UL (ref 2–7.15)
NEUTROPHILS NFR BLD: 80.8 % (ref 44–72)
NRBC # BLD AUTO: 0 K/UL
NRBC BLD-RTO: 0 /100 WBC
PLATELET # BLD AUTO: 377 K/UL (ref 164–446)
PMV BLD AUTO: 10.8 FL (ref 9–12.9)
POTASSIUM SERPL-SCNC: 4 MMOL/L (ref 3.6–5.5)
PROT SERPL-MCNC: 8.3 G/DL (ref 6–8.2)
RBC # BLD AUTO: 5.36 M/UL (ref 4.2–5.4)
SODIUM SERPL-SCNC: 136 MMOL/L (ref 135–145)
WBC # BLD AUTO: 16 K/UL (ref 4.8–10.8)

## 2018-06-27 PROCEDURE — 96375 TX/PRO/DX INJ NEW DRUG ADDON: CPT

## 2018-06-27 PROCEDURE — 83690 ASSAY OF LIPASE: CPT

## 2018-06-27 PROCEDURE — 96361 HYDRATE IV INFUSION ADD-ON: CPT

## 2018-06-27 PROCEDURE — 700102 HCHG RX REV CODE 250 W/ 637 OVERRIDE(OP)

## 2018-06-27 PROCEDURE — 99223 1ST HOSP IP/OBS HIGH 75: CPT | Mod: AI,25 | Performed by: INTERNAL MEDICINE

## 2018-06-27 PROCEDURE — 304538 HCHG NG TUBE

## 2018-06-27 PROCEDURE — 700111 HCHG RX REV CODE 636 W/ 250 OVERRIDE (IP): Performed by: INTERNAL MEDICINE

## 2018-06-27 PROCEDURE — 96374 THER/PROPH/DIAG INJ IV PUSH: CPT

## 2018-06-27 PROCEDURE — 99285 EMERGENCY DEPT VISIT HI MDM: CPT

## 2018-06-27 PROCEDURE — 700101 HCHG RX REV CODE 250: Performed by: EMERGENCY MEDICINE

## 2018-06-27 PROCEDURE — 96376 TX/PRO/DX INJ SAME DRUG ADON: CPT

## 2018-06-27 PROCEDURE — A9270 NON-COVERED ITEM OR SERVICE: HCPCS

## 2018-06-27 PROCEDURE — 700105 HCHG RX REV CODE 258: Performed by: INTERNAL MEDICINE

## 2018-06-27 PROCEDURE — 99406 BEHAV CHNG SMOKING 3-10 MIN: CPT | Performed by: INTERNAL MEDICINE

## 2018-06-27 PROCEDURE — 700105 HCHG RX REV CODE 258: Performed by: EMERGENCY MEDICINE

## 2018-06-27 PROCEDURE — 74177 CT ABD & PELVIS W/CONTRAST: CPT

## 2018-06-27 PROCEDURE — 0D9670Z DRAINAGE OF STOMACH WITH DRAINAGE DEVICE, VIA NATURAL OR ARTIFICIAL OPENING: ICD-10-PCS | Performed by: EMERGENCY MEDICINE

## 2018-06-27 PROCEDURE — 770006 HCHG ROOM/CARE - MED/SURG/GYN SEMI*

## 2018-06-27 PROCEDURE — 85025 COMPLETE CBC W/AUTO DIFF WBC: CPT

## 2018-06-27 PROCEDURE — 700111 HCHG RX REV CODE 636 W/ 250 OVERRIDE (IP): Performed by: EMERGENCY MEDICINE

## 2018-06-27 PROCEDURE — 36415 COLL VENOUS BLD VENIPUNCTURE: CPT

## 2018-06-27 PROCEDURE — 700117 HCHG RX CONTRAST REV CODE 255: Performed by: EMERGENCY MEDICINE

## 2018-06-27 PROCEDURE — 96372 THER/PROPH/DIAG INJ SC/IM: CPT

## 2018-06-27 PROCEDURE — 94760 N-INVAS EAR/PLS OXIMETRY 1: CPT

## 2018-06-27 PROCEDURE — 80053 COMPREHEN METABOLIC PANEL: CPT

## 2018-06-27 RX ORDER — SODIUM CHLORIDE 9 MG/ML
1000 INJECTION, SOLUTION INTRAVENOUS ONCE
Status: COMPLETED | OUTPATIENT
Start: 2018-06-27 | End: 2018-06-27

## 2018-06-27 RX ORDER — MIDAZOLAM HYDROCHLORIDE 1 MG/ML
2 INJECTION INTRAMUSCULAR; INTRAVENOUS ONCE
Status: COMPLETED | OUTPATIENT
Start: 2018-06-27 | End: 2018-06-27

## 2018-06-27 RX ORDER — DIAZEPAM 5 MG/ML
2.5 INJECTION, SOLUTION INTRAMUSCULAR; INTRAVENOUS ONCE
Status: DISCONTINUED | OUTPATIENT
Start: 2018-06-27 | End: 2018-06-27

## 2018-06-27 RX ORDER — DICYCLOMINE HYDROCHLORIDE 10 MG/ML
20 INJECTION INTRAMUSCULAR ONCE
Status: COMPLETED | OUTPATIENT
Start: 2018-06-27 | End: 2018-06-27

## 2018-06-27 RX ORDER — LORAZEPAM 2 MG/ML
0.5 INJECTION INTRAMUSCULAR EVERY 6 HOURS PRN
Status: DISCONTINUED | OUTPATIENT
Start: 2018-06-27 | End: 2018-06-29 | Stop reason: HOSPADM

## 2018-06-27 RX ORDER — KETOROLAC TROMETHAMINE 30 MG/ML
30 INJECTION, SOLUTION INTRAMUSCULAR; INTRAVENOUS EVERY 6 HOURS PRN
Status: DISCONTINUED | OUTPATIENT
Start: 2018-06-27 | End: 2018-06-29 | Stop reason: HOSPADM

## 2018-06-27 RX ORDER — NICOTINE 21 MG/24HR
21 PATCH, TRANSDERMAL 24 HOURS TRANSDERMAL
Status: DISCONTINUED | OUTPATIENT
Start: 2018-06-27 | End: 2018-06-29 | Stop reason: HOSPADM

## 2018-06-27 RX ORDER — ACETAMINOPHEN 650 MG/1
650 SUPPOSITORY RECTAL EVERY 4 HOURS PRN
Status: DISCONTINUED | OUTPATIENT
Start: 2018-06-27 | End: 2018-06-29

## 2018-06-27 RX ORDER — ACETAMINOPHEN 500 MG
1000 TABLET ORAL EVERY 6 HOURS PRN
COMMUNITY
End: 2018-10-19

## 2018-06-27 RX ORDER — ONDANSETRON 4 MG/1
4 TABLET, ORALLY DISINTEGRATING ORAL EVERY 4 HOURS PRN
Status: DISCONTINUED | OUTPATIENT
Start: 2018-06-27 | End: 2018-06-29 | Stop reason: HOSPADM

## 2018-06-27 RX ORDER — SODIUM CHLORIDE 9 MG/ML
INJECTION, SOLUTION INTRAVENOUS CONTINUOUS
Status: DISCONTINUED | OUTPATIENT
Start: 2018-06-27 | End: 2018-06-29 | Stop reason: HOSPADM

## 2018-06-27 RX ORDER — ONDANSETRON 2 MG/ML
4 INJECTION INTRAMUSCULAR; INTRAVENOUS ONCE
Status: COMPLETED | OUTPATIENT
Start: 2018-06-27 | End: 2018-06-27

## 2018-06-27 RX ORDER — FAMOTIDINE 20 MG/1
20 TABLET, FILM COATED ORAL 2 TIMES DAILY
Status: DISCONTINUED | OUTPATIENT
Start: 2018-06-27 | End: 2018-06-29 | Stop reason: HOSPADM

## 2018-06-27 RX ORDER — LORAZEPAM 1 MG/1
0.5 TABLET ORAL EVERY 6 HOURS PRN
Status: DISCONTINUED | OUTPATIENT
Start: 2018-06-27 | End: 2018-06-29 | Stop reason: HOSPADM

## 2018-06-27 RX ORDER — ONDANSETRON 2 MG/ML
4 INJECTION INTRAMUSCULAR; INTRAVENOUS EVERY 4 HOURS PRN
Status: DISCONTINUED | OUTPATIENT
Start: 2018-06-27 | End: 2018-06-29 | Stop reason: HOSPADM

## 2018-06-27 RX ADMIN — FENTANYL CITRATE 50 MCG: 50 INJECTION INTRAMUSCULAR; INTRAVENOUS at 11:57

## 2018-06-27 RX ADMIN — FAMOTIDINE 20 MG: 10 INJECTION INTRAVENOUS at 16:35

## 2018-06-27 RX ADMIN — ONDANSETRON 4 MG: 2 INJECTION INTRAMUSCULAR; INTRAVENOUS at 11:03

## 2018-06-27 RX ADMIN — KETOROLAC TROMETHAMINE 30 MG: 30 INJECTION, SOLUTION INTRAMUSCULAR at 21:51

## 2018-06-27 RX ADMIN — KETOROLAC TROMETHAMINE 30 MG: 30 INJECTION, SOLUTION INTRAMUSCULAR at 16:16

## 2018-06-27 RX ADMIN — SODIUM CHLORIDE 1000 ML: 9 INJECTION, SOLUTION INTRAVENOUS at 12:56

## 2018-06-27 RX ADMIN — SODIUM CHLORIDE 1000 ML: 9 INJECTION, SOLUTION INTRAVENOUS at 11:03

## 2018-06-27 RX ADMIN — LORAZEPAM 0.5 MG: 2 INJECTION INTRAMUSCULAR; INTRAVENOUS at 16:36

## 2018-06-27 RX ADMIN — DICYCLOMINE HYDROCHLORIDE 20 MG: 10 INJECTION INTRAMUSCULAR at 11:16

## 2018-06-27 RX ADMIN — LORAZEPAM 0.5 MG: 2 INJECTION INTRAMUSCULAR; INTRAVENOUS at 21:52

## 2018-06-27 RX ADMIN — LIDOCAINE HYDROCHLORIDE 15 ML: 20 SOLUTION ORAL; TOPICAL at 13:00

## 2018-06-27 RX ADMIN — IOHEXOL 100 ML: 350 INJECTION, SOLUTION INTRAVENOUS at 12:01

## 2018-06-27 RX ADMIN — FENTANYL CITRATE 50 MCG: 50 INJECTION INTRAMUSCULAR; INTRAVENOUS at 11:16

## 2018-06-27 RX ADMIN — SODIUM CHLORIDE: 9 INJECTION, SOLUTION INTRAVENOUS at 16:16

## 2018-06-27 RX ADMIN — ENOXAPARIN SODIUM 40 MG: 100 INJECTION SUBCUTANEOUS at 16:35

## 2018-06-27 RX ADMIN — MIDAZOLAM HYDROCHLORIDE 2 MG: 1 INJECTION, SOLUTION INTRAMUSCULAR; INTRAVENOUS at 13:20

## 2018-06-27 RX ADMIN — BENZOCAINE, BUTAMBEN, AND TETRACAINE HYDROCHLORIDE 1 SPRAY: .028; .004; .004 AEROSOL, SPRAY TOPICAL at 14:23

## 2018-06-27 ASSESSMENT — ENCOUNTER SYMPTOMS
EYE DISCHARGE: 0
NERVOUS/ANXIOUS: 0
EYE PAIN: 0
CHILLS: 0
SHORTNESS OF BREATH: 1
BLURRED VISION: 0
VOMITING: 1
DIARRHEA: 0
CONSTIPATION: 0
COUGH: 0
ABDOMINAL PAIN: 1
DEPRESSION: 0
MYALGIAS: 0
SPUTUM PRODUCTION: 0
FEVER: 0
DIZZINESS: 0
NAUSEA: 1

## 2018-06-27 ASSESSMENT — PAIN SCALES - GENERAL
PAINLEVEL_OUTOF10: 4
PAINLEVEL_OUTOF10: 7
PAINLEVEL_OUTOF10: 7
PAINLEVEL_OUTOF10: 3
PAINLEVEL_OUTOF10: 3

## 2018-06-27 ASSESSMENT — COPD QUESTIONNAIRES
HAVE YOU SMOKED AT LEAST 100 CIGARETTES IN YOUR ENTIRE LIFE: YES
COPD SCREENING SCORE: 5
DO YOU EVER COUGH UP ANY MUCUS OR PHLEGM?: YES, A FEW DAYS A WEEK OR MONTH
DURING THE PAST 4 WEEKS HOW MUCH DID YOU FEEL SHORT OF BREATH: NONE/LITTLE OF THE TIME

## 2018-06-27 ASSESSMENT — LIFESTYLE VARIABLES: EVER_SMOKED: YES

## 2018-06-27 NOTE — ASSESSMENT & PLAN NOTE
She has no fever or evidence of acute infection this could be stress related  Follow-up CBC shows resolution

## 2018-06-27 NOTE — ED NOTES
Med rec updated and complete  Allergies reviewed  Interviewed pt with wife at bedside with permission from pt.  Pts reports no antibiotics in the last 30 days.

## 2018-06-27 NOTE — ED NOTES
Unable to place NG tune past nares. OG tube placed , pt tolerated procedure ,   Hospitalist in to evaluate patient for further treatment.

## 2018-06-27 NOTE — H&P
Hospital Medicine History and Physical    Date of Service  6/27/2018    Chief Complaint  Chief Complaint   Patient presents with   • Abdominal Pain       History of Presenting Illness  72 y.o. female who presented 6/27/2018 with acute onset of abdominal pain and nausea and vomiting since 4 AM today . Patient has a history of 10 inches of bowel resected approximately 2012 for diverticulitis . She's had intermittent small bowel obstructions since that time that her last episode was a year ago . Patient felt fine and was eating well yesterday , she had 2 bowel movements yesterday evening but has not had bowel movement or flatus since onset of symptoms . She has no fever or chills or urinary symptoms, she's had no diarrhea recently . There is considerable difficulty trying to place a nasogastric tube and resulting only and some epistaxis , ultimately oral gastric tube was placed and she had 1 L output and felt significantly improved . Unfortunately I was just informed that the Cetacaine spray cannot be used outside of the emergency room . I am concerned that later her gag reflex to make her unable to tolerate the oral gastric tube .  Primary Care Physician  Kishore Baptiste M.D.    Consultants  none    Code Status  Discussed- she would like to discuss w/ her wife  Full code for now    Review of Systems  Review of Systems   Constitutional: Negative for chills and fever.   HENT: Positive for nosebleeds (just post NGT attempted).    Eyes: Negative for blurred vision, pain and discharge.   Respiratory: Positive for shortness of breath (occasional). Negative for cough and sputum production.    Cardiovascular: Negative for chest pain.   Gastrointestinal: Positive for abdominal pain, nausea and vomiting. Negative for constipation (had 2 BM's last night) and diarrhea.        No BM or flatus since symptom onset   Genitourinary: Negative for dysuria and urgency.   Musculoskeletal: Negative for myalgias.   Skin: Negative for itching  and rash.   Neurological: Negative for dizziness.   Psychiatric/Behavioral: Negative for depression. The patient is not nervous/anxious.         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 at SURGERY SAME DAY [facility]   • LOW ANTERIOR RESECTION ROBOTIC  1/28/2014    Performed by Ismael Rocha M.D. at SURGERY [facility]   • ABDOMINAL HYSTERECTOMY TOTAL     • HYSTERECTOMY, VAGINAL      and BSO   • OTHER ABDOMINAL SURGERY      partial colectomy   • PRIMARY C SECTION     • REPEAT C SECTION     • SHOULDER ARTHROSCOPY     ( when queried she did not mention all of these)    Medications  No current facility-administered medications on file prior to encounter.      Current Outpatient Prescriptions on File Prior to Encounter   Medication Sig Dispense Refill   • escitalopram (LEXAPRO) 20 MG tablet Take 1 Tab by mouth every day. 90 Tab 3   • pantoprazole (PROTONIX) 40 MG Tablet Delayed Response Take 1 Tab by mouth every day. 90 Tab 3       Family History  Family History   Problem Relation Age of Onset   • Lung Disease Mother      COPD   • Cancer Mother      Thyroid cancer   • Cancer Father      d. 72 Stomach cancer   •  Diabetes Father    • Cancer Sister 40     Breast cancer   • GI Sister      diverticulitis   • Cancer Sister      breast   heart disease    Social History  Social History   Substance Use Topics   • Smoking status: Current Every Day Smoker     Packs/day: 0.50     Years: 45.00     Types: Cigarettes     Start date: 1964     Last attempt to quit: 2012   • Smokeless tobacco: Never Used   • Alcohol use No   Lives with Partner  2 sons in [place]      Allergies  Allergies   Allergen Reactions   • Penicillins Rash and Unspecified     Itchy rash   • Codeine Vomiting   • Morphine Rash     Localized red rash after morphine administration   • Vicodin [Hydrocodone-Acetaminophen] Itching        Physical Exam  Laboratory   Hemodynamics  Temp (24hrs), Av.7 °C (98 °F), Min:36.7 °C (98 °F), Max:36.7 °C (98 °F)   Temperature: 36.7 °C (98 °F)  Pulse  Av  Min: 59  Max: 66 Heart Rate (Monitored): (!) 59  Blood Pressure : 141/75, NIBP: (!) 183/76      Respiratory      Respiration: 17, Pulse Oximetry: 99 %             Physical Exam   Constitutional: She is oriented to person, place, and time. She appears well-developed and well-nourished. No distress.   HENT:   Head: Normocephalic and atraumatic.   OG tube R side mouth  resiudal blood around nares and oropharynx from NGT attempt       Eyes: Conjunctivae and EOM are normal. Pupils are equal, round, and reactive to light. Right eye exhibits no discharge. Left eye exhibits no discharge. Scleral icterus is present.   Neck: Neck supple.   Cardiovascular: Normal rate and regular rhythm.    Pulmonary/Chest: Effort normal and breath sounds normal. No respiratory distress.   (breath sounds good despite smoking Hx)   Abdominal: Soft. She exhibits no distension. There is tenderness (minimal).   No BS  Suction container full of brackish fluid   Musculoskeletal: She exhibits no edema or tenderness.   Neurological: She is alert and oriented to person, place, and time. No cranial nerve deficit.    Skin: Skin is warm and dry. She is not diaphoretic.   Psychiatric: She has a normal mood and affect.   Nursing note and vitals reviewed.      Recent Labs      06/27/18   1050   WBC  16.0*   RBC  5.36   HEMOGLOBIN  16.6*   HEMATOCRIT  47.8*   MCV  89.2   MCH  31.0   MCHC  34.7   RDW  42.6   PLATELETCT  377   MPV  10.8     Recent Labs      06/27/18   1050   SODIUM  136   POTASSIUM  4.0   CHLORIDE  105   CO2  19*   GLUCOSE  137*   BUN  12   CREATININE  0.64   CALCIUM  9.9     Recent Labs      06/27/18   1050   ALTSGPT  11   ASTSGOT  23   ALKPHOSPHAT  104*   TBILIRUBIN  1.1   LIPASE  27   GLUCOSE  137*                 No results found for: TROPONINI  Urinalysis:  (results deleted as were from 2017)      Imaging  Mid small bowel obstruction is likely secondary to adhesions and located in the pelvis    Small free pelvic fluid is likely reactive to the bowel obstruction. No free air is seen to indicate perforation    Status post partial sigmoidectomy without evidence of recurrent diverticulitis. Severe diverticulosis is again noted    Cholecystectomy, hysterectomy    Severe atherosclerosis    Small hiatal hernia    (she has a large amount of stool in 1 loop of bowel in her pelvis)   Assessment/Plan     I anticipate this patient will require at least two midnights for appropriate medical management, necessitating inpatient admission.    Advanced care planning/counseling discussion   Assessment & Plan    5 minutes was spent in discussing her advanced directives, she is somewhat undecided on prefer to discuss this with her partner (who is not currently present). For this time she would prefer to be full code.        SBO (small bowel obstruction) ([facility])   Assessment & Plan    Patient has a history of previous small bowel obstructions  Had 10 inches bowel resected approximately 6 years ago due to diverticulitis  CT scan shows transition point in the pelvis she has a large amount of stool in one loop of bowel in the pelvis  OG  tube has been placed and she has 1 L out thus far-will give her an enema to see if this helps relieve the distal stool obstruction  Toradol for pain. GI prophylaxis  IV fluids        Leukocytosis- (present on admission)   Assessment & Plan    She has no fever or evidence of acute infection this could be stress related  Follow-up CBC in the morning        Anxiety disorder, unspecified- (present on admission)   Assessment & Plan    Resume Lexapro when able to take by mouth  When necessary Ativan for now        Tobacco use disorder- (present on admission)   Assessment & Plan    Patient had 3 years of nonsmoking, started back on year ago due to life stressors  Discussed and encouraged cessation and patient says it's been a harder go this time trying to quit  (5 minutes)            VTE prophylaxis: Lovenox.

## 2018-06-27 NOTE — ASSESSMENT & PLAN NOTE
5 minutes was spent in discussing her advanced directives, she is somewhat undecided on prefer to discuss this with her partner (who is not currently present). For this time she would prefer to be full code.

## 2018-06-27 NOTE — ED NOTES
Pt resting comfortable at this time.  States relief after OG tube placed  Output 1000 cc    Room still not clean at this time

## 2018-06-27 NOTE — ED NOTES
Pt continues to c/o pain and nausea. ERP notified, ordered to insert NG tube. ERP at BS discussing POC with pt.

## 2018-06-27 NOTE — ED NOTES
Presents with a C/O acute onset of diffuse abdominal pain with recurring episodes of N/V since approximately 0400 this AM, and for the past 6 hour becoming progressively worse.

## 2018-06-27 NOTE — ASSESSMENT & PLAN NOTE
CT scan shows transition point in the pelvis she has a large amount of stool in one loop of bowel in the pelvis  OGT put out very little past 12 hours a few cc only, d/c OGT  Exam improved  Ice chips until she has a BM or flatus then advance to clears as tolerated  If she worsens consider surgical consult

## 2018-06-27 NOTE — ED NOTES
[place] assessment done, pt assessment negative. No addition interventions needed  Call light within reach, bed in lowest position .

## 2018-06-27 NOTE — ED PROVIDER NOTES
ED Provider Note    Scribed for Hector Chavarria M.D. by Hector Chavarria. 6/27/2018,  10:58 AM.    CHIEF COMPLAINT  Chief Complaint   Patient presents with   • Abdominal Pain       HPI  Dina Crews is a 72 y.o. Female with a history of chronic constipation, anxiety disorder, diverticulitis, acid reflux, depression, and partial colectomy, who presents to the Emergency Department complaining of acute onset of diffuse abdominal pain with multiple episodes of nausea and vomiting which started about 4:00 this morning. She feels she's been getting progressively worse over about the past 6 hours. The patient continues to have episodes of vomiting on arrival. She said that she went to bed feeling normally. She has no sick contacts. She has not had fevers or chills. She said that pain started in the left lower quadrant and spread to her abdomen diffusely. She has waves of pain which are accompanied by vomiting. She said that she had a bowel movement yesterday which was loose. She doesn't think she's been passing gas since pain started this morning. She appears fatigued.       REVIEW OF SYSTEMS  See HPI for further details. All other systems are negative.     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    SOCIAL HISTORY  Social History     Social History Main Topics   • Smoking status: Current Every Day Smoker     Packs/day: 0.50  "    Years: 45.00     Types: Cigarettes     Start date: 1/1/1964     Last attempt to quit: 1/1/2012   • Smokeless tobacco: Never Used   • Alcohol use No   • Drug use: Yes     Types: Oral, Marijuana      Comment: Pot daily-edibles   • Sexual activity: Yes     Partners: Female     History   Drug Use   • Types: Oral, Marijuana     Comment: Pot daily-edibles       SURGICAL HISTORY   has a past surgical history that includes hysterectomy, vaginal; low anterior resection robotic (1/28/2014); malik by laparoscopy (4/20/2015); primary c section; repeat c section; other abdominal surgery; shoulder arthroscopy; and abdominal hysterectomy total.    CURRENT MEDICATIONS  Home Medications     Reviewed by Olimpia Tapia (Pharmacy Tech) on 06/27/18 at 1112  Med List Status: Complete   Medication Last Dose Status   acetaminophen (TYLENOL) 500 MG Tab 6/26/2018 Active   Cholecalciferol (VITAMIN D PO) 6/26/2018 Active   escitalopram (LEXAPRO) 20 MG tablet 6/26/2018 Active   pantoprazole (PROTONIX) 40 MG Tablet Delayed Response 6/26/2018 Active                ALLERGIES  Allergies   Allergen Reactions   • Penicillins Rash and Unspecified     Itchy rash   • Codeine Vomiting   • Morphine Rash     Localized red rash after morphine administration   • Vicodin [Hydrocodone-Acetaminophen] Itching       PHYSICAL EXAM  VITAL SIGNS: /84   Pulse 66   Temp 36.7 °C (98 °F)   Resp 18   Ht 1.575 m (5' 2\") Comment: Stated  Wt 71 kg (156 lb 8.4 oz)   LMP 05/30/1992   SpO2 99%   BMI 28.63 kg/m²   Pulse ox interpretation: I interpret this pulse ox as normal.  Constitutional: Alert in no apparent distress.  HENT: Sunken eyes, No signs of trauma, Bilateral external ears normal, Nose normal.   Eyes: Conjunctiva normal, Non-icteric.   Neck: Normal range of motion, Supple, No stridor.   Lymphatic: No lymphadenopathy noted.   Cardiovascular: Regular rate and rhythm, no murmurs.   Thorax & Lungs: Normal breath sounds, No respiratory distress, " No wheezing, No chest tenderness.   Abdomen: Bowel sounds absent, Soft, mild, diffuse, nonlocalizing tenderness, No masses, No pulsatile masses. No peritoneal signs.  Skin: Poor skin turgor, Warm, Dry, No erythema, No rash.   Back: No CVA tenderness.  Extremities: Intact distal pulses, No edema, No cyanosis.  Musculoskeletal: Good range of motion in all major joints. No or major deformities noted.   Neurologic: Alert , Normal motor function, Normal sensory function, No focal deficits noted.   Psychiatric: Affect normal, Judgment normal, Mood normal.     DIAGNOSTIC STUDIES / PROCEDURES      LABS  Labs Reviewed   CBC WITH DIFFERENTIAL - Abnormal; Notable for the following:        Result Value    WBC 16.0 (*)     Hemoglobin 16.6 (*)     Hematocrit 47.8 (*)     Neutrophils-Polys 80.80 (*)     Lymphocytes 13.30 (*)     Neutrophils (Absolute) 12.89 (*)     All other components within normal limits   COMP METABOLIC PANEL - Abnormal; Notable for the following:     Co2 19 (*)     Anion Gap 12.0 (*)     Glucose 137 (*)     Alkaline Phosphatase 104 (*)     Albumin 5.1 (*)     Total Protein 8.3 (*)     All other components within normal limits   LIPASE   ESTIMATED GFR   POC URINALYSIS     All labs reviewed by me.    RADIOLOGY  CT-ABDOMEN-PELVIS WITH   Final Result      Mid small bowel obstruction is likely secondary to adhesions and located in the pelvis      Small free pelvic fluid is likely reactive to the bowel obstruction. No free air is seen to indicate perforation      Status post partial sigmoidectomy without evidence of recurrent diverticulitis. Severe diverticulosis is again noted      Cholecystectomy, hysterectomy      Severe atherosclerosis      Small hiatal hernia      Findings were discussed with EMELY GUO on 6/27/2018 12:13 PM.        The radiologist's interpretation of all radiological studies have been reviewed by me.    COURSE & MEDICAL DECISION MAKING  Nursing notes, VS, PMSFHx reviewed in chart.      10:58 AM Patient seen and examined at bedside. Differential diagnosis includes but is not limited to diverticulitis, bowel obstruction, enterocolitis, dehydration, acute kidney injury. Ordered for abdominal pain labs, and based on her absence of bowel sounds, history of resection, and history of diverticulitis, an abdominal CT to evaluate. Patient will be treated with IV fluids because of sunken eyes and poor skin turgor and GI fluid losses, along with Bentyl and fentanyl for crampy abdominal pain for her symptoms.     11:10 AM this patient's labs show a leukocytosis of 16, reinforcing the indication for CT of the abdomen and pelvis to rule out diverticulitis or other intra-abdominal infection.    11:39 AM this patient's vomiting has resolved. She has been transported to CT. She still reports moderate pain, and she'll be given an additional 50 mg of fentanyl when she returns from her CT scan.    12:15 PM the radiologist called to report the patient has a small bowel obstruction, likely from adhesions. This patient's nausea and vomiting and general appearance improved after IV fluids and medications. She reports feeling better. I've paged the hospitalist for admission.    2:56 PM due to continued abdominal discomfort, but no more vomiting, we were able to place an orogastric tube for the patient, and are slowly evacuating stomach contents with improvement in her pain. She does not show evidence of high-grade obstruction. I have spoken with Dr. Ponce, who has been to the bedside place admission orders, and the patient is admitted in guarded condition.      DISPOSITION:  Patient will be admitted to Dr. Ponce in guarded condition.      FINAL IMPRESSION  1. Small bowel obstruction  2. Abdominal pain

## 2018-06-28 ENCOUNTER — PATIENT OUTREACH (OUTPATIENT)
Dept: HEALTH INFORMATION MANAGEMENT | Facility: OTHER | Age: 73
End: 2018-06-28

## 2018-06-28 PROBLEM — Z71.89 ADVANCED CARE PLANNING/COUNSELING DISCUSSION: Status: RESOLVED | Noted: 2018-06-27 | Resolved: 2018-06-28

## 2018-06-28 LAB
ALBUMIN SERPL BCP-MCNC: 3.6 G/DL (ref 3.2–4.9)
ALBUMIN/GLOB SERPL: 1.4 G/DL
ALP SERPL-CCNC: 74 U/L (ref 30–99)
ALT SERPL-CCNC: 8 U/L (ref 2–50)
ANION GAP SERPL CALC-SCNC: 5 MMOL/L (ref 0–11.9)
AST SERPL-CCNC: 15 U/L (ref 12–45)
BASOPHILS # BLD AUTO: 0.6 % (ref 0–1.8)
BASOPHILS # BLD: 0.06 K/UL (ref 0–0.12)
BILIRUB SERPL-MCNC: 1 MG/DL (ref 0.1–1.5)
BUN SERPL-MCNC: 14 MG/DL (ref 8–22)
CALCIUM SERPL-MCNC: 8.6 MG/DL (ref 8.4–10.2)
CHLORIDE SERPL-SCNC: 109 MMOL/L (ref 96–112)
CO2 SERPL-SCNC: 26 MMOL/L (ref 20–33)
CREAT SERPL-MCNC: 0.58 MG/DL (ref 0.5–1.4)
EOSINOPHIL # BLD AUTO: 0.17 K/UL (ref 0–0.51)
EOSINOPHIL NFR BLD: 1.8 % (ref 0–6.9)
ERYTHROCYTE [DISTWIDTH] IN BLOOD BY AUTOMATED COUNT: 45.9 FL (ref 35.9–50)
GLOBULIN SER CALC-MCNC: 2.5 G/DL (ref 1.9–3.5)
GLUCOSE SERPL-MCNC: 99 MG/DL (ref 65–99)
HCT VFR BLD AUTO: 38.9 % (ref 37–47)
HGB BLD-MCNC: 12.8 G/DL (ref 12–16)
IMM GRANULOCYTES # BLD AUTO: 0.04 K/UL (ref 0–0.11)
IMM GRANULOCYTES NFR BLD AUTO: 0.4 % (ref 0–0.9)
LYMPHOCYTES # BLD AUTO: 2.57 K/UL (ref 1–4.8)
LYMPHOCYTES NFR BLD: 26.5 % (ref 22–41)
MCH RBC QN AUTO: 31 PG (ref 27–33)
MCHC RBC AUTO-ENTMCNC: 32.9 G/DL (ref 33.6–35)
MCV RBC AUTO: 94.2 FL (ref 81.4–97.8)
MONOCYTES # BLD AUTO: 0.51 K/UL (ref 0–0.85)
MONOCYTES NFR BLD AUTO: 5.3 % (ref 0–13.4)
NEUTROPHILS # BLD AUTO: 6.33 K/UL (ref 2–7.15)
NEUTROPHILS NFR BLD: 65.4 % (ref 44–72)
NRBC # BLD AUTO: 0 K/UL
NRBC BLD-RTO: 0 /100 WBC
PLATELET # BLD AUTO: 272 K/UL (ref 164–446)
PMV BLD AUTO: 10.6 FL (ref 9–12.9)
POTASSIUM SERPL-SCNC: 3.8 MMOL/L (ref 3.6–5.5)
PROT SERPL-MCNC: 6.1 G/DL (ref 6–8.2)
RBC # BLD AUTO: 4.13 M/UL (ref 4.2–5.4)
SODIUM SERPL-SCNC: 140 MMOL/L (ref 135–145)
WBC # BLD AUTO: 9.7 K/UL (ref 4.8–10.8)

## 2018-06-28 PROCEDURE — 99406 BEHAV CHNG SMOKING 3-10 MIN: CPT

## 2018-06-28 PROCEDURE — 36415 COLL VENOUS BLD VENIPUNCTURE: CPT

## 2018-06-28 PROCEDURE — 700105 HCHG RX REV CODE 258: Performed by: INTERNAL MEDICINE

## 2018-06-28 PROCEDURE — A9270 NON-COVERED ITEM OR SERVICE: HCPCS | Performed by: INTERNAL MEDICINE

## 2018-06-28 PROCEDURE — 85025 COMPLETE CBC W/AUTO DIFF WBC: CPT

## 2018-06-28 PROCEDURE — 99232 SBSQ HOSP IP/OBS MODERATE 35: CPT | Performed by: HOSPITALIST

## 2018-06-28 PROCEDURE — 80053 COMPREHEN METABOLIC PANEL: CPT

## 2018-06-28 PROCEDURE — 700102 HCHG RX REV CODE 250 W/ 637 OVERRIDE(OP): Performed by: INTERNAL MEDICINE

## 2018-06-28 PROCEDURE — 700111 HCHG RX REV CODE 636 W/ 250 OVERRIDE (IP): Performed by: INTERNAL MEDICINE

## 2018-06-28 PROCEDURE — 770006 HCHG ROOM/CARE - MED/SURG/GYN SEMI*

## 2018-06-28 RX ORDER — PANTOPRAZOLE SODIUM 40 MG/1
TABLET, DELAYED RELEASE ORAL
Qty: 90 TAB | Refills: 0 | Status: SHIPPED | OUTPATIENT
Start: 2018-06-28 | End: 2018-10-06 | Stop reason: SDUPTHER

## 2018-06-28 RX ADMIN — LORAZEPAM 0.5 MG: 1 TABLET ORAL at 12:14

## 2018-06-28 RX ADMIN — NICOTINE 21 MG: 21 PATCH, EXTENDED RELEASE TRANSDERMAL at 06:04

## 2018-06-28 RX ADMIN — FAMOTIDINE 20 MG: 20 TABLET ORAL at 20:28

## 2018-06-28 RX ADMIN — KETOROLAC TROMETHAMINE 30 MG: 30 INJECTION, SOLUTION INTRAMUSCULAR at 06:05

## 2018-06-28 RX ADMIN — LORAZEPAM 0.5 MG: 2 INJECTION INTRAMUSCULAR; INTRAVENOUS at 06:06

## 2018-06-28 RX ADMIN — SODIUM CHLORIDE 1000 ML: 9 INJECTION, SOLUTION INTRAVENOUS at 02:00

## 2018-06-28 RX ADMIN — FAMOTIDINE 20 MG: 10 INJECTION INTRAVENOUS at 08:22

## 2018-06-28 RX ADMIN — LORAZEPAM 0.5 MG: 1 TABLET ORAL at 22:22

## 2018-06-28 RX ADMIN — KETOROLAC TROMETHAMINE 30 MG: 30 INJECTION, SOLUTION INTRAMUSCULAR at 12:09

## 2018-06-28 RX ADMIN — KETOROLAC TROMETHAMINE 30 MG: 30 INJECTION, SOLUTION INTRAMUSCULAR at 22:19

## 2018-06-28 ASSESSMENT — PATIENT HEALTH QUESTIONNAIRE - PHQ9
SUM OF ALL RESPONSES TO PHQ9 QUESTIONS 1 AND 2: 0
2. FEELING DOWN, DEPRESSED, IRRITABLE, OR HOPELESS: NOT AT ALL
1. LITTLE INTEREST OR PLEASURE IN DOING THINGS: NOT AT ALL

## 2018-06-28 ASSESSMENT — ENCOUNTER SYMPTOMS
SHORTNESS OF BREATH: 0
WEAKNESS: 0
CHILLS: 0
RESPIRATORY NEGATIVE: 1
FLANK PAIN: 0
ABDOMINAL PAIN: 1
COUGH: 0
FEVER: 0
NEUROLOGICAL NEGATIVE: 1
NERVOUS/ANXIOUS: 0
LOSS OF CONSCIOUSNESS: 0
DIZZINESS: 0
PSYCHIATRIC NEGATIVE: 1
BRUISES/BLEEDS EASILY: 0
MUSCULOSKELETAL NEGATIVE: 1
VOMITING: 0
MYALGIAS: 0
DEPRESSION: 0
CONSTITUTIONAL NEGATIVE: 1
NAUSEA: 0
WEIGHT LOSS: 0
CARDIOVASCULAR NEGATIVE: 1
BACK PAIN: 0

## 2018-06-28 ASSESSMENT — COGNITIVE AND FUNCTIONAL STATUS - GENERAL
MOBILITY SCORE: 20
DAILY ACTIVITIY SCORE: 21
STANDING UP FROM CHAIR USING ARMS: A LITTLE
SUGGESTED CMS G CODE MODIFIER DAILY ACTIVITY: CJ
HELP NEEDED FOR BATHING: A LITTLE
DRESSING REGULAR UPPER BODY CLOTHING: A LITTLE
WALKING IN HOSPITAL ROOM: A LITTLE
SUGGESTED CMS G CODE MODIFIER MOBILITY: CJ
CLIMB 3 TO 5 STEPS WITH RAILING: A LITTLE
MOVING FROM LYING ON BACK TO SITTING ON SIDE OF FLAT BED: A LITTLE
DRESSING REGULAR LOWER BODY CLOTHING: A LITTLE

## 2018-06-28 ASSESSMENT — PAIN SCALES - GENERAL
PAINLEVEL_OUTOF10: 5
PAINLEVEL_OUTOF10: 2
PAINLEVEL_OUTOF10: 3

## 2018-06-28 NOTE — CARE PLAN
Problem: Safety  Goal: Will remain free from falls    Intervention: Implement fall precautions  Room/floor clear. Non skid socks on. Proper signs up. Bed alarm on, bed low and locked. Call light and belonging within reach. Hourly rounding in place to make sure needs are met.       Problem: Infection  Goal: Will remain free from infection    Intervention: Implement standard precautions and perform hand washing before and after patient contact  Hand washing every encounter. IV ports scrubbed with alcohol when hanging medicine. Patient watch for s/s of infection. Patient taught to report s/s of infection, verbalized understanding.       Problem: Venous Thromboembolism (VTW)/Deep Vein Thrombosis (DVT) Prevention:  Goal: Patient will participate in Venous Thrombosis (VTE)/Deep Vein Thrombosis (DVT)Prevention Measures    Intervention: Encourage patient to perform ankle flex, foot rotation, and knee flex exercises in addition to other prophylatic measures every hour while awake  Encouraged to perform coughing and deep breathing, verbalized understanding, needs reinforcement.       Problem: Pain Management  Goal: Pain level will decrease to patient's comfort goal    Intervention: Follow pain managment plan developed in collaboration with patient and Interdisciplinary Team  Pain assessment Q4H or Q2H after medication intervention. Encouraged patient to report pain, verbalized understanding. Medicated PRN.

## 2018-06-28 NOTE — PROGRESS NOTES
BS report received. Patient in bed, alert and oriented. No c/o pain, nausea or sob at this time. OG to LCS. POC discussed, verbalized understanding. Bed low and locked. Call light and belonging within reach and demonstrated use of call light. Fall precaution in effect. Hourly rounding in place.

## 2018-06-28 NOTE — CARE PLAN
Problem: Communication  Goal: The ability to communicate needs accurately and effectively will improve  Outcome: PROGRESSING AS EXPECTED    Intervention: [place] patient and significant other/support system to call light to alert staff of needs  Patient oriented to surroundings and unit policies/routines.  Educated and understands the use of the call button.  Patient calls appropriately.      Problem: Safety  Goal: Will remain free from falls  Outcome: PROGRESSING AS EXPECTED    Intervention: Implement fall precautions  Patient educated and understands the fall precautions in place to prevent falls.  Bed alarm is off, patient calls appropriately, IV pole closest to bathroom, treaded slipper socks on, and bedrails closest to bathroom down.  Patient also educated and understands the use of the call button for any assistance with mobility.

## 2018-06-28 NOTE — PROGRESS NOTES
"[facility] Hospitalist Progress Note    Date of Service: 2018    Chief Complaint  72 y.o. female admitted 2018 with abdominal pain, CT scan showed SBO with possible transition point.    Interval Problem Update  Dr. Rocha removed 10 inches sigmoid colon in , patient has had one other SBO. History of prior  section 40 years ago as well.    Abdominal pain nearly resolved, only 5-10 cc of output from OGT. No flatus but does have some \"rumblings\"    Consultants/Specialty  None.    Disposition  Home when medically cleared.        Review of Systems   Constitutional: Negative.  Negative for chills, fever, malaise/fatigue and weight loss.   HENT: Negative.    Respiratory: Negative.  Negative for cough and shortness of breath.    Cardiovascular: Negative.  Negative for chest pain and leg swelling.   Gastrointestinal: Positive for abdominal pain (very little, almost gone). Negative for nausea and vomiting.   Genitourinary: Negative.  Negative for dysuria and flank pain.   Musculoskeletal: Negative.  Negative for back pain and myalgias.   Neurological: Negative.  Negative for dizziness, loss of consciousness and weakness.   Endo/Heme/Allergies: Negative.  Does not bruise/bleed easily.   Psychiatric/Behavioral: Negative.  Negative for depression. The patient is not nervous/anxious.    All other systems reviewed and are negative.     Physical Exam  Laboratory/Imaging   Hemodynamics  Temp (24hrs), Av.7 °C (98.1 °F), Min:36.5 °C (97.7 °F), Max:36.8 °C (98.3 °F)   Temperature: 36.8 °C (98.2 °F)  Pulse  Av.9  Min: 59  Max: 66 Heart Rate (Monitored): (!) 59  Blood Pressure : 102/53      Respiratory      Respiration: 18, Pulse Oximetry: 95 %, O2 Daily Delivery Respiratory : Room Air with O2 Available        RUL Breath Sounds: Clear, RML Breath Sounds: Clear, RLL Breath Sounds: Diminished, SANDRA Breath Sounds: Clear, LLL Breath Sounds: Diminished    Fluids    Intake/Output Summary (Last 24 hours) at 18 " 1359  Last data filed at 06/28/18 1300   Gross per 24 hour   Intake              120 ml   Output              100 ml   Net               20 ml       Nutrition  Orders Placed This Encounter   Procedures   • Diet NPO     Standing Status:   Standing     Number of Occurrences:   1     Order Specific Question:   Restrict to:     Answer:   Ice Chips [2]     Physical Exam   Constitutional: She appears well-developed and well-nourished. No distress.   Patient seen and examined by me.   HENT:   Nose: Nose normal.   Mouth/Throat: Oropharynx is clear and moist. No oropharyngeal exudate.   Eyes: Conjunctivae are normal. Right eye exhibits no discharge. Left eye exhibits no discharge. No scleral icterus.   Neck: No JVD present. No tracheal deviation present.   Cardiovascular: Normal rate, regular rhythm and normal heart sounds.    Pulmonary/Chest: Effort normal and breath sounds normal. No stridor. No respiratory distress. She has no wheezes. She has no rales. She exhibits no tenderness.   Abdominal: Soft. She exhibits no distension and no mass. Bowel sounds are decreased. There is no tenderness. There is no rebound and no guarding.   Musculoskeletal: She exhibits no edema or tenderness.   Neurological: She is alert. No cranial nerve deficit. She exhibits normal muscle tone.   Skin: Skin is warm and dry. She is not diaphoretic. No pallor.   Psychiatric: She has a normal mood and affect. Her behavior is normal.   Nursing note and vitals reviewed.      Recent Labs      06/27/18   1050  06/28/18   0443   WBC  16.0*  9.7   RBC  5.36  4.13*   HEMOGLOBIN  16.6*  12.8   HEMATOCRIT  47.8*  38.9   MCV  89.2  94.2   MCH  31.0  31.0   MCHC  34.7  32.9*   RDW  42.6  45.9   PLATELETCT  377  272   MPV  10.8  10.6     Recent Labs      06/27/18   1050  06/28/18   0443   SODIUM  136  140   POTASSIUM  4.0  3.8   CHLORIDE  105  109   CO2  19*  26   GLUCOSE  137*  99   BUN  12  14   CREATININE  0.64  0.58   CALCIUM  9.9  8.6                       Assessment/Plan     SBO (small bowel obstruction) (HCC)- (present on admission)   Assessment & Plan    CT scan shows transition point in the pelvis she has a large amount of stool in one loop of bowel in the pelvis  OGT put out very little past 12 hours a few cc only, d/c OGT  Exam improved  Ice chips until she has a BM or flatus then advance to clears as tolerated  If she worsens consider surgical consult        Leukocytosis- (present on admission)   Assessment & Plan    She has no fever or evidence of acute infection this could be stress related  Follow-up CBC shows resolution        Anxiety disorder, unspecified- (present on admission)   Assessment & Plan    Resume Lexapro when able to take by mouth  When necessary Ativan for now        Tobacco use disorder- (present on admission)   Assessment & Plan    Discussed cessation on admission          Quality-Core Measures   Reviewed items::  Labs reviewed, Medications reviewed and Radiology images reviewed  Butler catheter::  No Butler  DVT prophylaxis pharmacological::  Enoxaparin (Lovenox)

## 2018-06-28 NOTE — TELEPHONE ENCOUNTER
Dr. Baptiste, this Rx request made it into our refill pool. We cannot ok it since [facility] is not in our protocol group. Refill as you see fit. Thank you.

## 2018-06-28 NOTE — RESPIRATORY CARE
"Smoking Cessation Intervention 3 -10 minutes completed.    Provided smoking cessation packet with \"Tips to Quit\" and flyer for \"Free Smoking Cessation Classes\". Provided \"Quit Card\" with the phone number to [place] [facility] for free nicotine replacement therapy. Patient engaged in conversation.  "

## 2018-06-28 NOTE — PROGRESS NOTES
Bedside report completed.  Assumed pt care. Patient at edge of bed with CNA, up to commode,  in no apparent distress.  Safety precautions in place. Call light & personal belongings within reach.  Plan of care discussed.  On room air, IV running NS @ 100 mL/hour.  OG in place taped to left side at this time.  Reports minimal pain this morning.  No other needs expressed.

## 2018-06-28 NOTE — PROGRESS NOTES
Advance diet to clear liquid per MD communication.  Patient is passing flatus, tolerated ice chips.

## 2018-06-28 NOTE — PROGRESS NOTES
Removed patient's enteral tube per MD order, started new IV.  Patient tolerated both well.  Given ice chips.  Tolerating ice well at this point.  Will advance diet per orders.

## 2018-06-28 NOTE — PROGRESS NOTES
Received from the er via pj.aa&o x 4.  Oral gastric tube in place.conneceted to suction.  Iv fluids set-up.c/o pain.  Went over all meds with pt.she has toradol ordered for pain she feels it wont work but willing to try.also medicated with ativan for anxiety.  Assisted to the bsc voiding w/o diff.  Has enema ordered will order enema bag.  Oriented to unit call light in reach.

## 2018-06-29 VITALS
HEART RATE: 69 BPM | BODY MASS INDEX: 28.8 KG/M2 | HEIGHT: 62 IN | SYSTOLIC BLOOD PRESSURE: 118 MMHG | WEIGHT: 156.53 LBS | TEMPERATURE: 97.8 F | RESPIRATION RATE: 18 BRPM | OXYGEN SATURATION: 95 % | DIASTOLIC BLOOD PRESSURE: 91 MMHG

## 2018-06-29 PROCEDURE — 700102 HCHG RX REV CODE 250 W/ 637 OVERRIDE(OP): Performed by: HOSPITALIST

## 2018-06-29 PROCEDURE — A9270 NON-COVERED ITEM OR SERVICE: HCPCS | Performed by: HOSPITALIST

## 2018-06-29 PROCEDURE — 99239 HOSP IP/OBS DSCHRG MGMT >30: CPT | Performed by: HOSPITALIST

## 2018-06-29 PROCEDURE — 700105 HCHG RX REV CODE 258: Performed by: INTERNAL MEDICINE

## 2018-06-29 PROCEDURE — A9270 NON-COVERED ITEM OR SERVICE: HCPCS | Performed by: INTERNAL MEDICINE

## 2018-06-29 PROCEDURE — 700111 HCHG RX REV CODE 636 W/ 250 OVERRIDE (IP): Performed by: INTERNAL MEDICINE

## 2018-06-29 PROCEDURE — 700102 HCHG RX REV CODE 250 W/ 637 OVERRIDE(OP): Performed by: INTERNAL MEDICINE

## 2018-06-29 RX ORDER — ACETAMINOPHEN 325 MG/1
650 TABLET ORAL EVERY 4 HOURS PRN
Status: DISCONTINUED | OUTPATIENT
Start: 2018-06-29 | End: 2018-06-29 | Stop reason: HOSPADM

## 2018-06-29 RX ADMIN — LORAZEPAM 0.5 MG: 1 TABLET ORAL at 10:11

## 2018-06-29 RX ADMIN — NICOTINE 21 MG: 21 PATCH, EXTENDED RELEASE TRANSDERMAL at 06:04

## 2018-06-29 RX ADMIN — SODIUM CHLORIDE: 9 INJECTION, SOLUTION INTRAVENOUS at 00:48

## 2018-06-29 RX ADMIN — ACETAMINOPHEN 650 MG: 325 TABLET, FILM COATED ORAL at 10:19

## 2018-06-29 RX ADMIN — FAMOTIDINE 20 MG: 20 TABLET ORAL at 08:20

## 2018-06-29 ASSESSMENT — PAIN SCALES - GENERAL
PAINLEVEL_OUTOF10: 0
PAINLEVEL_OUTOF10: 3
PAINLEVEL_OUTOF10: 0
PAINLEVEL_OUTOF10: 0

## 2018-06-29 NOTE — CARE PLAN
Problem: Bowel/Gastric:  Goal: Normal bowel function is maintained or improved  Outcome: PROGRESSING AS EXPECTED  Pt had a medium loose bowel movement. Tolerating clear liquid diet. Encouraged ambulation for bowel motility, pt ambulated halls.     Problem: Pain Management  Goal: Pain level will decrease to patient's comfort goal  Outcome: PROGRESSING AS EXPECTED  Pt medicated with Toradol and provided with heat pack to improve comfort.

## 2018-06-29 NOTE — DISCHARGE PLANNING
Care Transition Team Assessment    Information Source  Orientation : Oriented x 4  Information Given By: Patient  Informant's Name: Dina  Who is responsible for making decisions for patient? : Patient    Readmission Evaluation  Is this a readmission?: No    Elopement Risk  Legal Hold: No  Ambulatory or Self Mobile in Wheelchair: Yes  Disoriented: No  Psychiatric Symptoms: None  History of Wandering: No  Elopement this Admit: No  Vocalizing Wanting to Leave: No  Displays Behaviors, Body Language Wanting to Leave: No-Not at Risk for Elopement  Elopement Risk: Not at Risk for Elopement    Interdisciplinary Discharge Planning  Patient or legal guardian wants to designate a caregiver (see row info): No    Discharge Preparedness  What is your plan after discharge?: Home with help  What are your discharge supports?: Other (comment) (significant other)  Prior Functional Level: Independent with Activities of Daily Living    Functional Assesment  Prior Functional Level: Independent with Activities of Daily Living    Finances  Financial Barriers to Discharge: No  Prescription Coverage: Yes    Values / Beliefs / Concerns  Values / Beliefs Concerns : No    Advance Directive  Advance Directive?: DPOA for Health Care  Durable Power of  Name and Contact : Nena Davis    Domestic Abuse  Have you ever been the victim of abuse or violence?: No    Psychological Assessment  History of Substance Abuse: None  History of Psychiatric Problems: No    Discharge Risks or Barriers  Discharge risks or barriers?: No    Anticipated Discharge Information  Anticipated discharge disposition: Home

## 2018-06-29 NOTE — PROGRESS NOTES
Pt had a medium loose bowel movement. Medicated with Toradol for abdominal discomfort. Pt ambulated 200 ft in hallway.

## 2018-06-29 NOTE — PROGRESS NOTES
Pt tolerating clear liquid diet overnight and had a medium bowel movement. Diet advanced to full liquids for breakfast per active order to advance diet as tolerated.

## 2018-06-29 NOTE — DISCHARGE INSTRUCTIONS
Discharge Instructions    Small Bowel Series, Care After  Refer to this sheet in the next few weeks. These instructions provide you with information on caring for yourself after your procedure. Your caregiver may also give you more specific instructions. Your treatment has been planned according to current medical practices, but problems sometimes occur. Call your caregiver if you have any problems or questions after your procedure.  HOME CARE INSTRUCTIONS  · You may go back to your normal diet and activities right away.  · Drink enough fluids to keep your urine clear or pale yellow. This will help get the barium out of your system. Your stools may be white or light-colored for a few days. This is normal.  · If you are constipated, ask your caregiver whether you should take a laxative.  SEEK MEDICAL CARE IF:  · You are constipated for more than 3 days.  · Your bowel movements still look white or chalky after 3 days.  · You have cramps, pain, or diarrhea.  · You feel nauseous or vomit.  SEEK IMMEDIATE MEDICAL CARE IF:  · You have very bad constipation.  · You cannot pass gas.  · You have abdominal pain that gets worse.  · You develop red, itchy hives on your skin.  · Your throat swells.  · You have trouble breathing.  · You have a fever.  MAKE SURE YOU:  · Understand these instructions.  · Will watch your condition.  · Will get help right away if you are not doing well or get worse.     This information is not intended to replace advice given to you by your health care provider. Make sure you discuss any questions you have with your health care provider.     Document Released: 12/06/2012 Document Revised: 01/08/2016 Document Reviewed: 12/06/2012  [employer] Interactive Patient Education ©2016 [employer]    Discharged to home by car with relative. Discharged via walking, hospital escort: Yes.  Special equipment needed: Not Applicable    Be sure to schedule a follow-up appointment with your primary care doctor or any  specialists as instructed.     Discharge Plan:   Smoking Cessation Offered:  (will give smoking cessatin counsseling in AM)  Influenza Vaccine Indication: Patient Refuses    I understand that a diet low in cholesterol, fat, and sodium is recommended for good health. Unless I have been given specific instructions below for another diet, I accept this instruction as my diet prescription.   Other diet: As instructed by MD    Special Instructions: None    · Is patient discharged on Warfarin / Coumadin?   No     Depression / Suicide Risk    As you are discharged from this [facility] facility, it is important to learn how to keep safe from harming yourself.    Recognize the warning signs:  · Abrupt changes in personality, positive or negative- including increase in energy   · Giving away possessions  · Change in eating patterns- significant weight changes-  positive or negative  · Change in sleeping patterns- unable to sleep or sleeping all the time   · Unwillingness or inability to communicate  · Depression  · Unusual sadness, discouragement and loneliness  · Talk of wanting to die  · Neglect of personal appearance   · Rebelliousness- reckless behavior  · Withdrawal from people/activities they love  · Confusion- inability to concentrate     If you or a loved one observes any of these behaviors or has concerns about self-harm, here's what you can do:  · Talk about it- your feelings and reasons for harming yourself  · Remove any means that you might use to hurt yourself (examples: pills, rope, extension cords, firearm)  · Get professional help from the community (Mental Health, Substance Abuse, psychological counseling)  · Do not be alone:Call your Safe Contact- someone whom you trust who will be there for you.  · Call your local CRISIS HOTLINE 630-2154 or 152-002-1700  · Call your local Children's Mobile Crisis Response Team [place] (241) 926-0468 or www.brotips  · Call the toll free National Suicide  Prevention Hotlines   · National Suicide Prevention Lifeline 355-470-DANH (0946)  · National Hope Line Network 800-SUICIDE (336-5128)

## 2018-06-29 NOTE — PROGRESS NOTES
"Report received, care of pt assumed. Pt tolerating clear liquid diet with no increased pain, nausea, or vomiting. Pt provided with cup of broth. +flatus. +bowel sounds x4. Pt reports some abdominal soreness, \"like I got punched in the stomach.\" Pt given heat pack and offered Toradol but pt politely declines at this time. Encouraged ambulation, pt agrees with the plan and will ambulate halls when she's finished with her broth.       "

## 2018-06-29 NOTE — DISCHARGE SUMMARY
Discharge Summary    CHIEF COMPLAINT ON ADMISSION  Chief Complaint   Patient presents with   • Abdominal Pain       Reason for Admission  abdomen pain,      Admission Date  6/27/2018    CODE STATUS  Full Code    HPI & HOSPITAL COURSE  This is a 72 y.o. female here with a small bowel obstruction. She has a prior history of resection of colon in 2014 due to diverticulosis. Condition responded quickly to bowel rest and decompression of the stomach. An OG tube had to be placed due to inability to pass the tube NG. Bowel sounds returned, the patient had a bowel movement and was tolerating solid food at discharge.        Therefore, she is discharged in good and stable condition to home with close outpatient follow-up.    The patient met 2-midnight criteria for an inpatient stay at the time of discharge.    Discharge Date  6/29/18    FOLLOW UP ITEMS POST DISCHARGE  Primary care as needed.    DISCHARGE DIAGNOSES  Active Problems:    Tobacco use disorder POA: Yes    Anxiety disorder, unspecified POA: Yes    Leukocytosis POA: Yes    SBO (small bowel obstruction) (HCC) POA: Yes  Resolved Problems:    Advanced care planning/counseling discussion POA: Unknown      FOLLOW UP  Future Appointments  Date Time Provider Department Center   7/3/2018 1:20 PM Kishore Baptiste M.D. [facility]     No follow-up provider specified.    MEDICATIONS ON DISCHARGE     Medication List      CHANGE how you take these medications      Instructions   pantoprazole 40 MG Tbec  What changed:  See the new instructions.  Commonly known as:  PROTONIX   TAKE ONE TABLET BY MOUTH ONCE DAILY        CONTINUE taking these medications      Instructions   acetaminophen 500 MG Tabs  Commonly known as:  TYLENOL   Take 1,000 mg by mouth every 6 hours as needed for Moderate Pain.  Dose:  1000 mg     escitalopram 20 MG tablet  Commonly known as:  LEXAPRO   Take 1 Tab by mouth every day.  Dose:  20 mg     VITAMIN D PO   Take 1 Cap by mouth every day.  Dose:  1 Cap             Allergies  Allergies   Allergen Reactions   • Penicillins Rash and Unspecified     Itchy rash   • Codeine Vomiting   • Morphine Rash     Localized red rash after morphine administration   • Vicodin [Hydrocodone-Acetaminophen] Itching       DIET  Orders Placed This Encounter   Procedures   • Diet Order Low Fiber(GI Soft)     Standing Status:   Standing     Number of Occurrences:   1     Order Specific Question:   Diet:     Answer:   Low Fiber(GI Soft) [2]       ACTIVITY  As tolerated.  Weight bearing as tolerated    CONSULTATIONS  None.    PROCEDURES  None.    LABORATORY  Lab Results   Component Value Date    SODIUM 140 06/28/2018    POTASSIUM 3.8 06/28/2018    CHLORIDE 109 06/28/2018    CO2 26 06/28/2018    GLUCOSE 99 06/28/2018    BUN 14 06/28/2018    CREATININE 0.58 06/28/2018        Lab Results   Component Value Date    WBC 9.7 06/28/2018    HEMOGLOBIN 12.8 06/28/2018    HEMATOCRIT 38.9 06/28/2018    PLATELETCT 272 06/28/2018        Total time of the discharge process exceeds 30 minutes.

## 2018-07-02 ENCOUNTER — PATIENT OUTREACH (OUTPATIENT)
Dept: HEALTH INFORMATION MANAGEMENT | Facility: OTHER | Age: 73
End: 2018-07-02

## 2018-07-03 ENCOUNTER — OFFICE VISIT (OUTPATIENT)
Dept: MEDICAL GROUP | Facility: MEDICAL CENTER | Age: 73
End: 2018-07-03
Payer: MEDICARE

## 2018-07-03 VITALS
OXYGEN SATURATION: 93 % | WEIGHT: 160.2 LBS | HEART RATE: 68 BPM | TEMPERATURE: 96.8 F | DIASTOLIC BLOOD PRESSURE: 84 MMHG | SYSTOLIC BLOOD PRESSURE: 158 MMHG | HEIGHT: 62 IN | BODY MASS INDEX: 29.48 KG/M2

## 2018-07-03 DIAGNOSIS — E78.5 HYPERLIPIDEMIA, UNSPECIFIED HYPERLIPIDEMIA TYPE: ICD-10-CM

## 2018-07-03 DIAGNOSIS — K56.609 SBO (SMALL BOWEL OBSTRUCTION) (HCC): ICD-10-CM

## 2018-07-03 DIAGNOSIS — R73.01 ELEVATED FASTING BLOOD SUGAR: ICD-10-CM

## 2018-07-03 DIAGNOSIS — M1A.0790 IDIOPATHIC CHRONIC GOUT OF FOOT WITHOUT TOPHUS, UNSPECIFIED LATERALITY: ICD-10-CM

## 2018-07-03 PROBLEM — K57.20 DIVERTICULITIS OF LARGE INTESTINE WITH PERFORATION WITHOUT BLEEDING: Status: RESOLVED | Noted: 2017-07-05 | Resolved: 2018-07-03

## 2018-07-03 PROBLEM — E66.9 OBESITY (BMI 30-39.9): Status: RESOLVED | Noted: 2017-10-12 | Resolved: 2018-07-03

## 2018-07-03 PROCEDURE — 99214 OFFICE O/P EST MOD 30 MIN: CPT | Performed by: FAMILY MEDICINE

## 2018-07-03 RX ORDER — INDOMETHACIN 50 MG/1
50 CAPSULE ORAL 3 TIMES DAILY
Qty: 90 CAP | Refills: 0 | Status: SHIPPED | OUTPATIENT
Start: 2018-07-03 | End: 2018-08-28

## 2018-07-03 NOTE — ASSESSMENT & PLAN NOTE
Having frequently gout attacks. Drinking water helps. Has made dietary changes, but still having attacks.  Great toe is getting swollen and painful, bilaterally. This current attack has been going for 2 months.

## 2018-07-03 NOTE — PROGRESS NOTES
Subjective:   Dina Crews is a 72 y.o. female here today for SBO and gout    SBO (small bowel obstruction) (HCC)  Has been constipation recently and on and off constipation. Diarrhea follows constipation.  Passing gas but no bowel movements for 5 days.  History of partial colectomy 10 inches of bowel was removed after diverticulitis.  Still having lower abdominal pain and bloating.    Idiopathic chronic gout of foot without tophus  Having frequently gout attacks. Drinking water helps. Has made dietary changes, but still having attacks.  Great toe is getting swollen and painful, bilaterally. This current attack has been going for 2 months.       A1c of 6.2 last year.      Current medicines (including changes today)  Current Outpatient Prescriptions   Medication Sig Dispense Refill   • indomethacin (INDOCIN) 50 MG Cap Take 1 Cap by mouth 3 times a day. 90 Cap 0   • pantoprazole (PROTONIX) 40 MG Tablet Delayed Response TAKE ONE TABLET BY MOUTH ONCE DAILY 90 Tab 0   • Cholecalciferol (VITAMIN D PO) Take 1 Cap by mouth every day.     • acetaminophen (TYLENOL) 500 MG Tab Take 1,000 mg by mouth every 6 hours as needed for Moderate Pain.     • escitalopram (LEXAPRO) 20 MG tablet Take 1 Tab by mouth every day. 90 Tab 3     No current facility-administered medications for this visit.      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    ROS   No chest  "pain, no fever       Objective:     Blood pressure 158/84, pulse 68, temperature 36 °C (96.8 °F), height 1.575 m (5' 2\"), weight 72.7 kg (160 lb 3.2 oz), last menstrual period 05/30/1992, SpO2 93 %. Body mass index is 29.3 kg/m².   Physical Exam:  Constitutional: Alert, no distress.  Skin: Warm, dry, good turgor, no rashes in visible areas.  Eye: Equal, round and reactive, conjunctiva clear, lids normal.  Psych: Alert and oriented x3, normal affect and mood.        Assessment and Plan:   The following treatment plan was discussed    1. SBO (small bowel obstruction) (HCC)  We will attempt to control constipation with daily Miralax. Advised liquid diet and bowel rest.    2. Idiopathic chronic gout of foot without tophus, unspecified laterality  Trial of indomethacin for 1 month. Consider allopurinol when pain resolves.  - indomethacin (INDOCIN) 50 MG Cap; Take 1 Cap by mouth 3 times a day.  Dispense: 90 Cap; Refill: 0    3. Hyperlipidemia, unspecified hyperlipidemia type  Check labs and follow up in 1 month.  - COMP METABOLIC PANEL; Future  - LIPID PROFILE; Future  - TSH WITH REFLEX TO FT4; Future    4. Elevated fasting blood sugar  Check labs and follow up in 1 month.  - HEMOGLOBIN A1C; Future      Followup: Return in about 4 weeks (around 7/31/2018) for Labs.         "

## 2018-07-03 NOTE — ASSESSMENT & PLAN NOTE
Has been constipation recently and on and off constipation. Diarrhea follows constipation.  Passing gas but no bowel movements for 5 days.  History of partial colectomy 10 inches of bowel was removed after diverticulitis.  Still having lower abdominal pain and bloating.

## 2018-07-17 ENCOUNTER — PATIENT OUTREACH (OUTPATIENT)
Dept: HEALTH INFORMATION MANAGEMENT | Facility: OTHER | Age: 73
End: 2018-07-17

## 2018-07-30 ENCOUNTER — PATIENT OUTREACH (OUTPATIENT)
Dept: HEALTH INFORMATION MANAGEMENT | Facility: OTHER | Age: 73
End: 2018-07-30

## 2018-08-06 NOTE — PROGRESS NOTES
Patient Dina Crews was admitted to [facility] on 6/27/18 for small bowel obstruction.  The patient was discharged on 6/29/18 and instructed to follow up with her PCP.  This patient advocate was unable to contact the patient and so she was followed on the back end until 7/17/18 when this patient advocate was able to speak with the patient.  The patient stated she had been out of town and was doing well.  The patient followed up with her PCP on 7/3/18 and she is scheduled for a follow up on 8/8/18.

## 2018-08-13 ENCOUNTER — HOSPITAL ENCOUNTER (OUTPATIENT)
Dept: LAB | Facility: MEDICAL CENTER | Age: 73
End: 2018-08-13
Attending: FAMILY MEDICINE
Payer: MEDICARE

## 2018-08-13 DIAGNOSIS — R73.01 ELEVATED FASTING BLOOD SUGAR: ICD-10-CM

## 2018-08-13 DIAGNOSIS — E78.5 HYPERLIPIDEMIA, UNSPECIFIED HYPERLIPIDEMIA TYPE: ICD-10-CM

## 2018-08-13 LAB
ALBUMIN SERPL BCP-MCNC: 4.4 G/DL (ref 3.2–4.9)
ALBUMIN/GLOB SERPL: 1.8 G/DL
ALP SERPL-CCNC: 88 U/L (ref 30–99)
ALT SERPL-CCNC: 10 U/L (ref 2–50)
ANION GAP SERPL CALC-SCNC: 7 MMOL/L (ref 0–11.9)
AST SERPL-CCNC: 15 U/L (ref 12–45)
BILIRUB SERPL-MCNC: 0.3 MG/DL (ref 0.1–1.5)
BUN SERPL-MCNC: 11 MG/DL (ref 8–22)
CALCIUM SERPL-MCNC: 9.4 MG/DL (ref 8.5–10.5)
CHLORIDE SERPL-SCNC: 106 MMOL/L (ref 96–112)
CHOLEST SERPL-MCNC: 212 MG/DL (ref 100–199)
CO2 SERPL-SCNC: 26 MMOL/L (ref 20–33)
CREAT SERPL-MCNC: 0.7 MG/DL (ref 0.5–1.4)
EST. AVERAGE GLUCOSE BLD GHB EST-MCNC: 126 MG/DL
GLOBULIN SER CALC-MCNC: 2.4 G/DL (ref 1.9–3.5)
GLUCOSE SERPL-MCNC: 99 MG/DL (ref 65–99)
HBA1C MFR BLD: 6 % (ref 0–5.6)
HDLC SERPL-MCNC: 53 MG/DL
LDLC SERPL CALC-MCNC: 131 MG/DL
POTASSIUM SERPL-SCNC: 4.2 MMOL/L (ref 3.6–5.5)
PROT SERPL-MCNC: 6.8 G/DL (ref 6–8.2)
SODIUM SERPL-SCNC: 139 MMOL/L (ref 135–145)
TRIGL SERPL-MCNC: 141 MG/DL (ref 0–149)
TSH SERPL DL<=0.005 MIU/L-ACNC: 2.8 UIU/ML (ref 0.38–5.33)

## 2018-08-13 PROCEDURE — 36415 COLL VENOUS BLD VENIPUNCTURE: CPT

## 2018-08-13 PROCEDURE — 83036 HEMOGLOBIN GLYCOSYLATED A1C: CPT

## 2018-08-13 PROCEDURE — 80053 COMPREHEN METABOLIC PANEL: CPT

## 2018-08-13 PROCEDURE — 84443 ASSAY THYROID STIM HORMONE: CPT

## 2018-08-13 PROCEDURE — 80061 LIPID PANEL: CPT

## 2018-08-20 NOTE — ASSESSMENT & PLAN NOTE
Patient reported runny nose with yellowish mucous and sinus pressure and congestion for 3 days. She reported postnasal drip, scratchy throat. She reported swelling and pain on neck glands as well.     Patient

## 2018-08-24 ENCOUNTER — TELEPHONE (OUTPATIENT)
Dept: MEDICAL GROUP | Facility: MEDICAL CENTER | Age: 73
End: 2018-08-24

## 2018-08-24 RX ORDER — SULFAMETHOXAZOLE AND TRIMETHOPRIM 800; 160 MG/1; MG/1
1 TABLET ORAL 2 TIMES DAILY
Qty: 6 TAB | Refills: 0 | Status: SHIPPED | OUTPATIENT
Start: 2018-08-24 | End: 2018-08-27

## 2018-08-24 NOTE — TELEPHONE ENCOUNTER
Patient as developed a bladder infection and is requesting medications to help. Stats she has an appt with you on Tuesday but doesn't want to wait that long to start taking something for it.

## 2018-08-28 ENCOUNTER — OFFICE VISIT (OUTPATIENT)
Dept: MEDICAL GROUP | Facility: MEDICAL CENTER | Age: 73
End: 2018-08-28
Payer: MEDICARE

## 2018-08-28 VITALS
BODY MASS INDEX: 29.66 KG/M2 | HEART RATE: 66 BPM | WEIGHT: 161.2 LBS | OXYGEN SATURATION: 96 % | HEIGHT: 62 IN | SYSTOLIC BLOOD PRESSURE: 128 MMHG | TEMPERATURE: 99 F | DIASTOLIC BLOOD PRESSURE: 74 MMHG

## 2018-08-28 DIAGNOSIS — M15.9 PRIMARY OSTEOARTHRITIS INVOLVING MULTIPLE JOINTS: ICD-10-CM

## 2018-08-28 DIAGNOSIS — R73.03 PREDIABETES: ICD-10-CM

## 2018-08-28 DIAGNOSIS — F33.1 MODERATE EPISODE OF RECURRENT MAJOR DEPRESSIVE DISORDER (HCC): ICD-10-CM

## 2018-08-28 DIAGNOSIS — F17.200 TOBACCO USE DISORDER: ICD-10-CM

## 2018-08-28 DIAGNOSIS — E78.00 PURE HYPERCHOLESTEROLEMIA: ICD-10-CM

## 2018-08-28 DIAGNOSIS — N39.41 URGE INCONTINENCE OF URINE: ICD-10-CM

## 2018-08-28 PROBLEM — M1A.09X0 IDIOPATHIC CHRONIC GOUT OF MULTIPLE SITES WITHOUT TOPHUS: Status: ACTIVE | Noted: 2018-08-28

## 2018-08-28 LAB
APPEARANCE UR: CLEAR
BILIRUB UR STRIP-MCNC: NEGATIVE MG/DL
COLOR UR AUTO: YELLOW
GLUCOSE UR STRIP.AUTO-MCNC: NEGATIVE MG/DL
KETONES UR STRIP.AUTO-MCNC: NEGATIVE MG/DL
LEUKOCYTE ESTERASE UR QL STRIP.AUTO: NEGATIVE
NITRITE UR QL STRIP.AUTO: NEGATIVE
PH UR STRIP.AUTO: 7 [PH] (ref 5–8)
PROT UR QL STRIP: NEGATIVE MG/DL
RBC UR QL AUTO: NORMAL
SP GR UR STRIP.AUTO: 1.01
UROBILINOGEN UR STRIP-MCNC: 0.2 MG/DL

## 2018-08-28 PROCEDURE — 81002 URINALYSIS NONAUTO W/O SCOPE: CPT | Performed by: FAMILY MEDICINE

## 2018-08-28 PROCEDURE — 99214 OFFICE O/P EST MOD 30 MIN: CPT | Performed by: FAMILY MEDICINE

## 2018-08-28 RX ORDER — BUPROPION HYDROCHLORIDE 150 MG/1
150 TABLET, EXTENDED RELEASE ORAL 2 TIMES DAILY
Qty: 60 TAB | Refills: 5 | Status: SHIPPED | OUTPATIENT
Start: 2018-08-28 | End: 2018-10-19

## 2018-08-28 NOTE — ASSESSMENT & PLAN NOTE
Patient complains of significant arthritis in hands and feet.  She states that her great toe joints are chronically in pain, usually a low level of pain, but can occasionally swell up and be more significant.  Last year a x-ray of her feet were done which showed moderate osteoarthritis and a uric acid was normal.

## 2018-08-28 NOTE — ASSESSMENT & PLAN NOTE
Patient quit smoking for 3 years but then restarted once she was put under stress.  She would like to have help discontinuing.  She would like to do counseling and Wellbutrin.

## 2018-08-28 NOTE — PROGRESS NOTES
Subjective:   Dina Crews is a 73 y.o. female here today for prediabetes, hyperlipidemia, tobacco use, urinary incontinence, arthritis    Moderate episode of recurrent major depressive disorder (CMS-[facility])  Patient states that Lexapro 20 mg daily has been helpful with her mood.    Prediabetes  Patient's A1c improved from 6.2 down to 6.0 in the last 1 year.    Primary osteoarthritis involving multiple joints  Patient complains of significant arthritis in hands and feet.  She states that her great toe joints are chronically in pain, usually a low level of pain, but can occasionally swell up and be more significant.  Last year a x-ray of her feet were done which showed moderate osteoarthritis and a uric acid was normal.    Hyperlipidemia  Reviewed lab work with patient which shows an improvement of LDL in the last 1 year.    Tobacco use disorder  Patient quit smoking for 3 years but then restarted once she was put under stress.  She would like to have help discontinuing.  She would like to do counseling and Wellbutrin.    Urge incontinence of urine  Patient has had increased urinary urge incontinence.  She would like to do physical therapy.         Current medicines (including changes today)  Current Outpatient Prescriptions   Medication Sig Dispense Refill   • buPROPion SR (WELLBUTRIN-SR) 150 MG TABLET SR 12 HR sustained-release tablet Take 1 Tab by mouth 2 times a day. 60 Tab 5   • pantoprazole (PROTONIX) 40 MG Tablet Delayed Response TAKE ONE TABLET BY MOUTH ONCE DAILY 90 Tab 0   • Cholecalciferol (VITAMIN D PO) Take 1 Cap by mouth every day.     • acetaminophen (TYLENOL) 500 MG Tab Take 1,000 mg by mouth every 6 hours as needed for Moderate Pain.     • escitalopram (LEXAPRO) 20 MG tablet Take 1 Tab by mouth every day. 90 Tab 3     No current facility-administered medications for this visit.      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    ROS   No chest pain, no shortness of breath       Objective:     Blood pressure 128/74, pulse 66, temperature 37.2 °C (99 °F), height 1.575 m (5' 2\"), weight 73.1 kg (161 lb 3.2 oz), last menstrual period 05/30/1992, SpO2 96 %. Body mass index is 29.48 kg/m².  Physical Exam:  Constitutional: Alert, no distress.  Skin: Warm, dry, good turgor, no rashes in visible areas.  Eye: Equal, round and reactive, conjunctiva clear, lids normal.  Psych: Alert and oriented x3, normal affect and mood.        Assessment and Plan:   The following treatment plan was discussed    1. Moderate episode of recurrent major depressive disorder (HCC)  Doing well.  Continue Lexapro.    2. Pure hypercholesterolemia  Improving.  Continue healthy lifestyle.    3. Prediabetes  Improving.  Continue healthy lifestyle.    4. Primary osteoarthritis involving multiple joints  Advised Tylenol as needed.    5. Urge incontinence of urine  Referral to physical therapy for Keagle exercises.  UA negative.  She has a long history of small blood in the urine.  - REFERRAL TO PHYSICAL THERAPY Reason for Therapy: Eval/Treat/Report  - POCT Urinalysis    6. Tobacco use disorder  Prescription for Wellbutrin prescription and referral to tobacco cessation program.  - buPROPion SR (WELLBUTRIN-SR) 150 MG TABLET SR 12 HR sustained-release tablet; Take 1 Tab by mouth 2 times a day.  Dispense: 60 Tab; Refill: 5  - REFERRAL TO TOBACCO CESSATION " PROGRAM      Followup: Return in about 1 year (around 8/28/2019), or if symptoms worsen or fail to improve, for Annual.

## 2018-09-21 DIAGNOSIS — F41.1 GENERALIZED ANXIETY DISORDER: ICD-10-CM

## 2018-09-21 RX ORDER — LORAZEPAM 1 MG/1
1 TABLET ORAL NIGHTLY PRN
Qty: 30 TAB | Refills: 5 | Status: SHIPPED
Start: 2018-09-21 | End: 2018-10-08 | Stop reason: SDUPTHER

## 2018-10-06 ENCOUNTER — PATIENT MESSAGE (OUTPATIENT)
Dept: MEDICAL GROUP | Facility: MEDICAL CENTER | Age: 73
End: 2018-10-06

## 2018-10-06 DIAGNOSIS — F41.1 GENERALIZED ANXIETY DISORDER: ICD-10-CM

## 2018-10-06 DIAGNOSIS — K21.9 GASTROESOPHAGEAL REFLUX DISEASE, ESOPHAGITIS PRESENCE NOT SPECIFIED: ICD-10-CM

## 2018-10-08 RX ORDER — PANTOPRAZOLE SODIUM 40 MG/1
40 TABLET, DELAYED RELEASE ORAL DAILY
Qty: 90 TAB | Refills: 3 | Status: SHIPPED | OUTPATIENT
Start: 2018-10-08 | End: 2019-12-05 | Stop reason: SDUPTHER

## 2018-10-08 RX ORDER — LORAZEPAM 1 MG/1
1 TABLET ORAL NIGHTLY PRN
Qty: 30 TAB | Refills: 5 | Status: SHIPPED
Start: 2018-10-08 | End: 2019-06-07 | Stop reason: SDUPTHER

## 2018-10-08 NOTE — TELEPHONE ENCOUNTER
From: Dina Crews  Sent: 10/6/2018 1:39 PM PDT  Subject: Medication Renewal Request    Dina Crews would like a refill of the following medications:     pantoprazole (PROTONIX) 40 MG Tablet Delayed Response [Kishore Baptiste M.D.]    Preferred pharmacy: [address], [place] - [id number]

## 2018-10-08 NOTE — TELEPHONE ENCOUNTER
From: Dina Crews  To: Kishore Powell M.D.  Sent: 10/6/2018 1:48 PM PDT  Subject: Prescription Question    Dr. Dr. Powell, I am in need of my Lorazazapam medication. I had 3 refills on my current bottle but it was  and they wouldn't fill it. The pharmacy has sent you 2 notices but still my prescription wasn't filled. I did not realize that the prescription had . Please would you refill. Im am also out of my Pantoprazole .      Thank You  Dina powell

## 2018-10-16 ENCOUNTER — APPOINTMENT (OUTPATIENT)
Dept: PHYSICAL THERAPY | Facility: REHABILITATION | Age: 73
End: 2018-10-16
Attending: FAMILY MEDICINE
Payer: MEDICARE

## 2018-10-19 ENCOUNTER — OFFICE VISIT (OUTPATIENT)
Dept: URGENT CARE | Facility: CLINIC | Age: 73
End: 2018-10-19
Payer: MEDICARE

## 2018-10-19 ENCOUNTER — APPOINTMENT (OUTPATIENT)
Dept: RADIOLOGY | Facility: MEDICAL CENTER | Age: 73
End: 2018-10-19
Attending: EMERGENCY MEDICINE
Payer: MEDICARE

## 2018-10-19 ENCOUNTER — HOSPITAL ENCOUNTER (EMERGENCY)
Facility: MEDICAL CENTER | Age: 73
End: 2018-10-19
Attending: EMERGENCY MEDICINE
Payer: MEDICARE

## 2018-10-19 VITALS
RESPIRATION RATE: 18 BRPM | OXYGEN SATURATION: 94 % | WEIGHT: 160.27 LBS | DIASTOLIC BLOOD PRESSURE: 95 MMHG | BODY MASS INDEX: 28.4 KG/M2 | TEMPERATURE: 99 F | SYSTOLIC BLOOD PRESSURE: 157 MMHG | HEART RATE: 76 BPM | HEIGHT: 63 IN

## 2018-10-19 VITALS
RESPIRATION RATE: 18 BRPM | SYSTOLIC BLOOD PRESSURE: 150 MMHG | WEIGHT: 161.8 LBS | OXYGEN SATURATION: 97 % | BODY MASS INDEX: 29.59 KG/M2 | TEMPERATURE: 100.2 F | DIASTOLIC BLOOD PRESSURE: 98 MMHG | HEART RATE: 78 BPM

## 2018-10-19 DIAGNOSIS — R50.81 FEVER IN OTHER DISEASES: ICD-10-CM

## 2018-10-19 DIAGNOSIS — M79.601 PAIN OF RIGHT UPPER EXTREMITY: ICD-10-CM

## 2018-10-19 LAB
ANION GAP SERPL CALC-SCNC: 11 MMOL/L (ref 0–11.9)
BASOPHILS # BLD AUTO: 0.4 % (ref 0–1.8)
BASOPHILS # BLD: 0.05 K/UL (ref 0–0.12)
BUN SERPL-MCNC: 6 MG/DL (ref 8–22)
CALCIUM SERPL-MCNC: 9.5 MG/DL (ref 8.4–10.2)
CHLORIDE SERPL-SCNC: 105 MMOL/L (ref 96–112)
CO2 SERPL-SCNC: 21 MMOL/L (ref 20–33)
CREAT SERPL-MCNC: 0.61 MG/DL (ref 0.5–1.4)
CRP SERPL HS-MCNC: 1.18 MG/DL (ref 0–0.75)
EOSINOPHIL # BLD AUTO: 0.04 K/UL (ref 0–0.51)
EOSINOPHIL NFR BLD: 0.3 % (ref 0–6.9)
ERYTHROCYTE [DISTWIDTH] IN BLOOD BY AUTOMATED COUNT: 42.3 FL (ref 35.9–50)
ERYTHROCYTE [SEDIMENTATION RATE] IN BLOOD BY WESTERGREN METHOD: 20 MM/HOUR (ref 0–30)
GLUCOSE SERPL-MCNC: 103 MG/DL (ref 65–99)
HCT VFR BLD AUTO: 41 % (ref 37–47)
HGB BLD-MCNC: 14.4 G/DL (ref 12–16)
IMM GRANULOCYTES # BLD AUTO: 0.05 K/UL (ref 0–0.11)
IMM GRANULOCYTES NFR BLD AUTO: 0.4 % (ref 0–0.9)
LYMPHOCYTES # BLD AUTO: 2.06 K/UL (ref 1–4.8)
LYMPHOCYTES NFR BLD: 15.2 % (ref 22–41)
MCH RBC QN AUTO: 30.9 PG (ref 27–33)
MCHC RBC AUTO-ENTMCNC: 35.1 G/DL (ref 33.6–35)
MCV RBC AUTO: 88 FL (ref 81.4–97.8)
MONOCYTES # BLD AUTO: 0.67 K/UL (ref 0–0.85)
MONOCYTES NFR BLD AUTO: 4.9 % (ref 0–13.4)
NEUTROPHILS # BLD AUTO: 10.71 K/UL (ref 2–7.15)
NEUTROPHILS NFR BLD: 78.8 % (ref 44–72)
NRBC # BLD AUTO: 0 K/UL
NRBC BLD-RTO: 0 /100 WBC
PLATELET # BLD AUTO: 295 K/UL (ref 164–446)
PMV BLD AUTO: 10.3 FL (ref 9–12.9)
POTASSIUM SERPL-SCNC: 3.2 MMOL/L (ref 3.6–5.5)
RBC # BLD AUTO: 4.66 M/UL (ref 4.2–5.4)
SODIUM SERPL-SCNC: 137 MMOL/L (ref 135–145)
WBC # BLD AUTO: 13.6 K/UL (ref 4.8–10.8)

## 2018-10-19 PROCEDURE — 85025 COMPLETE CBC W/AUTO DIFF WBC: CPT

## 2018-10-19 PROCEDURE — 99214 OFFICE O/P EST MOD 30 MIN: CPT | Performed by: FAMILY MEDICINE

## 2018-10-19 PROCEDURE — 700102 HCHG RX REV CODE 250 W/ 637 OVERRIDE(OP): Performed by: EMERGENCY MEDICINE

## 2018-10-19 PROCEDURE — 73030 X-RAY EXAM OF SHOULDER: CPT | Mod: RT

## 2018-10-19 PROCEDURE — 85652 RBC SED RATE AUTOMATED: CPT

## 2018-10-19 PROCEDURE — 99284 EMERGENCY DEPT VISIT MOD MDM: CPT

## 2018-10-19 PROCEDURE — 93971 EXTREMITY STUDY: CPT | Mod: RT

## 2018-10-19 PROCEDURE — 80048 BASIC METABOLIC PNL TOTAL CA: CPT

## 2018-10-19 PROCEDURE — A9270 NON-COVERED ITEM OR SERVICE: HCPCS | Performed by: EMERGENCY MEDICINE

## 2018-10-19 PROCEDURE — 86140 C-REACTIVE PROTEIN: CPT

## 2018-10-19 RX ORDER — OXYCODONE HYDROCHLORIDE AND ACETAMINOPHEN 5; 325 MG/1; MG/1
1-2 TABLET ORAL EVERY 4 HOURS PRN
Qty: 12 TAB | Refills: 0 | Status: SHIPPED | OUTPATIENT
Start: 2018-10-19 | End: 2018-10-22

## 2018-10-19 RX ORDER — OXYCODONE AND ACETAMINOPHEN 10; 325 MG/1; MG/1
1 TABLET ORAL ONCE
Status: COMPLETED | OUTPATIENT
Start: 2018-10-19 | End: 2018-10-19

## 2018-10-19 RX ORDER — GABAPENTIN 300 MG/1
CAPSULE ORAL
COMMUNITY
Start: 2018-08-11 | End: 2018-10-19

## 2018-10-19 RX ADMIN — OXYCODONE HYDROCHLORIDE AND ACETAMINOPHEN 1 TABLET: 10; 325 TABLET ORAL at 15:34

## 2018-10-19 ASSESSMENT — ENCOUNTER SYMPTOMS
NECK PAIN: 0
DIARRHEA: 0
FEVER: 0
CHILLS: 0
NAUSEA: 0
NUMBNESS: 1
VOMITING: 0
ABDOMINAL PAIN: 0
LOSS OF CONSCIOUSNESS: 0
FALLS: 0
COUGH: 0
WEAKNESS: 0
BACK PAIN: 0
SEIZURES: 0
SHORTNESS OF BREATH: 0
TINGLING: 1
CONSTIPATION: 0
MUSCLE WEAKNESS: 1
MYALGIAS: 0

## 2018-10-19 ASSESSMENT — PAIN SCALES - GENERAL
PAINLEVEL: 10=SEVERE PAIN
PAINLEVEL_OUTOF10: 10

## 2018-10-19 ASSESSMENT — PAIN SCALES - WONG BAKER: WONGBAKER_NUMERICALRESPONSE: HURTS AS MUCH AS POSSIBLE

## 2018-10-19 ASSESSMENT — PAIN DESCRIPTION - DESCRIPTORS: DESCRIPTORS: SHARP;PRESSURE;ACHING

## 2018-10-19 NOTE — ED TRIAGE NOTES
Pt amb to triage; c/o R arm pain since yesterday. Pt denies trauma to R arm. Pt seen at urgent care this am and sent to er.

## 2018-10-19 NOTE — ED NOTES
Pt sent here from  with c/o rt arm pain for 2 days. No trauma or injury to her arm or shoulder. Pt states she feels like her arm is in a vice.

## 2018-10-19 NOTE — ED PROVIDER NOTES
ED Provider Note    CHIEF COMPLAINT  Chief Complaint   Patient presents with   • Arm Pain     right       HPI  Dina Crews is a 73 y.o. female who presents for evaluation of right arm pain, states the pain involves every aspect of her arm, this includes the shoulder, upper arm, elbow, forearm, hand and all fingers.  Describes as a pressure sensation.  She also feels as though her hand as well as shoulder are swollen.  No fever, no redness, no weakness or numbness.  Apparently she went to urgent care where she was transferred here for evaluation with a specific concern over a possible septic arthritis of her shoulder.  No chest pain or shortness of breath or back pain, she states that she has had a similar problem with her left arm in the past but that has always resolved spontaneously.  Additionally, she states about 20 years ago she had an infection in her left shoulder, she went to the hospital with a red hot shoulder and eventually had surgery.    REVIEW OF SYSTEMS  Negative for fever, weakness, numbness, neck pain, back pain, chest pain.    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    SOCIAL HISTORY  Social History     Social History Main Topics   • Smoking status: Current Every Day Smoker     Packs/day: 0.50     Years: 45.00     Types: Cigarettes     Start date: 1/1/1964     Last attempt to quit: 1/1/2012   •  "Smokeless tobacco: Never Used   • Alcohol use No   • Drug use: Yes     Types: Oral, Marijuana      Comment: Pot daily-edibles   • Sexual activity: Yes     Partners: Female       SURGICAL HISTORY   has a past surgical history that includes hysterectomy, vaginal; low anterior resection robotic (1/28/2014); malik by laparoscopy (4/20/2015); primary c section; repeat c section; other abdominal surgery; shoulder arthroscopy; and abdominal hysterectomy total.    CURRENT MEDICATIONS  I personally reviewed the medication list in the charting documentation.     ALLERGIES  Allergies   Allergen Reactions   • Penicillins Rash and Unspecified     Itchy rash   • Codeine Vomiting   • Morphine Rash     Localized red rash after morphine administration   • Vicodin [Hydrocodone-Acetaminophen] Itching       PHYSICAL EXAM  VITAL SIGNS: /95   Pulse 73   Temp 37.2 °C (99 °F)   Resp 18   Ht 1.6 m (5' 3\")   Wt 72.7 kg (160 lb 4.4 oz)   LMP 05/30/1992   BMI 28.39 kg/m²   Constitutional: Standing at the bedside, appears to be experiencing pain.  HENT: No signs of trauma.   Eyes: Conjunctiva normal, Non-icteric.   Chest: Normal nonlabored respirations  Skin: No erythema, No rash.   Musculoskeletal: Unremarkable inspection of the right arm, range of motion of the shoulder, elbow and wrist is full passively and actively but elicits discomfort.  Neurovascular intact distally with normal sensation in the radial, ulnar and median nerve distributions.  Normal radial pulse.  Normal capillary refill.  No deformities, no ecchymosis, no obvious edema.  Neurologic: Alert, No focal deficits noted.   Psychiatric: Affect normal, Judgment normal.    DIAGNOSTIC STUDIES / PROCEDURES    LABS  Results for orders placed or performed during the hospital encounter of 10/19/18   CBC WITH DIFFERENTIAL   Result Value Ref Range    WBC 13.6 (H) 4.8 - 10.8 K/uL    RBC 4.66 4.20 - 5.40 M/uL    Hemoglobin 14.4 12.0 - 16.0 g/dL    Hematocrit 41.0 37.0 - 47.0 % "    MCV 88.0 81.4 - 97.8 fL    MCH 30.9 27.0 - 33.0 pg    MCHC 35.1 (H) 33.6 - 35.0 g/dL    RDW 42.3 35.9 - 50.0 fL    Platelet Count 295 164 - 446 K/uL    MPV 10.3 9.0 - 12.9 fL    Neutrophils-Polys 78.80 (H) 44.00 - 72.00 %    Lymphocytes 15.20 (L) 22.00 - 41.00 %    Monocytes 4.90 0.00 - 13.40 %    Eosinophils 0.30 0.00 - 6.90 %    Basophils 0.40 0.00 - 1.80 %    Immature Granulocytes 0.40 0.00 - 0.90 %    Nucleated RBC 0.00 /100 WBC    Neutrophils (Absolute) 10.71 (H) 2.00 - 7.15 K/uL    Lymphs (Absolute) 2.06 1.00 - 4.80 K/uL    Monos (Absolute) 0.67 0.00 - 0.85 K/uL    Eos (Absolute) 0.04 0.00 - 0.51 K/uL    Baso (Absolute) 0.05 0.00 - 0.12 K/uL    Immature Granulocytes (abs) 0.05 0.00 - 0.11 K/uL    NRBC (Absolute) 0.00 K/uL   BASIC METABOLIC PANEL   Result Value Ref Range    Sodium 137 135 - 145 mmol/L    Potassium 3.2 (L) 3.6 - 5.5 mmol/L    Chloride 105 96 - 112 mmol/L    Co2 21 20 - 33 mmol/L    Glucose 103 (H) 65 - 99 mg/dL    Bun 6 (L) 8 - 22 mg/dL    Creatinine 0.61 0.50 - 1.40 mg/dL    Calcium 9.5 8.4 - 10.2 mg/dL    Anion Gap 11.0 0.0 - 11.9   CRP QUANTITIVE (NON-CARDIAC)   Result Value Ref Range    Stat C-Reactive Protein 1.18 (H) 0.00 - 0.75 mg/dL   WESTERGREN SED RATE   Result Value Ref Range    Sed Rate Westergren 20 0 - 30 mm/hour   ESTIMATED GFR   Result Value Ref Range    GFR If African American >60 >60 mL/min/1.73 m 2    GFR If Non African American >60 >60 mL/min/1.73 m 2        RADIOLOGY  DX-SHOULDER 2+ RIGHT   Final Result      Acromioclavicular joint arthrosis.      Soft tissue calcification suggesting calcific bursitis or tendinitis.               INTERPRETING LOCATION:  [address] [facility], [place], [address]      US-EXTREMITY VENOUS UPPER UNILAT RIGHT   Final Result            COURSE & MEDICAL DECISION MAKING  Pertinent Labs & Imaging studies reviewed. (See chart for details)  Differential diagnosis includes: DVT, pathologic fracture, unlikely septic arthritis    Encounter Summary: This is a 73  y.o. female with generalized right arm pain, sent in the rule out a septic arthritis of the right shoulder although she has no fever, no redness of the shoulder, really no indication of a septic joint on history or exam.  She has essentially an examination that is remarkable only for pain but no objective findings.  Will rule out DVT with duplex, obtain an x-ray, blood work including sed rate and CRP, medicate with Percocet and then reevaluate -------- duplex is negative, x-ray is unremarkable for any sort of acute findings, blood work reveals a minimal elevation in WBC and the CRP but no elevation in the sed rate, at this point I really do not feel strongly that this is infectious related, I had a long discussion with the patient and her friend at the bedside regarding the importance of keeping a close eye on this to make sure no additional symptoms develop.  I have gone over strict return instructions as well as follow-up instructions and they both expressed clear understanding.    In prescribing controlled substances to this patient, I certify that I have obtained and reviewed the medical history of Dina Crews. I have also made a good shilpi effort to obtain applicable records from other providers who have treated the patient.    I have conducted a physical exam and documented it. I have reviewed Ms. Crews’s prescription history as maintained by the [place] Prescription Monitoring Program.     I have assessed the patient’s risk for abuse, dependency, and addiction using the validated Opioid Risk Tool    Given the above, I believe the benefits of controlled substance therapy outweigh the risks. The reasons for prescribing controlled substances include my professional opinion that controlled substances are a reasonable choice for this patient in the event other methods are ineffective inadequately controlling pain. Accordingly, I have discussed the risk and benefits, treatment plan, and alternative  therapies with the patient.             DISPOSITION: Discharge Home      FINAL IMPRESSION  1. Pain of right upper extremity        This dictation was created using voice recognition software. The accuracy of the dictation is limited to the abilities of the software. I expect there may be some errors of grammar and possibly content. The nursing notes were reviewed and certain aspects of this information were incorporated into this note.    Electronically signed by: Rehan Aggarwal, 10/19/2018 3:30 PM

## 2018-10-19 NOTE — PROGRESS NOTES
Subjective:   Dina Crews is a 73 y.o. female who presents for Arm Pain (began hurting yesterday and now hand is begining to swell, can not sleep )        Arm Pain    The incident occurred 6 to 12 hours ago. The incident occurred at home. There was no injury mechanism (Possibly related to repetitive motion. Patient has a previous hx of septic joint in L arm x 15 years ago. ). The pain is present in the right shoulder and upper right arm. The pain does not radiate. The pain is at a severity of 10/10. The pain is severe. The pain has been constant since the incident. Associated symptoms include muscle weakness, numbness and tingling. Pertinent negatives include no chest pain. The symptoms are aggravated by movement, lifting and palpation. She has tried NSAIDs for the symptoms. The treatment provided no relief.     Pt denies injury to arm or shoulder, pain came on suddenly and is severe described as a 10/10. Pt does have a hx of septic joint in L shoulder that occurred 15 years ago requiring IV abx and joint aspiration.  She denies chest pain, syncope or presyncope.  She is having pain with any movement, palpation.    Review of Systems   Constitutional: Negative for chills, fever and malaise/fatigue.   Respiratory: Negative for cough and shortness of breath.    Cardiovascular: Negative for chest pain.   Gastrointestinal: Negative for abdominal pain, constipation, diarrhea, nausea and vomiting.   Musculoskeletal: Positive for joint pain. Negative for back pain, falls, myalgias and neck pain.   Neurological: Positive for tingling and numbness. Negative for seizures, loss of consciousness and weakness.   All other systems reviewed and are negative.      PMH:  has a past medical history of Anxiety disorder; CATARACT; Chronic constipation (10/12/2013); Chronic maxillary sinusitis (12/30/2015); Diverticulitis (3/1/2013); Elevated TSH (5/30/2017); GERD (gastroesophageal reflux disease) (10/29/2011); History of  malignant neoplasm of parotid gland (10/19/2011); Hyperlipidemia (10/19/2011); Major depression (7/16/2012); Osteopenia (8/12/2012); Perennial allergic rhinitis (4/25/2011); Personal history of skin cancer (4/25/2011); S/P partial colectomy (4/22/2014); TMJ tenderness (10/29/2011); Tobacco abuse (4/25/2011); Torn meniscus; and Vitamin d deficiency (4/25/2011).  MEDS:   Current Outpatient Prescriptions:   •  pantoprazole (PROTONIX) 40 MG Tablet Delayed Response, Take 1 Tab by mouth every day., Disp: 90 Tab, Rfl: 3  •  LORazepam (ATIVAN) 1 MG Tab, Take 1 Tab by mouth at bedtime as needed (anxiety or sleep) for up to 30 days., Disp: 30 Tab, Rfl: 5  •  Cholecalciferol (VITAMIN D PO), Take 1 Cap by mouth every day., Disp: , Rfl:   •  escitalopram (LEXAPRO) 20 MG tablet, Take 1 Tab by mouth every day., Disp: 90 Tab, Rfl: 3  •  gabapentin (NEURONTIN) 300 MG Cap, , Disp: , Rfl:   •  acetaminophen (TYLENOL) 500 MG Tab, Take 1,000 mg by mouth every 6 hours as needed for Moderate Pain., Disp: , Rfl:   ALLERGIES:   Allergies   Allergen Reactions   • Penicillins Rash and Unspecified     Itchy rash   • Codeine Vomiting   • Morphine Rash     Localized red rash after morphine administration   • Vicodin [Hydrocodone-Acetaminophen] Itching     SURGHX:   Past Surgical History:   Procedure Laterality Date   • NEL BY LAPAROSCOPY  4/20/2015    Performed by Gaudencio Johnson M.D. at SURGERY SAME DAY [facility] ORS   • LOW ANTERIOR RESECTION ROBOTIC  1/28/2014    Performed by Ismael Rocha M.D. at SURGERY [facility] ORS   • ABDOMINAL HYSTERECTOMY TOTAL     • HYSTERECTOMY, VAGINAL      and BSO   • OTHER ABDOMINAL SURGERY      partial colectomy   • PRIMARY C SECTION     • REPEAT C SECTION     • SHOULDER ARTHROSCOPY       SOCHX:  reports that she has been smoking Cigarettes.  She started smoking about 54 years ago. She has a 22.50 pack-year smoking history. She has never used smokeless tobacco. She reports that she uses drugs, including  Oral and Marijuana. She reports that she does not drink alcohol.  Family History   Problem Relation Age of Onset   • Lung Disease Mother         COPD   • Cancer Mother         Thyroid cancer   • Cancer Father         d. 72 Stomach cancer   • Diabetes Father    • Cancer Sister 40        Breast cancer   • GI Sister         diverticulitis   • Cancer Sister         breast        Objective:   /98   Pulse 78   Temp 37.9 °C (100.2 °F)   Resp 18   Wt 73.4 kg (161 lb 12.8 oz)   LMP 05/30/1992   SpO2 97%   BMI 29.59 kg/m²     Physical Exam   Constitutional: She is oriented to person, place, and time. She appears well-developed and well-nourished. No distress.   HENT:   Head: Normocephalic and atraumatic.   Eyes: Pupils are equal, round, and reactive to light. Conjunctivae are normal.   Cardiovascular: Normal rate and regular rhythm.    Pulmonary/Chest: Effort normal and breath sounds normal.   Musculoskeletal:        Left shoulder: She exhibits decreased range of motion, tenderness, bony tenderness, swelling and decreased strength. She exhibits no deformity and no laceration.   Severe pain with light palpation. Unilateral swelling in R hand. Exam limited due to pain.    Neurological: She is alert and oriented to person, place, and time.   Skin: Skin is warm and dry.   Psychiatric: She has a normal mood and affect. Her behavior is normal.   Vitals reviewed.        Assessment/Plan:     1. Pain of right upper extremity     2. Fever in other diseases         Patient informed she is inappropriate for scope of service for urgent care clinic due to concerns for [possible/suspected/probable] severe atraumatic shoulder pain with hx of septic joint, and patient is referred to a higher level of care at [facility] now by private vehicle declines [facility] transportation for evaluation and treatment; patient aware of location of [facility]. We discussed risks of non-compliance or delayed medical care. All questions  answered to patient’s apparent satisfaction. Patient verbalizes understanding and agreement with plan of care.     Case was reviewed and discussed by Dr. Roca with Yamel Lanza PA-C during her training period.

## 2018-10-19 NOTE — ED NOTES
"RN in to patient's room to draw pt's blood and medicate pt. As RN walking in pt is dressed and walking out stating that we are too slow and that she is in too much pain to wait. Pt brought back and medicated by another RN.    This RN apologized to patient about wait and states that \" I brought the medication in as soon as it was ordered.\" Pt states well the doctor was in here 15 minutes ago and I was already dressed when you walked in. RN left the room and pt medicated by another RN    Lab called to come draw patient.   "

## 2018-10-20 NOTE — ED NOTES
.Pt D/C to home. VSS. D/C instructions and 1 prescriptions given to patient. .Pt signed Controlled Substance Use Informed Consent and consent placed on pt chart. Pt verbalizes understanding. Pt leaves ED with no acute changes, complaints or concerns. Pt ambulated out with a steady gait and all belongings.

## 2018-10-23 ENCOUNTER — HOSPITAL ENCOUNTER (EMERGENCY)
Facility: MEDICAL CENTER | Age: 73
End: 2018-10-23
Payer: MEDICARE

## 2018-10-23 ENCOUNTER — PATIENT OUTREACH (OUTPATIENT)
Dept: HEALTH INFORMATION MANAGEMENT | Facility: OTHER | Age: 73
End: 2018-10-23

## 2018-10-23 ENCOUNTER — APPOINTMENT (OUTPATIENT)
Dept: PHYSICAL THERAPY | Facility: REHABILITATION | Age: 73
End: 2018-10-23
Attending: FAMILY MEDICINE
Payer: MEDICARE

## 2018-10-23 VITALS
SYSTOLIC BLOOD PRESSURE: 150 MMHG | RESPIRATION RATE: 18 BRPM | WEIGHT: 159.83 LBS | DIASTOLIC BLOOD PRESSURE: 89 MMHG | TEMPERATURE: 99.7 F | HEART RATE: 85 BPM | BODY MASS INDEX: 28.31 KG/M2

## 2018-10-23 PROCEDURE — 302449 STATCHG TRIAGE ONLY (STATISTIC)

## 2018-10-23 ASSESSMENT — PAIN SCALES - GENERAL: PAINLEVEL_OUTOF10: 10

## 2018-10-23 ASSESSMENT — PAIN SCALES - WONG BAKER: WONGBAKER_NUMERICALRESPONSE: HURTS AS MUCH AS POSSIBLE

## 2018-10-23 ASSESSMENT — PAIN DESCRIPTION - DESCRIPTORS: DESCRIPTORS: PRESSURE;ACHING;SHARP

## 2018-10-23 NOTE — PROGRESS NOTES
Outcome: Requested a call back     Please transfer to Patient Outreach Team at [phone] when patient returns call.    WebIZ Checked & Epic Updated:  yes    [facility] Verified: yes    Attempt # 1

## 2018-10-24 ENCOUNTER — HOSPITAL ENCOUNTER (EMERGENCY)
Facility: MEDICAL CENTER | Age: 73
End: 2018-10-24
Attending: EMERGENCY MEDICINE
Payer: MEDICARE

## 2018-10-24 VITALS
RESPIRATION RATE: 17 BRPM | HEART RATE: 84 BPM | HEIGHT: 63 IN | OXYGEN SATURATION: 95 % | BODY MASS INDEX: 27.97 KG/M2 | SYSTOLIC BLOOD PRESSURE: 148 MMHG | TEMPERATURE: 98 F | DIASTOLIC BLOOD PRESSURE: 95 MMHG | WEIGHT: 157.85 LBS

## 2018-10-24 DIAGNOSIS — M79.601 RIGHT ARM PAIN: ICD-10-CM

## 2018-10-24 DIAGNOSIS — M65.20 CALCIFIC TENDINITIS: ICD-10-CM

## 2018-10-24 LAB
ALBUMIN SERPL BCP-MCNC: 4.1 G/DL (ref 3.2–4.9)
ALBUMIN/GLOB SERPL: 1.2 G/DL
ALP SERPL-CCNC: 109 U/L (ref 30–99)
ALT SERPL-CCNC: 12 U/L (ref 2–50)
ANION GAP SERPL CALC-SCNC: 11 MMOL/L (ref 0–11.9)
AST SERPL-CCNC: 17 U/L (ref 12–45)
BASOPHILS # BLD AUTO: 0.4 % (ref 0–1.8)
BASOPHILS # BLD: 0.05 K/UL (ref 0–0.12)
BILIRUB SERPL-MCNC: 1.3 MG/DL (ref 0.1–1.5)
BUN SERPL-MCNC: 7 MG/DL (ref 8–22)
CALCIUM SERPL-MCNC: 9.4 MG/DL (ref 8.4–10.2)
CHLORIDE SERPL-SCNC: 99 MMOL/L (ref 96–112)
CO2 SERPL-SCNC: 21 MMOL/L (ref 20–33)
CREAT SERPL-MCNC: 0.6 MG/DL (ref 0.5–1.4)
EOSINOPHIL # BLD AUTO: 0.08 K/UL (ref 0–0.51)
EOSINOPHIL NFR BLD: 0.7 % (ref 0–6.9)
ERYTHROCYTE [DISTWIDTH] IN BLOOD BY AUTOMATED COUNT: 41.8 FL (ref 35.9–50)
GLOBULIN SER CALC-MCNC: 3.4 G/DL (ref 1.9–3.5)
GLUCOSE SERPL-MCNC: 143 MG/DL (ref 65–99)
HCT VFR BLD AUTO: 41.7 % (ref 37–47)
HGB BLD-MCNC: 14.6 G/DL (ref 12–16)
IMM GRANULOCYTES # BLD AUTO: 0.03 K/UL (ref 0–0.11)
IMM GRANULOCYTES NFR BLD AUTO: 0.3 % (ref 0–0.9)
LYMPHOCYTES # BLD AUTO: 1.86 K/UL (ref 1–4.8)
LYMPHOCYTES NFR BLD: 16.1 % (ref 22–41)
MCH RBC QN AUTO: 30.9 PG (ref 27–33)
MCHC RBC AUTO-ENTMCNC: 35 G/DL (ref 33.6–35)
MCV RBC AUTO: 88.2 FL (ref 81.4–97.8)
MONOCYTES # BLD AUTO: 0.69 K/UL (ref 0–0.85)
MONOCYTES NFR BLD AUTO: 6 % (ref 0–13.4)
NEUTROPHILS # BLD AUTO: 8.87 K/UL (ref 2–7.15)
NEUTROPHILS NFR BLD: 76.5 % (ref 44–72)
NRBC # BLD AUTO: 0 K/UL
NRBC BLD-RTO: 0 /100 WBC
PLATELET # BLD AUTO: 318 K/UL (ref 164–446)
PMV BLD AUTO: 9.9 FL (ref 9–12.9)
POTASSIUM SERPL-SCNC: 3.2 MMOL/L (ref 3.6–5.5)
PROT SERPL-MCNC: 7.5 G/DL (ref 6–8.2)
RBC # BLD AUTO: 4.73 M/UL (ref 4.2–5.4)
SODIUM SERPL-SCNC: 131 MMOL/L (ref 135–145)
WBC # BLD AUTO: 11.6 K/UL (ref 4.8–10.8)

## 2018-10-24 PROCEDURE — 700101 HCHG RX REV CODE 250: Performed by: EMERGENCY MEDICINE

## 2018-10-24 PROCEDURE — 96372 THER/PROPH/DIAG INJ SC/IM: CPT | Mod: XU

## 2018-10-24 PROCEDURE — 700111 HCHG RX REV CODE 636 W/ 250 OVERRIDE (IP): Performed by: EMERGENCY MEDICINE

## 2018-10-24 PROCEDURE — 96374 THER/PROPH/DIAG INJ IV PUSH: CPT | Mod: XU

## 2018-10-24 PROCEDURE — 36415 COLL VENOUS BLD VENIPUNCTURE: CPT

## 2018-10-24 PROCEDURE — 80053 COMPREHEN METABOLIC PANEL: CPT

## 2018-10-24 PROCEDURE — 99284 EMERGENCY DEPT VISIT MOD MDM: CPT

## 2018-10-24 PROCEDURE — 85025 COMPLETE CBC W/AUTO DIFF WBC: CPT

## 2018-10-24 RX ORDER — LIDOCAINE HYDROCHLORIDE 10 MG/ML
2 INJECTION, SOLUTION INFILTRATION; PERINEURAL ONCE
Status: COMPLETED | OUTPATIENT
Start: 2018-10-24 | End: 2018-10-24

## 2018-10-24 RX ORDER — OXYCODONE HYDROCHLORIDE AND ACETAMINOPHEN 5; 325 MG/1; MG/1
1 TABLET ORAL EVERY 6 HOURS PRN
Qty: 15 TAB | Refills: 0 | Status: SHIPPED | OUTPATIENT
Start: 2018-10-24 | End: 2018-10-31 | Stop reason: SDUPTHER

## 2018-10-24 RX ORDER — KETOROLAC TROMETHAMINE 30 MG/ML
15 INJECTION, SOLUTION INTRAMUSCULAR; INTRAVENOUS ONCE
Status: COMPLETED | OUTPATIENT
Start: 2018-10-24 | End: 2018-10-24

## 2018-10-24 RX ORDER — METHYLPREDNISOLONE SODIUM SUCCINATE 40 MG/ML
20 INJECTION, POWDER, LYOPHILIZED, FOR SOLUTION INTRAMUSCULAR; INTRAVENOUS ONCE
Status: COMPLETED | OUTPATIENT
Start: 2018-10-24 | End: 2018-10-24

## 2018-10-24 RX ADMIN — METHYLPREDNISOLONE SODIUM SUCCINATE 20 MG: 40 INJECTION, POWDER, FOR SOLUTION INTRAMUSCULAR; INTRAVENOUS at 11:00

## 2018-10-24 RX ADMIN — KETOROLAC TROMETHAMINE 15 MG: 30 INJECTION, SOLUTION INTRAMUSCULAR at 10:09

## 2018-10-24 RX ADMIN — LIDOCAINE HYDROCHLORIDE 2 ML: 10 INJECTION, SOLUTION INFILTRATION; PERINEURAL at 11:00

## 2018-10-24 ASSESSMENT — PAIN SCALES - GENERAL: PAINLEVEL_OUTOF10: 10

## 2018-10-24 NOTE — ED PROVIDER NOTES
ED Provider Note    ER PROVIDER NOTE        CHIEF COMPLAINT  Chief Complaint   Patient presents with   • Arm Pain     R arm pain from shoulder to elbow - seen here last week and given pain meds but not any better       HPI  Dina Crews is a 73 y.o. female who presents to the emergency department complaining of right shoulder pain.  Patient reports she has had pain over the last week, was seen here and started on Percocet which she says has been helping although pain has continued.  She has been unable to see her primary care doctor.  She denies any trauma to the area although states she was doing heavy use of that arm filling bottles the day before.  She denies any fevers or chills, no redness or swelling.  Focal weakness numbness or tingling.  No chest pain or difficulty breathing    REVIEW OF SYSTEMS  Pertinent positives include shoulder pain. Pertinent negatives include no fever. See HPI for details. All other systems reviewed and are negative.    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    SURGICAL HISTORY   has a past surgical history that includes hysterectomy, vaginal; low anterior resection robotic (1/28/2014); malik by laparoscopy (4/20/2015); primary c section; repeat c section; other abdominal surgery; shoulder arthroscopy; and abdominal hysterectomy total.    FAMILY HISTORY  Family History   Problem Relation  Age of Onset   • Lung Disease Mother         COPD   • Cancer Mother         Thyroid cancer   • Cancer Father         d. 72 Stomach cancer   • Diabetes Father    • Cancer Sister 40        Breast cancer   • GI Sister         diverticulitis   • Cancer Sister         breast       SOCIAL HISTORY  Social History     Social History   • Marital status:      Spouse name: N/A   • Number of children: 2   • Years of education: HS     Occupational History   •  Other     Social History Main Topics   • Smoking status: Current Every Day Smoker     Packs/day: 0.50     Years: 45.00     Types: Cigarettes     Start date: 1964     Last attempt to quit: 2012   • Smokeless tobacco: Never Used   • Alcohol use No   • Drug use: Yes     Types: Oral, Marijuana      Comment: Pot daily-edibles   • Sexual activity: Yes     Partners: Female     Other Topics Concern   • Not on file     Social History Narrative    Moved to [employer] after couldn't find work in [place]. Then mother had increasing health problems. Now stays in [place] M- to care for mom. Siblings caregive on Weekends. 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    Spouse (female) 25 years.    2 children. 1 grandchild.     2013: mother .       History   Drug Use   • Types: Oral, Marijuana     Comment: Pot daily-edibles       CURRENT MEDICATIONS  Home Medications     Reviewed by Olimpia Mcguire (Pharmacy Tech) on 10/24/18 at 0957  Med List Status: Complete   Medication Last Dose Status   Cholecalciferol 2000 UNIT Tab 10/21/2018 Active   escitalopram (LEXAPRO) 20 MG tablet 10/21/2018 Active   LORazepam (ATIVAN) 1 MG Tab 10/23/2018 Active   pantoprazole (PROTONIX) 40 MG Tablet Delayed Response 10/21/2018 Active                ALLERGIES  Allergies   Allergen Reactions   • Penicillins Rash     Rxn - Late      • Codeine Vomiting   • Morphine Rash     Localized red rash after morphine administration   • Vicodin [Hydrocodone-Acetaminophen] Itching       PHYSICAL  "EXAM  VITAL SIGNS: /95   Pulse 84   Temp 36.7 °C (98 °F)   Resp 17   Ht 1.6 m (5' 3\")   Wt 71.6 kg (157 lb 13.6 oz)   LMP 05/30/1992   SpO2 95%   BMI 27.96 kg/m²   Pulse ox interpretation: I interpret this pulse ox as normal.    Constitutional: Alert in no apparent distress.  HENT: No signs of trauma, Bilateral external ears normal, Nose normal.   Eyes: Pupils are equal and reactive, Conjunctiva normal, Non-icteric.   Neck: Normal range of motion, No tenderness, Supple, No stridor.   Lymphatic: No lymphadenopathy noted.   Cardiovascular: Regular rate and rhythm, no murmurs.   Thorax & Lungs: Normal breath sounds, No respiratory distress, No wheezing, No chest tenderness.   Abdomen: Bowel sounds normal, Soft, No tenderness, No masses, No pulsatile masses. No peritoneal signs.  Skin: Warm, Dry, No erythema, No rash.   Back: No bony tenderness, No CVA tenderness.   Extremities: Intact distal pulses, No edema, No tenderness, No cyanosis  Musculoskeletal: Tenderness to palpation over lateral aspect of right shoulder nontender through anterior superior and posterior aspects.  No erythema, no edema, patient has limited range of motion with flexion and AB duction of shoulder although not with extension or abduction, with movement pain is located laterally and not internally per patient report.  Distal capillary refill less than 2 seconds, distal sensation intact light touch, radial and ulnar pulses intact good range of motion in all major joints. No tenderness to palpation or major deformities noted.   Neurologic: Alert , Normal motor function, Normal sensory function, No focal deficits noted.   Psychiatric: Affect normal, Judgment normal, Mood normal.     DIAGNOSTIC STUDIES / PROCEDURES        LABS  Labs Reviewed   CBC WITH DIFFERENTIAL - Abnormal; Notable for the following:        Result Value    WBC 11.6 (*)     Neutrophils-Polys 76.50 (*)     Lymphocytes 16.10 (*)     Neutrophils (Absolute) 8.87 (*)     " All other components within normal limits   COMP METABOLIC PANEL - Abnormal; Notable for the following:     Sodium 131 (*)     Potassium 3.2 (*)     Glucose 143 (*)     Bun 7 (*)     Alkaline Phosphatase 109 (*)     All other components within normal limits   ESTIMATED GFR       All labs reviewed by me.    RADIOLOGY  No orders to display     The radiologist's interpretation of all radiological studies have been reviewed by me.    COURSE & MEDICAL DECISION MAKING  Nursing notes, VS, PMSFHx reviewed in chart.    9:47 AM Patient seen and examined at bedside. Patient will be treated with injection,toradol. Ordered for labs to evaluate her symptoms.   On exam and in review of records, patient has had some findings consistent with calcific tendinitis, will perform injection, orthopedic follow-up    Reviewed patient records, she had labs, white count 13, x-ray that was unremarkable although some calcium deposits were noted    11:13 AM  After consent was obtained, using sterile technique the right shoulder was prepped and 3 ml's of 1% plain Lidocaine used to anesthetize the skin.  Then under direct ultrasound guidance 20 mg of Solu-Medrol and 10 mg of lidocaine was injected around the calcified tendon tract the procedure was well tolerated.  Neurovascularly intact after procedure      11:53 AM  Patient reevaluated, she is moving her arm much better, states it feels improved although not all the way better.  We will plan for discharge        Decision Making:  This is a 73 y.o. female presented with right arm pain.  The nature of her symptoms, focal tenderness is suggestive of calcific tendinitis.  She has improved with injection, follow-up with orthopedics.  Do not think this represents joint or soft tissue infection.  Her white blood cell count is actually decreased since her prior visit and her pain is more external and not internal to the joint.  She had prior evaluation with unremarkable ultrasound for DVT and there is  no evidence of vascular compromise on exam.  She has had no new trauma and her prior x-ray is unrevealing other than some calcifications    I reviewed prescription monitoring program for patient's narcotic use before prescribing a scheduled drug.The patient will not drink alcohol nor drive with prescribed medications The patient will return for new or worsening symptoms and is stable at the time of discharge.    The patient is referred to a primary physician for blood pressure management, diabetic screening, and for all other preventative health concerns.    In prescribing controlled substances to this patient, I certify that I have obtained and reviewed the medical history of Dina Crews. I have also made a good shilpi effort to obtain applicable records from other providers who have treated the patient and records did not demonstrate any increased risk of substance abuse that would prevent me from prescribing controlled substances.     I have conducted a physical exam and documented it. I have reviewed Ms. Crews’s prescription history as maintained by the [place] Prescription Monitoring Program.     I have assessed the patient’s risk for abuse, dependency, and addiction using the validated Opioid Risk Tool available at https://www.mdcalc.com/ymkrsw-kkrb-aqnd-ort-narcotic-abuse.     Given the above, I believe the benefits of controlled substance therapy outweigh the risks. The reasons for prescribing controlled substances include non-narcotic, oral analgesic alternatives have been inadequate for pain control. Accordingly, I have discussed the risk and benefits, treatment plan, and alternative therapies with the patient.             DISPOSITION:  Patient will be discharged home in stable condition.    FOLLOW UP:  [facility] [place] [address]  [phone]    In 1 week        OUTPATIENT MEDICATIONS:  Discharge Medication List as of 10/24/2018 12:13 PM      START taking these medications    Details    oxyCODONE-acetaminophen (PERCOCET) 5-325 MG Tab Take 1 Tab by mouth every 6 hours as needed for Severe Pain for up to 5 days., Disp-15 Tab, R-0, Print Rx Paper, For 5 days               FINAL IMPRESSION  1. Right arm pain    2. Calcific tendinitis          The note accurately reflects work and decisions made by me.  Vernon Yanes  10/24/2018  4:36 PM

## 2018-10-24 NOTE — ED NOTES
Patient verbalized understanding of discharge instructions including order not to drive or drink alcohol for 6-12 hrs following narcotic use, no questions at this time. VS stable, patient will ambulate to exit with d/c instructions and rx in hand. Narcotic consent form discussed with pt, pt signed form.

## 2018-10-24 NOTE — ED NOTES
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 She was seen last week and worked up with imaging and labs, unable to see PCP and is still in a lot of pain. Holds arm dependant.

## 2018-10-24 NOTE — ED TRIAGE NOTES
Pt amb to triage c/o R shoulder pain x6d; pt denies trauma to R shoulder. Pt seen in er last Friday 10-18 for same s/s. Pt states pain worsened.

## 2018-10-30 ENCOUNTER — TELEPHONE (OUTPATIENT)
Dept: MEDICAL GROUP | Facility: MEDICAL CENTER | Age: 73
End: 2018-10-30

## 2018-10-30 ENCOUNTER — APPOINTMENT (OUTPATIENT)
Dept: PHYSICAL THERAPY | Facility: REHABILITATION | Age: 73
End: 2018-10-30
Payer: MEDICARE

## 2018-10-30 NOTE — TELEPHONE ENCOUNTER
Left message for patient to call back regarding pre-visit planning. Please transfer call to [phone].

## 2018-10-30 NOTE — TELEPHONE ENCOUNTER
ESTABLISHED PATIENT PRE-VISIT PLANNING     Note: Patient will not be contacted if there is no indication to call.     1.  Reviewed notes from the last few office visits within the medical group: Yes 8/28/18, 7/3/18    2.  If any orders were placed at last visit or intended to be done for this visit (i.e. 6 mos follow-up), do we have Results/Consult Notes?        •  Labs - Labs ordered, completed on 8/28/18, 8/13/18 and results are in chart.   Note: If patient appointment is for lab review and patient did not complete labs, check with provider if OK to reschedule patient until labs completed.       •  Imaging - Imaging was not ordered at last office visit.       •  Referrals - No referrals were ordered at last office visit.    3. Is this appointment scheduled as a Hospital Follow-Up? No    4.  Immunizations were updated in [facility] using WebIZ?: Yes       •  Web Iz Recommendations: FLU, TD and ZOSTAVAX (Shingles)    5.  Patient is due for the following Health Maintenance Topics:   Health Maintenance Due   Topic Date Due   • IMM ZOSTER VACCINES (1 of 2) 07/12/1997   • LUNG CANCER SCREENING  07/12/2002   • BONE DENSITY  08/09/2018   • Annual Wellness Visit  08/17/2018   • IMM INFLUENZA (1) 09/01/2018   • MAMMOGRAM  10/19/2018   Unable to reach pt to discuss health maintenance topics that are due    6.  MDX printed for Provider? NO    7.  Patient was NOT informed to arrive 15 min prior to their scheduled appointment and bring in their medication bottles.

## 2018-10-31 ENCOUNTER — OFFICE VISIT (OUTPATIENT)
Dept: MEDICAL GROUP | Facility: MEDICAL CENTER | Age: 73
End: 2018-10-31
Payer: MEDICARE

## 2018-10-31 VITALS
HEART RATE: 85 BPM | OXYGEN SATURATION: 99 % | DIASTOLIC BLOOD PRESSURE: 82 MMHG | HEIGHT: 62 IN | TEMPERATURE: 98.3 F | WEIGHT: 160 LBS | SYSTOLIC BLOOD PRESSURE: 144 MMHG | BODY MASS INDEX: 29.44 KG/M2

## 2018-10-31 DIAGNOSIS — F41.1 GAD (GENERALIZED ANXIETY DISORDER): ICD-10-CM

## 2018-10-31 DIAGNOSIS — M65.20 CALCIFIC TENDINITIS: ICD-10-CM

## 2018-10-31 PROCEDURE — 99214 OFFICE O/P EST MOD 30 MIN: CPT | Performed by: FAMILY MEDICINE

## 2018-10-31 RX ORDER — ESCITALOPRAM OXALATE 20 MG/1
20 TABLET ORAL DAILY
Qty: 90 TAB | Refills: 3 | Status: SHIPPED | OUTPATIENT
Start: 2018-10-31 | End: 2019-11-17 | Stop reason: SDUPTHER

## 2018-10-31 RX ORDER — OXYCODONE HYDROCHLORIDE AND ACETAMINOPHEN 5; 325 MG/1; MG/1
1 TABLET ORAL EVERY 6 HOURS PRN
Qty: 20 TAB | Refills: 0 | Status: SHIPPED | OUTPATIENT
Start: 2018-10-31 | End: 2018-11-05

## 2018-10-31 NOTE — ASSESSMENT & PLAN NOTE
New problem.  1.5 weeks ago patient was unable to move her right shoulder and was in extreme pain.  She went to the emergency department had an x-ray done which showed calcified tendinitis and AC joint arthritis.  She was given a small prescription for Percocet, which helped slightly but she is still unable to move for shoulder and still having extreme pain.  She went to back to the emergency department was given a cortisone injection which did relieve her pain significantly.  She is requesting a small prescription for Percocet to use if needed for recurrent shoulder pain, until so she can get in for another cortisone injection.

## 2018-10-31 NOTE — PROGRESS NOTES
Subjective:   Dina Crews is a 73 y.o. female here today for calcified tendinitis, anxiety    Calcific tendinitis  New problem.  1.5 weeks ago patient was unable to move her right shoulder and was in extreme pain.  She went to the emergency department had an x-ray done which showed calcified tendinitis and AC joint arthritis.  She was given a small prescription for Percocet, which helped slightly but she is still unable to move for shoulder and still having extreme pain.  She went to back to the emergency department was given a cortisone injection which did relieve her pain significantly.  She is requesting a small prescription for Percocet to use if needed for recurrent shoulder pain, until so she can get in for another cortisone injection.    JUNIOR (generalized anxiety disorder)  Patient is tolerating Lexapro 20 mg daily which she states is very helpful at controlling her symptoms.         Current medicines (including changes today)  Current Outpatient Prescriptions   Medication Sig Dispense Refill   • oxyCODONE-acetaminophen (PERCOCET) 5-325 MG Tab Take 1 Tab by mouth every 6 hours as needed for Severe Pain for up to 5 days. 20 Tab 0   • escitalopram (LEXAPRO) 20 MG tablet Take 1 Tab by mouth every day. 90 Tab 3   • Cholecalciferol 2000 UNIT Tab Take 2,000 Units by mouth every day.     • pantoprazole (PROTONIX) 40 MG Tablet Delayed Response Take 1 Tab by mouth every day. 90 Tab 3   • LORazepam (ATIVAN) 1 MG Tab Take 1 Tab by mouth at bedtime as needed (anxiety or sleep) for up to 30 days. 30 Tab 5     No current facility-administered medications for this visit.      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    ROS   No chest pain, no fever       Objective:     Blood pressure 144/82, pulse 85, temperature 36.8 °C (98.3 °F), height 1.575 m (5' 2\"), weight 72.6 kg (160 lb), last menstrual period 05/30/1992, SpO2 99 %, not currently breastfeeding. Body mass index is 29.26 kg/m².   Physical Exam:  Constitutional: Alert, no distress.  Skin: Warm, dry, good turgor, no rashes in visible areas.  Eye: Equal, round and reactive, conjunctiva clear, lids normal.  Psych: Alert and oriented x3, normal affect and mood.  MSK: Right shoulder with restricted range of movement until about 90 degrees then she is able to force her movement until about 110 degrees with pain in anterior and lateral planes of movement.  Negative empty can.  Tenderness palpation over AC joint, otherwise no tenderness to palpation.      Assessment and Plan:   The following treatment plan was discussed    1. Calcific tendinitis  Discussed doing cortisone injections every 3 months or less often, which I advised if possible.  Small prescription for Percocet 5 mg, dispense 20 tablets given to patient.  Signed consent form.  Offered physical therapy but patient declines.  - oxyCODONE-acetaminophen (PERCOCET) 5-325 MG Tab; Take 1 Tab by mouth every 6 hours as needed for Severe Pain for up to 5 days.  Dispense: 20 Tab; Refill: 0  - Consent for Opiate Prescription    2. JUNIOR (generalized anxiety disorder)  Controlled.  Refill Lexapro.  - escitalopram (LEXAPRO) 20 MG tablet; Take 1 Tab by mouth every day.  Dispense: 90 Tab; Refill: 3      Followup: Return if symptoms worsen or fail to improve.         "

## 2018-10-31 NOTE — ASSESSMENT & PLAN NOTE
Patient is tolerating Lexapro 20 mg daily which she states is very helpful at controlling her symptoms.

## 2018-10-31 NOTE — PROGRESS NOTES
Outcome: Left Message    Please transfer to Patient Outreach Team at [phone] when patient returns call.    Attempt # 2

## 2018-11-06 ENCOUNTER — APPOINTMENT (OUTPATIENT)
Dept: PHYSICAL THERAPY | Facility: REHABILITATION | Age: 73
End: 2018-11-06
Payer: MEDICARE

## 2018-11-08 NOTE — PROGRESS NOTES
Outcome: Left Message    Please transfer to Patient Outreach Team at [phone] when patient returns call.        Attempt # 3

## 2018-11-12 NOTE — PROGRESS NOTES
Outcome: Left Message    Please transfer to Patient Outreach Team at [phone] when patient returns call.    Attempt # 4

## 2018-11-15 ENCOUNTER — APPOINTMENT (OUTPATIENT)
Dept: PHYSICAL THERAPY | Facility: REHABILITATION | Age: 73
End: 2018-11-15
Payer: MEDICARE

## 2018-11-20 ENCOUNTER — APPOINTMENT (OUTPATIENT)
Dept: PHYSICAL THERAPY | Facility: REHABILITATION | Age: 73
End: 2018-11-20
Payer: MEDICARE

## 2018-11-27 ENCOUNTER — APPOINTMENT (OUTPATIENT)
Dept: PHYSICAL THERAPY | Facility: REHABILITATION | Age: 73
End: 2018-11-27
Payer: MEDICARE

## 2019-01-31 ENCOUNTER — OFFICE VISIT (OUTPATIENT)
Dept: MEDICAL GROUP | Facility: MEDICAL CENTER | Age: 74
End: 2019-01-31
Payer: MEDICARE

## 2019-01-31 VITALS
OXYGEN SATURATION: 98 % | WEIGHT: 158 LBS | DIASTOLIC BLOOD PRESSURE: 88 MMHG | HEART RATE: 73 BPM | SYSTOLIC BLOOD PRESSURE: 140 MMHG | TEMPERATURE: 97.8 F | RESPIRATION RATE: 16 BRPM | HEIGHT: 62 IN | BODY MASS INDEX: 29.08 KG/M2

## 2019-01-31 DIAGNOSIS — F33.1 MODERATE EPISODE OF RECURRENT MAJOR DEPRESSIVE DISORDER (HCC): ICD-10-CM

## 2019-01-31 DIAGNOSIS — M25.511 ACUTE PAIN OF RIGHT SHOULDER: ICD-10-CM

## 2019-01-31 PROCEDURE — 99214 OFFICE O/P EST MOD 30 MIN: CPT | Mod: 25 | Performed by: FAMILY MEDICINE

## 2019-01-31 PROCEDURE — 20610 DRAIN/INJ JOINT/BURSA W/O US: CPT | Performed by: FAMILY MEDICINE

## 2019-01-31 RX ORDER — TRIAMCINOLONE ACETONIDE 40 MG/ML
40 INJECTION, SUSPENSION INTRA-ARTICULAR; INTRAMUSCULAR ONCE
Status: COMPLETED | OUTPATIENT
Start: 2019-01-31 | End: 2019-01-31

## 2019-01-31 RX ORDER — BUPROPION HYDROCHLORIDE 150 MG/1
150 TABLET, EXTENDED RELEASE ORAL 2 TIMES DAILY
Qty: 60 TAB | Refills: 5 | Status: SHIPPED
Start: 2019-01-31 | End: 2020-01-04

## 2019-01-31 RX ORDER — LIDOCAINE HYDROCHLORIDE 20 MG/ML
1 INJECTION, SOLUTION EPIDURAL; INFILTRATION; INTRACAUDAL; PERINEURAL ONCE
Status: COMPLETED | OUTPATIENT
Start: 2019-01-31 | End: 2019-01-31

## 2019-01-31 RX ADMIN — TRIAMCINOLONE ACETONIDE 40 MG: 40 INJECTION, SUSPENSION INTRA-ARTICULAR; INTRAMUSCULAR at 10:45

## 2019-01-31 RX ADMIN — LIDOCAINE HYDROCHLORIDE 1 ML: 20 INJECTION, SOLUTION EPIDURAL; INFILTRATION; INTRACAUDAL; PERINEURAL at 10:44

## 2019-01-31 ASSESSMENT — PAIN SCALES - GENERAL: PAINLEVEL: 8=MODERATE-SEVERE PAIN

## 2019-01-31 NOTE — ASSESSMENT & PLAN NOTE
Right shoulder pain started yesterday, did grocery shopping and was moving heavy water bottles, there was aching immediate. There is restricted range of movement. radiating to upper arm. Feels best when holding it against her body.  Last and first injection was over 3 months ago.

## 2019-01-31 NOTE — ASSESSMENT & PLAN NOTE
Has been really depressed and anxiety for the last 2 months. She is taking Lexapro 20 mg daily for over 1 year. Significant other has health problems and she sleeps a lot, so patient is alone a lot. There associated irritability. No suicidal.

## 2019-01-31 NOTE — PROGRESS NOTES
Subjective:     Dina Crews is a 73 y.o. female here today for shoulder pain and depression      Acute pain of right shoulder  Right shoulder pain started yesterday, did grocery shopping and was moving heavy water bottles, there was aching immediate. There is restricted range of movement. radiating to upper arm. Feels best when holding it against her body.  Last and first injection was over 3 months ago.    Moderate episode of recurrent major depressive disorder (CMS-HCC)  Has been really depressed and anxiety for the last 2 months. She is taking Lexapro 20 mg daily for over 1 year. Significant other has health problems and she sleeps a lot, so patient is alone a lot. There associated irritability. No suicidal.       Health Maintenance Summary                IMM ZOSTER VACCINES Overdue 7/12/1997     LUNG CANCER SCREENING Overdue 7/12/2002     BONE DENSITY Overdue 8/9/2018      Done 8/9/2016 DS-BONE DENSITY STUDY (DEXA)     Patient has more history with this topic...    Annual Wellness Visit Overdue 8/17/2018      Done 8/16/2017 Visit Dx: Medicare annual wellness visit, subsequent     Patient has more history with this topic...    IMM INFLUENZA Overdue 9/1/2018     MAMMOGRAM Overdue 10/19/2018      Done 10/19/2017 MA-MAMMO SCREENING BILAT W/TOMOSYNTHESIS W/CAD     Patient has more history with this topic...    COLONOSCOPY Next Due 12/2/2020      Done 12/2/2015 AMB REFERRAL TO GI FOR COLONOSCOPY    IMM DTaP/Tdap/Td Vaccine Next Due 8/16/2027      Done 8/16/2017 Imm Admin: Tdap Vaccine     Patient has more history with this topic...           Annual Health Assessment Questions:     1.  Are you currently engaging in any exercise or physical activity? No    2.  How would you describe your mood or emotional well-being today? fair    3.  Have you had any falls in the last year? Yes    4.  Have you noticed any problems with your balance or had difficulty walking? Yes    5.  In the last six months have you  experienced any leakage of urine? Yes    6. DPA/Advanced Directive: Patient does not have an Advanced Directive.  A packet and workshop information was given on Advanced Directives.    Current medicines (including changes today)  Current Outpatient Prescriptions   Medication Sig Dispense Refill   • buPROPion SR (WELLBUTRIN-SR) 150 MG TABLET SR 12 HR sustained-release tablet Take 1 Tab by mouth 2 times a day. 60 Tab 5   • escitalopram (LEXAPRO) 20 MG tablet Take 1 Tab by mouth every day. 90 Tab 3   • Cholecalciferol 2000 UNIT Tab Take 2,000 Units by mouth every day.     • pantoprazole (PROTONIX) 40 MG Tablet Delayed Response Take 1 Tab by mouth every day. 90 Tab 3     No current facility-administered medications for this visit.        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    Penicillins; Codeine; Morphine; and Vicodin [hydrocodone-acetaminophen]    She  reports that she has been smoking Cigarettes.  She started smoking about 55 years ago. She has a 22.50 pack-year smoking history. She has never used smokeless tobacco. She reports that she uses drugs, including Oral and Marijuana. She reports that she does not drink alcohol.  Ready to quit: Not Answered  Counseling given: Not Answered      ROS   No chest pain, no shortness of breath       Objective:     Physical Exam:  Blood pressure 140/88, pulse 73, temperature 36.6 °C (97.8 °F), temperature  "source Temporal, resp. rate 16, height 1.575 m (5' 2.01\"), weight 71.7 kg (158 lb), last menstrual period 05/30/1992, SpO2 98 %, not currently breastfeeding. Body mass index is 28.89 kg/m².   Constitutional: Alert, no distress.  Skin: Warm, dry, good turgor, no rashes in visible areas.  Eye: Equal, round and reactive, conjunctiva clear, lids normal.  Psych: Alert and oriented x3, normal affect and mood.  Right shoulder: holding against her body, significantly reduced range of motion and moderate effusion, mild tenderness to palpation.    PROCEDURE: Procedure was explained to patient with possible risks. Patient verbally agreed. The shoulder was prepped with iodine. Using a 23 guage needle the glenhumoral joint is injected with 1 cc 2% xylocaine and 1 cc of kenalog 40 under the posterior aspect of the acromion. The area is cleansed and dressed.      Assessment and Plan:     1. Acute pain of right shoulder  New problem. Most likely bursitis. Injection done today.  - triamcinolone acetonide (KENALOG-40) injection 40 mg; 1 mL by Intra-articular route Once.  - lidocaine PF (XYLOCAINE-MPF) 2 % injection PF 1 mL; 1 mL by Injection route Once.    2. Moderate episode of recurrent major depressive disorder (HCC)  Uncontrolled. Add Wellbutrin  mg twice daily.  - buPROPion SR (WELLBUTRIN-SR) 150 MG TABLET SR 12 HR sustained-release tablet; Take 1 Tab by mouth 2 times a day.  Dispense: 60 Tab; Refill: 5      Discussion today about general wellness and lifestyle habits:    · Engage in regular physical activity and social activities.  · Prevent falls and reduce trip hazards; using ambulatory aides, hearing and vision testing if appropriate.  · Steps to improve urinary incontinence.  · Advanced care planning.    Follow-Up: Return if symptoms worsen or fail to improve.         PLEASE NOTE: This dictation was created using voice recognition software. I have made every reasonable attempt to correct obvious errors, but I expect " that there are errors of grammar and possibly content that I did not discover before finalizing the note.

## 2019-06-06 ENCOUNTER — PATIENT MESSAGE (OUTPATIENT)
Dept: MEDICAL GROUP | Facility: MEDICAL CENTER | Age: 74
End: 2019-06-06

## 2019-06-06 DIAGNOSIS — F41.1 GAD (GENERALIZED ANXIETY DISORDER): ICD-10-CM

## 2019-06-06 DIAGNOSIS — F41.1 GENERALIZED ANXIETY DISORDER: ICD-10-CM

## 2019-06-07 RX ORDER — LORAZEPAM 1 MG/1
1 TABLET ORAL NIGHTLY PRN
Qty: 30 TAB | Refills: 5 | Status: SHIPPED
Start: 2019-06-07 | End: 2019-07-07

## 2019-07-22 ENCOUNTER — HOSPITAL ENCOUNTER (OUTPATIENT)
Dept: RADIOLOGY | Facility: MEDICAL CENTER | Age: 74
End: 2019-07-22
Attending: NURSE PRACTITIONER
Payer: MEDICARE

## 2019-07-22 ENCOUNTER — HOSPITAL ENCOUNTER (OUTPATIENT)
Dept: LAB | Facility: MEDICAL CENTER | Age: 74
End: 2019-07-22
Attending: NURSE PRACTITIONER
Payer: MEDICARE

## 2019-07-22 ENCOUNTER — OFFICE VISIT (OUTPATIENT)
Dept: URGENT CARE | Facility: MEDICAL CENTER | Age: 74
End: 2019-07-22
Payer: MEDICARE

## 2019-07-22 ENCOUNTER — HOSPITAL ENCOUNTER (OUTPATIENT)
Facility: MEDICAL CENTER | Age: 74
End: 2019-07-22
Attending: NURSE PRACTITIONER
Payer: MEDICARE

## 2019-07-22 VITALS
TEMPERATURE: 96.5 F | HEART RATE: 87 BPM | OXYGEN SATURATION: 96 % | HEIGHT: 62 IN | WEIGHT: 158 LBS | SYSTOLIC BLOOD PRESSURE: 124 MMHG | BODY MASS INDEX: 29.08 KG/M2 | DIASTOLIC BLOOD PRESSURE: 80 MMHG

## 2019-07-22 DIAGNOSIS — R10.32 LEFT LOWER QUADRANT PAIN: ICD-10-CM

## 2019-07-22 DIAGNOSIS — Z87.19 HX OF DIVERTICULITIS OF COLON: ICD-10-CM

## 2019-07-22 DIAGNOSIS — R10.9 ABDOMINAL PAIN, UNSPECIFIED ABDOMINAL LOCATION: ICD-10-CM

## 2019-07-22 DIAGNOSIS — Z86.19 HX OF CANDIDIASIS: ICD-10-CM

## 2019-07-22 DIAGNOSIS — K57.92 ACUTE DIVERTICULITIS: Primary | ICD-10-CM

## 2019-07-22 LAB
ALBUMIN SERPL BCP-MCNC: 4.8 G/DL (ref 3.2–4.9)
ALBUMIN/GLOB SERPL: 1.2 G/DL
ALP SERPL-CCNC: 84 U/L (ref 30–99)
ALT SERPL-CCNC: 8 U/L (ref 2–50)
ANION GAP SERPL CALC-SCNC: 14 MMOL/L (ref 0–11.9)
APPEARANCE UR: NORMAL
AST SERPL-CCNC: 13 U/L (ref 12–45)
BILIRUB SERPL-MCNC: 0.9 MG/DL (ref 0.1–1.5)
BILIRUB UR STRIP-MCNC: NORMAL MG/DL
BUN SERPL-MCNC: 12 MG/DL (ref 8–22)
CALCIUM SERPL-MCNC: 9.5 MG/DL (ref 8.4–10.2)
CHLORIDE SERPL-SCNC: 100 MMOL/L (ref 96–112)
CO2 SERPL-SCNC: 23 MMOL/L (ref 20–33)
COLOR UR AUTO: NORMAL
CREAT SERPL-MCNC: 0.71 MG/DL (ref 0.5–1.4)
ERYTHROCYTE [DISTWIDTH] IN BLOOD BY AUTOMATED COUNT: 44.4 FL (ref 35.9–50)
GLOBULIN SER CALC-MCNC: 3.9 G/DL (ref 1.9–3.5)
GLUCOSE SERPL-MCNC: 99 MG/DL (ref 65–99)
GLUCOSE UR STRIP.AUTO-MCNC: NEGATIVE MG/DL
HCT VFR BLD AUTO: 45.6 % (ref 37–47)
HGB BLD-MCNC: 15.2 G/DL (ref 12–16)
KETONES UR STRIP.AUTO-MCNC: NEGATIVE MG/DL
LEUKOCYTE ESTERASE UR QL STRIP.AUTO: NEGATIVE
MCH RBC QN AUTO: 31 PG (ref 27–33)
MCHC RBC AUTO-ENTMCNC: 33.3 G/DL (ref 33.6–35)
MCV RBC AUTO: 92.9 FL (ref 81.4–97.8)
NITRITE UR QL STRIP.AUTO: NEGATIVE
PH UR STRIP.AUTO: 5.5 [PH] (ref 5–8)
PLATELET # BLD AUTO: 328 K/UL (ref 164–446)
PMV BLD AUTO: 10.3 FL (ref 9–12.9)
POTASSIUM SERPL-SCNC: 3.5 MMOL/L (ref 3.6–5.5)
PROT SERPL-MCNC: 8.7 G/DL (ref 6–8.2)
PROT UR QL STRIP: 30 MG/DL
RBC # BLD AUTO: 4.91 M/UL (ref 4.2–5.4)
RBC UR QL AUTO: NORMAL
SODIUM SERPL-SCNC: 137 MMOL/L (ref 135–145)
SP GR UR STRIP.AUTO: 1.03
UROBILINOGEN UR STRIP-MCNC: 0.2 MG/DL
WBC # BLD AUTO: 9.8 K/UL (ref 4.8–10.8)

## 2019-07-22 PROCEDURE — 74177 CT ABD & PELVIS W/CONTRAST: CPT

## 2019-07-22 PROCEDURE — 36415 COLL VENOUS BLD VENIPUNCTURE: CPT

## 2019-07-22 PROCEDURE — 80053 COMPREHEN METABOLIC PANEL: CPT

## 2019-07-22 PROCEDURE — 81002 URINALYSIS NONAUTO W/O SCOPE: CPT | Performed by: NURSE PRACTITIONER

## 2019-07-22 PROCEDURE — 87086 URINE CULTURE/COLONY COUNT: CPT

## 2019-07-22 PROCEDURE — 700117 HCHG RX CONTRAST REV CODE 255: Performed by: NURSE PRACTITIONER

## 2019-07-22 PROCEDURE — 99214 OFFICE O/P EST MOD 30 MIN: CPT | Performed by: NURSE PRACTITIONER

## 2019-07-22 PROCEDURE — 85027 COMPLETE CBC AUTOMATED: CPT

## 2019-07-22 RX ORDER — METRONIDAZOLE 500 MG/1
500 TABLET ORAL 3 TIMES DAILY
Qty: 30 TAB | Refills: 0 | Status: SHIPPED | OUTPATIENT
Start: 2019-07-22 | End: 2019-08-01

## 2019-07-22 RX ORDER — SULFAMETHOXAZOLE AND TRIMETHOPRIM 800; 160 MG/1; MG/1
1 TABLET ORAL 2 TIMES DAILY
Qty: 10 TAB | Refills: 0 | Status: SHIPPED | OUTPATIENT
Start: 2019-07-22 | End: 2019-07-27

## 2019-07-22 RX ORDER — FLUCONAZOLE 150 MG/1
150 TABLET ORAL DAILY
Qty: 1 TAB | Refills: 0 | Status: SHIPPED
Start: 2019-07-22 | End: 2020-01-04

## 2019-07-22 RX ADMIN — IOHEXOL 25 ML: 240 INJECTION, SOLUTION INTRATHECAL; INTRAVASCULAR; INTRAVENOUS; ORAL at 16:41

## 2019-07-22 RX ADMIN — IOHEXOL 100 ML: 350 INJECTION, SOLUTION INTRAVENOUS at 16:40

## 2019-07-22 ASSESSMENT — ENCOUNTER SYMPTOMS
PALPITATIONS: 0
HEADACHES: 0
VOMITING: 0
COUGH: 0
FLANK PAIN: 0
CONSTIPATION: 0
DIZZINESS: 0
SPUTUM PRODUCTION: 0
BACK PAIN: 1
ABDOMINAL PAIN: 1
WEAKNESS: 0
CHILLS: 0
NAUSEA: 0
HEARTBURN: 0
FEVER: 0
BLOOD IN STOOL: 0
DIARRHEA: 0

## 2019-07-22 ASSESSMENT — LIFESTYLE VARIABLES: SUBSTANCE_ABUSE: 0

## 2019-07-22 NOTE — PROGRESS NOTES
"Subjective:      Dina Crews is a 74 y.o. female who presents with Diverticulitis (this started again yesterday, has history of it, cant take cipro for it)    Reviewed past medical, surgical and family history. Reviewed prescription and OTC medications with patient in electronic health record today.     Allergies   Allergen Reactions   • Penicillins Rash     Rxn - Late 1990's     • Codeine Vomiting   • Morphine Rash     Localized red rash after morphine administration   • Vicodin [Hydrocodone-Acetaminophen] Itching             HPI This is a new problem.  Sarah is a 75 y/o female with c/o abd pain. + history of chronic diverticulitis. Pain started yesterday in LLQ and low back discomfort. \" my low stomach hurts'. + nausea, Denies fever, chills, vomiting, no change in her stools, dysuria.  Can't take Cipro due to dizziness as a side effect. Pain is 7/10, sharp , constant. Feels like it radiates to her left lower back.  No change in diet.  Eats popcorn occasionally and recently.   Has not been to GI for > 1 year.  Usually sees Dr. Myas      Review of Systems   Constitutional: Negative for chills, fever and malaise/fatigue.   Respiratory: Negative for cough and sputum production.    Cardiovascular: Negative for chest pain and palpitations.   Gastrointestinal: Positive for abdominal pain. Negative for blood in stool, constipation, diarrhea, heartburn, melena, nausea and vomiting.   Genitourinary: Negative for dysuria, flank pain, frequency and urgency.   Musculoskeletal: Positive for back pain (Left lower back).   Neurological: Negative for dizziness, weakness and headaches.   Endo/Heme/Allergies: Negative for environmental allergies.   Psychiatric/Behavioral: Negative for substance abuse.          Objective:     /80   Pulse 87   Temp 35.8 °C (96.5 °F) (Temporal)   Ht 1.575 m (5' 2\")   Wt 71.7 kg (158 lb)   LMP 05/30/1992   SpO2 96%   BMI 28.90 kg/m²      Physical Exam   Constitutional: She is " oriented to person, place, and time. Vital signs are normal. She appears well-developed and well-nourished. She is cooperative.  Non-toxic appearance. She does not have a sickly appearance. She appears distressed.   HENT:   Head: Normocephalic.   Right Ear: Hearing normal.   Left Ear: Hearing normal.   Mouth/Throat: Uvula is midline and oropharynx is clear and moist.   Eyes: Pupils are equal, round, and reactive to light. Lids are normal.   Neck: Trachea normal, normal range of motion, full passive range of motion without pain and phonation normal.   Cardiovascular: Normal rate and regular rhythm.    Pulmonary/Chest: Effort normal and breath sounds normal.   Abdominal: Soft. Normal appearance and bowel sounds are normal. There is tenderness in the left lower quadrant. There is no rigidity, no rebound, no guarding, no CVA tenderness, no tenderness at McBurney's point and negative Vincent's sign.       Musculoskeletal:        Lumbar back: She exhibits pain. She exhibits no tenderness.        Back:    Lymphadenopathy:     She has no cervical adenopathy.        Right: No supraclavicular adenopathy present.        Left: No supraclavicular adenopathy present.   Neurological: She is alert and oriented to person, place, and time.   Skin: Skin is warm and dry. No rash noted.   Psychiatric: She has a normal mood and affect. Her speech is normal and behavior is normal. Cognition and memory are normal.   Nursing note and vitals reviewed.       Component Value Ref Range & Units Status   GFR If African American >60  >60 mL/min/1.73 m 2 Final   GFR If Non African American >60  >60 mL/min/1.73 m 2        Ref Range & Units Status     WBC 9.8  4.8 - 10.8 K/uL Final   RBC 4.91  4.20 - 5.40 M/uL Final   Hemoglobin 15.2  12.0 - 16.0 g/dL Final   Hematocrit 45.6  37.0 - 47.0 % Final   MCV 92.9  81.4 - 97.8 fL Final   MCH 31.0  27.0 - 33.0 pg Final   MCHC 33.3   33.6 - 35.0 g/dL Final   RDW 44.4  35.9 - 50.0 fL Final   Platelet Count 328   164 - 446 K/uL Final   MPV 10.3  9.0 - 12.9 fL Final          Component Value Ref Range & Units Status   Sodium 137  135 - 145 mmol/L Final   Potassium 3.5   3.6 - 5.5 mmol/L Final   Chloride 100  96 - 112 mmol/L Final   Co2 23  20 - 33 mmol/L Final   Anion Gap 14.0   0.0 - 11.9 Final   Glucose 99  65 - 99 mg/dL Final   Bun 12  8 - 22 mg/dL Final   Creatinine 0.71  0.50 - 1.40 mg/dL Final   Calcium 9.5  8.4 - 10.2 mg/dL Final   AST(SGOT) 13  12 - 45 U/L Final   ALT(SGPT) 8  2 - 50 U/L Final   Alkaline Phosphatase 84  30 - 99 U/L Final   Total Bilirubin 0.9  0.1 - 1.5 mg/dL Final   Albumin 4.8  3.2 - 4.9 g/dL Final   Total Protein 8.7   6.0 - 8.2 g/dL Final   Globulin 3.9   1.9 - 3.5 g/dL Final   A-G Ratio 1.2  g/dL Final       CT abd/ pelvis w. Con.     7/22/2019 4:21 PM    HISTORY/REASON FOR EXAM:  LLQ abdominal pain, diverticulitis suspected.      TECHNIQUE/EXAM DESCRIPTION:   CT scan of the abdomen and pelvis with contrast.    Contrast-enhanced helical scanning was obtained from the diaphragmatic domes through the pubic symphysis following the bolus administration of nonionic contrast without complication.    100 mL of Omnipaque 350 nonionic contrast was administered without complication.    Low dose optimization technique was utilized for this CT exam including automated exposure control and adjustment of the mA and/or kV according to patient size.    COMPARISON: 6/27/2018    FINDINGS:  Chest Base: Lung bases are clear.    Liver:  Normal.    Gallbladder: Gallbladder is surgically absent.    Biliary tract: Nondilated.    Pancreas: Normal.    Spleen: Normal.    Adrenals: Normal.    Kidneys and Collecting Systems:  Normal.    Gastrointestinal tract:  No bowel obstruction.   There is short segment colonic wall thickening with pericolonic fat infiltration consistent with acute diverticulitis. This involves the transverse colon which extends the into the pelvis. There is   surgical suture within the distal sigmoid  colon. The appendix is normal.    Peritoneum: No free air or free fluid.    Reproductive organs:  Prior hysterectomy. No significant adnexal mass.    Bladder:  Normal.    Vessels:  There is severe atherosclerosis.    Lymph  Nodes:  No lymphadenopathy.    Abdominal wall: Within normal limits.    Bones:  No acute or aggressive abnormality.   Impression         1.  Acute diverticulitis involving the transverse colon. No evidence of perforation or intra-abdominal abscess.  2.  Prior cholecystectomy.  3.  Atherosclerosis.          Assessment/Plan:     1. Acute diverticulitis     2. Hx of diverticulitis of colon  ESTIMATED GFR    Comp Metabolic Panel    CBC WITHOUT DIFFERENTIAL    REFERRAL TO GASTROENTEROLOGY    sulfamethoxazole-trimethoprim (BACTRIM DS) 800-160 MG tablet    metroNIDAZOLE (FLAGYL) 500 MG Tab    POCT Urinalysis   3. Abdominal pain, unspecified abdominal location  ESTIMATED GFR    Comp Metabolic Panel    CBC WITHOUT DIFFERENTIAL    REFERRAL TO GASTROENTEROLOGY    sulfamethoxazole-trimethoprim (BACTRIM DS) 800-160 MG tablet    metroNIDAZOLE (FLAGYL) 500 MG Tab    URINE CULTURE    POCT Urinalysis   4. Left lower quadrant pain  CT-ABDOMEN-PELVIS WITH    REFERRAL TO GASTROENTEROLOGY    sulfamethoxazole-trimethoprim (BACTRIM DS) 800-160 MG tablet    metroNIDAZOLE (FLAGYL) 500 MG Tab    POCT Urinalysis     [phone]  Results of all diagnostic tests/ screens discussed with patient.   Clear liquid diet until pain begins to resolve, then advance to full liquid, low residual and then high fiber diet there after.   OTC  analgesic of choice. Follow manufactures dosing and safety precautions.       FU with gastroenterology    Return to clinic or PCP  3-4 days if current symptoms are not resolving in a satisfactory manner or sooner if new or worsening symptoms occur.   Patient was advised of signs and symptoms which would warrant further evaluation and /or emergent evaluation in ER.  Verbalized agreement with this  treatment plan and seemed to understand without barriers. Questions were encouraged and answered to patients satisfaction.   Aftercare instructions were given to pt

## 2019-07-23 LAB
AMBIGUOUS DTTM AMBI4: NORMAL
SIGNIFICANT IND 70042: NORMAL
SITE SITE: NORMAL
SOURCE SOURCE: NORMAL

## 2019-07-25 LAB
BACTERIA UR CULT: NORMAL
SIGNIFICANT IND 70042: NORMAL
SITE SITE: NORMAL
SOURCE SOURCE: NORMAL

## 2019-11-17 DIAGNOSIS — F41.1 GAD (GENERALIZED ANXIETY DISORDER): ICD-10-CM

## 2019-11-18 RX ORDER — ESCITALOPRAM OXALATE 20 MG/1
TABLET ORAL
Qty: 90 TAB | Refills: 3 | Status: SHIPPED | OUTPATIENT
Start: 2019-11-18 | End: 2020-03-19 | Stop reason: SDUPTHER

## 2019-12-05 ENCOUNTER — PATIENT OUTREACH (OUTPATIENT)
Dept: HEALTH INFORMATION MANAGEMENT | Facility: OTHER | Age: 74
End: 2019-12-05

## 2019-12-05 DIAGNOSIS — K21.9 GASTROESOPHAGEAL REFLUX DISEASE, ESOPHAGITIS PRESENCE NOT SPECIFIED: ICD-10-CM

## 2019-12-05 SDOH — ECONOMIC STABILITY: TRANSPORTATION INSECURITY
IN THE PAST 12 MONTHS, HAS LACK OF TRANSPORTATION KEPT YOU FROM MEETINGS, WORK, OR FROM GETTING THINGS NEEDED FOR DAILY LIVING?: NO

## 2019-12-05 SDOH — ECONOMIC STABILITY: FOOD INSECURITY: WITHIN THE PAST 12 MONTHS, THE FOOD YOU BOUGHT JUST DIDN'T LAST AND YOU DIDN'T HAVE MONEY TO GET MORE.: NEVER TRUE

## 2019-12-05 SDOH — ECONOMIC STABILITY: FOOD INSECURITY: WITHIN THE PAST 12 MONTHS, YOU WORRIED THAT YOUR FOOD WOULD RUN OUT BEFORE YOU GOT MONEY TO BUY MORE.: NEVER TRUE

## 2019-12-05 SDOH — ECONOMIC STABILITY: TRANSPORTATION INSECURITY
IN THE PAST 12 MONTHS, HAS THE LACK OF TRANSPORTATION KEPT YOU FROM MEDICAL APPOINTMENTS OR FROM GETTING MEDICATIONS?: NO

## 2019-12-06 RX ORDER — PANTOPRAZOLE SODIUM 40 MG/1
TABLET, DELAYED RELEASE ORAL
Qty: 90 TAB | Refills: 3 | Status: SHIPPED | OUTPATIENT
Start: 2019-12-06 | End: 2020-10-12 | Stop reason: SDUPTHER

## 2019-12-06 NOTE — PROGRESS NOTES
1. [facility] Verified: yes    2. Verify PCP: yes    3. Review and add  to Care Team: yes    5. Reviewed/Updated the following with patient:       •   Communication Preference Obtained? YES  • MyChart Activation: already active       •   E-Mail Address Obtained? YES       •   Appointment Day and Time Preferences? YES       •   Preferred Pharmacy? YES       •   Preferred Lab? YES    6. Care Gap Scheduling (Attempt to Schedule EACH Overdue Care Gap!)    Scheduled patient for Mammogram  Patient declined Annual Wellness Visit (AWV), Dexa Scan and Immunizations: FLU.

## 2020-01-04 ENCOUNTER — APPOINTMENT (OUTPATIENT)
Dept: RADIOLOGY | Facility: MEDICAL CENTER | Age: 75
DRG: 390 | End: 2020-01-04
Attending: EMERGENCY MEDICINE
Payer: MEDICARE

## 2020-01-04 ENCOUNTER — HOSPITAL ENCOUNTER (INPATIENT)
Facility: MEDICAL CENTER | Age: 75
LOS: 2 days | DRG: 390 | End: 2020-01-06
Attending: EMERGENCY MEDICINE | Admitting: INTERNAL MEDICINE
Payer: MEDICARE

## 2020-01-04 DIAGNOSIS — K56.609 SMALL BOWEL OBSTRUCTION (HCC): ICD-10-CM

## 2020-01-04 LAB
ALBUMIN SERPL BCP-MCNC: 4.6 G/DL (ref 3.2–4.9)
ALBUMIN/GLOB SERPL: 1.4 G/DL
ALP SERPL-CCNC: 95 U/L (ref 30–99)
ALT SERPL-CCNC: 7 U/L (ref 2–50)
ANION GAP SERPL CALC-SCNC: 17 MMOL/L (ref 0–11.9)
APPEARANCE UR: CLEAR
AST SERPL-CCNC: 13 U/L (ref 12–45)
BACTERIA #/AREA URNS HPF: ABNORMAL /HPF
BASOPHILS # BLD AUTO: 0.5 % (ref 0–1.8)
BASOPHILS # BLD: 0.07 K/UL (ref 0–0.12)
BILIRUB SERPL-MCNC: 0.3 MG/DL (ref 0.1–1.5)
BILIRUB UR QL STRIP.AUTO: NEGATIVE
BUN SERPL-MCNC: 15 MG/DL (ref 8–22)
CALCIUM SERPL-MCNC: 9.9 MG/DL (ref 8.4–10.2)
CHLORIDE SERPL-SCNC: 100 MMOL/L (ref 96–112)
CO2 SERPL-SCNC: 23 MMOL/L (ref 20–33)
COLOR UR: YELLOW
CREAT SERPL-MCNC: 0.61 MG/DL (ref 0.5–1.4)
EOSINOPHIL # BLD AUTO: 0.13 K/UL (ref 0–0.51)
EOSINOPHIL NFR BLD: 1 % (ref 0–6.9)
EPI CELLS #/AREA URNS HPF: ABNORMAL /HPF
ERYTHROCYTE [DISTWIDTH] IN BLOOD BY AUTOMATED COUNT: 43.1 FL (ref 35.9–50)
GLOBULIN SER CALC-MCNC: 3.4 G/DL (ref 1.9–3.5)
GLUCOSE SERPL-MCNC: 110 MG/DL (ref 65–99)
GLUCOSE UR STRIP.AUTO-MCNC: NEGATIVE MG/DL
HCT VFR BLD AUTO: 43.6 % (ref 37–47)
HGB BLD-MCNC: 14.8 G/DL (ref 12–16)
IMM GRANULOCYTES # BLD AUTO: 0.05 K/UL (ref 0–0.11)
IMM GRANULOCYTES NFR BLD AUTO: 0.4 % (ref 0–0.9)
KETONES UR STRIP.AUTO-MCNC: NEGATIVE MG/DL
LEUKOCYTE ESTERASE UR QL STRIP.AUTO: NEGATIVE
LIPASE SERPL-CCNC: 38 U/L (ref 7–58)
LYMPHOCYTES # BLD AUTO: 1.64 K/UL (ref 1–4.8)
LYMPHOCYTES NFR BLD: 12.4 % (ref 22–41)
MCH RBC QN AUTO: 30.6 PG (ref 27–33)
MCHC RBC AUTO-ENTMCNC: 33.9 G/DL (ref 33.6–35)
MCV RBC AUTO: 90.3 FL (ref 81.4–97.8)
MICRO URNS: ABNORMAL
MONOCYTES # BLD AUTO: 0.61 K/UL (ref 0–0.85)
MONOCYTES NFR BLD AUTO: 4.6 % (ref 0–13.4)
MUCOUS THREADS #/AREA URNS HPF: ABNORMAL /HPF
NEUTROPHILS # BLD AUTO: 10.7 K/UL (ref 2–7.15)
NEUTROPHILS NFR BLD: 81.1 % (ref 44–72)
NITRITE UR QL STRIP.AUTO: NEGATIVE
NRBC # BLD AUTO: 0 K/UL
NRBC BLD-RTO: 0 /100 WBC
PH UR STRIP.AUTO: 5 [PH] (ref 5–8)
PLATELET # BLD AUTO: 376 K/UL (ref 164–446)
PMV BLD AUTO: 10 FL (ref 9–12.9)
POTASSIUM SERPL-SCNC: 4.5 MMOL/L (ref 3.6–5.5)
PROT SERPL-MCNC: 8 G/DL (ref 6–8.2)
PROT UR QL STRIP: NEGATIVE MG/DL
RBC # BLD AUTO: 4.83 M/UL (ref 4.2–5.4)
RBC # URNS HPF: ABNORMAL /HPF
RBC UR QL AUTO: ABNORMAL
SODIUM SERPL-SCNC: 140 MMOL/L (ref 135–145)
SP GR UR STRIP.AUTO: 1.01
WBC # BLD AUTO: 13.2 K/UL (ref 4.8–10.8)
WBC #/AREA URNS HPF: ABNORMAL /HPF

## 2020-01-04 PROCEDURE — 96374 THER/PROPH/DIAG INJ IV PUSH: CPT

## 2020-01-04 PROCEDURE — 99223 1ST HOSP IP/OBS HIGH 75: CPT | Mod: AI,25 | Performed by: INTERNAL MEDICINE

## 2020-01-04 PROCEDURE — 304538 HCHG NG TUBE

## 2020-01-04 PROCEDURE — 96375 TX/PRO/DX INJ NEW DRUG ADDON: CPT

## 2020-01-04 PROCEDURE — 700105 HCHG RX REV CODE 258: Performed by: EMERGENCY MEDICINE

## 2020-01-04 PROCEDURE — 700111 HCHG RX REV CODE 636 W/ 250 OVERRIDE (IP): Performed by: EMERGENCY MEDICINE

## 2020-01-04 PROCEDURE — 74018 RADEX ABDOMEN 1 VIEW: CPT

## 2020-01-04 PROCEDURE — 99285 EMERGENCY DEPT VISIT HI MDM: CPT

## 2020-01-04 PROCEDURE — 74177 CT ABD & PELVIS W/CONTRAST: CPT

## 2020-01-04 PROCEDURE — 85025 COMPLETE CBC W/AUTO DIFF WBC: CPT

## 2020-01-04 PROCEDURE — 99406 BEHAV CHNG SMOKING 3-10 MIN: CPT | Mod: 25 | Performed by: INTERNAL MEDICINE

## 2020-01-04 PROCEDURE — 770006 HCHG ROOM/CARE - MED/SURG/GYN SEMI*

## 2020-01-04 PROCEDURE — 700111 HCHG RX REV CODE 636 W/ 250 OVERRIDE (IP): Performed by: INTERNAL MEDICINE

## 2020-01-04 PROCEDURE — 80053 COMPREHEN METABOLIC PANEL: CPT

## 2020-01-04 PROCEDURE — 83690 ASSAY OF LIPASE: CPT

## 2020-01-04 PROCEDURE — 700101 HCHG RX REV CODE 250: Performed by: INTERNAL MEDICINE

## 2020-01-04 PROCEDURE — C9113 INJ PANTOPRAZOLE SODIUM, VIA: HCPCS | Performed by: INTERNAL MEDICINE

## 2020-01-04 PROCEDURE — 700117 HCHG RX CONTRAST REV CODE 255: Performed by: EMERGENCY MEDICINE

## 2020-01-04 PROCEDURE — A9270 NON-COVERED ITEM OR SERVICE: HCPCS | Performed by: EMERGENCY MEDICINE

## 2020-01-04 PROCEDURE — 700102 HCHG RX REV CODE 250 W/ 637 OVERRIDE(OP): Performed by: EMERGENCY MEDICINE

## 2020-01-04 PROCEDURE — 81001 URINALYSIS AUTO W/SCOPE: CPT

## 2020-01-04 RX ORDER — PANTOPRAZOLE SODIUM 40 MG/10ML
40 INJECTION, POWDER, LYOPHILIZED, FOR SOLUTION INTRAVENOUS EVERY EVENING
Status: DISCONTINUED | OUTPATIENT
Start: 2020-01-04 | End: 2020-01-05

## 2020-01-04 RX ORDER — SODIUM CHLORIDE AND POTASSIUM CHLORIDE 150; 900 MG/100ML; MG/100ML
INJECTION, SOLUTION INTRAVENOUS CONTINUOUS
Status: DISCONTINUED | OUTPATIENT
Start: 2020-01-04 | End: 2020-01-06 | Stop reason: HOSPADM

## 2020-01-04 RX ORDER — KETOROLAC TROMETHAMINE 30 MG/ML
INJECTION, SOLUTION INTRAMUSCULAR; INTRAVENOUS
Status: DISPENSED
Start: 2020-01-04 | End: 2020-01-05

## 2020-01-04 RX ORDER — ONDANSETRON 4 MG/1
4 TABLET, ORALLY DISINTEGRATING ORAL EVERY 4 HOURS PRN
Status: DISCONTINUED | OUTPATIENT
Start: 2020-01-04 | End: 2020-01-06 | Stop reason: HOSPADM

## 2020-01-04 RX ORDER — BUPROPION HYDROCHLORIDE 150 MG/1
150 TABLET, EXTENDED RELEASE ORAL DAILY
COMMUNITY
End: 2020-03-19 | Stop reason: SDUPTHER

## 2020-01-04 RX ORDER — ONDANSETRON 2 MG/ML
4 INJECTION INTRAMUSCULAR; INTRAVENOUS EVERY 4 HOURS PRN
Status: DISCONTINUED | OUTPATIENT
Start: 2020-01-04 | End: 2020-01-06 | Stop reason: HOSPADM

## 2020-01-04 RX ORDER — LORAZEPAM 1 MG/1
TABLET ORAL
Refills: 5 | COMMUNITY
Start: 2019-11-17 | End: 2020-03-19 | Stop reason: SDUPTHER

## 2020-01-04 RX ORDER — HYDROMORPHONE HYDROCHLORIDE 1 MG/ML
.5-1 INJECTION, SOLUTION INTRAMUSCULAR; INTRAVENOUS; SUBCUTANEOUS
Status: DISCONTINUED | OUTPATIENT
Start: 2020-01-04 | End: 2020-01-06 | Stop reason: HOSPADM

## 2020-01-04 RX ORDER — KETOROLAC TROMETHAMINE 30 MG/ML
15 INJECTION, SOLUTION INTRAMUSCULAR; INTRAVENOUS EVERY 6 HOURS PRN
Status: DISCONTINUED | OUTPATIENT
Start: 2020-01-04 | End: 2020-01-06 | Stop reason: HOSPADM

## 2020-01-04 RX ORDER — LORAZEPAM 2 MG/ML
0.5 INJECTION INTRAMUSCULAR NIGHTLY PRN
Status: DISCONTINUED | OUTPATIENT
Start: 2020-01-04 | End: 2020-01-06 | Stop reason: HOSPADM

## 2020-01-04 RX ORDER — BISACODYL 10 MG
10 SUPPOSITORY, RECTAL RECTAL
Status: DISCONTINUED | OUTPATIENT
Start: 2020-01-04 | End: 2020-01-06

## 2020-01-04 RX ORDER — HYDROMORPHONE HYDROCHLORIDE 1 MG/ML
0.5 INJECTION, SOLUTION INTRAMUSCULAR; INTRAVENOUS; SUBCUTANEOUS ONCE
Status: COMPLETED | OUTPATIENT
Start: 2020-01-04 | End: 2020-01-04

## 2020-01-04 RX ORDER — AMOXICILLIN 250 MG
2 CAPSULE ORAL 2 TIMES DAILY
Status: DISCONTINUED | OUTPATIENT
Start: 2020-01-04 | End: 2020-01-06

## 2020-01-04 RX ORDER — POLYETHYLENE GLYCOL 3350 17 G/17G
1 POWDER, FOR SOLUTION ORAL
Status: DISCONTINUED | OUTPATIENT
Start: 2020-01-04 | End: 2020-01-06

## 2020-01-04 RX ORDER — SODIUM CHLORIDE 9 MG/ML
1000 INJECTION, SOLUTION INTRAVENOUS ONCE
Status: COMPLETED | OUTPATIENT
Start: 2020-01-04 | End: 2020-01-04

## 2020-01-04 RX ORDER — ONDANSETRON 2 MG/ML
4 INJECTION INTRAMUSCULAR; INTRAVENOUS ONCE
Status: COMPLETED | OUTPATIENT
Start: 2020-01-04 | End: 2020-01-04

## 2020-01-04 RX ADMIN — BENZOCAINE, BUTAMBEN, AND TETRACAINE HYDROCHLORIDE 1 SPRAY: .028; .004; .004 AEROSOL, SPRAY TOPICAL at 16:10

## 2020-01-04 RX ADMIN — ONDANSETRON HYDROCHLORIDE 4 MG: 2 SOLUTION INTRAMUSCULAR; INTRAVENOUS at 14:13

## 2020-01-04 RX ADMIN — HYDROMORPHONE HYDROCHLORIDE 1 MG: 1 INJECTION, SOLUTION INTRAMUSCULAR; INTRAVENOUS; SUBCUTANEOUS at 19:15

## 2020-01-04 RX ADMIN — ENOXAPARIN SODIUM 40 MG: 100 INJECTION SUBCUTANEOUS at 19:13

## 2020-01-04 RX ADMIN — SODIUM CHLORIDE 1000 ML: 9 INJECTION, SOLUTION INTRAVENOUS at 15:35

## 2020-01-04 RX ADMIN — PANTOPRAZOLE SODIUM 40 MG: 40 INJECTION, POWDER, LYOPHILIZED, FOR SOLUTION INTRAVENOUS at 19:14

## 2020-01-04 RX ADMIN — KETOROLAC TROMETHAMINE 15 MG: 30 INJECTION, SOLUTION INTRAMUSCULAR at 16:25

## 2020-01-04 RX ADMIN — HYDROMORPHONE HYDROCHLORIDE 0.5 MG: 1 INJECTION, SOLUTION INTRAMUSCULAR; INTRAVENOUS; SUBCUTANEOUS at 14:13

## 2020-01-04 RX ADMIN — IOHEXOL 100 ML: 350 INJECTION, SOLUTION INTRAVENOUS at 14:58

## 2020-01-04 RX ADMIN — LORAZEPAM 0.5 MG: 2 INJECTION INTRAMUSCULAR; INTRAVENOUS at 22:19

## 2020-01-04 RX ADMIN — POTASSIUM CHLORIDE AND SODIUM CHLORIDE: 900; 150 INJECTION, SOLUTION INTRAVENOUS at 19:15

## 2020-01-04 ASSESSMENT — COGNITIVE AND FUNCTIONAL STATUS - GENERAL
SUGGESTED CMS G CODE MODIFIER DAILY ACTIVITY: CH
DAILY ACTIVITIY SCORE: 24
MOBILITY SCORE: 24
SUGGESTED CMS G CODE MODIFIER MOBILITY: CH

## 2020-01-04 ASSESSMENT — LIFESTYLE VARIABLES
TOTAL SCORE: 0
ON A TYPICAL DAY WHEN YOU DRINK ALCOHOL HOW MANY DRINKS DO YOU HAVE: 0
CONSUMPTION TOTAL: NEGATIVE
ALCOHOL_USE: NO
TOTAL SCORE: 0
EVER HAD A DRINK FIRST THING IN THE MORNING TO STEADY YOUR NERVES TO GET RID OF A HANGOVER: NO
AVERAGE NUMBER OF DAYS PER WEEK YOU HAVE A DRINK CONTAINING ALCOHOL: 0
TOTAL SCORE: 0
EVER FELT BAD OR GUILTY ABOUT YOUR DRINKING: NO
EVER_SMOKED: YES
HOW MANY TIMES IN THE PAST YEAR HAVE YOU HAD 5 OR MORE DRINKS IN A DAY: 0
HAVE YOU EVER FELT YOU SHOULD CUT DOWN ON YOUR DRINKING: NO
HAVE PEOPLE ANNOYED YOU BY CRITICIZING YOUR DRINKING: NO

## 2020-01-04 ASSESSMENT — COPD QUESTIONNAIRES
IN THE PAST 12 MONTHS DO YOU DO LESS THAN YOU USED TO BECAUSE OF YOUR BREATHING PROBLEMS: DISAGREE/UNSURE
DO YOU EVER COUGH UP ANY MUCUS OR PHLEGM?: NO/ONLY WITH OCCASIONAL COLDS OR INFECTIONS
HAVE YOU SMOKED AT LEAST 100 CIGARETTES IN YOUR ENTIRE LIFE: YES
COPD SCREENING SCORE: 4
DURING THE PAST 4 WEEKS HOW MUCH DID YOU FEEL SHORT OF BREATH: NONE/LITTLE OF THE TIME

## 2020-01-04 ASSESSMENT — ENCOUNTER SYMPTOMS
WEAKNESS: 0
FOCAL WEAKNESS: 0
HALLUCINATIONS: 0
DIZZINESS: 0
BLOOD IN STOOL: 0
DIARRHEA: 0
NAUSEA: 1
PALPITATIONS: 0
MYALGIAS: 0
HEARTBURN: 0
NERVOUS/ANXIOUS: 1
SHORTNESS OF BREATH: 0
FEVER: 0
COUGH: 0
HEADACHES: 0
SORE THROAT: 0
CHILLS: 0
BACK PAIN: 0
VOMITING: 1
ABDOMINAL PAIN: 1
DEPRESSION: 0

## 2020-01-04 ASSESSMENT — PATIENT HEALTH QUESTIONNAIRE - PHQ9
3. TROUBLE FALLING OR STAYING ASLEEP OR SLEEPING TOO MUCH: NOT AT ALL
7. TROUBLE CONCENTRATING ON THINGS, SUCH AS READING THE NEWSPAPER OR WATCHING TELEVISION: NOT AT ALL
9. THOUGHTS THAT YOU WOULD BE BETTER OFF DEAD, OR OF HURTING YOURSELF: NOT AT ALL
2. FEELING DOWN, DEPRESSED, IRRITABLE, OR HOPELESS: SEVERAL DAYS
8. MOVING OR SPEAKING SO SLOWLY THAT OTHER PEOPLE COULD HAVE NOTICED. OR THE OPPOSITE, BEING SO FIGETY OR RESTLESS THAT YOU HAVE BEEN MOVING AROUND A LOT MORE THAN USUAL: NOT AT ALL
4. FEELING TIRED OR HAVING LITTLE ENERGY: SEVERAL DAYS
6. FEELING BAD ABOUT YOURSELF - OR THAT YOU ARE A FAILURE OR HAVE LET YOURSELF OR YOUR FAMILY DOWN: NOT AL ALL
SUM OF ALL RESPONSES TO PHQ QUESTIONS 1-9: 2
SUM OF ALL RESPONSES TO PHQ9 QUESTIONS 1 AND 2: 1
1. LITTLE INTEREST OR PLEASURE IN DOING THINGS: NOT AT ALL
5. POOR APPETITE OR OVEREATING: NOT AT ALL

## 2020-01-04 ASSESSMENT — PAIN DESCRIPTION - DESCRIPTORS: DESCRIPTORS: ACHING

## 2020-01-04 NOTE — ED TRIAGE NOTES
"Presents complaining of constipation protracting for approximately a week with acute onset of diffuse abdominal pian and associated episodes of N/V.  OTC interventions have not provided any amelioration of symptoms.  Chief Complaint   Patient presents with   • Constipation   • N/V   • Abdominal Pain     Pulse 74   Temp 36.6 °C (97.8 °F) (Temporal)   Resp 18   Ht 1.575 m (5' 2\")   Wt 71 kg (156 lb 8.4 oz)   LMP 05/30/1992   SpO2 98%   BMI 28.63 kg/m²     "

## 2020-01-04 NOTE — ED PROVIDER NOTES
ED Provider Note  CHIEF COMPLAINT  Chief Complaint   Patient presents with   • Constipation   • N/V   • Abdominal Pain       HPI  Dina Crews is a 74 y.o. female who presents with complaint of sudden onset of left lower abdominal pain which began 3 hours ago.  She reports of the last 1 week she has felt constipated.  She has had small, hard, dark-colored stools on and off over the last 1 week but no good bowel movement.  She had not had any pain until 3 hours ago when she developed severe pain in the left lower abdomen which felt similar to a small bowel obstruction pain from the past.  Patient also has history of diverticulitis.  She had 10 cm of her colon removed many years ago for diverticulitis.  Her surgeon has long since retired.  She had a small bowel obstruction a few years ago which resolved with an NG tube.  No surgical intervention needed at that time.  She is had nausea with one episode of vomiting today.  No fevers or chills.  No chest pains or shortness of breath.  No cough.  No urinary symptoms.    REVIEW OF SYSTEMS  See HPI for further details.  Positive for nausea, vomiting, abdominal pain, constipation.  Negative for fever, chills, chest pain, shortness of breath, urinary symptoms.  All other systems are negative.    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 72 Stomach cancer   • Diabetes Father    • Cancer Sister 40        Breast cancer   • GI Disease Sister         diverticulitis   • Cancer Sister         breast       SOCIAL HISTORY  Social History     Socioeconomic History   • Marital status:      Spouse name: Not on file   • Number of children: 2   • Years of education: HS   • Highest education level: Not on file   Occupational History     Employer: OTHER   Social Needs   • Financial resource strain: Not on file   • Food insecurity:     Worry: Never true     Inability: Never true   • Transportation needs:     Medical: No     Non-medical: No   Tobacco Use   • Smoking status: Current Every Day Smoker     Packs/day: 0.50     Years: 45.00     Pack years: 22.50     Types: Cigarettes     Start date: 1964     Last attempt to quit: 2012     Years since quittin.0   • Smokeless tobacco: Never Used   Substance and Sexual Activity   • Alcohol use: No     Alcohol/week: 0.0 oz   • Drug use: Yes     Types: Oral, Marijuana     Comment: Pot daily-edibles   • Sexual activity: Yes     Partners: Female   Lifestyle   • Physical activity:     Days per week: Not on file     Minutes per session: Not on file   • Stress: Not on file   Relationships   • Social connections:     Talks on phone: Not on file     Gets together: Not on file     Attends Anabaptism service: Not on file     Active member of club or organization: Not on file     Attends meetings of clubs or organizations: Not on file     Relationship status: Not on file   • Intimate partner violence:     Fear of current or ex partner: Not on file     Emotionally abused: Not on file     Physically abused: Not on file     Forced sexual activity: Not on file   Other Topics Concern   • Not on file   Social History Narrative     "Moved to [place] after couldn't find work in [place]. Then mother had increasing health problems. Now stays in [place] to care for mom. Siblings caregive on Weekends. 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    Spouse (female) 25 years.    2 children. 1 grandchild.     : mother .        SURGICAL HISTORY  Past Surgical History:   Procedure Laterality Date   • NEL BY LAPAROSCOPY  2015    Performed by Gaudencio Johnson M.D. at SURGERY SAME DAY [facility] ORS   • LOW ANTERIOR RESECTION ROBOTIC  2014    Performed by Ismael Rocha M.D. at SURGERY [facility] ORS   • ABDOMINAL HYSTERECTOMY TOTAL     • HYSTERECTOMY, VAGINAL      and BSO   • OTHER ABDOMINAL SURGERY      partial colectomy   • PRIMARY C SECTION     • REPEAT C SECTION     • SHOULDER ARTHROSCOPY         CURRENT MEDICATIONS  Home Medications    **Home medications have not yet been reviewed for this encounter**         ALLERGIES  Allergies   Allergen Reactions   • Penicillins Rash     Rxn - Late      • Codeine Vomiting   • Morphine Rash     Localized red rash after morphine administration   • Vicodin [Hydrocodone-Acetaminophen] Itching       PHYSICAL EXAM  VITAL SIGNS: Pulse 74   Temp 36.6 °C (97.8 °F) (Temporal)   Resp 18   Ht 1.575 m (5' 2\")   Wt 71 kg (156 lb 8.4 oz)   LMP 1992   SpO2 98%   BMI 28.63 kg/m²    Constitutional: Well developed, well nourished; No acute distress; Non-toxic appearance.   HENT: Normocephalic, atraumatic; Bilateral external ears normal; Oropharynx with moist mucous membranes; No erythema or exudates in the posterior oropharynx.   Eyes: PERRL, EOMI, Conjunctiva normal. No discharge.   Neck:  Supple, nontender midline; No stridor; No nuchal rigidity.   Lymphatic: No cervical lymphadenopathy noted.   Cardiovascular: Regular rate and rhythm without murmurs, rubs, or gallop.   Thorax & Lungs: No respiratory distress, breath sounds clear to auscultation bilaterally without wheezing, rales or rhonchi. " Nontender chest wall. No crepitus or subcutaneous air  Abdomen: Soft, tender diffusely to percussion, tender in the left upper quadrant and left lower quadrant, healed abdominal surgical wounds, bowel sounds normal. No obvious masses; No pulsatile masses; no rebound, guarding, or peritoneal signs.   Skin: Good color; warm and dry without rash or petechia.  Back: Nontender, No CVA tenderness.   Extremities: Distal dorsalis pedis, posterior tibial pulses are equal bilaterally; No edema; Nontender calves or saphenous, No cyanosis, No clubbing.   Musculoskeletal: Good range of motion in all major joints. No tenderness to palpation or major deformities noted.   Neurologic: Alert & oriented x 4, clear speech        RADIOLOGY/PROCEDURES  [id number] VIEW   Final Result      NG tube is coiled in the distal esophagus, tip directed cephalad. Recommend repositioning.      CT-ABDOMEN-PELVIS WITH   Final Result      Mildly dilated loops of small bowel along with distal decompressed loops of small bowel suggesting at least partial or early small bowel obstruction with transition point appearing to be in the midline pelvis.      Diverticulosis without evidence of diverticulitis.      Atherosclerotic disease.          COURSE & MEDICAL DECISION MAKING  Pertinent Labs & Imaging studies reviewed. (See chart for details)      Results for orders placed or performed during the hospital encounter of 01/04/20   CBC WITH DIFFERENTIAL   Result Value Ref Range    WBC 13.2 (H) 4.8 - 10.8 K/uL    RBC 4.83 4.20 - 5.40 M/uL    Hemoglobin 14.8 12.0 - 16.0 g/dL    Hematocrit 43.6 37.0 - 47.0 %    MCV 90.3 81.4 - 97.8 fL    MCH 30.6 27.0 - 33.0 pg    MCHC 33.9 33.6 - 35.0 g/dL    RDW 43.1 35.9 - 50.0 fL    Platelet Count 376 164 - 446 K/uL    MPV 10.0 9.0 - 12.9 fL    Neutrophils-Polys 81.10 (H) 44.00 - 72.00 %    Lymphocytes 12.40 (L) 22.00 - 41.00 %    Monocytes 4.60 0.00 - 13.40 %    Eosinophils 1.00 0.00 - 6.90 %    Basophils 0.50 0.00 - 1.80 %     Immature Granulocytes 0.40 0.00 - 0.90 %    Nucleated RBC 0.00 /100 WBC    Neutrophils (Absolute) 10.70 (H) 2.00 - 7.15 K/uL    Lymphs (Absolute) 1.64 1.00 - 4.80 K/uL    Monos (Absolute) 0.61 0.00 - 0.85 K/uL    Eos (Absolute) 0.13 0.00 - 0.51 K/uL    Baso (Absolute) 0.07 0.00 - 0.12 K/uL    Immature Granulocytes (abs) 0.05 0.00 - 0.11 K/uL    NRBC (Absolute) 0.00 K/uL   COMP METABOLIC PANEL   Result Value Ref Range    Sodium 140 135 - 145 mmol/L    Potassium 4.5 3.6 - 5.5 mmol/L    Chloride 100 96 - 112 mmol/L    Co2 23 20 - 33 mmol/L    Anion Gap 17.0 (H) 0.0 - 11.9    Glucose 110 (H) 65 - 99 mg/dL    Bun 15 8 - 22 mg/dL    Creatinine 0.61 0.50 - 1.40 mg/dL    Calcium 9.9 8.4 - 10.2 mg/dL    AST(SGOT) 13 12 - 45 U/L    ALT(SGPT) 7 2 - 50 U/L    Alkaline Phosphatase 95 30 - 99 U/L    Total Bilirubin 0.3 0.1 - 1.5 mg/dL    Albumin 4.6 3.2 - 4.9 g/dL    Total Protein 8.0 6.0 - 8.2 g/dL    Globulin 3.4 1.9 - 3.5 g/dL    A-G Ratio 1.4 g/dL   LIPASE   Result Value Ref Range    Lipase 38 7 - 58 U/L   ESTIMATED GFR   Result Value Ref Range    GFR If African American >60 >60 mL/min/1.73 m 2    GFR If Non African American >60 >60 mL/min/1.73 m 2       HYDRATION: Based on the patient's presentation of Acute Vomiting the patient was given IV fluids. IV Hydration was used because oral hydration was not adequate alone. Upon recheck following hydration, the patient was improved.    1535: Discussed the case with Dr. Buckley.  He asked that the hospitalist admit the patient.  He will consult.  He agrees with NG tube placement and IV fluids for now.    1537: I discussed the case with Dr. Liriano, hospitalist on-call.  She will kindly evaluate the patient for hospitalization.    Patient presents to the ER complaining of abdominal pain which began about 3 hours ago.  She has had nausea and one episode of vomiting.  Over the last 1 week she reports constipation.  She is had a few hard, dark-colored bowel movements, otherwise no real  "good bowel movement in the last 6 to 7 days.  She reports that the pain is reminiscent of a small bowel obstruction pain which she had a few years ago.  She also has history of diverticulitis and reports that she is \"never had pain this bad\" with her diverticulitis.  She was tender mostly in the left abdomen, maximally in the left lower quadrant.  She localizes her pain to the midline lower abdomen.  White count is minimally elevated at 13,000.  CT scan reveals a early small bowel obstruction with a transition point in the midline pelvis.  She has history of partial colon resection due to diverticulitis in the remote past.  No fevers.  Her nausea and pain are well controlled here in the ER with Zofran and Dilaudid.  An NG tube was placed for small bowel obstruction.  She states her previous SBO resolved with medical management alone and she did not require surgery.  I spoke with Dr. Buckley, general surgeon on-call.  He will kindly consult.  He agrees with NG tube placement and IV fluids.  At this time patient will be hospitalized under the care of Dr. Liriano, hospitalist on-call.  Further evaluation and management will be done by Dr. Buckley and Dr. Liriano.    Disposition: Patient will be hospitalized under the care of Dr. Liriano with surgical consultation by Dr. Buckley.    FINAL IMPRESSION  1. Small bowel obstruction (HCC) Acute         This dictation has been created using voice recognition software. The accuracy of the dictation is limited by the abilities of the software. I expect there may be some errors of grammar and possibly content. I made every attempt to manually correct the errors within my dictation. However, errors related to voice recognition software may still exist and should be interpreted within the appropriate context.    Electronically signed by: Michelle Yu, 1/4/2020 1:37 PM    "

## 2020-01-05 LAB
ALBUMIN SERPL BCP-MCNC: 3.8 G/DL (ref 3.2–4.9)
BASOPHILS # BLD AUTO: 0.6 % (ref 0–1.8)
BASOPHILS # BLD: 0.06 K/UL (ref 0–0.12)
BUN SERPL-MCNC: 11 MG/DL (ref 8–22)
CALCIUM SERPL-MCNC: 8.7 MG/DL (ref 8.4–10.2)
CHLORIDE SERPL-SCNC: 104 MMOL/L (ref 96–112)
CO2 SERPL-SCNC: 23 MMOL/L (ref 20–33)
CREAT SERPL-MCNC: 0.52 MG/DL (ref 0.5–1.4)
EOSINOPHIL # BLD AUTO: 0.14 K/UL (ref 0–0.51)
EOSINOPHIL NFR BLD: 1.4 % (ref 0–6.9)
ERYTHROCYTE [DISTWIDTH] IN BLOOD BY AUTOMATED COUNT: 45.4 FL (ref 35.9–50)
GLUCOSE SERPL-MCNC: 112 MG/DL (ref 65–99)
HCT VFR BLD AUTO: 42.2 % (ref 37–47)
HGB BLD-MCNC: 13.9 G/DL (ref 12–16)
IMM GRANULOCYTES # BLD AUTO: 0.04 K/UL (ref 0–0.11)
IMM GRANULOCYTES NFR BLD AUTO: 0.4 % (ref 0–0.9)
LYMPHOCYTES # BLD AUTO: 1.7 K/UL (ref 1–4.8)
LYMPHOCYTES NFR BLD: 16.6 % (ref 22–41)
MAGNESIUM SERPL-MCNC: 2.1 MG/DL (ref 1.5–2.5)
MCH RBC QN AUTO: 30.8 PG (ref 27–33)
MCHC RBC AUTO-ENTMCNC: 32.9 G/DL (ref 33.6–35)
MCV RBC AUTO: 93.4 FL (ref 81.4–97.8)
MONOCYTES # BLD AUTO: 0.55 K/UL (ref 0–0.85)
MONOCYTES NFR BLD AUTO: 5.4 % (ref 0–13.4)
NEUTROPHILS # BLD AUTO: 7.77 K/UL (ref 2–7.15)
NEUTROPHILS NFR BLD: 75.6 % (ref 44–72)
NRBC # BLD AUTO: 0 K/UL
NRBC BLD-RTO: 0 /100 WBC
PHOSPHATE SERPL-MCNC: 3.3 MG/DL (ref 2.5–4.5)
PLATELET # BLD AUTO: 318 K/UL (ref 164–446)
PMV BLD AUTO: 10.3 FL (ref 9–12.9)
POTASSIUM SERPL-SCNC: 4.4 MMOL/L (ref 3.6–5.5)
RBC # BLD AUTO: 4.52 M/UL (ref 4.2–5.4)
SODIUM SERPL-SCNC: 140 MMOL/L (ref 135–145)
WBC # BLD AUTO: 10.3 K/UL (ref 4.8–10.8)

## 2020-01-05 PROCEDURE — 700101 HCHG RX REV CODE 250: Performed by: INTERNAL MEDICINE

## 2020-01-05 PROCEDURE — A9270 NON-COVERED ITEM OR SERVICE: HCPCS | Performed by: INTERNAL MEDICINE

## 2020-01-05 PROCEDURE — 36415 COLL VENOUS BLD VENIPUNCTURE: CPT

## 2020-01-05 PROCEDURE — 80069 RENAL FUNCTION PANEL: CPT

## 2020-01-05 PROCEDURE — 99232 SBSQ HOSP IP/OBS MODERATE 35: CPT | Performed by: INTERNAL MEDICINE

## 2020-01-05 PROCEDURE — 700111 HCHG RX REV CODE 636 W/ 250 OVERRIDE (IP): Performed by: INTERNAL MEDICINE

## 2020-01-05 PROCEDURE — 700102 HCHG RX REV CODE 250 W/ 637 OVERRIDE(OP): Performed by: INTERNAL MEDICINE

## 2020-01-05 PROCEDURE — 83735 ASSAY OF MAGNESIUM: CPT

## 2020-01-05 PROCEDURE — 85025 COMPLETE CBC W/AUTO DIFF WBC: CPT

## 2020-01-05 PROCEDURE — 770006 HCHG ROOM/CARE - MED/SURG/GYN SEMI*

## 2020-01-05 RX ORDER — ACETAMINOPHEN 325 MG/1
650 TABLET ORAL EVERY 4 HOURS PRN
Status: DISCONTINUED | OUTPATIENT
Start: 2020-01-05 | End: 2020-01-06 | Stop reason: HOSPADM

## 2020-01-05 RX ORDER — LORAZEPAM 1 MG/1
1 TABLET ORAL NIGHTLY PRN
Status: DISCONTINUED | OUTPATIENT
Start: 2020-01-05 | End: 2020-01-06 | Stop reason: HOSPADM

## 2020-01-05 RX ORDER — ESCITALOPRAM OXALATE 10 MG/1
20 TABLET ORAL DAILY
Status: DISCONTINUED | OUTPATIENT
Start: 2020-01-05 | End: 2020-01-06 | Stop reason: HOSPADM

## 2020-01-05 RX ORDER — BUPROPION HYDROCHLORIDE 150 MG/1
150 TABLET, EXTENDED RELEASE ORAL DAILY
Status: DISCONTINUED | OUTPATIENT
Start: 2020-01-05 | End: 2020-01-06 | Stop reason: HOSPADM

## 2020-01-05 RX ORDER — OMEPRAZOLE 20 MG/1
20 CAPSULE, DELAYED RELEASE ORAL DAILY
Status: DISCONTINUED | OUTPATIENT
Start: 2020-01-05 | End: 2020-01-06 | Stop reason: HOSPADM

## 2020-01-05 RX ADMIN — POTASSIUM CHLORIDE AND SODIUM CHLORIDE: 900; 150 INJECTION, SOLUTION INTRAVENOUS at 15:17

## 2020-01-05 RX ADMIN — ACETAMINOPHEN 650 MG: 325 TABLET, FILM COATED ORAL at 15:15

## 2020-01-05 RX ADMIN — KETOROLAC TROMETHAMINE 15 MG: 30 INJECTION, SOLUTION INTRAMUSCULAR at 22:43

## 2020-01-05 RX ADMIN — LORAZEPAM 0.5 MG: 2 INJECTION INTRAMUSCULAR; INTRAVENOUS at 22:36

## 2020-01-05 RX ADMIN — ENOXAPARIN SODIUM 40 MG: 100 INJECTION SUBCUTANEOUS at 05:18

## 2020-01-05 RX ADMIN — ONDANSETRON HYDROCHLORIDE 4 MG: 2 SOLUTION INTRAMUSCULAR; INTRAVENOUS at 09:37

## 2020-01-05 RX ADMIN — HYDROMORPHONE HYDROCHLORIDE 1 MG: 1 INJECTION, SOLUTION INTRAMUSCULAR; INTRAVENOUS; SUBCUTANEOUS at 03:43

## 2020-01-05 ASSESSMENT — ENCOUNTER SYMPTOMS
CHILLS: 0
SHORTNESS OF BREATH: 0
COUGH: 0
ABDOMINAL PAIN: 0
DIARRHEA: 0
WEIGHT LOSS: 0
CONSTIPATION: 1
FEVER: 0
HEADACHES: 0
SORE THROAT: 0
DIZZINESS: 0
VOMITING: 0
DIAPHORESIS: 0
NAUSEA: 1
NERVOUS/ANXIOUS: 0

## 2020-01-05 NOTE — PROGRESS NOTES
Surgery General Daily Progress Note    Date of Service  1/5/2020    Subjective  Patient states that her abdominal pain is much better today. She notes she took pain medicine twice yesterday, and once early this morning, but has not had any in 5-6 hours and does not have any pain currently. She did have a small episode of emesis earlier, but says she was not nauseous, but when she got up from bed quickly she became briefly nauseous and threw up. She does not feel nauseous right now. She says she passed flatus this am, but has not had a BM. No belching or hiccups. Minimal NGT output over previous 24 hours (150). She says her biggest complaint right now is the NGT itself.      Physical Exam  Temp:  [36.5 °C (97.7 °F)-37 °C (98.6 °F)] 37 °C (98.6 °F)  Pulse:  [63-71] 66  Resp:  [17-18] 18  BP: (115-167)/(53-87) 137/53  SpO2:  [89 %-95 %] 95 %    Physical Exam  General: NAD, sitting up in bed  Neuro: AOx3, no focal defecits  HEENT: MMM, neck supple  Chest: normal respiratory excursion and effort  Abdomen: soft, NT, less distended than yesterday  Ext: grossly normal ROM, distal pulses x4  Skin: pink, warm and dry        Fluids    Intake/Output Summary (Last 24 hours) at 1/5/2020 1356  Last data filed at 1/5/2020 0800  Gross per 24 hour   Intake 735 ml   Output 150 ml   Net 585 ml       Laboratory  Recent Labs     01/04/20  1330 01/05/20  0321   WBC 13.2* 10.3   RBC 4.83 4.52   HEMOGLOBIN 14.8 13.9   HEMATOCRIT 43.6 42.2   MCV 90.3 93.4   MCH 30.6 30.8   MCHC 33.9 32.9*   RDW 43.1 45.4   PLATELETCT 376 318   MPV 10.0 10.3     Recent Labs     01/04/20  1330 01/05/20  0321   SODIUM 140 140   POTASSIUM 4.5 4.4   CHLORIDE 100 104   CO2 23 23   GLUCOSE 110* 112*   BUN 15 11   CREATININE 0.61 0.52   CALCIUM 9.9 8.7                     Assessment/Plan  73 y/o female with multiple previous abdominal surgeries including sigmoidectomy, now with pSBO  1. Because NGT draining minimal amount despite adequate position, and high amount  of pain and discomfort from it being in place, will remove and see how she responds.  2. Keep NPO, small amount of ice chips for comfort ok  3. Daily labs  4. If pain and or nausea/distention return, will likely proceed to OR for exploration  5. Tylenol only for pain

## 2020-01-05 NOTE — PROGRESS NOTES
Report received from ER. Pt came to the floor.     NG-tube is hooked to low suction.    IV fluid started.

## 2020-01-05 NOTE — CARE PLAN
Problem: Bowel/Gastric:  Goal: Normal bowel function is maintained or improved  Outcome: PROGRESSING SLOWER THAN EXPECTED  Note:   Pt denied any flatus or nausea/vomiting   NG-tube in place - on low- intermittent suction; clear brownish gastric output noted   Soft abdomen        Problem: Pain Management  Goal: Pain level will decrease to patient's comfort goal  Outcome: PROGRESSING AS EXPECTED  Note:   Pt stated that pain is tolerable  Requested Ativan for sleeping - given per MAR   Encouraged pt to report to the nurse if pain remains uncontrolled; pt verbalized understanding

## 2020-01-05 NOTE — PROGRESS NOTES
Has BS, reports no passing gas. ABD soft. Pain better today, no nausea. IV site intact. Discussed POC including ambulation.

## 2020-01-05 NOTE — PROGRESS NOTES
Hospital Medicine Daily Progress Note    Date of Service  1/5/2020    Chief Complaint  Abdominal pain    Hospital Course    *74-year-old female with previous abdominal surgery presents with recurrent small bowel obstruction, she had one prior episode that did not require surgery.  NG tube placed in the emergency room*      Interval Problem Update  Pain has resolved she is passing small amounts of gas but no BM as yet.  She feels like she has to have a BM but it just will not come.  She is nauseated but has not vomited.  Discussed with surgery  Patient is ambulatory    Consultants/Specialty  Kvng Buckley MD    Code Status  Full per H&P    Disposition  *Anticipate home    Review of Systems  Review of Systems   Constitutional: Negative for chills, diaphoresis, fever, malaise/fatigue and weight loss.   HENT: Negative for congestion and sore throat.    Respiratory: Negative for cough and shortness of breath.    Cardiovascular: Negative for chest pain.   Gastrointestinal: Positive for constipation and nausea. Negative for abdominal pain (resolved), diarrhea and vomiting.   Genitourinary: Negative for dysuria and urgency.   Skin: Negative for rash.   Neurological: Negative for dizziness and headaches.   Psychiatric/Behavioral: The patient is not nervous/anxious.         Physical Exam  Temp:  [36.5 °C (97.7 °F)-37 °C (98.6 °F)] 37 °C (98.6 °F)  Pulse:  [66-71] 66  Resp:  [18] 18  BP: (115-167)/(53-87) 137/53  SpO2:  [89 %-95 %] 95 %    Physical Exam  Constitutional:       General: She is not in acute distress.     Appearance: Normal appearance. She is normal weight. She is not ill-appearing, toxic-appearing or diaphoretic.   HENT:      Head: Normocephalic and atraumatic.      Right Ear: External ear normal.      Left Ear: External ear normal.      Nose: Nose normal.      Mouth/Throat:      Mouth: Mucous membranes are moist.      Pharynx: Oropharynx is clear.   Eyes:      Conjunctiva/sclera: Conjunctivae normal.       Pupils: Pupils are equal, round, and reactive to light.   Cardiovascular:      Rate and Rhythm: Normal rate and regular rhythm.   Pulmonary:      Effort: Pulmonary effort is normal.      Comments: Mildly diminished  Abdominal:      General: There is no distension.      Palpations: Abdomen is soft. There is no mass.      Tenderness: There is tenderness (Mild).      Comments: Bowel sounds present but diminished   Musculoskeletal:      Right lower leg: No edema.      Left lower leg: No edema.   Skin:     General: Skin is warm and dry.   Neurological:      General: No focal deficit present.      Mental Status: She is alert and oriented to person, place, and time. Mental status is at baseline.      Cranial Nerves: No cranial nerve deficit.      Motor: No weakness.   Psychiatric:         Mood and Affect: Mood normal.         Fluids    Intake/Output Summary (Last 24 hours) at 1/5/2020 1423  Last data filed at 1/5/2020 1200  Gross per 24 hour   Intake 935 ml   Output 150 ml   Net 785 ml       Laboratory  Recent Labs     01/04/20  1330 01/05/20  0321   WBC 13.2* 10.3   RBC 4.83 4.52   HEMOGLOBIN 14.8 13.9   HEMATOCRIT 43.6 42.2   MCV 90.3 93.4   MCH 30.6 30.8   MCHC 33.9 32.9*   RDW 43.1 45.4   PLATELETCT 376 318   MPV 10.0 10.3     Recent Labs     01/04/20  1330 01/05/20  0321   SODIUM 140 140   POTASSIUM 4.5 4.4   CHLORIDE 100 104   CO2 23 23   GLUCOSE 110* 112*   BUN 15 11   CREATININE 0.61 0.52   CALCIUM 9.9 8.7                   Imaging  [id number] VIEW   Final Result      NGT tip is now in the stomach, directed cephalad.      [id number] VIEW   Final Result      NG tube is coiled in the distal esophagus, tip directed cephalad. Recommend repositioning.      CT-ABDOMEN-PELVIS WITH   Final Result      Mildly dilated loops of small bowel along with distal decompressed loops of small bowel suggesting at least partial or early small bowel obstruction with transition point appearing to be in the midline pelvis.       Diverticulosis without evidence of diverticulitis.      Atherosclerotic disease.           Assessment/Plan  * SBO (small bowel obstruction) (HCC)- (present on admission)  Assessment & Plan  She has had 1 day of abdominal discomfort and nausea associated with vomiting.  No Decent BM ~ 1week  Was on metamucil in past but stopped it... discussed  NGT dc by surgeon, + flatus  No appetite  CTM    Leukocytosis- (present on admission)  Assessment & Plan  Mild, resolved, UA OK    S/P partial colectomy- (present on admission)  Assessment & Plan  She has a partial colectomy in 2014 due to diverticulitis.  She has had 1 bowel obstruction since this time  She cannot recall her surgeon however states he is no longer in practice    JUNIOR (generalized anxiety disorder)- (present on admission)  Assessment & Plan  Resume home meds    GERD (gastroesophageal reflux disease)- (present on admission)  Assessment & Plan  Prilosec    Hyperlipidemia- (present on admission)  Assessment & Plan  She is not on a statin at home    Tobacco use disorder- (present on admission)  Assessment & Plan  -counseled for 5 minutes on tobacco cessation [id number]  -She declined a nicotine patch           VTE prophylaxis: change to SCDs in case needs surgery

## 2020-01-05 NOTE — ASSESSMENT & PLAN NOTE
She has a partial colectomy in 2014 due to diverticulitis.  She has had 1 bowel obstruction since this time  She cannot recall her surgeon however states he is no longer in practice

## 2020-01-05 NOTE — CARE PLAN
Problem: Infection  Goal: Will remain free from infection  Outcome: PROGRESSING AS EXPECTED     Problem: Bowel/Gastric:  Goal: Normal bowel function is maintained or improved  Outcome: PROGRESSING AS EXPECTED  Goal: Will not experience complications related to bowel motility  Outcome: PROGRESSING AS EXPECTED     Problem: Communication  Goal: The ability to communicate needs accurately and effectively will improve  Outcome: MET     Problem: Safety  Goal: Will remain free from injury  Outcome: MET

## 2020-01-05 NOTE — ASSESSMENT & PLAN NOTE
She has had 1 day of abdominal discomfort and nausea associated with vomiting.  No Decent BM ~ 1week  Was on metamucil in past but stopped it... discussed  NGT dc by surgeon, + flatus  No appetite  CTM

## 2020-01-05 NOTE — H&P
Hospital Medicine History & Physical Note    Date of Service  1/4/2020    Primary Care Physician  Kishore Baptiste M.D.    Consultants  Dr. Mckeon - Surgery    Code Status  Full    Chief Complaint  Abdominal Pain    History of Presenting Illness  74 y.o. female with a history of diverticulitis that is post partial bowel resection, history of SBO, anxiety, diet-controlled hyperlipidemia, GERD, depression who presented 1/4/2020 with abdominal pain.    Patient states she was in her normal state health until this morning when she woke up and had generalized abdominal discomfort.  Pain worsened throughout the day was associated with nausea.  She subsequently developed vomiting.  No blood in the vomit.  She has not been able to pass gas.  She has been constipated for the last week.  She denies any fever.  She does feel anxious.  She has had similar symptoms in the past when she was diagnosed with a small bowel obstruction about 2 to 3 years ago.  This resolved with NG tube placement and she did not require surgery.    ER course: Noted to have leukocytosis.  CT scan of the abdomen was performed and revealed a small bowel obstruction.  General surgery was consulted and they recommended conservative management.  NG tube was placed and hospital service was consulted for admission.     Review of Systems  Review of Systems   Constitutional: Negative for chills, fever and malaise/fatigue.   HENT: Negative for sore throat.    Respiratory: Negative for cough and shortness of breath.    Cardiovascular: Negative for chest pain and palpitations.   Gastrointestinal: Positive for abdominal pain, nausea and vomiting. Negative for blood in stool, diarrhea and heartburn.   Genitourinary: Negative for dysuria and frequency.   Musculoskeletal: Negative for back pain and myalgias.   Neurological: Negative for dizziness, focal weakness, weakness and headaches.   Psychiatric/Behavioral: Negative for depression and hallucinations. The patient is  nervous/anxious.    All other systems reviewed and are negative.      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    Surgical History   has a past surgical history that includes hysterectomy, vaginal; low anterior resection robotic (1/28/2014); malik by laparoscopy (4/20/2015); primary c section; repeat c section; other abdominal surgery; shoulder arthroscopy; and abdominal hysterectomy total.     Family History  family history includes Cancer in her father, mother, and sister; Cancer (age of onset: 40) in her sister; Diabetes in her father; GI Disease in her sister; Lung Disease in her mother.     Social History   reports that she has been smoking cigarettes. She started smoking about 56 years ago. She has a 22.50 pack-year smoking history. She has never used smokeless tobacco. She reports current drug use. Drugs: Oral and Marijuana. She reports that she does not drink alcohol.    Allergies  Allergies   Allergen Reactions   • Penicillins Rash     Rxn - Late 1990's     • Codeine Vomiting   • Morphine Rash     Localized red rash after morphine administration   • Vicodin [Hydrocodone-Acetaminophen] Itching       Medications  Prior to Admission Medications   Prescriptions Last Dose Informant Patient Reported? Taking?   Cholecalciferol 2000 UNIT Tab 1/3/2020 at AM Patient Yes No   Sig: Take 2,000 Units by mouth every day.   LORazepam  (ATIVAN) 1 MG Tab FEW DAYS AGO at PRN Patient Yes No   Sig: TAKE 1 TABLET BY MOUTH AT BEDTIME AS NEEDED FOR ANXIETY OR SLEEP FOR UP TO 30 DAYS   buPROPion SR (WELLBUTRIN-SR) 150 MG TABLET SR 12 HR sustained-release tablet 1/3/2020 at AM Patient Yes Yes   Sig: Take 150 mg by mouth every day.   escitalopram (LEXAPRO) 20 MG tablet 1/3/2020 at AM Patient No No   Sig: TAKE 1 TABLET BY MOUTH ONCE DAILY   pantoprazole (PROTONIX) 40 MG Tablet Delayed Response 1/3/2020 at AM Patient No No   Sig: TAKE 1 TABLET BY MOUTH ONCE DAILY      Facility-Administered Medications: None       Physical Exam  Temp:  [36.6 °C (97.8 °F)] 36.6 °C (97.8 °F)  Pulse:  [63-74] 69  Resp:  [17-18] 18  BP: (127-167)/(64-87) 167/87  SpO2:  [91 %-98 %] 91 %    Physical Exam  Constitutional:       General: She is not in acute distress.     Appearance: She is ill-appearing.   HENT:      Nose: Nose normal.      Comments: NGT in place     Mouth/Throat:      Mouth: Mucous membranes are dry.   Neck:      Musculoskeletal: No muscular tenderness.   Cardiovascular:      Rate and Rhythm: Normal rate and regular rhythm.      Pulses: Normal pulses.      Heart sounds: No murmur.   Pulmonary:      Effort: Pulmonary effort is normal.      Breath sounds: Normal breath sounds.   Abdominal:      General: There is distension.      Palpations: Abdomen is soft.      Tenderness: There is tenderness.   Musculoskeletal:         General: No swelling.   Lymphadenopathy:      Cervical: No cervical adenopathy.   Skin:     General: Skin is warm and dry.      Coloration: Skin is pale.      Findings: No rash.   Neurological:      General: No focal deficit present.      Mental Status: She is alert and oriented to person, place, and time.      Motor: No weakness.   Psychiatric:         Mood and Affect: Mood normal.         Thought Content: Thought content normal.         Laboratory:  Recent Labs     01/04/20  1330   WBC 13.2*   RBC 4.83   HEMOGLOBIN 14.8   HEMATOCRIT 43.6   MCV 90.3    MCH 30.6   MCHC 33.9   RDW 43.1   PLATELETCT 376   MPV 10.0     Recent Labs     01/04/20  1330   SODIUM 140   POTASSIUM 4.5   CHLORIDE 100   CO2 23   GLUCOSE 110*   BUN 15   CREATININE 0.61   CALCIUM 9.9     Recent Labs     01/04/20  1330   ALTSGPT 7   ASTSGOT 13   ALKPHOSPHAT 95   TBILIRUBIN 0.3   LIPASE 38   GLUCOSE 110*         No results for input(s): NTPROBNP in the last 72 hours.      No results for input(s): TROPONINT in the last 72 hours.    Urinalysis:    No results found     Imaging:  [id number] VIEW   Final Result      NGT tip is now in the stomach, directed cephalad.      [id number] VIEW   Final Result      NG tube is coiled in the distal esophagus, tip directed cephalad. Recommend repositioning.      CT-ABDOMEN-PELVIS WITH   Final Result      Mildly dilated loops of small bowel along with distal decompressed loops of small bowel suggesting at least partial or early small bowel obstruction with transition point appearing to be in the midline pelvis.      Diverticulosis without evidence of diverticulitis.      Atherosclerotic disease.            Assessment/Plan:  I anticipate this patient will require at least two midnights for appropriate medical management, necessitating inpatient admission.    SBO (small bowel obstruction) (HCC)- (present on admission)  Assessment & Plan  She has had 1 day of abdominal discomfort and nausea associated with vomiting.  This feels very similar to her last bowel obstruction  General surgery was consulted and recommended conservative management  NG tube placed in the emergency department  IV fluids  Antiemetics  Pain control with IV Toradol, IV Dilaudid    Leukocytosis- (present on admission)  Assessment & Plan  Likely due to SBO.  UA is pending  Repeat in the morning    S/P partial colectomy- (present on admission)  Assessment & Plan  She has a partial colectomy in 2014 due to diverticulitis.  She has had 1 bowel obstruction since this time  She cannot recall her  surgeon however states he is no longer in practice    JUNIOR (generalized anxiety disorder)- (present on admission)  Assessment & Plan  She takes Ativan orally nightly  I will change to IV while she is n.p.o.  Hold Lexapro and Wellbutrin while she is n.p.o.    GERD (gastroesophageal reflux disease)- (present on admission)  Assessment & Plan  Iv Protonix while she is NPO    Hyperlipidemia- (present on admission)  Assessment & Plan  She is not on a statin at home    Tobacco use disorder- (present on admission)  Assessment & Plan  -counseled for 5 minutes on tobacco cessation [id number]  -She declined a nicotine patch        VTE prophylaxis: Lovenox

## 2020-01-05 NOTE — PROGRESS NOTES
Pt up to BR. Back to bed. Became nauseated. Vomited a small amount. Back onto wall suction with only a small amount of gastric contents out. Zofran given. Pt states she feels better now.  NGT functioning and wall suction functioning.

## 2020-01-05 NOTE — CONSULTS
Surgery General Consultation    Date of Service  1/4/2020    Consulting Physician  Kvng Buckley M.D.    Reason for Consultation  Abdominal pain    History of Presenting Illness  74 y.o. female who presented 1/4/2020 with a 1 day history of vague intermittent and crampy abdominal pain associated with nausea. The pain was mild, but progressed over the day to a 10/10. The pain does not radiate anywhere. 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    Surgical History   has a past surgical history that includes hysterectomy, vaginal; low anterior resection robotic (1/28/2014); malik by laparoscopy (4/20/2015); primary c section; repeat c section; other abdominal surgery; shoulder arthroscopy; and abdominal hysterectomy total.    Family History  family history includes Cancer in her father, mother, and sister; Cancer (age of onset: 40) in her sister; Diabetes in her father; GI Disease in her sister; Lung Disease in her mother.    Social History   reports that she has been smoking cigarettes. She started smoking about 56 years ago. She has a 22.50 pack-year smoking history. She has never used smokeless tobacco. She reports current drug use. Drugs: Oral and Marijuana. She reports that she does not drink alcohol.    Medications  Prior to  Admission Medications   Prescriptions Last Dose Informant Patient Reported? Taking?   Cholecalciferol 2000 UNIT Tab 1/3/2020 at AM Patient Yes No   Sig: Take 2,000 Units by mouth every day.   LORazepam (ATIVAN) 1 MG Tab FEW DAYS AGO at PRN Patient Yes No   Sig: TAKE 1 TABLET BY MOUTH AT BEDTIME AS NEEDED FOR ANXIETY OR SLEEP FOR UP TO 30 DAYS   buPROPion SR (WELLBUTRIN-SR) 150 MG TABLET SR 12 HR sustained-release tablet 1/3/2020 at AM Patient Yes Yes   Sig: Take 150 mg by mouth every day.   escitalopram (LEXAPRO) 20 MG tablet 1/3/2020 at AM Patient No No   Sig: TAKE 1 TABLET BY MOUTH ONCE DAILY   pantoprazole (PROTONIX) 40 MG Tablet Delayed Response 1/3/2020 at AM Patient No No   Sig: TAKE 1 TABLET BY MOUTH ONCE DAILY      Facility-Administered Medications: None       Allergies  Allergies   Allergen Reactions   • Penicillins Rash     Rxn - Late 1990's     • Codeine Vomiting   • Morphine Rash     Localized red rash after morphine administration   • Vicodin [Hydrocodone-Acetaminophen] Itching       Physical Exam  Temp:  [36.6 °C (97.8 °F)] 36.6 °C (97.8 °F)  Pulse:  [63-74] 67  Resp:  [17-18] 18  BP: (127-167)/(64-87) 151/64  SpO2:  [91 %-98 %] 91 %    Physical Exam  General: NAD, appears well  Neuro: AOx3, no focal deficits  HEENT: neck supple, NGT in place  Chest: normal respiratory excursion and effort  Abdomen: soft, mildly distended, mild discomfort with LLQ deep palpation, no rebound, no guarding  Ext: ROM grossly intact  Skin: pink warm and dry      Fluids      Laboratory  Recent Labs     01/04/20  1330   WBC 13.2*   RBC 4.83   HEMOGLOBIN 14.8   HEMATOCRIT 43.6   MCV 90.3   MCH 30.6   MCHC 33.9   RDW 43.1   PLATELETCT 376   MPV 10.0     Recent Labs     01/04/20  1330   SODIUM 140   POTASSIUM 4.5   CHLORIDE 100   CO2 23   GLUCOSE 110*   BUN 15   CREATININE 0.61   CALCIUM 9.9                     Imaging  [id number] VIEW   Final Result      NGT tip is now in the stomach, directed cephalad.      [id number] VIEW    Final Result      NG tube is coiled in the distal esophagus, tip directed cephalad. Recommend repositioning.      CT-ABDOMEN-PELVIS WITH   Final Result      Mildly dilated loops of small bowel along with distal decompressed loops of small bowel suggesting at least partial or early small bowel obstruction with transition point appearing to be in the midline pelvis.      Diverticulosis without evidence of diverticulitis.      Atherosclerotic disease.          Assessment/Plan  75 y/o female with pSBO secondary to intra-abdominal adhesions  1. Appreciate care per medical team  2. NPO  3. NGT  4. IV fluids  5. Daily CBC/BMP  6. If not resolved Monday, will plan on gastrograffin SBS

## 2020-01-05 NOTE — PROGRESS NOTES
Received report from day shift nurse.   Assumed pt care   Pt is A&O x4, resting comfortably in bed.   Pt on r.a.. No signs of SOB/respiratory distress.   Labs noted, VSS.   Pt stated that pain is tolerable at this moment  Assessment completed, NG-tube to L-nare in place with low-intermittent suction, brownish gastric output noted    Denied flatus  Fall precautions in place.   Call light and personal belongings within reach.   Continue to monitor

## 2020-01-06 VITALS
HEIGHT: 62 IN | SYSTOLIC BLOOD PRESSURE: 129 MMHG | WEIGHT: 156.53 LBS | HEART RATE: 64 BPM | OXYGEN SATURATION: 95 % | BODY MASS INDEX: 28.8 KG/M2 | RESPIRATION RATE: 18 BRPM | TEMPERATURE: 98.7 F | DIASTOLIC BLOOD PRESSURE: 69 MMHG

## 2020-01-06 PROCEDURE — 99239 HOSP IP/OBS DSCHRG MGMT >30: CPT | Performed by: INTERNAL MEDICINE

## 2020-01-06 PROCEDURE — 700102 HCHG RX REV CODE 250 W/ 637 OVERRIDE(OP): Performed by: INTERNAL MEDICINE

## 2020-01-06 PROCEDURE — A9270 NON-COVERED ITEM OR SERVICE: HCPCS | Performed by: INTERNAL MEDICINE

## 2020-01-06 PROCEDURE — 99406 BEHAV CHNG SMOKING 3-10 MIN: CPT

## 2020-01-06 RX ORDER — AMOXICILLIN 250 MG
2 CAPSULE ORAL 2 TIMES DAILY
Status: DISCONTINUED | OUTPATIENT
Start: 2020-01-06 | End: 2020-01-06 | Stop reason: HOSPADM

## 2020-01-06 RX ORDER — POLYETHYLENE GLYCOL 3350 17 G/17G
1 POWDER, FOR SOLUTION ORAL DAILY
Status: DISCONTINUED | OUTPATIENT
Start: 2020-01-06 | End: 2020-01-06 | Stop reason: HOSPADM

## 2020-01-06 RX ORDER — POLYETHYLENE GLYCOL 3350 17 G/17G
17 POWDER, FOR SOLUTION ORAL
COMMUNITY
Start: 2020-01-06

## 2020-01-06 RX ORDER — BISACODYL 10 MG
10 SUPPOSITORY, RECTAL RECTAL
Status: DISCONTINUED | OUTPATIENT
Start: 2020-01-06 | End: 2020-01-06 | Stop reason: HOSPADM

## 2020-01-06 RX ADMIN — POLYETHYLENE GLYCOL 3350 1 PACKET: 17 POWDER, FOR SOLUTION ORAL at 09:04

## 2020-01-06 NOTE — DISCHARGE INSTRUCTIONS
Discharge Instructions per Sheila Ponce M.D.    Follow up with primary care    DIET: regular    ACTIVITY: as tolerated    DIAGNOSIS: partial small bowel obstruction    Return to ER if recurrent issues    Discharge Instructions    Discharged to home by car with relative. Discharged via wheelchair, hospital escort: Yes.  Special equipment needed: Not Applicable    Be sure to schedule a follow-up appointment with your primary care doctor or any specialists as instructed.     Discharge Plan:   Diet Plan: Discussed  Activity Level: Discussed  Smoking Cessation Offered: Patient Counseled  Confirmed Follow up Appointment: Patient to Call and Schedule Appointment  Confirmed Symptoms Management: Discussed  Medication Reconciliation Updated: Yes    I understand that a diet low in cholesterol, fat, and sodium is recommended for good health. Unless I have been given specific instructions below for another diet, I accept this instruction as my diet prescription.   Other diet: regular diet as tolerated    Special Instructions:   Small Bowel Obstruction  A small bowel obstruction is a blockage in the small bowel. The small bowel, which is also called the small intestine, is a long, slender tube that connects the stomach to the colon. When a person eats and drinks, food and fluids go from the stomach to the small bowel. This is where most of the nutrients in the food and fluids are absorbed.  A small bowel obstruction will prevent food and fluids from passing through the small bowel as they normally do during digestion. The small bowel can become partially or completely blocked. This can cause symptoms such as abdominal pain, vomiting, and bloating. If this condition is not treated, it can be dangerous because the small bowel could rupture.  What are the causes?  Common causes of this condition include:  · Scar tissue from previous surgery or radiation treatment.  · Recent surgery. This may cause the movements of the  bowel to slow down and cause food to block the intestine.  · Hernias.  · Inflammatory bowel disease (colitis).  · Twisting of the bowel (volvulus).  · Tumors.  · A foreign body.  · Slipping of a part of the bowel into another part (intussusception).  What are the signs or symptoms?  Symptoms of this condition include:  · Abdominal pain. This may be dull cramps or sharp pain. It may occur in one area, or it may be present in the entire abdomen. Pain can range from mild to severe, depending on the degree of obstruction.  · Nausea and vomiting. Vomit may be greenish or a yellow bile color.  · Abdominal bloating.  · Constipation.  · Lack of passing gas.  · Frequent belching.  · Diarrhea. This may occur if the obstruction is partial and runny stool is able to leak around the obstruction.  How is this diagnosed?  This condition may be diagnosed based on a physical exam, medical history, and X-rays of the abdomen. You may also have other tests, such as a CT scan of the abdomen and pelvis.  How is this treated?  Treatment for this condition depends on the cause and severity of the problem. Treatment options may include:  · Bed rest along with fluids and pain medicines that are given through an IV tube inserted into one of your veins. Sometimes, this is all that is needed for the obstruction to improve.  · Following a simple diet. In some cases, a clear liquid diet may be required for several days. This allows the bowel to rest.  · Placement of a small tube (nasogastric tube) into the stomach. When the bowel is blocked, it usually swells up like a balloon that is filled with air and fluids. The air and fluids may be removed by suction through the nasogastric tube. This can help with pain, discomfort, and nausea. It can also help the obstruction to clear up faster.  · Surgery. This may be required if other treatments do not work. Bowel obstruction from a hernia may require early surgery and can be an emergency procedure.  Surgery may also be required for scar tissue that causes frequent or severe obstructions.  Follow these instructions at home:  · Get plenty of rest.  · Follow instructions from your health care provider about eating restrictions. You may need to avoid solid foods and consume only clear liquids until your condition improves.  · Take over-the-counter and prescription medicines only as told by your health care provider.  · Keep all follow-up visits as told by your health care provider. This is important.  Contact a health care provider if:  · You have a fever.  · You have chills.  Get help right away if:  · You have increased pain or cramping.  · You vomit blood.  · You have uncontrolled vomiting or nausea.  · You cannot drink fluids because of vomiting or pain.  · You develop confusion.  · You begin feeling very dry or thirsty (dehydrated).  · You have severe bloating.  · You feel extremely weak or you faint.  This information is not intended to replace advice given to you by your health care provider. Make sure you discuss any questions you have with your health care provider.  Document Released: 03/06/2007 Document Revised: 08/14/2017 Document Reviewed: 02/11/2016  [employer] Patient Education © 2017 [employer]    · Is patient discharged on Warfarin / Coumadin?   No     Depression / Suicide Risk    As you are discharged from this [facility] facility, it is important to learn how to keep safe from harming yourself.    Recognize the warning signs:  · Abrupt changes in personality, positive or negative- including increase in energy   · Giving away possessions  · Change in eating patterns- significant weight changes-  positive or negative  · Change in sleeping patterns- unable to sleep or sleeping all the time   · Unwillingness or inability to communicate  · Depression  · Unusual sadness, discouragement and loneliness  · Talk of wanting to die  · Neglect of personal appearance   · Rebelliousness-  reckless behavior  · Withdrawal from people/activities they love  · Confusion- inability to concentrate     If you or a loved one observes any of these behaviors or has concerns about self-harm, here's what you can do:  · Talk about it- your feelings and reasons for harming yourself  · Remove any means that you might use to hurt yourself (examples: pills, rope, extension cords, firearm)  · Get professional help from the community (Mental Health, Substance Abuse, psychological counseling)  · Do not be alone:Call your Safe Contact- someone whom you trust who will be there for you.  · Call your local CRISIS HOTLINE 392-9263 or 274-945-3472  · Call your local Children's Mobile Crisis Response Team [place] (993) 136-1441 or www.[employer]Chapters  · Call the toll free National Suicide Prevention Hotlines   · National Suicide Prevention Lifeline 348-507-BPVS (5544)  · National Hope Line Network 800-SUICIDE (840-4148)

## 2020-01-06 NOTE — DISCHARGE SUMMARY
Discharge Summary    CHIEF COMPLAINT ON ADMISSION  Chief Complaint   Patient presents with   • Constipation   • N/V   • Abdominal Pain       Reason for Admission  Abd Pain,Constipation     Admission Date  1/4/2020    CODE STATUS  Full Code    HPI & HOSPITAL COURSE  *74-year-old female with previous abdominal surgery presents with recurrent small bowel obstruction, she had one prior episode that did not require surgery.  NG tube placed in the emergency room*      The following morning she was having a lot of discomfort from the NG and not much output, it was discontinued by surgery she was passing gas and having a very small BMs.  She continued to have improvement of her pain, and continued to have gas and BMs.  She felt nauseated this morning but thought it was because she had not been eating indeed after being started on diet her symptoms improved.    Therefore, she is discharged in good and stable condition to home with close outpatient follow-up.    The patient met 2-midnight criteria for an inpatient stay at the time of discharge.    Discharge Date  1/6/2020    FOLLOW UP ITEMS POST DISCHARGE  PCP    DISCHARGE DIAGNOSES  Principal Problem:    SBO (small bowel obstruction) (HCC) POA: Yes  Active Problems:    Tobacco use disorder POA: Yes    Hyperlipidemia POA: Yes    GERD (gastroesophageal reflux disease) POA: Yes    JUNIOR (generalized anxiety disorder) POA: Yes    S/P partial colectomy POA: Yes      Overview: Partial colectomy January 2014    Leukocytosis POA: Yes  Resolved Problems:    * No resolved hospital problems. *      FOLLOW UP  Future Appointments   Date Time Provider Department Center   2/11/2020 11:40 AM GEOVANNA MESSINA MG 1 [facility] OSCAR Baptiset M.D.  [address]  [address]  [facility] [address]  [phone]    In 10 days        MEDICATIONS ON DISCHARGE     Medication List      CONTINUE taking these medications      Instructions   buPROPion  MG Tb12 sustained-release  tablet  Commonly known as:  WELLBUTRIN-SR   Take 150 mg by mouth every day.  Dose:  150 mg     Cholecalciferol 2000 UNIT Tabs   Take 2,000 Units by mouth every day.  Dose:  2,000 Units     escitalopram 20 MG tablet  Commonly known as:  LEXAPRO   TAKE 1 TABLET BY MOUTH ONCE DAILY     LORazepam 1 MG Tabs  Commonly known as:  ATIVAN   TAKE 1 TABLET BY MOUTH AT BEDTIME AS NEEDED FOR ANXIETY OR SLEEP FOR UP TO 30 DAYS     pantoprazole 40 MG Tbec  Commonly known as:  PROTONIX   TAKE 1 TABLET BY MOUTH ONCE DAILY     polyethylene glycol/lytes Pack  Commonly known as:  MIRALAX   Take 1 Packet by mouth 1 time daily as needed. No good BM in 24 hrs  Dose:  17 g     Psyllium 28 % packet  Commonly known as:  METAMUCIL   Take 1 Packet by mouth every day.  Dose:  1 Packet            Allergies  Allergies   Allergen Reactions   • Penicillins Rash     Rxn - Late 1990's     • Codeine Vomiting   • Morphine Rash     Localized red rash after morphine administration   • Vicodin [Hydrocodone-Acetaminophen] Itching       DIET  Orders Placed This Encounter   Procedures   • Diet Order Regular     Standing Status:   Standing     Number of Occurrences:   1     Order Specific Question:   Diet:     Answer:   Regular [1]       ACTIVITY  As tolerated.  Weight bearing as tolerated    CONSULTATIONS  Kvng Buckley MD    PROCEDURES  none    LABORATORY  Lab Results   Component Value Date    SODIUM 140 01/05/2020    POTASSIUM 4.4 01/05/2020    CHLORIDE 104 01/05/2020    CO2 23 01/05/2020    GLUCOSE 112 (H) 01/05/2020    BUN 11 01/05/2020    CREATININE 0.52 01/05/2020        Lab Results   Component Value Date    WBC 10.3 01/05/2020    HEMOGLOBIN 13.9 01/05/2020    HEMATOCRIT 42.2 01/05/2020    PLATELETCT 318 01/05/2020        Total time of the discharge process exceeds 33 minutes.

## 2020-01-06 NOTE — PROGRESS NOTES
Pt discharged per order. Discussed discharge meds, activity, and follow up. Pt states understanding and asks no further questions.  Escorted to private transportation by staff.

## 2020-01-06 NOTE — PROGRESS NOTES
Received report from day shift nurse.   Assumed pt care   Pt is A&Ox4, resting comfortably in bed.   Pt on r.a.. No signs of SOB/respiratory distress.   Labs noted, VSS.   Pt c/o soreness/tenderness to abdomen; prn Toradol 15mg given per MAR   Pt reported that she had a medium bowel movement today and it was very hard; + flatus   Denied any nausea or vomiting   Assessment completed, hypoactive bowel sounds x4 quads.   IVF running at 50mL/hr   Fall precautions in place.   Bed at lowest position.   Call light and personal belongings within reach.   Continue to monitor

## 2020-01-06 NOTE — DOCUMENTATION QUERY
[facility]                                                                       Query Response Note      PATIENT:               ALBARO SHULTZ  ACCT #:                  [id number]  MRN:                     [id number]  :                      1945  ADMIT DATE:       2020 1:13 PM  DISCH DATE:          RESPONDING  PROVIDER #:        [id number]           QUERY TEXT:    Depression is documented in the Medical Record.  Please specify the type.    NOTE:  If an appropriate response is not listed below, please respond with a new note.              The patient's Clinical Indicators include:  ? Findings.  history of major depression per ED provider note, H&P and progress notes  ? Treatments:  wellbutrin and Lexapro   ? Risk factors: anxiety disorder, repeat bowel obstructions  Options provided:   -- MDD, recurrent, in full remission   -- MDD, single episode, in full remission   -- MDD, recurrent, in partial remission   -- MDD, single episode, in partial remission   -- MDD, mild, single episode   -- MDD, mild, recurrent, current episode   -- MDD, moderate, single episode   -- MDD, moderate, recurrent, current episode   -- MDD, severe, single episode, without psychotic features   -- MDD, severe, single episode, with psychotic features   -- MDD, severe, recurrent, current episode, without psychotic features   -- MDD, severe, recurrent, current episode, with psychotic features   -- Situational/Grief Reaction depression   -- Unable to determine      Query created by: Eve Rhoades on 2020 1:35 PM    RESPONSE TEXT:    MDD, recurrent, in full remission          Electronically signed by:  ELOISA ORTIZ 2020 2:17 PM

## 2020-01-06 NOTE — CARE PLAN
Problem: Bowel/Gastric:  Goal: Will not experience complications related to bowel motility  Outcome: PROGRESSING AS EXPECTED  Note:   Pt reported that she has a bowel movement last night (01/05) and it was pretty hard   Pt passed gas and ambulated in the hallway during daytime   Denied any nausea/vomiting   Tolerated ice chips      Problem: Pain Management  Goal: Pain level will decrease to patient's comfort goal  Outcome: PROGRESSING AS EXPECTED  Note:   Prn Toradol 15mg given per MAR for c/o soreness to abd   Ativan 0.5mg given to help pt sleeping   Pt resting comfortably in bed at this moment - will continue to monitor and medicate as needed

## 2020-01-06 NOTE — RESPIRATORY CARE
" COPD EDUCATION by COPD CLINICAL EDUCATOR  1/6/2020 at 11:54 AM by Kiara Truong     Patient reviewed by COPD education team. Smoking Cessation Intervention and education completed, 10 minutes spent on smoking cessation education with patient    Provided smoking cessation packet with \"Tips to Quit\" and flyer for \"Free Smoking Cessation Classes\".     "

## 2020-01-06 NOTE — PROGRESS NOTES
Pt up ambulating the hallway. States she is passing gas and had a hard BM. CLD ordered. Miralax given. States pain and nausea not an issue right now.

## 2020-01-06 NOTE — CARE PLAN
Problem: Bowel/Gastric:  Goal: Normal bowel function is maintained or improved  Outcome: PROGRESSING AS EXPECTED     Problem: Safety  Goal: Will remain free from falls  Outcome: MET     Problem: Infection  Goal: Will remain free from infection  Outcome: MET     Problem: Venous Thromboembolism (VTW)/Deep Vein Thrombosis (DVT) Prevention:  Goal: Patient will participate in Venous Thrombosis (VTE)/Deep Vein Thrombosis (DVT)Prevention Measures  Outcome: MET

## 2020-01-06 NOTE — PROGRESS NOTES
"Surgery General Daily Progress Note    Date of Service  1/6/2020    Subjective  Patient describes mild discomfort today- says her abdominal pain is mostly gone, but she feels she may have some nausea coming soon. She says she feels this way after she has not eaten for a while. She is passing gas, had a BM yesterday, and once today.         Physical Exam  Temp:  [36.7 °C (98 °F)-37 °C (98.6 °F)] 36.7 °C (98 °F)  Pulse:  [62-75] 68  Resp:  [18] 18  BP: (115-147)/(50-62) 115/58  SpO2:  [90 %-93 %] 93 %    Physical Exam  General: NAD, AOx3  Neuro: Aox3, no focal deficits  HEENT: neck supple, MMM  Chest: normal respiratory excursion and effort  Abdomen: soft, ND, mild discomfort with deep palpation of LLQ  Ext: pink warm and dry      Fluids    Intake/Output Summary (Last 24 hours) at 1/6/2020 1131  Last data filed at 1/6/2020 0400  Gross per 24 hour   Intake 1000 ml   Output --   Net 1000 ml       Laboratory  Recent Labs     01/04/20  1330 01/05/20  0321   WBC 13.2* 10.3   RBC 4.83 4.52   HEMOGLOBIN 14.8 13.9   HEMATOCRIT 43.6 42.2   MCV 90.3 93.4   MCH 30.6 30.8   MCHC 33.9 32.9*   RDW 43.1 45.4   PLATELETCT 376 318   MPV 10.0 10.3     Recent Labs     01/04/20  1330 01/05/20  0321   SODIUM 140 140   POTASSIUM 4.5 4.4   CHLORIDE 100 104   CO2 23 23   GLUCOSE 110* 112*   BUN 15 11   CREATININE 0.61 0.52   CALCIUM 9.9 8.7                     Assessment/Plan    73 y/o female with resolving pSBO secondary to adhesions, but she feels \"not well\" this am  1. NGT out yesterday without nausea  2. Reg diet today  3. If nausea or discomfort return progress, will obtain SBS with gastrograffin  4. Will continue to follow closely with you  5. No narcotic pain medicine, tylenol ok    "

## 2020-02-07 ENCOUNTER — PATIENT OUTREACH (OUTPATIENT)
Dept: HEALTH INFORMATION MANAGEMENT | Facility: OTHER | Age: 75
End: 2020-02-07

## 2020-02-11 ENCOUNTER — HOSPITAL ENCOUNTER (OUTPATIENT)
Dept: RADIOLOGY | Facility: MEDICAL CENTER | Age: 75
End: 2020-02-11
Attending: FAMILY MEDICINE
Payer: MEDICARE

## 2020-02-11 DIAGNOSIS — Z12.31 VISIT FOR SCREENING MAMMOGRAM: ICD-10-CM

## 2020-02-11 PROCEDURE — 77067 SCR MAMMO BI INCL CAD: CPT

## 2020-02-11 NOTE — PROGRESS NOTES
A 74-year-old female was an emergent admission to [facility] from 1/4/2020 to 1/6/2020 to treat Unsp intestnl obst, unsp as to partial versus complete obst. IHD visited the patient bedside. The patient was discharged Home. No additional medical conditions were listed. The patient was not under clinical case management.     The patient was ordered to follow-up with PCP and Outpatient Services. The patient had the following appointment: 2/3/2020 @ 10:00 Kishore Baptiste general/family practice - CONFIRMED AS KEPT. The patient has a future appointment scheduled for 2/11/2020 @ 11:40 Diagnostic Imaging or Testing, diagnostic imaging - SCHEDULED.       IHD was unable to reach patient. despite multiple outreaches to patient's primary phone. ER contacts, providers, and any other contact provided to us from the patient while inpatient.. IHD did confirm that patient picked up medications and attended appointments.

## 2020-02-14 ENCOUNTER — PATIENT OUTREACH (OUTPATIENT)
Dept: HEALTH INFORMATION MANAGEMENT | Facility: OTHER | Age: 75
End: 2020-02-14

## 2020-02-14 NOTE — PROGRESS NOTES
Called patient to follow up on IHD discharge. No answer, left message. If the patient calls back, please transfer to [phone].    Attempt # 1

## 2020-02-19 ENCOUNTER — PATIENT MESSAGE (OUTPATIENT)
Dept: HEALTH INFORMATION MANAGEMENT | Facility: OTHER | Age: 75
End: 2020-02-19

## 2020-02-19 NOTE — PROGRESS NOTES
Attempted to call patient and follow up on IHD discharge. Both numbers on file are no longer associated with the patient. Sent a [employer] message with my contact information. If the patient calls in, please transfer to [phone].    Attempt # 2

## 2020-03-19 ENCOUNTER — PATIENT MESSAGE (OUTPATIENT)
Dept: MEDICAL GROUP | Facility: MEDICAL CENTER | Age: 75
End: 2020-03-19

## 2020-03-19 DIAGNOSIS — F41.1 GAD (GENERALIZED ANXIETY DISORDER): ICD-10-CM

## 2020-03-19 RX ORDER — BUPROPION HYDROCHLORIDE 150 MG/1
150 TABLET, EXTENDED RELEASE ORAL DAILY
Qty: 180 TAB | Refills: 0 | Status: SHIPPED | OUTPATIENT
Start: 2020-03-19 | End: 2020-10-12

## 2020-03-19 RX ORDER — LORAZEPAM 1 MG/1
1 TABLET ORAL NIGHTLY PRN
Qty: 30 TAB | Refills: 2 | Status: SHIPPED
Start: 2020-03-19 | End: 2020-04-18

## 2020-03-19 RX ORDER — ESCITALOPRAM OXALATE 20 MG/1
20 TABLET ORAL DAILY
Qty: 90 TAB | Refills: 0 | Status: SHIPPED | OUTPATIENT
Start: 2020-03-19 | End: 2020-10-12 | Stop reason: SDUPTHER

## 2020-03-19 NOTE — TELEPHONE ENCOUNTER
From: Dina Crews  To: Kishore Baptiste M.D.  Sent: 3/19/2020 7:51 AM PDT  Subject: Prescription Question    Dr. Baptiste, why are all my medications not available to me? Really need my Ativan and Anti Depression meds.

## 2020-05-05 ENCOUNTER — PATIENT OUTREACH (OUTPATIENT)
Dept: HEALTH INFORMATION MANAGEMENT | Facility: OTHER | Age: 75
End: 2020-05-05

## 2020-05-05 NOTE — PROGRESS NOTES
Outcome: Left Message to call back so I can schedule the pt for their AWV    Please transfer to Patient Outreach Team at [phone] when patient returns call.    [employer] Verified: yes    Attempt # 1

## 2020-05-14 ENCOUNTER — APPOINTMENT (RX ONLY)
Dept: URBAN - METROPOLITAN AREA CLINIC 35 | Facility: CLINIC | Age: 75
Setting detail: DERMATOLOGY
End: 2020-05-14

## 2020-05-14 VITALS — HEIGHT: 63 IN | WEIGHT: 150 LBS

## 2020-05-14 DIAGNOSIS — L57.0 ACTINIC KERATOSIS: ICD-10-CM

## 2020-05-14 PROBLEM — D48.5 NEOPLASM OF UNCERTAIN BEHAVIOR OF SKIN: Status: ACTIVE | Noted: 2020-05-14

## 2020-05-14 PROCEDURE — 17000 DESTRUCT PREMALG LESION: CPT | Mod: 59

## 2020-05-14 PROCEDURE — ? BIOPSY BY SHAVE METHOD

## 2020-05-14 PROCEDURE — ? LIQUID NITROGEN

## 2020-05-14 PROCEDURE — 11102 TANGNTL BX SKIN SINGLE LES: CPT

## 2020-05-14 PROCEDURE — ? COUNSELING

## 2020-05-14 ASSESSMENT — LOCATION SIMPLE DESCRIPTION DERM: LOCATION SIMPLE: RIGHT LIP

## 2020-05-14 ASSESSMENT — LOCATION ZONE DERM: LOCATION ZONE: LIP

## 2020-05-14 ASSESSMENT — LOCATION DETAILED DESCRIPTION DERM: LOCATION DETAILED: RIGHT UPPER CUTANEOUS LIP

## 2020-05-14 NOTE — PROCEDURE: LIQUID NITROGEN
Post-Care Instructions: I reviewed with the patient in detail post-care instructions. Patient is to wear sunprotection, and avoid picking at any of the treated lesions. Pt may apply Vaseline to crusted or scabbing areas.
Render Note In Bullet Format When Appropriate: No
Duration Of Freeze Thaw-Cycle (Seconds): 2
Consent: The patient's consent was obtained including but not limited to risks of crusting, scabbing, blistering, scarring, darker or lighter pigmentary change, recurrence, incomplete removal and infection.
Number Of Freeze-Thaw Cycles: 2 freeze-thaw cycles
Detail Level: Detailed

## 2020-05-14 NOTE — PROCEDURE: BIOPSY BY SHAVE METHOD
Detail Level: Detailed
Depth Of Biopsy: dermis
Was A Bandage Applied: Yes
Size Of Lesion In Cm: 0
Biopsy Type: H and E
Biopsy Method: Dermablade
Anesthesia Type: 0.5% lidocaine with 1:200,000 epinephrine and a 1:10 solution of 8.4% sodium bicarbonate
Anesthesia Volume In Cc: 0.5
Hemostasis: Aluminum Chloride
Wound Care: Petrolatum
Dressing: Band-Aid
Destruction After The Procedure: No
Type Of Destruction Used: Curettage
Curettage Text: The wound bed was treated with curettage after the biopsy was performed.
Electrodesiccation And Curettage Text: The wound bed was treated with electrodesiccation and curettage after the biopsy was
Lab: [id number]
Lab Facility: 
Consent: Written consent was obtained and risks were reviewed including but not limited to scarring, infection, bleeding, scabbing, incomplete removal, nerve damage and allergy to anesthesia.
Post-Care Instructions: I reviewed with the patient in detail post-care instructions. Patient is to keep the biopsy site dry overnight, and then apply white petrolatum twice daily until healed. Patient may apply hydrogen peroxide soaks to remove any crusting.
Notification Instructions: Patient will be notified of biopsy results. However, patient instructed to call the office if not contacted within 2 weeks.
Billing Type: Third-Party Bill
Information: Selecting Yes will display possible errors in your note based on the variables you have selected. This validation is only offered as a suggestion for you. PLEASE NOTE THAT THE VALIDATION TEXT WILL BE REMOVED WHEN YOU FINALIZE YOUR NOTE. IF YOU WANT TO FAX A PRELIMINARY NOTE YOU WILL NEED TO TOGGLE THIS TO 'NO' IF YOU DO NOT WANT IT IN YOUR FAXED NOTE.

## 2020-06-02 ENCOUNTER — TELEPHONE (OUTPATIENT)
Dept: HEALTH INFORMATION MANAGEMENT | Facility: OTHER | Age: 75
End: 2020-06-02

## 2020-06-02 ENCOUNTER — TELEPHONE (OUTPATIENT)
Dept: MEDICAL GROUP | Facility: MEDICAL CENTER | Age: 75
End: 2020-06-02

## 2020-06-02 NOTE — TELEPHONE ENCOUNTER
VOICEMAIL  1. Caller Name: Sarah                      Call Back Number: [phone]    2. Message: Patient reports a flare up of Diverticulitis, onset was on Sunday and pain is worsening. Patient requesting rx for treatment. Please be advised that patient is allergic to Cipro. She is also requesting Diflucan because she is prone to yeast infections. Please advise.    3. Patient approves office to leave a detailed voicemail/MyChart message: N\A

## 2020-06-03 RX ORDER — METRONIDAZOLE 500 MG/1
500 TABLET ORAL 3 TIMES DAILY
Qty: 21 TAB | Refills: 0 | Status: SHIPPED | OUTPATIENT
Start: 2020-06-03 | End: 2020-06-10

## 2020-06-03 RX ORDER — SULFAMETHOXAZOLE AND TRIMETHOPRIM 800; 160 MG/1; MG/1
1 TABLET ORAL 2 TIMES DAILY
Qty: 14 TAB | Refills: 0 | Status: SHIPPED | OUTPATIENT
Start: 2020-06-03 | End: 2020-06-19 | Stop reason: SDUPTHER

## 2020-06-03 RX ORDER — FLUCONAZOLE 150 MG/1
150 TABLET ORAL DAILY
Qty: 5 TAB | Refills: 0 | Status: SHIPPED | OUTPATIENT
Start: 2020-06-03 | End: 2020-06-08

## 2020-06-03 NOTE — TELEPHONE ENCOUNTER
Prescription for 2 antibiotics sent in, bactrim and flagyl. Prescription for diflucan to help with yeast infections.  Patient is due for annual appointment.  Kishore Baptiste M.D.

## 2020-06-04 NOTE — PROGRESS NOTES
Outcome: Left Message to call back so I can schedule over due AWV     Please transfer to Patient Outreach Team at [phone] when patient returns call.    [employer] Verified: yes    Attempt # 3

## 2020-06-22 RX ORDER — SULFAMETHOXAZOLE AND TRIMETHOPRIM 800; 160 MG/1; MG/1
1 TABLET ORAL 2 TIMES DAILY
Qty: 14 TAB | Refills: 0 | Status: SHIPPED | OUTPATIENT
Start: 2020-06-22 | End: 2020-06-29

## 2020-07-02 ENCOUNTER — TELEPHONE (OUTPATIENT)
Dept: MEDICAL GROUP | Facility: MEDICAL CENTER | Age: 75
End: 2020-07-02

## 2020-07-02 DIAGNOSIS — R10.9 ABDOMINAL PAIN, UNSPECIFIED ABDOMINAL LOCATION: ICD-10-CM

## 2020-07-02 NOTE — TELEPHONE ENCOUNTER
Urgent GI referral done.  If she gets worse, please have her go to the ER.  Kishore Baptiste M.D.

## 2020-07-02 NOTE — TELEPHONE ENCOUNTER
VOICEMAIL  1. Caller Name: Sarah                      Call Back Number: [phone]    2. Message: Patient reports diverticulitis persists after 2 rounds of medication. Patient is requesting urgent referral to GI for evaluation. Please advise.    3. Patient approves office to leave a detailed voicemail/MyChart message: N\A

## 2020-07-07 ENCOUNTER — HOSPITAL ENCOUNTER (OUTPATIENT)
Dept: RADIOLOGY | Facility: MEDICAL CENTER | Age: 75
End: 2020-07-07
Attending: FAMILY MEDICINE
Payer: MEDICARE

## 2020-07-07 ENCOUNTER — HOSPITAL ENCOUNTER (OUTPATIENT)
Dept: LAB | Facility: MEDICAL CENTER | Age: 75
End: 2020-07-07
Attending: FAMILY MEDICINE
Payer: MEDICARE

## 2020-07-07 ENCOUNTER — TELEPHONE (OUTPATIENT)
Dept: MEDICAL GROUP | Facility: MEDICAL CENTER | Age: 75
End: 2020-07-07

## 2020-07-07 ENCOUNTER — OFFICE VISIT (OUTPATIENT)
Dept: MEDICAL GROUP | Facility: MEDICAL CENTER | Age: 75
End: 2020-07-07
Payer: MEDICARE

## 2020-07-07 VITALS
HEART RATE: 66 BPM | OXYGEN SATURATION: 96 % | TEMPERATURE: 99.4 F | HEIGHT: 63 IN | BODY MASS INDEX: 28.35 KG/M2 | SYSTOLIC BLOOD PRESSURE: 118 MMHG | WEIGHT: 160 LBS | DIASTOLIC BLOOD PRESSURE: 76 MMHG

## 2020-07-07 DIAGNOSIS — R10.32 LEFT LOWER QUADRANT ABDOMINAL PAIN: ICD-10-CM

## 2020-07-07 LAB
ANION GAP SERPL CALC-SCNC: 11 MMOL/L (ref 7–16)
BUN SERPL-MCNC: 11 MG/DL (ref 8–22)
CALCIUM SERPL-MCNC: 9.7 MG/DL (ref 8.4–10.2)
CHLORIDE SERPL-SCNC: 102 MMOL/L (ref 96–112)
CO2 SERPL-SCNC: 27 MMOL/L (ref 20–33)
CREAT SERPL-MCNC: 0.7 MG/DL (ref 0.5–1.4)
FASTING STATUS PATIENT QL REPORTED: NORMAL
GLUCOSE SERPL-MCNC: 101 MG/DL (ref 65–99)
POTASSIUM SERPL-SCNC: 4.6 MMOL/L (ref 3.6–5.5)
SODIUM SERPL-SCNC: 140 MMOL/L (ref 135–145)

## 2020-07-07 PROCEDURE — 700117 HCHG RX CONTRAST REV CODE 255: Performed by: FAMILY MEDICINE

## 2020-07-07 PROCEDURE — 36415 COLL VENOUS BLD VENIPUNCTURE: CPT

## 2020-07-07 PROCEDURE — 74177 CT ABD & PELVIS W/CONTRAST: CPT

## 2020-07-07 PROCEDURE — 99214 OFFICE O/P EST MOD 30 MIN: CPT | Performed by: FAMILY MEDICINE

## 2020-07-07 PROCEDURE — 80048 BASIC METABOLIC PNL TOTAL CA: CPT

## 2020-07-07 RX ADMIN — IOHEXOL 25 ML: 240 INJECTION, SOLUTION INTRATHECAL; INTRAVASCULAR; INTRAVENOUS; ORAL at 14:36

## 2020-07-07 RX ADMIN — IOHEXOL 100 ML: 350 INJECTION, SOLUTION INTRAVENOUS at 14:37

## 2020-07-07 ASSESSMENT — FIBROSIS 4 INDEX: FIB4 SCORE: 1.11

## 2020-07-07 NOTE — TELEPHONE ENCOUNTER
----- Message from Kishore Baptiste M.D. sent at 7/7/2020  3:04 PM PDT -----  Please notify patient that her CT scan shows no diverticulitis or bowel obstruction.  Please have her use MiraLAX daily to clean out her bowels and hopefully her pain improves.  Kishore Baptiste M.D.

## 2020-07-07 NOTE — PROGRESS NOTES
Subjective:   Dina Crews is a 74 y.o. female here today for left lower quadrant abdominal pain    Has been having left lower quadrant abdominal pain that is dull and constant, started over a month ago. She has had diverticulitis about 8 times, similar symptoms, but this time Bactrim and Flagyl did not improve symptoms at all. Laying down helps with the pain. Walking or standing makes it worse.  She has history of small bowel obstruction and partial colectomy.  She uses Miralax about every other day for constipation. Sometimes she doesn't have a BM for a week.      Current medicines (including changes today)  Current Outpatient Medications   Medication Sig Dispense Refill   • buPROPion SR (WELLBUTRIN-SR) 150 MG TABLET SR 12 HR sustained-release tablet Take 1 Tab by mouth every day. 180 Tab 0   • escitalopram (LEXAPRO) 20 MG tablet Take 1 Tab by mouth every day. 90 Tab 0   • Psyllium (METAMUCIL) 28 % packet Take 1 Packet by mouth every day.     • polyethylene glycol/lytes (MIRALAX) Pack Take 1 Packet by mouth 1 time daily as needed. No good BM in 24 hrs     • pantoprazole (PROTONIX) 40 MG Tablet Delayed Response TAKE 1 TABLET BY MOUTH ONCE DAILY 90 Tab 3   • Cholecalciferol 2000 UNIT Tab Take 2,000 Units by mouth every day.       No current facility-administered medications for this visit.      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    ROS   No fever, + constipation, no dysuria. + GERD, + fatigue.       Objective:     /76   Pulse 66   Temp 37.4 °C (99.4 °F) (Temporal)   Ht 1.6 m (5' 3\")   Wt 72.6 kg (160 lb)   SpO2 96%  Body mass index is 28.34 kg/m².   Physical Exam:  Constitutional: Alert, no distress.  Skin: Warm, dry, good turgor, no rashes in visible areas.  Eye: Equal, round and reactive, conjunctiva clear, lids normal.  Cardiovascular: Normal S1, S2, no murmur, no edema.  Abdomen: Soft, tenderness in left lower quadrant, milder tenderness in right lower quadrant.  Psych: Alert and oriented x3, normal affect and mood.        Assessment and Plan:   The following treatment plan was discussed    1. Left lower quadrant abdominal pain  New problem. Check CT scan. Advised Miralax daily and find dose that works to keep constipation and bowel movements loose.  - CT-ABDOMEN-PELVIS WITH        Followup: Return if symptoms worsen or fail to improve.         "

## 2020-07-08 ENCOUNTER — PATIENT OUTREACH (OUTPATIENT)
Dept: HEALTH INFORMATION MANAGEMENT | Facility: OTHER | Age: 75
End: 2020-07-08

## 2020-07-08 NOTE — PROGRESS NOTES
Called member to wish her a happy birthday. Her room mate took a message as she was not available to speak at this time. If the member calls in please transfer to [phone]

## 2020-07-22 ENCOUNTER — HOSPITAL ENCOUNTER (OUTPATIENT)
Dept: LAB | Facility: MEDICAL CENTER | Age: 75
End: 2020-07-22
Attending: NURSE PRACTITIONER
Payer: MEDICARE

## 2020-07-22 LAB
BASOPHILS # BLD AUTO: 0.8 % (ref 0–1.8)
BASOPHILS # BLD: 0.07 K/UL (ref 0–0.12)
EOSINOPHIL # BLD AUTO: 0.13 K/UL (ref 0–0.51)
EOSINOPHIL NFR BLD: 1.5 % (ref 0–6.9)
ERYTHROCYTE [DISTWIDTH] IN BLOOD BY AUTOMATED COUNT: 46.1 FL (ref 35.9–50)
HCT VFR BLD AUTO: 44.9 % (ref 37–47)
HGB BLD-MCNC: 15.1 G/DL (ref 12–16)
IMM GRANULOCYTES # BLD AUTO: 0.03 K/UL (ref 0–0.11)
IMM GRANULOCYTES NFR BLD AUTO: 0.3 % (ref 0–0.9)
LYMPHOCYTES # BLD AUTO: 2.01 K/UL (ref 1–4.8)
LYMPHOCYTES NFR BLD: 23.4 % (ref 22–41)
MCH RBC QN AUTO: 30.9 PG (ref 27–33)
MCHC RBC AUTO-ENTMCNC: 33.6 G/DL (ref 33.6–35)
MCV RBC AUTO: 91.8 FL (ref 81.4–97.8)
MONOCYTES # BLD AUTO: 0.51 K/UL (ref 0–0.85)
MONOCYTES NFR BLD AUTO: 5.9 % (ref 0–13.4)
NEUTROPHILS # BLD AUTO: 5.85 K/UL (ref 2–7.15)
NEUTROPHILS NFR BLD: 68.1 % (ref 44–72)
NRBC # BLD AUTO: 0 K/UL
NRBC BLD-RTO: 0 /100 WBC
PLATELET # BLD AUTO: 293 K/UL (ref 164–446)
PMV BLD AUTO: 10.1 FL (ref 9–12.9)
RBC # BLD AUTO: 4.89 M/UL (ref 4.2–5.4)
WBC # BLD AUTO: 8.6 K/UL (ref 4.8–10.8)

## 2020-07-22 PROCEDURE — 36415 COLL VENOUS BLD VENIPUNCTURE: CPT

## 2020-07-22 PROCEDURE — 85025 COMPLETE CBC W/AUTO DIFF WBC: CPT

## 2020-08-24 ENCOUNTER — APPOINTMENT (RX ONLY)
Dept: URBAN - METROPOLITAN AREA CLINIC 35 | Facility: CLINIC | Age: 75
Setting detail: DERMATOLOGY
End: 2020-08-24

## 2020-08-24 DIAGNOSIS — L73.8 OTHER SPECIFIED FOLLICULAR DISORDERS: ICD-10-CM

## 2020-08-24 DIAGNOSIS — D18.0 HEMANGIOMA: ICD-10-CM

## 2020-08-24 DIAGNOSIS — Z71.89 OTHER SPECIFIED COUNSELING: ICD-10-CM

## 2020-08-24 DIAGNOSIS — D22 MELANOCYTIC NEVI: ICD-10-CM

## 2020-08-24 DIAGNOSIS — Z85.828 PERSONAL HISTORY OF OTHER MALIGNANT NEOPLASM OF SKIN: ICD-10-CM

## 2020-08-24 DIAGNOSIS — L82.1 OTHER SEBORRHEIC KERATOSIS: ICD-10-CM

## 2020-08-24 DIAGNOSIS — L81.4 OTHER MELANIN HYPERPIGMENTATION: ICD-10-CM

## 2020-08-24 PROBLEM — D48.5 NEOPLASM OF UNCERTAIN BEHAVIOR OF SKIN: Status: ACTIVE | Noted: 2020-08-24

## 2020-08-24 PROBLEM — D22.5 MELANOCYTIC NEVI OF TRUNK: Status: ACTIVE | Noted: 2020-08-24

## 2020-08-24 PROBLEM — D18.01 HEMANGIOMA OF SKIN AND SUBCUTANEOUS TISSUE: Status: ACTIVE | Noted: 2020-08-24

## 2020-08-24 PROCEDURE — ? BIOPSY BY SHAVE METHOD

## 2020-08-24 PROCEDURE — 99214 OFFICE O/P EST MOD 30 MIN: CPT | Mod: 25

## 2020-08-24 PROCEDURE — ? COUNSELING

## 2020-08-24 PROCEDURE — 11102 TANGNTL BX SKIN SINGLE LES: CPT

## 2020-08-24 PROCEDURE — ? ADDITIONAL NOTES

## 2020-08-24 PROCEDURE — ? OBSERVATION AND MEASURE

## 2020-08-24 ASSESSMENT — LOCATION ZONE DERM
LOCATION ZONE: NECK
LOCATION ZONE: TRUNK
LOCATION ZONE: ARM
LOCATION ZONE: FACE
LOCATION ZONE: LEG
LOCATION ZONE: EAR

## 2020-08-24 ASSESSMENT — LOCATION SIMPLE DESCRIPTION DERM
LOCATION SIMPLE: ABDOMEN
LOCATION SIMPLE: LEFT FOREARM
LOCATION SIMPLE: LEFT UPPER BACK
LOCATION SIMPLE: RIGHT LOWER BACK
LOCATION SIMPLE: LEFT CHEEK
LOCATION SIMPLE: RIGHT ANTERIOR NECK
LOCATION SIMPLE: RIGHT SHOULDER
LOCATION SIMPLE: RIGHT EAR
LOCATION SIMPLE: RIGHT CHEEK
LOCATION SIMPLE: RIGHT FOREARM
LOCATION SIMPLE: LEFT THIGH
LOCATION SIMPLE: CHEST

## 2020-08-24 ASSESSMENT — LOCATION DETAILED DESCRIPTION DERM
LOCATION DETAILED: RIGHT CLAVICULAR NECK
LOCATION DETAILED: LEFT LATERAL MALAR CHEEK
LOCATION DETAILED: RIGHT MEDIAL SUPERIOR CHEST
LOCATION DETAILED: LEFT ANTERIOR DISTAL THIGH
LOCATION DETAILED: RIGHT SUPERIOR LATERAL MIDBACK
LOCATION DETAILED: LEFT SUPERIOR UPPER BACK
LOCATION DETAILED: LEFT RIB CAGE
LOCATION DETAILED: LEFT PROXIMAL DORSAL FOREARM
LOCATION DETAILED: EPIGASTRIC SKIN
LOCATION DETAILED: RIGHT PROXIMAL DORSAL FOREARM
LOCATION DETAILED: LEFT MID-UPPER BACK
LOCATION DETAILED: RIGHT LATERAL SHOULDER
LOCATION DETAILED: RIGHT CAVUM CONCHA
LOCATION DETAILED: RIGHT SUPERIOR MEDIAL BUCCAL CHEEK
LOCATION DETAILED: RIGHT SUPERIOR LATERAL MALAR CHEEK

## 2020-08-24 NOTE — PROCEDURE: BIOPSY BY SHAVE METHOD
Detail Level: Detailed
Depth Of Biopsy: dermis
Was A Bandage Applied: Yes
Size Of Lesion In Cm: 0.4
X Size Of Lesion In Cm: 0
Biopsy Type: H and E
Biopsy Method: Dermablade
Anesthesia Type: 1% lidocaine with 1:100,000 epinephrine and a 1:12 solution of 8.4% sodium bicarbonate
Anesthesia Volume In Cc: 2.5
Hemostasis: Aluminum Chloride
Wound Care: Petrolatum
Dressing: Band-Aid
Destruction After The Procedure: No
Type Of Destruction Used: Curettage
Curettage Text: The wound bed was treated with curettage after the biopsy was performed.
Electrodesiccation And Curettage Text: The wound bed was treated with electrodesiccation and curettage after the biopsy was
Lab: [id number]
Lab Facility: 
Consent: Written consent was obtained and risks were reviewed including but not limited to scarring, infection, bleeding, scabbing, incomplete removal, nerve damage and allergy to anesthesia.
Post-Care Instructions: I reviewed with the patient in detail post-care instructions. Patient is to keep the biopsy site dry overnight, and then apply white petrolatum twice daily until healed. Patient may apply hydrogen peroxide soaks to remove any crusting.
Notification Instructions: Patient will be notified of biopsy results. However, patient instructed to call the office if not contacted within 2 weeks.
Billing Type: Third-Party Bill
Information: Selecting Yes will display possible errors in your note based on the variables you have selected. This validation is only offered as a suggestion for you. PLEASE NOTE THAT THE VALIDATION TEXT WILL BE REMOVED WHEN YOU FINALIZE YOUR NOTE. IF YOU WANT TO FAX A PRELIMINARY NOTE YOU WILL NEED TO TOGGLE THIS TO 'NO' IF YOU DO NOT WANT IT IN YOUR FAXED NOTE.

## 2020-09-18 ENCOUNTER — TELEPHONE (OUTPATIENT)
Dept: MEDICAL GROUP | Facility: MEDICAL CENTER | Age: 75
End: 2020-09-18

## 2020-09-18 DIAGNOSIS — Z11.59 ENCOUNTER FOR SCREENING FOR OTHER VIRAL DISEASES: ICD-10-CM

## 2020-09-18 NOTE — TELEPHONE ENCOUNTER
Patient believes she has the COVID-19 virus. Symptoms began this morning and wife was just diagnosed yesterday with the virus. Please order nasal swab.

## 2020-09-26 ENCOUNTER — HOSPITAL ENCOUNTER (OUTPATIENT)
Dept: LAB | Facility: MEDICAL CENTER | Age: 75
End: 2020-09-26
Attending: FAMILY MEDICINE
Payer: MEDICARE

## 2020-09-26 DIAGNOSIS — Z11.59 ENCOUNTER FOR SCREENING FOR OTHER VIRAL DISEASES: ICD-10-CM

## 2020-09-26 LAB — COVID ORDER STATUS COVID19: NORMAL

## 2020-09-26 PROCEDURE — U0003 INFECTIOUS AGENT DETECTION BY NUCLEIC ACID (DNA OR RNA); SEVERE ACUTE RESPIRATORY SYNDROME CORONAVIRUS 2 (SARS-COV-2) (CORONAVIRUS DISEASE [COVID-19]), AMPLIFIED PROBE TECHNIQUE, MAKING USE OF HIGH THROUGHPUT TECHNOLOGIES AS DESCRIBED BY CMS-2020-01-R: HCPCS

## 2020-09-26 PROCEDURE — C9803 HOPD COVID-19 SPEC COLLECT: HCPCS

## 2020-09-27 LAB
SARS-COV-2 RNA RESP QL NAA+PROBE: DETECTED
SPECIMEN SOURCE: ABNORMAL

## 2020-09-28 ENCOUNTER — TELEPHONE (OUTPATIENT)
Dept: MEDICAL GROUP | Facility: MEDICAL CENTER | Age: 75
End: 2020-09-28

## 2020-09-28 NOTE — TELEPHONE ENCOUNTER
----- Message from Kishore Baptiste M.D. sent at 9/28/2020  7:34 AM PDT -----  Please notify patient that she is positive for COVID.  She will need to quarantine for 10 days.  If she experiences shortness of breath or severe coughing, she should go to the emergency department.  Kishore Baptiste M.D.

## 2020-09-29 ENCOUNTER — TELEPHONE (OUTPATIENT)
Dept: MEDICAL GROUP | Facility: MEDICAL CENTER | Age: 75
End: 2020-09-29

## 2020-09-29 DIAGNOSIS — F41.8 SITUATIONAL ANXIETY: ICD-10-CM

## 2020-09-29 NOTE — TELEPHONE ENCOUNTER
Daughter, Cheryl,  called reports that she no longer has symptoms of COVID-19. Onset of symptoms was 13 days ago, last reported fever was 3 days ago. This Thursday makes 14 days since first symptoms presented. NO SOB or cough reported. Patient had loss of sense of taste and smell, others symptoms reported were back pain and dizziness. Patient and daughter would like to know when she will be safe to be around others, as her wife just passed and the family would like to mourn together.    Patient has also had a hard time since her wife passed and is requesting refill of Ativan. She reports it is the only thing that helps her sleep and she is very anxious and grieving.

## 2020-09-30 RX ORDER — LORAZEPAM 1 MG/1
1 TABLET ORAL 2 TIMES DAILY PRN
Qty: 60 TAB | Refills: 0 | Status: SHIPPED | OUTPATIENT
Start: 2020-09-30 | End: 2020-10-12 | Stop reason: SDUPTHER

## 2020-09-30 NOTE — TELEPHONE ENCOUNTER
She has to quarantine for 10 days and it has been 24-hour since last fever, so it sounds like she can be around others now.  The loss of taste and smell can last for weeks and even months, but almost always returns.    Prescription for Ativan was sent to [facility] pharmacy.    Kishore Baptiste M.D.

## 2020-10-12 ENCOUNTER — OFFICE VISIT (OUTPATIENT)
Dept: MEDICAL GROUP | Facility: MEDICAL CENTER | Age: 75
End: 2020-10-12
Payer: MEDICARE

## 2020-10-12 ENCOUNTER — APPOINTMENT (RX ONLY)
Dept: URBAN - METROPOLITAN AREA CLINIC 35 | Facility: CLINIC | Age: 75
Setting detail: DERMATOLOGY
End: 2020-10-12

## 2020-10-12 VITALS
OXYGEN SATURATION: 96 % | BODY MASS INDEX: 26.39 KG/M2 | DIASTOLIC BLOOD PRESSURE: 90 MMHG | SYSTOLIC BLOOD PRESSURE: 142 MMHG | TEMPERATURE: 97.4 F | HEART RATE: 87 BPM | WEIGHT: 149 LBS

## 2020-10-12 DIAGNOSIS — F41.8 SITUATIONAL ANXIETY: ICD-10-CM

## 2020-10-12 DIAGNOSIS — Z00.00 MEDICARE ANNUAL WELLNESS VISIT, SUBSEQUENT: ICD-10-CM

## 2020-10-12 DIAGNOSIS — Z85.828 PERSONAL HISTORY OF OTHER MALIGNANT NEOPLASM OF SKIN: ICD-10-CM

## 2020-10-12 DIAGNOSIS — K21.9 GASTROESOPHAGEAL REFLUX DISEASE: ICD-10-CM

## 2020-10-12 DIAGNOSIS — G47.00 INSOMNIA, UNSPECIFIED TYPE: ICD-10-CM

## 2020-10-12 DIAGNOSIS — F41.1 GENERALIZED ANXIETY DISORDER: ICD-10-CM

## 2020-10-12 PROBLEM — K56.609 SBO (SMALL BOWEL OBSTRUCTION) (HCC): Status: RESOLVED | Noted: 2018-06-27 | Resolved: 2020-10-12

## 2020-10-12 PROBLEM — D72.829 LEUKOCYTOSIS: Status: RESOLVED | Noted: 2017-06-03 | Resolved: 2020-10-12

## 2020-10-12 PROBLEM — E87.6 HYPOKALEMIA: Status: RESOLVED | Noted: 2017-06-02 | Resolved: 2020-10-12

## 2020-10-12 PROBLEM — C44.719 BASAL CELL CARCINOMA OF SKIN OF LEFT LOWER LIMB, INCLUDING HIP: Status: ACTIVE | Noted: 2020-10-12

## 2020-10-12 PROBLEM — M25.511 ACUTE PAIN OF RIGHT SHOULDER: Status: RESOLVED | Noted: 2019-01-31 | Resolved: 2020-10-12

## 2020-10-12 PROCEDURE — ? CURETTAGE AND DESTRUCTION

## 2020-10-12 PROCEDURE — G0439 PPPS, SUBSEQ VISIT: HCPCS | Performed by: FAMILY MEDICINE

## 2020-10-12 PROCEDURE — 99213 OFFICE O/P EST LOW 20 MIN: CPT | Mod: 25

## 2020-10-12 PROCEDURE — 17261 DSTRJ MAL LES T/A/L .6-1.0CM: CPT

## 2020-10-12 PROCEDURE — ? COUNSELING

## 2020-10-12 RX ORDER — LORAZEPAM 1 MG/1
1 TABLET ORAL 2 TIMES DAILY PRN
Qty: 60 TAB | Refills: 2 | Status: SHIPPED | OUTPATIENT
Start: 2020-10-12 | End: 2020-11-12 | Stop reason: SDUPTHER

## 2020-10-12 RX ORDER — TRAZODONE HYDROCHLORIDE 50 MG/1
50-150 TABLET ORAL
Qty: 90 TAB | Refills: 3 | Status: SHIPPED | OUTPATIENT
Start: 2020-10-12 | End: 2020-11-12

## 2020-10-12 RX ORDER — PANTOPRAZOLE SODIUM 40 MG/1
40 TABLET, DELAYED RELEASE ORAL DAILY
Qty: 90 TAB | Refills: 3 | Status: SHIPPED | OUTPATIENT
Start: 2020-10-12 | End: 2021-04-13 | Stop reason: SDUPTHER

## 2020-10-12 RX ORDER — ESCITALOPRAM OXALATE 20 MG/1
20 TABLET ORAL DAILY
Qty: 90 TAB | Refills: 3 | Status: SHIPPED | OUTPATIENT
Start: 2020-10-12 | End: 2021-01-12 | Stop reason: SDUPTHER

## 2020-10-12 ASSESSMENT — LOCATION ZONE DERM
LOCATION ZONE: TRUNK
LOCATION ZONE: FACE
LOCATION ZONE: NECK
LOCATION ZONE: EAR

## 2020-10-12 ASSESSMENT — LOCATION DETAILED DESCRIPTION DERM
LOCATION DETAILED: RIGHT SUPERIOR LATERAL MALAR CHEEK
LOCATION DETAILED: RIGHT CLAVICULAR NECK
LOCATION DETAILED: RIGHT CAVUM CONCHA
LOCATION DETAILED: LEFT LATERAL MALAR CHEEK
LOCATION DETAILED: RIGHT SUPERIOR LATERAL MIDBACK
LOCATION DETAILED: RIGHT SUPERIOR MEDIAL BUCCAL CHEEK

## 2020-10-12 ASSESSMENT — PATIENT HEALTH QUESTIONNAIRE - PHQ9
5. POOR APPETITE OR OVEREATING: 0 - NOT AT ALL
CLINICAL INTERPRETATION OF PHQ2 SCORE: 2

## 2020-10-12 ASSESSMENT — LOCATION SIMPLE DESCRIPTION DERM
LOCATION SIMPLE: LEFT CHEEK
LOCATION SIMPLE: RIGHT EAR
LOCATION SIMPLE: RIGHT ANTERIOR NECK
LOCATION SIMPLE: RIGHT LOWER BACK
LOCATION SIMPLE: RIGHT CHEEK

## 2020-10-12 ASSESSMENT — ENCOUNTER SYMPTOMS: GENERAL WELL-BEING: GOOD

## 2020-10-12 ASSESSMENT — FIBROSIS 4 INDEX: FIB4 SCORE: 1.22

## 2020-10-12 ASSESSMENT — ACTIVITIES OF DAILY LIVING (ADL): BATHING_REQUIRES_ASSISTANCE: 0

## 2020-10-12 NOTE — ASSESSMENT & PLAN NOTE
She is on lexapro 20 mg daily. Just lost her spouse from COVID-19. She has tried Ambien and that worked in the past, she is concerned about taking Ativan 1 mg too much and developing a tolerance.  Having difficulty with stress, anxiety and insomnia since spouse past away.

## 2020-10-12 NOTE — PROCEDURE: CURETTAGE AND DESTRUCTION
Detail Level: Detailed
Biopsy Photograph Reviewed: Yes
Number Of Curettages: 3
Size Of Lesion In Cm: 0.4
Size Of Lesion After Curettage: 0.8
Add Intralesional Injection: No
Total Volume (Ccs): 1
Anesthesia Type: 1% lidocaine with epinephrine and a 1:10 solution of 8.4% sodium bicarbonate
Cautery Type: electrocautery (heat cautery)
What Was Performed First?: Curettage
Additional Information: (Optional): The wound was cleaned, and a pressure dressing was applied.  The patient received detailed post-op instructions.
Consent was obtained from the patient. The risks, benefits and alternatives to therapy were discussed in detail. Specifically, the risks of infection, scarring, bleeding, prolonged wound healing, nerve injury, incomplete removal, allergy to anesthesia and recurrence were addressed. Alternatives to ED&C, such as: surgical removal and XRT were also discussed.  Prior to the procedure, the treatment site was clearly identified and confirmed by the patient. All components of Universal Protocol/PAUSE Rule completed.
Post-Care Instructions: I reviewed with the patient in detail post-care instructions. Patient is to keep the area dry for 24 hours, and not to engage in any swimming until the area is healed. Should the patient develop any fevers, chills, bleeding, severe pain patient will contact the office immediately.
Bill As A Line Item Or As Units: Line Item

## 2020-10-12 NOTE — PROGRESS NOTES
Chief Complaint   Patient presents with   • Annual Wellness Visit         HPI:  Dina Crews is a 75 y.o. here for Medicare Annual Wellness Visit     JUNIOR (generalized anxiety disorder)  She is on lexapro 20 mg daily. Just lost her spouse from COVID-19. She has tried Ambien and that worked in the past, she is concerned about taking Ativan 1 mg too much and developing a tolerance.  Having difficulty with stress, anxiety and insomnia since spouse past away.        Patient Active Problem List    Diagnosis Date Noted   • Calcific tendinitis 10/31/2018   • Prediabetes 08/28/2018   • Urge incontinence of urine 08/28/2018   • Idiopathic chronic gout of foot without tophus 07/03/2018   • Bilateral plantar fasciitis 10/12/2017   • Elevated TSH 05/30/2017   • Chronic pain of right knee 05/11/2017   • Cataract of both eyes, managed by Dr. 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 90 Tab 3   • escitalopram (LEXAPRO) 20 MG tablet Take 1 Tab by mouth every day. 90 Tab 3   • LORazepam  (ATIVAN) 1 MG Tab Take 1 Tab by mouth 2 times a day as needed for Anxiety for up to 30 days. 60 Tab 2   • pantoprazole (PROTONIX) 40 MG Tablet Delayed Response Take 1 Tab by mouth every day. 90 Tab 3   • Psyllium (METAMUCIL) 28 % packet Take 1 Packet by mouth every day.     • polyethylene glycol/lytes (MIRALAX) Pack Take 1 Packet by mouth 1 time daily as needed. No good BM in 24 hrs     • Cholecalciferol 2000 UNIT Tab Take 2,000 Units by mouth every day.       No current facility-administered medications for this visit.             Current supplements as per medication list.       Allergies: Penicillins, Codeine, Morphine, and Vicodin [hydrocodone-acetaminophen]    Current social contact/activities: spouse just passed away has support with spouse's daughter.     She  reports that she has been smoking cigarettes. She started smoking about 56 years ago. She has a 22.50 pack-year smoking history. She has never used smokeless tobacco. She reports previous drug use. Drugs: Oral and Marijuana. She reports that she does not drink alcohol.  Ready to quit: Not Answered  Counseling given: Not Answered      DPA/Advanced Directive:  Patient has Advanced Directive on file.     ROS:    Gait: Uses no assistive device  Ostomy: No  Other tubes: No  Amputations: No  Chronic oxygen use: No  Last eye exam: patient reports being unable to recall last eye exam  Wears hearing aids: No   : Reports urinary leakage during the last 6 months that has not interfered at all with their daily activities or sleep.      POLST/AD    Does the patient have a POLST or Advanced Directive?  Yes    Depression Screening    Little interest or pleasure in doing things?  2 - more than half the days  Feeling down, depressed , or hopeless?    Trouble falling or staying asleep, or sleeping too much?  2 - more than half the days  Feeling tired or having little energy?  0 - not at all  Poor appetite or overeating?  0 - not at all  Feeling bad about yourself - or  that you are a failure or have let yourself or your family down? 0 - not at all  Trouble concentrating on things, such as reading the newspaper or watching television? 2 - more than half the days  Moving or speaking so slowly that other people could have noticed.  Or the opposite - being so fidgety or restless that you have been moving around a lot more than usual?  0 - not at all  Thoughts that you would be better off dead, or of hurting yourself?  0 - not at all  Patient Health Questionnaire Score: 2    If depressive symptoms identified deferred to follow up visit unless specifically addressed in assessment and plan.    Interpretation of PHQ-9 Total Score   Score Severity   1-4 No Depression   5-9 Mild Depression   10-14 Moderate Depression   15-19 Moderately Severe Depression   20-27 Severe Depression      Screening for Cognitive Impairment    Three Minute Recall (river, nation, finger) 2/3    Luis clock face with all 12 numbers and set the hands to show 10 past 11.  Yes 5/5  Cognitive concerns identified deferred for follow up unless specifically addressed in assessment and plan.    Fall Risk Assessment    Has the patient had two or more falls in the last year or any fall with injury in the last year?  No    Safety Assessment    Throw rugs on floor.  Yes  Handrails on all stairs.  Yes  Good lighting in all hallways.  Yes  Difficulty hearing.  No  Patient counseled about all safety risks that were identified.    Functional Assessment ADLs    Are there any barriers preventing you from cooking for yourself or meeting nutritional needs?  No.    Are there any barriers preventing you from driving safely or obtaining transportation?  No.    Are there any barriers preventing you from using a telephone or calling for help?  No.    Are there any barriers preventing you from shopping?  No.    Are there any barriers preventing you from taking care of your own finances?  No.    Are there any barriers preventing you from  managing your medications?  No.    Are there any barriers preventing you from showering, bathing or dressing yourself? No.    Are you currently engaging in any exercise or physical activity?  No.  Patient reports no physical activity  What is your perception of your health?  Good.    Health Maintenance Summary                LUNG CANCER SCREENING Overdue 1945     IMM ZOSTER VACCINES Overdue 1997     BONE DENSITY Overdue 2018      Done 2016 DS-BONE DENSITY STUDY (DEXA)     Patient has more history with this topic...    Annual Wellness Visit Overdue 2018      Done 2017 Visit Dx: Medicare annual wellness visit, subsequent     Patient has more history with this topic...    IMM INFLUENZA Overdue 2020     COLONOSCOPY Next Due 2020      Done 2015 AMB REFERRAL TO GI FOR COLONOSCOPY    MAMMOGRAM Next Due 2021      Done 2020 MA-SCREENING MAMMO BILAT W/TOMOSYNTHESIS W/CAD     Patient has more history with this topic...    IMM DTaP/Tdap/Td Vaccine Next Due 2027      Done 2017 Imm Admin: Tdap Vaccine     Patient has more history with this topic...          Patient Care Team:  Kishore Baptiste M.D. as PCP - General (Family Medicine)  Bert Mays M.D. as Consulting Physician (Gastroenterology)  Marylin Turner M.D. as Consulting Physician (Dermatology)  Sapphire Carbajal M.D. as Consulting Physician (Otolaryngology)  Mona Cheema as [facility]       Social History     Tobacco Use   • Smoking status: Current Every Day Smoker     Packs/day: 0.50     Years: 45.00     Pack years: 22.50     Types: Cigarettes     Start date: 1964     Last attempt to quit: 2012     Years since quittin.7   • Smokeless tobacco: Never Used   Substance Use Topics   • Alcohol use: No     Alcohol/week: 0.0 oz   • Drug use: Not Currently     Types: Oral, Marijuana     Comment: Pot daily-edibles     Family History   Problem Relation Age of Onset   • Lung  Disease Mother         COPD   • Cancer Mother         Thyroid cancer   • Cancer Father         d. 72 Stomach cancer   • Diabetes Father    • Cancer Sister 40        Breast cancer   • GI Disease Sister         diverticulitis   • Cancer Sister         breast     She  has a past medical history of Anxiety disorder, CATARACT, Chronic constipation (10/12/2013), Chronic maxillary sinusitis (12/30/2015), Diverticulitis (3/1/2013), Elevated TSH (5/30/2017), GERD (gastroesophageal reflux disease) (10/29/2011), History of malignant neoplasm of parotid gland (10/19/2011), Hyperlipidemia (10/19/2011), Major depression (7/16/2012), Osteopenia (8/12/2012), Perennial allergic rhinitis (4/25/2011), Personal history of skin cancer (4/25/2011), S/P partial colectomy (4/22/2014), TMJ tenderness (10/29/2011), Tobacco abuse (4/25/2011), Torn meniscus, and Vitamin d deficiency (4/25/2011).   Past Surgical History:   Procedure Laterality Date   • NEL BY LAPAROSCOPY  4/20/2015    Performed by Gaudencio Johnson M.D. at SURGERY SAME DAY [facility] ORS   • LOW ANTERIOR RESECTION ROBOTIC  1/28/2014    Performed by Ismael Rocha M.D. at SURGERY [facility] ORS   • ABDOMINAL HYSTERECTOMY TOTAL     • HYSTERECTOMY, VAGINAL      and BSO   • OTHER ABDOMINAL SURGERY      partial colectomy   • PRIMARY C SECTION     • REPEAT C SECTION     • SHOULDER ARTHROSCOPY         Exam:   /90 (BP Location: Left arm, Patient Position: Sitting, BP Cuff Size: Adult)   Pulse 87   Temp 36.3 °C (97.4 °F) (Temporal)   Wt 67.6 kg (149 lb)   SpO2 96%  Body mass index is 26.39 kg/m².    Constitutional: Alert, no distress.  Skin: Warm, dry, good turgor, no rashes in visible areas.  Eye: Equal, round and reactive, conjunctiva clear, lids normal.  Psych: Alert and oriented x3, normal affect and mood.      Assessment and Plan. The following treatment and monitoring plan is recommended:    1. Medicare annual wellness visit, subsequent  Follow up next year and we  will check labs and consider GI referral for colonoscopy.  - Subsequent Annual Wellness Visit - Includes PPPS ()    2. JUNIOR (generalized anxiety disorder)  Stable. Continue Lexapro and as needed Ativan.  - Subsequent Annual Wellness Visit - Includes PPPS ()  - escitalopram (LEXAPRO) 20 MG tablet; Take 1 Tab by mouth every day.  Dispense: 90 Tab; Refill: 3    3. Insomnia, unspecified type  We will try trazodone to see if we can reduce her use of Ativan.  - Subsequent Annual Wellness Visit - Includes PPPS ()  - traZODone (DESYREL) 50 MG Tab; Take 1-3 Tabs by mouth every bedtime.  Dispense: 90 Tab; Refill: 3    4. Situational anxiety  Offered grief counseling but she does not need more things to do right now. Continue as needed Ativan, which is helping her get through this difficult time. Discussed caution of over doing Ativan, which she understands and uses Ativan cautiously.  - Subsequent Annual Wellness Visit - Includes PPPS ()  - LORazepam (ATIVAN) 1 MG Tab; Take 1 Tab by mouth 2 times a day as needed for Anxiety for up to 30 days.  Dispense: 60 Tab; Refill: 2    5. Gastroesophageal reflux disease  Controlled. Refill Protonix 40 mg daily.  - Subsequent Annual Wellness Visit - Includes PPPS ()  - pantoprazole (PROTONIX) 40 MG Tablet Delayed Response; Take 1 Tab by mouth every day.  Dispense: 90 Tab; Refill: 3        Services suggested: No services needed at this time  Health Care Screening: Age-appropriate preventive services recommended by USPTF and ACIP covered by Medicare were discussed today. Services ordered if indicated and agreed upon by the patient.  Referrals offered: Community-based lifestyle interventions to reduce health risks and promote self-management and wellness, fall prevention, nutrition, physical activity, tobacco-use cessation, weight loss, and mental health services as per orders if indicated.    Discussion today about general wellness and lifestyle habits:    · Prevent  falls and reduce trip hazards; Cautioned about securing or removing rugs.  · Have a working fire alarm and carbon monoxide detector;   · Engage in regular physical activity and social activities     Follow-up: Return in about 1 year (around 10/12/2021) for Annual.

## 2020-10-27 ENCOUNTER — PATIENT MESSAGE (OUTPATIENT)
Dept: MEDICAL GROUP | Facility: MEDICAL CENTER | Age: 75
End: 2020-10-27

## 2020-10-27 DIAGNOSIS — F43.21 GRIEVING: ICD-10-CM

## 2020-11-12 ENCOUNTER — OFFICE VISIT (OUTPATIENT)
Dept: MEDICAL GROUP | Facility: MEDICAL CENTER | Age: 75
End: 2020-11-12
Payer: MEDICARE

## 2020-11-12 DIAGNOSIS — F41.8 SITUATIONAL ANXIETY: ICD-10-CM

## 2020-11-12 DIAGNOSIS — F51.01 PRIMARY INSOMNIA: ICD-10-CM

## 2020-11-12 DIAGNOSIS — R11.0 NAUSEA: ICD-10-CM

## 2020-11-12 PROCEDURE — 99214 OFFICE O/P EST MOD 30 MIN: CPT | Performed by: FAMILY MEDICINE

## 2020-11-12 RX ORDER — ONDANSETRON 4 MG/1
4 TABLET, FILM COATED ORAL EVERY 4 HOURS PRN
Qty: 30 TAB | Refills: 2 | Status: SHIPPED | OUTPATIENT
Start: 2020-11-12 | End: 2021-01-12

## 2020-11-12 RX ORDER — MIRTAZAPINE 15 MG/1
15 TABLET, FILM COATED ORAL
Qty: 30 TAB | Refills: 5 | Status: SHIPPED | OUTPATIENT
Start: 2020-11-12 | End: 2021-01-12

## 2020-11-12 RX ORDER — LORAZEPAM 1 MG/1
1 TABLET ORAL 2 TIMES DAILY PRN
Qty: 60 TAB | Refills: 2 | Status: SHIPPED | OUTPATIENT
Start: 2020-11-12 | End: 2020-12-12

## 2020-11-12 NOTE — PROGRESS NOTES
Subjective:   Dina Crews is a 75 y.o. female here today for grieving    Patient is having difficulty with the grieving process.  She lost her partner recently, they were together for over 40 years.  She is on Lexapro 20 mg daily, lorazepam 1 mg as needed, but not needing it daily, trazodone 150 mg at night, but still having difficulty sleeping.  Yesterday she was on the verge of having a panic attack.  Her stepdaughter was helping her through the panic attack over the phone.  She also has associated nausea, despite taking pantoprazole 40 mg daily.  She is planning to visit her family in [place] for Thanksgiving.    Current medicines (including changes today)  Current Outpatient Medications   Medication Sig Dispense Refill   • LORazepam (ATIVAN) 1 MG Tab Take 1 Tab by mouth 2 times a day as needed for Anxiety for up to 30 days. 60 Tab 2   • ondansetron (ZOFRAN) 4 MG Tab tablet Take 1 Tab by mouth every four hours as needed for Nausea/Vomiting. 30 Tab 2   • mirtazapine (REMERON) 15 MG Tab Take 1 Tab by mouth every bedtime. 30 Tab 5   • escitalopram (LEXAPRO) 20 MG tablet Take 1 Tab by mouth every day. 90 Tab 3   • pantoprazole (PROTONIX) 40 MG Tablet Delayed Response Take 1 Tab by mouth every day. 90 Tab 3   • Psyllium (METAMUCIL) 28 % packet Take 1 Packet by mouth every day.     • polyethylene glycol/lytes (MIRALAX) Pack Take 1 Packet by mouth 1 time daily as needed. No good BM in 24 hrs     • Cholecalciferol 2000 UNIT Tab Take 2,000 Units by mouth every day.       No current facility-administered medications for this visit.      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    ROS   No suicidal ideation, no fever       Objective:     There were no vitals taken for this visit. There is no height or weight on file to calculate BMI.   Physical Exam:  Constitutional: Alert, no distress.  Skin: Warm, dry, good turgor, no rashes in visible areas.  Eye: Equal, round and reactive, conjunctiva clear, lids normal.  Psych: Alert and oriented x3, tearful.        Assessment and Plan:   The following treatment plan was discussed    1. Situational anxiety  Uncontrolled.  We will continue lorazepam and Lexapro.  We will discontinue trazodone and start patient on mirtazapine at night.  - LORazepam (ATIVAN) 1 MG Tab; Take 1 Tab by mouth 2 times a day as needed for Anxiety for up to 30 days.  Dispense: 60 Tab; Refill: 2    2. Nausea  Most likely stress-induced.  Continue pantoprazole.  We will add a prescription for Zofran.  - ondansetron (ZOFRAN) 4 MG Tab tablet; Take 1 Tab by mouth every four hours as needed for Nausea/Vomiting.  Dispense: 30 Tab; Refill: 2    3. Primary insomnia  Uncontrolled.  We will switch patient from trazodone to mirtazapine.  - mirtazapine (REMERON) 15 MG Tab; Take 1 Tab by mouth every bedtime.  Dispense: 30 Tab; Refill: 5      Followup: Return in 4 weeks (on 12/10/2020) for Grieving.

## 2020-12-10 ENCOUNTER — TELEPHONE (OUTPATIENT)
Dept: MEDICAL GROUP | Facility: MEDICAL CENTER | Age: 75
End: 2020-12-10

## 2020-12-10 NOTE — TELEPHONE ENCOUNTER
She should try to contact GI or follow up at the urgent care for possible diverticulitis.  Kishore Baptiste M.D.

## 2020-12-10 NOTE — TELEPHONE ENCOUNTER
VOICEMAIL  1. Caller Name: Sarah                      Call Back Number: [phone]    2. Message: Patient had a colonoscopy done on Monday, 12/7. She has not heard back from GI yet and is unable to get through to them in regards to her results and current pain. She reports left-sided pain since Wednesday and is requesting refill for diverticulitis meds from our office since she cannot contact her GI provider. Please advise and send to pharmacy if appropriate.    3. Patient approves office to leave a detailed voicemail/[employer] message: N\A

## 2020-12-10 NOTE — TELEPHONE ENCOUNTER
Left a message for patient to call back at [phone].     Lesvia Pryor  [facility] Assistant

## 2021-01-12 ENCOUNTER — OFFICE VISIT (OUTPATIENT)
Dept: MEDICAL GROUP | Facility: LAB | Age: 76
End: 2021-01-12
Payer: MEDICARE

## 2021-01-12 VITALS
TEMPERATURE: 98.1 F | RESPIRATION RATE: 12 BRPM | WEIGHT: 142 LBS | OXYGEN SATURATION: 97 % | DIASTOLIC BLOOD PRESSURE: 84 MMHG | BODY MASS INDEX: 25.16 KG/M2 | HEART RATE: 68 BPM | HEIGHT: 63 IN | SYSTOLIC BLOOD PRESSURE: 132 MMHG

## 2021-01-12 DIAGNOSIS — F41.1 GENERALIZED ANXIETY DISORDER: ICD-10-CM

## 2021-01-12 DIAGNOSIS — F33.1 MODERATE EPISODE OF RECURRENT MAJOR DEPRESSIVE DISORDER (HCC): ICD-10-CM

## 2021-01-12 DIAGNOSIS — Z12.31 ENCOUNTER FOR SCREENING MAMMOGRAM FOR MALIGNANT NEOPLASM OF BREAST: ICD-10-CM

## 2021-01-12 DIAGNOSIS — Z87.19 HISTORY OF DIVERTICULITIS: ICD-10-CM

## 2021-01-12 DIAGNOSIS — F43.21 GRIEF: ICD-10-CM

## 2021-01-12 PROCEDURE — 99213 OFFICE O/P EST LOW 20 MIN: CPT | Performed by: FAMILY MEDICINE

## 2021-01-12 RX ORDER — LORAZEPAM 1 MG/1
1 TABLET ORAL DAILY
COMMUNITY
Start: 2021-01-11 | End: 2021-04-13 | Stop reason: SDUPTHER

## 2021-01-12 RX ORDER — ESCITALOPRAM OXALATE 20 MG/1
20 TABLET ORAL DAILY
Qty: 90 TAB | Refills: 3 | Status: SHIPPED | OUTPATIENT
Start: 2021-01-12 | End: 2021-10-14 | Stop reason: SDUPTHER

## 2021-01-12 ASSESSMENT — PATIENT HEALTH QUESTIONNAIRE - PHQ9
5. POOR APPETITE OR OVEREATING: 1 - SEVERAL DAYS
CLINICAL INTERPRETATION OF PHQ2 SCORE: 6
SUM OF ALL RESPONSES TO PHQ QUESTIONS 1-9: 11

## 2021-01-12 ASSESSMENT — ANXIETY QUESTIONNAIRES
1. FEELING NERVOUS, ANXIOUS, OR ON EDGE: MORE THAN HALF THE DAYS
3. WORRYING TOO MUCH ABOUT DIFFERENT THINGS: NEARLY EVERY DAY
4. TROUBLE RELAXING: MORE THAN HALF THE DAYS
6. BECOMING EASILY ANNOYED OR IRRITABLE: SEVERAL DAYS
5. BEING SO RESTLESS THAT IT IS HARD TO SIT STILL: SEVERAL DAYS
2. NOT BEING ABLE TO STOP OR CONTROL WORRYING: NEARLY EVERY DAY
7. FEELING AFRAID AS IF SOMETHING AWFUL MIGHT HAPPEN: MORE THAN HALF THE DAYS
GAD7 TOTAL SCORE: 14

## 2021-01-12 ASSESSMENT — FIBROSIS 4 INDEX: FIB4 SCORE: 1.22

## 2021-01-12 NOTE — LETTER
[facility]  Roger Larson M.D.  [id number]  [place] [address]  Fax: [phone]   Authorization for Release/Disclosure of   Protected Health Information   Name: DINA CREWS : 1945 SSN: [id number]   Address: [address] [place] [address]  [place] [address] Phone:    [phone] (home)    I authorize the entity listed below to release/disclose the PHI below to:   [facility] Larson M.D[facility] and Roger Larson M.D.   Provider or Entity Name:  [facility]   Address   City, State, Zip   Phone:      Fax:     Reason for request: continuity of care   Information to be released:    [  ] LAST COLONOSCOPY,  including any PATH REPORT and follow-up  [  ] LAST FIT/COLOGUARD RESULT [  ] LAST DEXA  [  ] LAST MAMMOGRAM  [  ] LAST PAP  [  ] LAST LABS [  ] RETINA EXAM REPORT  [  ] IMMUNIZATION RECORDS  [XXXXX] Release all info        DATES OF SERVICE OR TIME PERIOD TO BE DISCLOSED: _____________  I understand and acknowledge that:  * This Authorization may be revoked at any time by you in writing, except if your health information has already been used or disclosed.  * Your health information that will be used or disclosed as a result of you signing this authorization could be re-disclosed by the recipient. If this occurs, your re-disclosed health information may no longer be protected by State or Federal laws.  * You may refuse to sign this Authorization. Your refusal will not affect your ability to obtain treatment.  * This Authorization becomes effective upon signing and will  on (date) __________.      If no date is indicated, this Authorization will  one (1) year from the signature date.    Name: Dina Crews    Signature:   Date:     2021       PLEASE FAX REQUESTED RECORDS BACK TO: [phone]

## 2021-01-12 NOTE — PROGRESS NOTES
Annual Health Assessment Questions:    1.  Are you currently engaging in any exercise or physical activity? Yes  Walking  2.  How would you describe your mood or emotional well-being today? fair    3.  Have you had any falls in the last year? No    4.  Have you noticed any problems with your balance or had difficulty walking? No    5.  In the last six months have you experienced any leakage of urine? No    6. DPA/Advanced Directive: Patient has Advanced Directive on file.  Patient will need to update due to spouse passing away. Packet will be provided.

## 2021-01-12 NOTE — PROGRESS NOTES
Dina Crews is a 75 y.o. female here to establish care and discuss anxiety.    HPI:      Anxiety and depression  History of anxiety and depression. Has been Lexapro and Ativan for years.  Lost her wife to Covid 4 months ago and has had significant worsened anxiety and grief since then. Taking ativan ~ 3 times weekly for anxiety attacks.  Did not like side effects with Remeron, trazodone did not help with sleep.  She has been trying to get in for therapy but has had a tough time finding somebody, does have appointment with psychiatry next month.    JUNIOR-7 Questionnaire    Feeling nervous, anxious, or on edge: More than half the days  Not being able to sop or control worrying: Nearly every day  Worrying too much about different things: Nearly every day  Trouble relaxing: More than half the days  Being so restless that it's hard to sit still: Several days  Becoming easily annoyed or irritable: Several days  Feeling afraid as if something awful might happen: More than half the days  Total: 14    Interpretation of JUNIOR 7 Total Score   Score Severity :  0-4 No Anxiety   5-9 Mild Anxiety  10-14 Moderate Anxiety  15-21 Severe Anxiety    Depression Screening    Little interest or pleasure in doing things?  3 - nearly every day   Feeling down, depressed , or hopeless? 3 - nearly every day   Trouble falling or staying asleep, or sleeping too much?  3 - nearly every day   Feeling tired or having little energy?  0 - not at all   Poor appetite or overeating?  1 - several days   Feeling bad about yourself - or that you are a failure or have let yourself or your family down? 0 - not at all   Trouble concentrating on things, such as reading the newspaper or watching television? 1 - several days   Moving or speaking so slowly that other people could have noticed.  Or the opposite - being so fidgety or restless that you have been moving around a lot more than usual?  0 - not at all   Thoughts that you would be better off dead,  or of hurting yourself?  0 - not at all   Patient Health Questionnaire Score: 11       If depressive symptoms identified deferred to follow up visit unless specifically addressed in assesment and plan.    Interpretation of PHQ-9 Total Score   Score Severity   1-4 No Depression   5-9 Mild Depression   10-14 Moderate Depression   15-19 Moderately Severe Depression   20-27 Severe Depression    Current medicines (including changes today)  Current Outpatient Medications   Medication Sig Dispense Refill   • LORazepam (ATIVAN) 1 MG Tab Take 1 mg by mouth every day.     • Probiotic Product (PROBIOTIC PO) Take  by mouth every day.     • escitalopram (LEXAPRO) 20 MG tablet Take 1 Tab by mouth every day. 90 Tab 3   • pantoprazole (PROTONIX) 40 MG Tablet Delayed Response Take 1 Tab by mouth every day. 90 Tab 3   • polyethylene glycol/lytes (MIRALAX) Pack Take 1 Packet by mouth 1 time daily as needed. No good BM in 24 hrs     • Cholecalciferol 2000 UNIT Tab Take 2,000 Units by mouth every day.     • Psyllium (METAMUCIL) 28 % packet Take 1 Packet by mouth every day.       No current facility-administered medications for this visit.      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  She  has a past surgical history that includes hysterectomy, vaginal; low anterior resection robotic (1/28/2014); malik by laparoscopy (4/20/2015); primary c section; repeat c section;  "other abdominal surgery; shoulder arthroscopy; and abdominal hysterectomy total.  Social History     Tobacco Use   • Smoking status: Current Every Day Smoker     Packs/day: 0.50     Years: 45.00     Pack years: 22.50     Types: Cigarettes     Start date: 1964     Last attempt to quit: 2012     Years since quittin.0   • Smokeless tobacco: Never Used   Substance Use Topics   • Alcohol use: No     Alcohol/week: 0.0 oz   • Drug use: Not Currently     Types: Oral, Marijuana     Comment: Pot daily-edibles     Social History     Social History Narrative    Moved to [place] after couldn't find work in [place]. Then mother had increasing health problems. Now stays in [place] M-F to care for mom. Siblings caregive on Weekends. 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    Spouse (female) 25 years.    2 children. 1 grandchild.     2013: mother .      Family History   Problem Relation Age of Onset   • Lung Disease Mother         COPD   • Cancer Mother         Thyroid cancer   • Cancer Father         d. 72 Stomach cancer   • Diabetes Father    • Cancer Sister 40        Breast cancer   • GI Disease Sister         diverticulitis   • Cancer Sister         breast     Family Status   Relation Name Status   • Mo     • Fa     • Sis     • Sis     • Sis  Alive       ROS  ROS      Objective:     Physical Exam:  /84 (BP Location: Left arm, Patient Position: Sitting, BP Cuff Size: Adult)   Pulse 68   Temp 36.7 °C (98.1 °F)   Resp 12   Ht 1.6 m (5' 3\")   Wt 64.4 kg (142 lb)   SpO2 97%  Body mass index is 25.15 kg/m².  Constitutional: Alert. Well appearing. No distress.  Skin: Warm, dry, good turgor, no visible rashes.  Eye: Equal, round and reactive to light, conjunctiva clear, lids normal.  ENMT: Moist mucous membranes.  Neck: Trachea midline, no masses, no thyromegaly.   Respiratory: Normal effort. Lungs are clear to auscultation bilaterally.  Cardiovascular: Regular rate and rhythm. " Normal S1/S2. No murmurs, rubs or gallops.   Neuro: Moves all four extremities. No facial droop.  Psych: Answers questions appropriately. Teary throughout visit.  Assessment and Plan:     1. JUNIOR (generalized anxiety disorder)  2. Moderate episode of recurrent major depressive disorder (HCC)  3. Grief  Chronic depression and anxiety.  Recently exacerbated with death of her partner 4 months ago.  She continues on Lexapro and Ativan as needed.  Did not see benefit from trazodone or Remeron for sleep.  Does have follow-up scheduled with psychiatry.  - escitalopram (LEXAPRO) 20 MG tablet; Take 1 Tab by mouth every day.  Dispense: 90 Tab; Refill: 3    4. History of diverticulitis  History of recurrent diverticulitis.  Discussed follow-up for any return of symptoms.    5. Encounter for screening mammogram for malignant neoplasm of breast  - MA-SCREENING MAMMO BILAT W/CAD; Future    Follow up: Return in about 3 months (around 4/12/2021).         PLEASE NOTE: This note was created using voice recognition software.

## 2021-01-15 DIAGNOSIS — Z23 NEED FOR VACCINATION: ICD-10-CM

## 2021-02-10 ENCOUNTER — IMMUNIZATION (OUTPATIENT)
Dept: FAMILY PLANNING/WOMEN'S HEALTH CLINIC | Facility: IMMUNIZATION CENTER | Age: 76
End: 2021-02-10
Attending: INTERNAL MEDICINE
Payer: MEDICARE

## 2021-02-10 DIAGNOSIS — Z23 NEED FOR VACCINATION: ICD-10-CM

## 2021-02-10 DIAGNOSIS — Z23 ENCOUNTER FOR VACCINATION: Primary | ICD-10-CM

## 2021-02-10 PROCEDURE — 91300 PFIZER SARS-COV-2 VACCINE: CPT

## 2021-02-10 PROCEDURE — 0001A PFIZER SARS-COV-2 VACCINE: CPT

## 2021-03-06 ENCOUNTER — APPOINTMENT (OUTPATIENT)
Dept: FAMILY PLANNING/WOMEN'S HEALTH CLINIC | Facility: IMMUNIZATION CENTER | Age: 76
End: 2021-03-06
Attending: INTERNAL MEDICINE
Payer: MEDICARE

## 2021-03-06 PROCEDURE — 0002A PFIZER SARS-COV-2 VACCINE: CPT

## 2021-03-06 PROCEDURE — 91300 PFIZER SARS-COV-2 VACCINE: CPT

## 2021-03-08 ENCOUNTER — IMMUNIZATION (OUTPATIENT)
Dept: FAMILY PLANNING/WOMEN'S HEALTH CLINIC | Facility: IMMUNIZATION CENTER | Age: 76
End: 2021-03-08
Payer: MEDICARE

## 2021-03-08 DIAGNOSIS — Z23 ENCOUNTER FOR VACCINATION: Primary | ICD-10-CM

## 2021-03-18 ENCOUNTER — HOSPITAL ENCOUNTER (OUTPATIENT)
Facility: MEDICAL CENTER | Age: 76
End: 2021-03-18
Attending: NURSE PRACTITIONER
Payer: MEDICARE

## 2021-03-18 ENCOUNTER — OFFICE VISIT (OUTPATIENT)
Dept: URGENT CARE | Facility: CLINIC | Age: 76
End: 2021-03-18
Payer: MEDICARE

## 2021-03-18 ENCOUNTER — HOSPITAL ENCOUNTER (OUTPATIENT)
Dept: RADIOLOGY | Facility: MEDICAL CENTER | Age: 76
End: 2021-03-18
Attending: NURSE PRACTITIONER
Payer: MEDICARE

## 2021-03-18 VITALS
SYSTOLIC BLOOD PRESSURE: 132 MMHG | WEIGHT: 143 LBS | OXYGEN SATURATION: 98 % | BODY MASS INDEX: 25.34 KG/M2 | TEMPERATURE: 98 F | HEART RATE: 68 BPM | DIASTOLIC BLOOD PRESSURE: 86 MMHG | RESPIRATION RATE: 16 BRPM | HEIGHT: 63 IN

## 2021-03-18 DIAGNOSIS — R10.32 LEFT LOWER QUADRANT ABDOMINAL PAIN: ICD-10-CM

## 2021-03-18 DIAGNOSIS — Z87.19 HISTORY OF DIVERTICULITIS OF COLON: ICD-10-CM

## 2021-03-18 LAB
ALBUMIN SERPL BCP-MCNC: 4.3 G/DL (ref 3.2–4.9)
ALBUMIN/GLOB SERPL: 1.5 G/DL
ALP SERPL-CCNC: 91 U/L (ref 30–99)
ALT SERPL-CCNC: 6 U/L (ref 2–50)
ANION GAP SERPL CALC-SCNC: 9 MMOL/L (ref 7–16)
AST SERPL-CCNC: 12 U/L (ref 12–45)
BASOPHILS # BLD AUTO: 0.6 % (ref 0–1.8)
BASOPHILS # BLD: 0.05 K/UL (ref 0–0.12)
BILIRUB SERPL-MCNC: 0.3 MG/DL (ref 0.1–1.5)
BUN SERPL-MCNC: 9 MG/DL (ref 8–22)
CALCIUM SERPL-MCNC: 9.9 MG/DL (ref 8.5–10.5)
CHLORIDE SERPL-SCNC: 106 MMOL/L (ref 96–112)
CO2 SERPL-SCNC: 26 MMOL/L (ref 20–33)
CREAT SERPL-MCNC: 0.57 MG/DL (ref 0.5–1.4)
EOSINOPHIL # BLD AUTO: 0.09 K/UL (ref 0–0.51)
EOSINOPHIL NFR BLD: 1.2 % (ref 0–6.9)
ERYTHROCYTE [DISTWIDTH] IN BLOOD BY AUTOMATED COUNT: 47.9 FL (ref 35.9–50)
GLOBULIN SER CALC-MCNC: 2.9 G/DL (ref 1.9–3.5)
GLUCOSE SERPL-MCNC: 93 MG/DL (ref 65–99)
HCT VFR BLD AUTO: 46.7 % (ref 37–47)
HGB BLD-MCNC: 15.5 G/DL (ref 12–16)
IMM GRANULOCYTES # BLD AUTO: 0.03 K/UL (ref 0–0.11)
IMM GRANULOCYTES NFR BLD AUTO: 0.4 % (ref 0–0.9)
LYMPHOCYTES # BLD AUTO: 1.66 K/UL (ref 1–4.8)
LYMPHOCYTES NFR BLD: 21.4 % (ref 22–41)
MCH RBC QN AUTO: 31.2 PG (ref 27–33)
MCHC RBC AUTO-ENTMCNC: 33.2 G/DL (ref 33.6–35)
MCV RBC AUTO: 94 FL (ref 81.4–97.8)
MONOCYTES # BLD AUTO: 0.44 K/UL (ref 0–0.85)
MONOCYTES NFR BLD AUTO: 5.7 % (ref 0–13.4)
NEUTROPHILS # BLD AUTO: 5.49 K/UL (ref 2–7.15)
NEUTROPHILS NFR BLD: 70.7 % (ref 44–72)
NRBC # BLD AUTO: 0 K/UL
NRBC BLD-RTO: 0 /100 WBC
PLATELET # BLD AUTO: 334 K/UL (ref 164–446)
PMV BLD AUTO: 11 FL (ref 9–12.9)
POTASSIUM SERPL-SCNC: 4.4 MMOL/L (ref 3.6–5.5)
PROT SERPL-MCNC: 7.2 G/DL (ref 6–8.2)
RBC # BLD AUTO: 4.97 M/UL (ref 4.2–5.4)
SODIUM SERPL-SCNC: 141 MMOL/L (ref 135–145)
WBC # BLD AUTO: 7.8 K/UL (ref 4.8–10.8)

## 2021-03-18 PROCEDURE — 36415 COLL VENOUS BLD VENIPUNCTURE: CPT | Performed by: NURSE PRACTITIONER

## 2021-03-18 PROCEDURE — 99214 OFFICE O/P EST MOD 30 MIN: CPT | Performed by: NURSE PRACTITIONER

## 2021-03-18 PROCEDURE — 74177 CT ABD & PELVIS W/CONTRAST: CPT | Mod: ME

## 2021-03-18 PROCEDURE — 700117 HCHG RX CONTRAST REV CODE 255: Performed by: NURSE PRACTITIONER

## 2021-03-18 PROCEDURE — 80053 COMPREHEN METABOLIC PANEL: CPT

## 2021-03-18 PROCEDURE — 99000 SPECIMEN HANDLING OFFICE-LAB: CPT | Performed by: NURSE PRACTITIONER

## 2021-03-18 PROCEDURE — 85025 COMPLETE CBC W/AUTO DIFF WBC: CPT

## 2021-03-18 RX ADMIN — IOHEXOL 100 ML: 350 INJECTION, SOLUTION INTRAVENOUS at 16:16

## 2021-03-18 ASSESSMENT — ENCOUNTER SYMPTOMS
HEADACHES: 0
NAUSEA: 0
ABDOMINAL PAIN: 1
FLANK PAIN: 0
FEVER: 0
HEARTBURN: 0
CONSTIPATION: 1
DIARRHEA: 1
CHILLS: 1
BLOOD IN STOOL: 0
VOMITING: 0

## 2021-03-18 ASSESSMENT — FIBROSIS 4 INDEX: FIB4 SCORE: 1.22

## 2021-03-18 NOTE — PROGRESS NOTES
Subjective:     Dina Crews is a 75 y.o. female who presents for Abdominal Pain (x2-3 weeks, mainly on left side )      Abdominal Pain  This is a new problem. The current episode started 1 to 4 weeks ago (Dina states approximately 3 weeks ago she developed lower abdominal pain that is worse on the left side.  Associated symptoms include malodorous stool, constipation and diarrhea.). The onset quality is sudden. The problem occurs constantly. The problem has been gradually worsening. The pain is located in the LLQ. The pain is at a severity of 7/10. The quality of the pain is dull. The abdominal pain radiates to the periumbilical region. Associated symptoms include constipation and diarrhea. Pertinent negatives include no dysuria, fever, frequency, headaches, hematuria, melena, nausea or vomiting. Exacerbated by: sitting. The pain is relieved by standing and being still (lying down). Treatments tried: stool softner. Her past medical history is significant for abdominal surgery. There is no history of colon cancer. diverticulitis. Recent colonoscpy 2 months ago with polyp removal, partial colectomy        Review of Systems   Constitutional: Positive for chills. Negative for fever and malaise/fatigue.   Gastrointestinal: Positive for abdominal pain, constipation and diarrhea. Negative for blood in stool, heartburn, melena, nausea and vomiting.   Genitourinary: Negative for dysuria, flank pain, frequency, hematuria and urgency.   Neurological: Negative for headaches.       PMH:   Past Medical History:   Diagnosis Date   • Anxiety disorder    • CATARACT     early stages   • Chronic constipation 10/12/2013   • Chronic maxillary sinusitis 12/30/2015   • Diverticulitis 3/1/2013   • Elevated TSH 5/30/2017   • GERD (gastroesophageal reflux disease) 10/29/2011   • History of malignant neoplasm of parotid gland 10/19/2011   • Hyperlipidemia 10/19/2011   • Major depression 7/16/2012   • Osteopenia 8/12/2012   •  Perennial allergic rhinitis 2011   • Personal history of skin cancer 2011   • S/P partial colectomy 2014   • TMJ tenderness 10/29/2011   • Tobacco abuse 2011   • Torn meniscus     right knee   • Vitamin d deficiency 2011     ALLERGIES:   Allergies   Allergen Reactions   • Penicillins Rash     Rxn - Late      • Codeine Vomiting   • Morphine Rash     Localized red rash after morphine administration   • Vicodin [Hydrocodone-Acetaminophen] Itching     SURGHX:   Past Surgical History:   Procedure Laterality Date   • NEL BY LAPAROSCOPY  2015    Performed by Gaudencio Johnson M.D. at SURGERY SAME DAY [facility]   • LOW ANTERIOR RESECTION ROBOTIC  2014    Performed by Ismael Rocha M.D. at SURGERY [facility]   • ABDOMINAL HYSTERECTOMY TOTAL     • HYSTERECTOMY, VAGINAL      and BSO   • OTHER ABDOMINAL SURGERY      partial colectomy   • PRIMARY C SECTION     • REPEAT C SECTION     • SHOULDER ARTHROSCOPY       SOCHX:   Social History     Socioeconomic History   • Marital status:      Spouse name: Not on file   • Number of children: 2   • Years of education: HS   • Highest education level: Not on file   Occupational History     Employer: OTHER   Tobacco Use   • Smoking status: Current Every Day Smoker     Packs/day: 0.50     Years: 45.00     Pack years: 22.50     Types: Cigarettes     Start date: 1964     Last attempt to quit: 2012     Years since quittin.2   • Smokeless tobacco: Never Used   Substance and Sexual Activity   • Alcohol use: No     Alcohol/week: 0.0 oz   • Drug use: Yes     Types: Oral, Marijuana     Comment: Pot daily-edibles   • Sexual activity: Yes     Partners: Female   Other Topics Concern   • Not on file   Social History Narrative    Moved to [place] after couldn't find work in [place]. Then mother had increasing health problems. Now stays in [place] to care for mom. Siblings caregive on Weekends. 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  "   Spouse (female) 25 years.    2 children. 1 grandchild.     2013: mother .      Social Determinants of Health     Financial Resource Strain:    • Difficulty of Paying Living Expenses:    Food Insecurity:    • Worried About Running Out of Food in the Last Year:    • Ran Out of Food in the Last Year:    Transportation Needs:    • Lack of Transportation (Medical):    • Lack of Transportation (Non-Medical):    Physical Activity:    • Days of Exercise per Week:    • Minutes of Exercise per Session:    Stress:    • Feeling of Stress :    Social Connections:    • Frequency of Communication with Friends and Family:    • Frequency of Social Gatherings with Friends and Family:    • Attends Religion Services:    • Active Member of Clubs or Organizations:    • Attends Club or Organization Meetings:    • Marital Status:    Intimate Partner Violence:    • Fear of Current or Ex-Partner:    • Emotionally Abused:    • Physically Abused:    • Sexually Abused:      FH:   Family History   Problem Relation Age of Onset   • Lung Disease Mother         COPD   • Cancer Mother         Thyroid cancer   • Cancer Father         d. 72 Stomach cancer   • Diabetes Father    • Cancer Sister 40        Breast cancer   • GI Disease Sister         diverticulitis   • Cancer Sister         breast         Objective:   /86   Pulse 68   Temp 36.7 °C (98 °F) (Temporal)   Resp 16   Ht 1.6 m (5' 3\")   Wt 64.9 kg (143 lb)   LMP 1992   SpO2 98%   BMI 25.33 kg/m²     Physical Exam  Vitals and nursing note reviewed.   Constitutional:       General: She is not in acute distress.     Appearance: Normal appearance. She is not ill-appearing.   HENT:      Head: Normocephalic and atraumatic.      Right Ear: External ear normal.      Left Ear: External ear normal.      Nose: No congestion or rhinorrhea.      Mouth/Throat:      Mouth: Mucous membranes are moist.   Eyes:      Extraocular Movements: Extraocular movements intact.      Pupils: " Pupils are equal, round, and reactive to light.   Cardiovascular:      Rate and Rhythm: Normal rate and regular rhythm.      Pulses: Normal pulses.      Heart sounds: Normal heart sounds.   Pulmonary:      Effort: Pulmonary effort is normal.      Breath sounds: Normal breath sounds.   Abdominal:      General: Abdomen is flat. Bowel sounds are normal.      Palpations: Abdomen is soft.      Tenderness: There is abdominal tenderness. There is rebound. There is no right CVA tenderness, left CVA tenderness or guarding. Negative signs include Vincent's sign, Rovsing's sign, McBurney's sign, psoas sign and obturator sign.       Musculoskeletal:         General: Normal range of motion.      Cervical back: Normal range of motion and neck supple.   Skin:     General: Skin is warm and dry.      Capillary Refill: Capillary refill takes less than 2 seconds.   Neurological:      General: No focal deficit present.      Mental Status: She is alert and oriented to person, place, and time. Mental status is at baseline.   Psychiatric:         Mood and Affect: Mood normal.         Behavior: Behavior normal.         Thought Content: Thought content normal.         Judgment: Judgment normal.         Assessment/Plan:   Assessment    1. Left lower quadrant abdominal pain  CT-ABDOMEN-PELVIS WITH    CBC WITH DIFFERENTIAL    Comp Metabolic Panel    CANCELED: CBC WITH DIFFERENTIAL    CANCELED: Comp Metabolic Panel   2. History of diverticulitis of colon  CBC WITH DIFFERENTIAL    Comp Metabolic Panel    CANCELED: CBC WITH DIFFERENTIAL    CANCELED: Comp Metabolic Panel   We discussed differential diagnoses.  Stat lab work ordered and collected in clinic for evaluation.  CT of abdomen and pelvis ordered for evaluation of abdominal pain for 3 weeks.  I will call her with results.   AVS handout given and reviewed with patient. Pt educated on red flags and when to seek treatment back in ER or UC.

## 2021-04-06 ENCOUNTER — TELEPHONE (OUTPATIENT)
Dept: MEDICAL GROUP | Facility: LAB | Age: 76
End: 2021-04-06

## 2021-04-06 NOTE — TELEPHONE ENCOUNTER
ESTABLISHED PATIENT PRE-VISIT PLANNING     Patient was NOT contacted to complete PVP.     Note: Patient will not be contacted if there is no indication to call.     1.  Reviewed notes from the last few office visits within the medical group: Yes    2.  If any orders were placed at last visit or intended to be done for this visit (i.e. 6 mos follow-up), do we have Results/Consult Notes?         •  Labs - Labs ordered, completed on 3/18/21 and results are in chart.  Note: If patient appointment is for lab review and patient did not complete labs, check with provider if OK to reschedule patient until labs completed.       •  Imaging - Imaging ordered, completed and results are in chart.       •  Referrals - No referrals were ordered at last office visit.    3. Is this appointment scheduled as a Hospital Follow-Up? No    4.  Immunizations were updated in Epic using Reconcile Outside Information activity? Yes    5.  Patient is due for the following Health Maintenance Topics:   Health Maintenance Due   Topic Date Due   • LUNG CANCER SCREENING  Never done   • IMM ZOSTER VACCINES (1 of 2) Never done   • BONE DENSITY  08/09/2018   • MAMMOGRAM  02/11/2021         6.  AHA (Pulse8) form printed for Provider? Email sent to [facility] requesting form if needed

## 2021-04-13 ENCOUNTER — OFFICE VISIT (OUTPATIENT)
Dept: MEDICAL GROUP | Facility: LAB | Age: 76
End: 2021-04-13
Payer: MEDICARE

## 2021-04-13 VITALS
DIASTOLIC BLOOD PRESSURE: 72 MMHG | HEIGHT: 63 IN | TEMPERATURE: 98.7 F | OXYGEN SATURATION: 95 % | RESPIRATION RATE: 12 BRPM | HEART RATE: 70 BPM | BODY MASS INDEX: 25.52 KG/M2 | SYSTOLIC BLOOD PRESSURE: 120 MMHG | WEIGHT: 144 LBS

## 2021-04-13 DIAGNOSIS — Z13.6 SCREENING FOR CARDIOVASCULAR CONDITION: ICD-10-CM

## 2021-04-13 DIAGNOSIS — R20.8 DECREASED SENSATION OF FOOT: ICD-10-CM

## 2021-04-13 DIAGNOSIS — Z12.31 ENCOUNTER FOR SCREENING MAMMOGRAM FOR MALIGNANT NEOPLASM OF BREAST: ICD-10-CM

## 2021-04-13 DIAGNOSIS — Z23 NEED FOR VACCINATION: ICD-10-CM

## 2021-04-13 DIAGNOSIS — F41.1 GENERALIZED ANXIETY DISORDER: ICD-10-CM

## 2021-04-13 DIAGNOSIS — R79.9 ABNORMAL FINDING OF BLOOD CHEMISTRY, UNSPECIFIED: ICD-10-CM

## 2021-04-13 DIAGNOSIS — K21.9 GASTROESOPHAGEAL REFLUX DISEASE: ICD-10-CM

## 2021-04-13 PROCEDURE — 90750 HZV VACC RECOMBINANT IM: CPT | Performed by: FAMILY MEDICINE

## 2021-04-13 PROCEDURE — 90471 IMMUNIZATION ADMIN: CPT | Performed by: FAMILY MEDICINE

## 2021-04-13 PROCEDURE — 99214 OFFICE O/P EST MOD 30 MIN: CPT | Mod: 25 | Performed by: FAMILY MEDICINE

## 2021-04-13 RX ORDER — LORAZEPAM 1 MG/1
1 TABLET ORAL
Qty: 30 TABLET | Refills: 0 | Status: SHIPPED | OUTPATIENT
Start: 2021-06-13 | End: 2021-07-13

## 2021-04-13 RX ORDER — PANTOPRAZOLE SODIUM 40 MG/1
40 TABLET, DELAYED RELEASE ORAL DAILY
Qty: 90 TABLET | Refills: 3 | Status: SHIPPED | OUTPATIENT
Start: 2021-04-13 | End: 2021-10-14 | Stop reason: SDUPTHER

## 2021-04-13 RX ORDER — LORAZEPAM 1 MG/1
1 TABLET ORAL
Qty: 30 TABLET | Refills: 0 | Status: SHIPPED | OUTPATIENT
Start: 2021-05-13 | End: 2021-06-12

## 2021-04-13 RX ORDER — LORAZEPAM 1 MG/1
1 TABLET ORAL
Qty: 30 TABLET | Refills: 0 | Status: SHIPPED | OUTPATIENT
Start: 2021-04-13 | End: 2021-05-13

## 2021-04-13 ASSESSMENT — FIBROSIS 4 INDEX: FIB4 SCORE: 1.07

## 2021-04-13 NOTE — PROGRESS NOTES
"Subjective:     CC: Anxiety, sensation in feet    HPI:   Dina presents today with:     Feet  One year of feeling like skin over toes is \"thickening\" or possible decrease in sensation.    Anxiety  Has been on Lexapro and Ativan for years.  Lost her wife to Covid 4 months ago and has had significantly worsened anxiety and grief since then.  Next Ativan a few times weekly for anxiety attacks.  Had tried Remeron and trazodone in the past without improvement.  She had appointment with psychiatry after referral from previous PCP but they were only offering virtual visits and she would prefer in person visit.    Health Maintenance: Completed    Medications, past medical history, allergies, and social history have been reviewed and updated.    ROS:  No chest pain or shortness of breath.  No fever/chills.  No nausea, vomiting, or diarrhea.    Objective:       Exam:  /72 (BP Location: Left arm, Patient Position: Sitting, BP Cuff Size: Adult)   Pulse 70   Temp 37.1 °C (98.7 °F)   Resp 12   Ht 1.6 m (5' 3\")   Wt 65.3 kg (144 lb)   LMP 05/30/1992   SpO2 95%   BMI 25.51 kg/m²  Body mass index is 25.51 kg/m².    Constitutional: Alert. Well appearing. No distress.  Skin: Warm, dry, good turgor, no visible rashes.  Eye: Equal, round and reactive to light, conjunctiva clear, lids normal.  ENMT: Moist mucous membranes. Normal dentition.  Respiratory: Normal effort.   Neuro: Moves all four extremities. No facial droop.  Ext: Mildly decreased sensation to monofilament testing to both feet with thickened, callused skin.  Psych: Answers questions appropriately. Normal affect and mood.      Assessment & Plan:     75 y.o. female with the following -     1. Decreased sensation of foot  Possible neuropathy versus just secondary to thickened, callused skin.  Will check A1c and B12.  - HEMOGLOBIN A1C; Future  - VITAMIN B12; Future    2. Gastroesophageal reflux disease  Stable, continue Protonix.  - pantoprazole (PROTONIX) 40 " MG Tablet Delayed Response; Take 1 tablet by mouth every day.  Dispense: 90 tablet; Refill: 3    3. Generalized anxiety disorder  Chronic, worsened after recent death of spouse.  Continue Lexapro and Ativan.  She has been trying to establish with psychiatry but was only offered virtual visits.  Referral is placed, requesting in person visit.  - LORazepam (ATIVAN) 1 MG Tab; Take 1 tablet by mouth 1 time a day as needed for Anxiety for up to 30 days.  Dispense: 30 tablet; Refill: 0  - REFERRAL TO PSYCHIATRY  - LORazepam (ATIVAN) 1 MG Tab; Take 1 tablet by mouth 1 time a day as needed for Anxiety for up to 30 days.  Dispense: 30 tablet; Refill: 0  - LORazepam (ATIVAN) 1 MG Tab; Take 1 tablet by mouth 1 time a day as needed for Anxiety for up to 30 days.  Dispense: 30 tablet; Refill: 0    4. Need for vaccination  - Shingles Vaccine (Shingrix)    5. Enounter for screening mammogram for malignant neoplasm of breast  - MA-SCREENING MAMMO BILAT W/CAD; Future    6. Screening for cardiovascular condition  - Lipid Profile; Future    7. Abnormal finding of blood chemistry, unspecified   - HEMOGLOBIN A1C; Future      Please note that this note was created using voice recognition software.

## 2021-05-04 ENCOUNTER — APPOINTMENT (RX ONLY)
Dept: URBAN - METROPOLITAN AREA CLINIC 35 | Facility: CLINIC | Age: 76
Setting detail: DERMATOLOGY
End: 2021-05-04

## 2021-05-04 DIAGNOSIS — Z85.828 PERSONAL HISTORY OF OTHER MALIGNANT NEOPLASM OF SKIN: ICD-10-CM

## 2021-05-04 DIAGNOSIS — D22 MELANOCYTIC NEVI: ICD-10-CM

## 2021-05-04 DIAGNOSIS — L82.1 OTHER SEBORRHEIC KERATOSIS: ICD-10-CM

## 2021-05-04 DIAGNOSIS — L81.4 OTHER MELANIN HYPERPIGMENTATION: ICD-10-CM

## 2021-05-04 DIAGNOSIS — L72.0 EPIDERMAL CYST: ICD-10-CM

## 2021-05-04 DIAGNOSIS — Z71.89 OTHER SPECIFIED COUNSELING: ICD-10-CM

## 2021-05-04 DIAGNOSIS — B35.3 TINEA PEDIS: ICD-10-CM | Status: INADEQUATELY CONTROLLED

## 2021-05-04 DIAGNOSIS — D18.0 HEMANGIOMA: ICD-10-CM

## 2021-05-04 PROBLEM — D18.01 HEMANGIOMA OF SKIN AND SUBCUTANEOUS TISSUE: Status: ACTIVE | Noted: 2021-05-04

## 2021-05-04 PROBLEM — D22.5 MELANOCYTIC NEVI OF TRUNK: Status: ACTIVE | Noted: 2021-05-04

## 2021-05-04 PROBLEM — D48.5 NEOPLASM OF UNCERTAIN BEHAVIOR OF SKIN: Status: ACTIVE | Noted: 2021-05-04

## 2021-05-04 PROCEDURE — 99214 OFFICE O/P EST MOD 30 MIN: CPT | Mod: 25

## 2021-05-04 PROCEDURE — 69100 BIOPSY OF EXTERNAL EAR: CPT

## 2021-05-04 PROCEDURE — ? COUNSELING

## 2021-05-04 PROCEDURE — ? OBSERVATION AND MEASURE

## 2021-05-04 PROCEDURE — ? EXTRACTIONS

## 2021-05-04 PROCEDURE — ? BIOPSY BY SHAVE METHOD

## 2021-05-04 PROCEDURE — ? PRESCRIPTION

## 2021-05-04 RX ORDER — KETOCONAZOLE 20 MG/G
THIN LAYER CREAM TOPICAL BID
Qty: 1 | Refills: 11 | Status: ERX | COMMUNITY
Start: 2021-05-04

## 2021-05-04 RX ORDER — AMMONIUM LACTATE 120 MG/G
CREAM TOPICAL
Qty: 1 | Refills: 11 | Status: ERX | COMMUNITY
Start: 2021-05-04

## 2021-05-04 RX ADMIN — AMMONIUM LACTATE: 120 CREAM TOPICAL at 00:00

## 2021-05-04 RX ADMIN — KETOCONAZOLE THIN LAYER: 20 CREAM TOPICAL at 00:00

## 2021-05-04 ASSESSMENT — LOCATION SIMPLE DESCRIPTION DERM
LOCATION SIMPLE: LEFT FOREARM
LOCATION SIMPLE: RIGHT FOREARM
LOCATION SIMPLE: RIGHT EYELID
LOCATION SIMPLE: RIGHT EAR
LOCATION SIMPLE: RIGHT ANTERIOR NECK
LOCATION SIMPLE: ABDOMEN
LOCATION SIMPLE: RIGHT SHOULDER
LOCATION SIMPLE: RIGHT PLANTAR SURFACE
LOCATION SIMPLE: LEFT UPPER BACK
LOCATION SIMPLE: CHEST
LOCATION SIMPLE: LEFT CHEEK
LOCATION SIMPLE: RIGHT CHEEK
LOCATION SIMPLE: LEFT PLANTAR SURFACE
LOCATION SIMPLE: LEFT PRETIBIAL REGION
LOCATION SIMPLE: RIGHT LOWER BACK

## 2021-05-04 ASSESSMENT — LOCATION DETAILED DESCRIPTION DERM
LOCATION DETAILED: RIGHT MEDIAL SUPERIOR CHEST
LOCATION DETAILED: LEFT PROXIMAL PRETIBIAL REGION
LOCATION DETAILED: LEFT SUPERIOR UPPER BACK
LOCATION DETAILED: LEFT LATERAL MALAR CHEEK
LOCATION DETAILED: RIGHT CLAVICULAR NECK
LOCATION DETAILED: RIGHT PLANTAR FOREFOOT OVERLYING 3RD METATARSAL
LOCATION DETAILED: RIGHT SUPERIOR LATERAL MIDBACK
LOCATION DETAILED: LEFT PLANTAR FOREFOOT OVERLYING 2ND METATARSAL
LOCATION DETAILED: RIGHT SUPERIOR LATERAL MALAR CHEEK
LOCATION DETAILED: LEFT MID-UPPER BACK
LOCATION DETAILED: RIGHT PROXIMAL DORSAL FOREARM
LOCATION DETAILED: RIGHT LATERAL CANTHUS
LOCATION DETAILED: EPIGASTRIC SKIN
LOCATION DETAILED: LEFT PROXIMAL DORSAL FOREARM
LOCATION DETAILED: LEFT RIB CAGE
LOCATION DETAILED: RIGHT SUPERIOR MEDIAL BUCCAL CHEEK
LOCATION DETAILED: RIGHT CAVUM CONCHA
LOCATION DETAILED: RIGHT LATERAL SHOULDER

## 2021-05-04 ASSESSMENT — LOCATION ZONE DERM
LOCATION ZONE: NECK
LOCATION ZONE: FEET
LOCATION ZONE: LEG
LOCATION ZONE: FACE
LOCATION ZONE: ARM
LOCATION ZONE: EAR
LOCATION ZONE: EYELID
LOCATION ZONE: TRUNK

## 2021-05-04 NOTE — PROCEDURE: EXTRACTIONS
Acne Type: Comedonal Lesions
Prep Text (Optional): Prior to removal the treatment areas were prepped in the usual fashion.
Detail Level: Detailed
Extraction Method: 11 blade and q-tip
Post-Care Instructions: I reviewed with the patient in detail post-care instructions. Patient is to keep the treatment areas dry overnight, and then apply bacitracin twice daily until healed. Patient may apply hydrogen peroxide soaks to remove any crusting.
Consent was obtained and risks were reviewed including but not limited to scarring, infection, bleeding, scabbing, incomplete removal, and allergy to anesthesia.
Render Post-Care Instructions In Note?: no

## 2021-05-04 NOTE — PROCEDURE: BIOPSY BY SHAVE METHOD
Detail Level: Simple
Depth Of Biopsy: dermis
Was A Bandage Applied: Yes
Size Of Lesion In Cm: 2.5
X Size Of Lesion In Cm: 0
Biopsy Type: H and E
Biopsy Method: Dermablade
Anesthesia Type: 1% lidocaine with 1:100,000 epinephrine and a 1:12 solution of 8.4% sodium bicarbonate
Anesthesia Volume In Cc: 2
Hemostasis: Aluminum Chloride
Wound Care: Petrolatum
Dressing: no dressing applied
Destruction After The Procedure: No
Type Of Destruction Used: Curettage
Curettage Text: The wound bed was treated with curettage after the biopsy was performed.
Electrodesiccation And Curettage Text: The wound bed was treated with electrodesiccation and curettage after the biopsy was
Lab: [id number]
Lab Facility: 
Consent: Written consent was obtained and risks were reviewed including but not limited to scarring, infection, bleeding, scabbing, incomplete removal, nerve damage and allergy to anesthesia.
Post-Care Instructions: I reviewed with the patient in detail post-care instructions. Patient is to keep the biopsy site dry overnight, and then apply white petrolatum twice daily until healed. Patient may apply hydrogen peroxide soaks to remove any crusting.
Notification Instructions: Patient will be notified of biopsy results. However, patient instructed to call the office if not contacted within 2 weeks.
Billing Type: Third-Party Bill
Information: Selecting Yes will display possible errors in your note based on the variables you have selected. This validation is only offered as a suggestion for you. PLEASE NOTE THAT THE VALIDATION TEXT WILL BE REMOVED WHEN YOU FINALIZE YOUR NOTE. IF YOU WANT TO FAX A PRELIMINARY NOTE YOU WILL NEED TO TOGGLE THIS TO 'NO' IF YOU DO NOT WANT IT IN YOUR FAXED NOTE.

## 2021-06-07 ENCOUNTER — APPOINTMENT (RX ONLY)
Dept: URBAN - METROPOLITAN AREA CLINIC 36 | Facility: CLINIC | Age: 76
Setting detail: DERMATOLOGY
End: 2021-06-07

## 2021-06-07 DIAGNOSIS — L57.8 OTHER SKIN CHANGES DUE TO CHRONIC EXPOSURE TO NONIONIZING RADIATION: ICD-10-CM

## 2021-06-07 DIAGNOSIS — L57.0 ACTINIC KERATOSIS: ICD-10-CM

## 2021-06-07 PROCEDURE — ? MOHS SURGERY

## 2021-06-07 PROCEDURE — ? PHOTODYNAMIC THERAPY COUNSELING

## 2021-06-07 PROCEDURE — ? COUNSELING

## 2021-06-07 NOTE — PROCEDURE: MOHS SURGERY
Stage 13: Additional Anesthesia Volume In Cc: 0
O-L Flap Text: The defect edges were debeveled with a #15 scalpel blade.  Given the location of the defect, shape of the defect and the proximity to free margins an O-L flap was deemed most appropriate.  Using a sterile surgical marker, an appropriate advancement flap was drawn incorporating the defect and placing the expected incisions within the relaxed skin tension lines where possible.    The area thus outlined was incised deep to adipose tissue with a #15 scalpel blade.  The skin margins were undermined to an appropriate distance in all directions utilizing iris scissors.
Incorporate Mauc Into Note After Indications: Yes
Eye Shield Used: No
Closure 2 Information: This tab is for additional flaps and grafts, including complex repair and grafts and complex repair and flaps. You can also specify a different location for the additional defect, if the location is the same you do not need to select a new one. We will insert the automated text for the repair you select below just as we do for solitary flaps and grafts. Please note that at this time if you select a location with a different insurance zone you will need to override the ICD10 and CPT if appropriate.
Consent 3/Introductory Paragraph: I gave the patient a chance to ask questions they had about the procedure.  Following this I explained the Mohs procedure and consent was obtained. The risks, benefits and alternatives to therapy were discussed in detail. Specifically, the risks of infection, scarring, bleeding, prolonged wound healing, incomplete removal, allergy to anesthesia, nerve injury and recurrence were addressed. Prior to the procedure, the treatment site was clearly identified and confirmed by the patient. All components of Universal Protocol/PAUSE Rule completed.
Double O-Z Plasty Text: The defect edges were debeveled with a #15 scalpel blade.  Given the location of the defect, shape of the defect and the proximity to free margins a Double O-Z plasty (double transposition flap) was deemed most appropriate.  Using a sterile surgical marker, the appropriate transposition flaps were drawn incorporating the defect and placing the expected incisions within the relaxed skin tension lines where possible. The area thus outlined was incised deep to adipose tissue with a #15 scalpel blade.  The skin margins were undermined to an appropriate distance in all directions utilizing iris scissors.  Hemostasis was achieved with electrocautery.  The flaps were then transposed into place, one clockwise and the other counterclockwise, and anchored with interrupted buried subcutaneous sutures.
Rhombic Flap Text: The defect edges were debeveled with a #15 scalpel blade.  Given the location of the defect and the proximity to free margins a rhombic flap was deemed most appropriate.  Using a sterile surgical marker, an appropriate rhombic flap was drawn incorporating the defect.    The area thus outlined was incised deep to adipose tissue with a #15 scalpel blade.  The skin margins were undermined to an appropriate distance in all directions utilizing iris scissors.
Special Stains Stage 10 - Results: Base On Clearance Noted Above
Mart-1 - Positive Histology Text: MART-1 staining demonstrates areas of higher density and clustering of melanocytes with Pagetoid spread upwards within the epidermis. The surgical margins are positive for tumor cells.
Tumor Debulked?: curette
Consent 2/Introductory Paragraph: Mohs surgery was explained to the patient and consent was obtained. The risks, benefits and alternatives to therapy were discussed in detail. Specifically, the risks of infection, scarring, bleeding, prolonged wound healing, incomplete removal, allergy to anesthesia, nerve injury and recurrence were addressed. Prior to the procedure, the treatment site was clearly identified and confirmed by the patient. All components of Universal Protocol/PAUSE Rule completed.
Consent 1/Introductory Paragraph: The rationale for Mohs was explained to the patient and consent was obtained. The risks, benefits and alternatives to therapy were discussed in detail. Specifically, the risks of infection, scarring, bleeding, prolonged wound healing, incomplete removal, allergy to anesthesia, nerve injury and recurrence were addressed. Prior to the procedure, the treatment site was clearly identified and confirmed by the patient. All components of Universal Protocol/PAUSE Rule completed.
Plastic Surgeon Procedure Text (E): After obtaining clear surgical margins the patient was sent to plastics for surgical repair.  The patient understands they will receive post-surgical care and follow-up from the referring physician's office.
Transposition Flap Text: The defect edges were debeveled with a #15 scalpel blade.  Given the location of the defect and the proximity to free margins a transposition flap was deemed most appropriate.  Using a sterile surgical marker, an appropriate transposition flap was drawn incorporating the defect.    The area thus outlined was incised deep to adipose tissue with a #15 scalpel blade.  The skin margins were undermined to an appropriate distance in all directions utilizing iris scissors.
Consent (Temporal Branch)/Introductory Paragraph: The rationale for Mohs was explained to the patient and consent was obtained. The risks, benefits and alternatives to therapy were discussed in detail. Specifically, the risks of damage to the temporal branch of the facial nerve, infection, scarring, bleeding, prolonged wound healing, incomplete removal, allergy to anesthesia, and recurrence were addressed. Prior to the procedure, the treatment site was clearly identified and confirmed by the patient. All components of Universal Protocol/PAUSE Rule completed.
Mastoid Interpolation Flap Text: A decision was made to reconstruct the defect utilizing an interpolation axial flap and a staged reconstruction.  A telfa template was made of the defect.  This telfa template was then used to outline the mastoid interpolation flap.  The donor area for the pedicle flap was then injected with anesthesia.  The flap was excised through the skin and subcutaneous tissue down to the layer of the underlying musculature.  The pedicle flap was carefully excised within this deep plane to maintain its blood supply.  The edges of the donor site were undermined.   The donor site was closed in a primary fashion.  The pedicle was then rotated into position and sutured.  Once the tube was sutured into place, adequate blood supply was confirmed with blanching and refill.  The pedicle was then wrapped with xeroform gauze and dressed appropriately with a telfa and gauze bandage to ensure continued blood supply and protect the attached pedicle.
Date Of Previous Biopsy (Optional): 5-4-21
Otolaryngologist Procedure Text (B): After obtaining clear surgical margins the patient was sent to otolaryngology for surgical repair.  The patient understands they will receive post-surgical care and follow-up from the referring physician's office.
Wound Care (No Sutures): Petrolatum
S Plasty Text: Given the location and shape of the defect, and the orientation of relaxed skin tension lines, an S-plasty was deemed most appropriate for repair.  Using a sterile surgical marker, the appropriate outline of the S-plasty was drawn, incorporating the defect and placing the expected incisions within the relaxed skin tension lines where possible.  The area thus outlined was incised deep to adipose tissue with a #15 scalpel blade.  The skin margins were undermined to an appropriate distance in all directions utilizing iris scissors. The skin flaps were advanced over the defect.  The opposing margins were then approximated with interrupted buried subcutaneous sutures.
Bilobed Transposition Flap Text: The defect edges were debeveled with a #15 scalpel blade.  Given the location of the defect and the proximity to free margins a bilobed transposition flap was deemed most appropriate.  Using a sterile surgical marker, an appropriate bilobe flap drawn around the defect.    The area thus outlined was incised deep to adipose tissue with a #15 scalpel blade.  The skin margins were undermined to an appropriate distance in all directions utilizing iris scissors.
W Plasty Text: The lesion was extirpated to the level of the fat with a #15 scalpel blade.  Given the location of the defect, shape of the defect and the proximity to free margins a W-plasty was deemed most appropriate for repair.  Using a sterile surgical marker, the appropriate transposition arms of the W-plasty were drawn incorporating the defect and placing the expected incisions within the relaxed skin tension lines where possible.    The area thus outlined was incised deep to adipose tissue with a #15 scalpel blade.  The skin margins were undermined to an appropriate distance in all directions utilizing iris scissors.  The opposing transposition arms were then transposed into place in opposite direction and anchored with interrupted buried subcutaneous sutures.
Ear Wedge Repair Text: A wedge excision was completed by carrying down an excision through the full thickness of the ear and cartilage with an inward facing Burow's triangle. The wound was then closed in a layered fashion.
Postop Diagnosis: same
Anesthesia Volume In Cc: 9
A-T Advancement Flap Text: The defect edges were debeveled with a #15 scalpel blade.  Given the location of the defect, shape of the defect and the proximity to free margins an A-T advancement flap was deemed most appropriate.  Using a sterile surgical marker, an appropriate advancement flap was drawn incorporating the defect and placing the expected incisions within the relaxed skin tension lines where possible.    The area thus outlined was incised deep to adipose tissue with a #15 scalpel blade.  The skin margins were undermined to an appropriate distance in all directions utilizing iris scissors.
Retention Suture Bite Size: 1 mm
Stage 2: Additional Anesthesia Type: 1% lidocaine with epinephrine
Cheiloplasty (Complex) Text: A decision was made to reconstruct the defect with a  cheiloplasty.  The defect was undermined extensively.  Additional obicularis oris muscle was excised with a 15 blade scalpel.  The defect was converted into a full thickness wedge to facilite a better cosmetic result.  Small vessels were then tied off with 5-0 monocyrl. The obicularis oris, superficial fascia, adipose and dermis were then reapproximated.  After the deeper layers were approximated the epidermis was reapproximated with particular care given to realign the vermilion border.
Skin Substitute Text: The defect edges were debeveled with a #15 scalpel blade.  Given the location of the defect, shape of the defect and the proximity to free margins a skin substitute graft was deemed most appropriate.  The graft material was trimmed to fit the size of the defect. The graft was then placed in the primary defect and oriented appropriately.
Star Wedge Flap Text: The defect edges were debeveled with a #15 scalpel blade.  Given the location of the defect, shape of the defect and the proximity to free margins a star wedge flap was deemed most appropriate.  Using a sterile surgical marker, an appropriate rotation flap was drawn incorporating the defect and placing the expected incisions within the relaxed skin tension lines where possible. The area thus outlined was incised deep to adipose tissue with a #15 scalpel blade.  The skin margins were undermined to an appropriate distance in all directions utilizing iris scissors.
Full Thickness Lip Wedge Repair (Flap) Text: Given the location of the defect and the proximity to free margins a full thickness wedge repair was deemed most appropriate.  Using a sterile surgical marker, the appropriate repair was drawn incorporating the defect and placing the expected incisions perpendicular to the vermilion border.  The vermilion border was also meticulously outlined to ensure appropriate reapproximation during the repair.  The area thus outlined was incised through and through with a #15 scalpel blade.  The muscularis and dermis were reaproximated with deep sutures following hemostasis. Care was taken to realign the vermilion border before proceeding with the superficial closure.  Once the vermilion was realigned the superfical and mucosal closure was finished.
Oculoplastic Surgeon Procedure Text (C): After obtaining clear surgical margins the patient was sent to oculoplastics for surgical repair.  The patient understands they will receive post-surgical care and follow-up from the referring physician's office.
Closure 4 Information: This tab is for additional flaps and grafts above and beyond our usual structured repairs.  Please note if you enter information here it will not currently bill and you will need to add the billing information manually.
Provider Procedure Text (D): After obtaining clear surgical margins the defect was repaired by another provider.
Muscle Hinge Flap Text: The defect edges were debeveled with a #15 scalpel blade.  Given the size, depth and location of the defect and the proximity to free margins a muscle hinge flap was deemed most appropriate.  Using a sterile surgical marker, an appropriate hinge flap was drawn incorporating the defect. The area thus outlined was incised with a #15 scalpel blade.  The skin margins were undermined to an appropriate distance in all directions utilizing iris scissors.
Double M-Plasty Intermediate Repair Preamble Text (Leave Blank If You Do Not Want): Undermining was performed with blunt dissection.
Graft Cartilage Fenestration Text: The cartilage was fenestrated with a 2mm punch biopsy to help facilitate graft survival and healing.
Additional Anesthesia Volume In Cc: 6
Wound Care: Vaseline
Partial Purse String (Simple) Text: Given the location of the defect and the characteristics of the surrounding skin a simple purse string closure was deemed most appropriate.  Undermining was performed circumfirentially around the surgical defect.  A purse string suture was then placed and tightened. Wound tension only allowed a partial closure of the circular defect.
Area L Indication Text: Tumors in this location are included in Area L (trunk and extremities).  Mohs surgery is indicated for larger tumors, or tumors with aggressive histologic features, in these anatomic locations.
Mucosal Advancement Flap Text: Given the location of the defect, shape of the defect and the proximity to free margins a mucosal advancement flap was deemed most appropriate. Incisions were made with a 15 blade scalpel in the appropriate fashion along the cutaneous vermilion border and the mucosal lip. The remaining actinically damaged mucosal tissue was excised.  The mucosal advancement flap was then elevated to the gingival sulcus with care taken to preserve the neurovascular structures and advanced into the primary defect. Care was taken to ensure that precise realignment of the vermilion border was achieved.
Detail Level: Detailed
Mart-1 - Negative Histology Text: MART-1 staining demonstrates a normal density and pattern of melanocytes along the dermal-epidermal junction. The surgical margins are negative for tumor cells.
Spiral Flap Text: The defect edges were debeveled with a #15 scalpel blade.  Given the location of the defect, shape of the defect and the proximity to free margins a spiral flap was deemed most appropriate.  Using a sterile surgical marker, an appropriate rotation flap was drawn incorporating the defect and placing the expected incisions within the relaxed skin tension lines where possible. The area thus outlined was incised deep to adipose tissue with a #15 scalpel blade.  The skin margins were undermined to an appropriate distance in all directions utilizing iris scissors.
Bcc Histology Text: There were numerous aggregates of basaloid cells.
Epidermal Closure: running cuticular
Advancement Flap (Double) Text: The defect edges were debeveled with a #15 scalpel blade.  Given the location of the defect and the proximity to free margins a double advancement flap was deemed most appropriate.  Using a sterile surgical marker, the appropriate advancement flaps were drawn incorporating the defect and placing the expected incisions within the relaxed skin tension lines where possible.    The area thus outlined was incised deep to adipose tissue with a #15 scalpel blade.  The skin margins were undermined to an appropriate distance in all directions utilizing iris scissors.
Area M Indication Text: Tumors in this location are included in Area M (cheek, forehead, scalp, neck, jawline and pretibial skin).  Mohs surgery is indicated for tumors in these anatomic locations.
H Plasty Text: Given the location of the defect, shape of the defect and the proximity to free margins a H-plasty was deemed most appropriate for repair.  Using a sterile surgical marker, the appropriate advancement arms of the H-plasty were drawn incorporating the defect and placing the expected incisions within the relaxed skin tension lines where possible. The area thus outlined was incised deep to adipose tissue with a #15 scalpel blade. The skin margins were undermined to an appropriate distance in all directions utilizing iris scissors.  The opposing advancement arms were then advanced into place in opposite direction and anchored with interrupted buried subcutaneous sutures.
Melolabial Transposition Flap Text: The defect edges were debeveled with a #15 scalpel blade.  Given the location of the defect and the proximity to free margins a melolabial flap was deemed most appropriate.  Using a sterile surgical marker, an appropriate melolabial transposition flap was drawn incorporating the defect.    The area thus outlined was incised deep to adipose tissue with a #15 scalpel blade.  The skin margins were undermined to an appropriate distance in all directions utilizing iris scissors.
V-Y Plasty Text: The defect edges were debeveled with a #15 scalpel blade.  Given the location of the defect, shape of the defect and the proximity to free margins an V-Y advancement flap was deemed most appropriate.  Using a sterile surgical marker, an appropriate advancement flap was drawn incorporating the defect and placing the expected incisions within the relaxed skin tension lines where possible.    The area thus outlined was incised deep to adipose tissue with a #15 scalpel blade.  The skin margins were undermined to an appropriate distance in all directions utilizing iris scissors.
Mid-Level Procedure Text (D): After obtaining clear surgical margins the patient was sent to a mid-level provider for surgical repair.  The patient understands they will receive post-surgical care and follow-up from the mid-level provider.
Burow's Advancement Flap Text: The defect edges were debeveled with a #15 scalpel blade.  Given the location of the defect and the proximity to free margins a Burow's advancement flap was deemed most appropriate.  Using a sterile surgical marker, the appropriate advancement flap was drawn incorporating the defect and placing the expected incisions within the relaxed skin tension lines where possible.    The area thus outlined was incised deep to adipose tissue with a #15 scalpel blade.  The skin margins were undermined to an appropriate distance in all directions utilizing iris scissors.
Tissue Cultured Epidermal Autograft Text: The defect edges were debeveled with a #15 scalpel blade.  Given the location of the defect, shape of the defect and the proximity to free margins a tissue cultured epidermal autograft was deemed most appropriate.  The graft was then trimmed to fit the size of the defect.  The graft was then placed in the primary defect and oriented appropriately.
Number Of Stages: 1
Alternatives Discussed Intro (Do Not Add Period): I discussed alternative treatments to Mohs surgery and specifically discussed the risks and benefits of
Repair Type: None (only Mohs)
Estimated Blood Loss (Cc): minimal
O-Z Flap Text: The defect edges were debeveled with a #15 scalpel blade.  Given the location of the defect, shape of the defect and the proximity to free margins an O-Z flap was deemed most appropriate.  Using a sterile surgical marker, an appropriate transposition flap was drawn incorporating the defect and placing the expected incisions within the relaxed skin tension lines where possible. The area thus outlined was incised deep to adipose tissue with a #15 scalpel blade.  The skin margins were undermined to an appropriate distance in all directions utilizing iris scissors.
Wound Check: 6 weeks
Complex Repair And Graft Additional Text (Will Appearing After The Standard Complex Repair Text): The complex repair was not sufficient to completely close the primary defect. The remaining additional defect was repaired with the graft mentioned below.
Mercedes Flap Text: The defect edges were debeveled with a #15 scalpel blade.  Given the location of the defect, shape of the defect and the proximity to free margins a Mercedes flap was deemed most appropriate.  Using a sterile surgical marker, an appropriate advancement flap was drawn incorporating the defect and placing the expected incisions within the relaxed skin tension lines where possible. The area thus outlined was incised deep to adipose tissue with a #15 scalpel blade.  The skin margins were undermined to an appropriate distance in all directions utilizing iris scissors.
Consent (Spinal Accessory)/Introductory Paragraph: The rationale for Mohs was explained to the patient and consent was obtained. The risks, benefits and alternatives to therapy were discussed in detail. Specifically, the risks of damage to the spinal accessory nerve, infection, scarring, bleeding, prolonged wound healing, incomplete removal, allergy to anesthesia, and recurrence were addressed. Prior to the procedure, the treatment site was clearly identified and confirmed by the patient. All components of Universal Protocol/PAUSE Rule completed.
Complex Repair And Flap Additional Text (Will Appearing After The Standard Complex Repair Text): The complex repair was not sufficient to completely close the primary defect. The remaining additional defect was repaired with the flap mentioned below.
Pain Refusal Text: I offered to prescribe pain medication but the patient refused to take this medication.
Paramedian Forehead Flap Text: A decision was made to reconstruct the defect utilizing an interpolation axial flap and a staged reconstruction.  A telfa template was made of the defect.  This telfa template was then used to outline the paramedian forehead pedicle flap.  The donor area for the pedicle flap was then injected with anesthesia.  The flap was excised through the skin and subcutaneous tissue down to the layer of the underlying musculature.  The pedicle flap was carefully excised within this deep plane to maintain its blood supply.  The edges of the donor site were undermined.   The donor site was closed in a primary fashion.  The pedicle was then rotated into position and sutured.  Once the tube was sutured into place, adequate blood supply was confirmed with blanching and refill.  The pedicle was then wrapped with xeroform gauze and dressed appropriately with a telfa and gauze bandage to ensure continued blood supply and protect the attached pedicle.
Interpolation Flap Text: A decision was made to reconstruct the defect utilizing an interpolation axial flap and a staged reconstruction.  A telfa template was made of the defect.  This telfa template was then used to outline the interpolation flap.  The donor area for the pedicle flap was then injected with anesthesia.  The flap was excised through the skin and subcutaneous tissue down to the layer of the underlying musculature.  The interpolation flap was carefully excised within this deep plane to maintain its blood supply.  The edges of the donor site were undermined.   The donor site was closed in a primary fashion.  The pedicle was then rotated into position and sutured.  Once the tube was sutured into place, adequate blood supply was confirmed with blanching and refill.  The pedicle was then wrapped with xeroform gauze and dressed appropriately with a telfa and gauze bandage to ensure continued blood supply and protect the attached pedicle.
Asc Procedure Text (B): After obtaining clear surgical margins the patient was sent to an ASC for surgical repair.  The patient understands they will receive post-surgical care and follow-up from the ASC physician.
Epidermal Sutures: 5-0 Surgipro
Keystone Flap Text: The defect edges were debeveled with a #15 scalpel blade.  Given the location of the defect, shape of the defect a keystone flap was deemed most appropriate.  Using a sterile surgical marker, an appropriate keystone flap was drawn incorporating the defect, outlining the appropriate donor tissue and placing the expected incisions within the relaxed skin tension lines where possible. The area thus outlined was incised deep to adipose tissue with a #15 scalpel blade.  The skin margins were undermined to an appropriate distance in all directions around the primary defect and laterally outward around the flap utilizing iris scissors.
Helical Rim Advancement Flap Text: The defect edges were debeveled with a #15 blade scalpel.  Given the location of the defect and the proximity to free margins (helical rim) a double helical rim advancement flap was deemed most appropriate.  Using a sterile surgical marker, the appropriate advancement flaps were drawn incorporating the defect and placing the expected incisions between the helical rim and antihelix where possible.  The area thus outlined was incised through and through with a #15 scalpel blade.  With a skin hook and iris scissors, the flaps were gently and sharply undermined and freed up.
Cheek-To-Nose Interpolation Flap Text: A decision was made to reconstruct the defect utilizing an interpolation axial flap and a staged reconstruction.  A telfa template was made of the defect.  This telfa template was then used to outline the Cheek-To-Nose Interpolation flap.  The donor area for the pedicle flap was then injected with anesthesia.  The flap was excised through the skin and subcutaneous tissue down to the layer of the underlying musculature.  The interpolation flap was carefully excised within this deep plane to maintain its blood supply.  The edges of the donor site were undermined.   The donor site was closed in a primary fashion.  The pedicle was then rotated into position and sutured.  Once the tube was sutured into place, adequate blood supply was confirmed with blanching and refill.  The pedicle was then wrapped with xeroform gauze and dressed appropriately with a telfa and gauze bandage to ensure continued blood supply and protect the attached pedicle.
O-T Advancement Flap Text: The defect edges were debeveled with a #15 scalpel blade.  Given the location of the defect, shape of the defect and the proximity to free margins an O-T advancement flap was deemed most appropriate.  Using a sterile surgical marker, an appropriate advancement flap was drawn incorporating the defect and placing the expected incisions within the relaxed skin tension lines where possible.    The area thus outlined was incised deep to adipose tissue with a #15 scalpel blade.  The skin margins were undermined to an appropriate distance in all directions utilizing iris scissors.
Surgeon: Alberta Duran MD
Bilobed Flap Text: The defect edges were debeveled with a #15 scalpel blade.  Given the location of the defect and the proximity to free margins a bilobe flap was deemed most appropriate.  Using a sterile surgical marker, an appropriate bilobe flap drawn around the defect.    The area thus outlined was incised deep to adipose tissue with a #15 scalpel blade.  The skin margins were undermined to an appropriate distance in all directions utilizing iris scissors.
Non-Graft Cartilage Fenestration Text: The cartilage was fenestrated with a 2mm punch biopsy to help facilitate healing.
Melolabial Interpolation Flap Text: A decision was made to reconstruct the defect utilizing an interpolation axial flap and a staged reconstruction.  A telfa template was made of the defect.  This telfa template was then used to outline the melolabial interpolation flap.  The donor area for the pedicle flap was then injected with anesthesia.  The flap was excised through the skin and subcutaneous tissue down to the layer of the underlying musculature.  The pedicle flap was carefully excised within this deep plane to maintain its blood supply.  The edges of the donor site were undermined.   The donor site was closed in a primary fashion.  The pedicle was then rotated into position and sutured.  Once the tube was sutured into place, adequate blood supply was confirmed with blanching and refill.  The pedicle was then wrapped with xeroform gauze and dressed appropriately with a telfa and gauze bandage to ensure continued blood supply and protect the attached pedicle.
Advancement-Rotation Flap Text: The defect edges were debeveled with a #15 scalpel blade.  Given the location of the defect, shape of the defect and the proximity to free margins an advancement-rotation flap was deemed most appropriate.  Using a sterile surgical marker, an appropriate flap was drawn incorporating the defect and placing the expected incisions within the relaxed skin tension lines where possible. The area thus outlined was incised deep to adipose tissue with a #15 scalpel blade.  The skin margins were undermined to an appropriate distance in all directions utilizing iris scissors.
Ear Star Wedge Flap Text: The defect edges were debeveled with a #15 blade scalpel.  Given the location of the defect and the proximity to free margins (helical rim) an ear star wedge flap was deemed most appropriate.  Using a sterile surgical marker, the appropriate flap was drawn incorporating the defect and placing the expected incisions between the helical rim and antihelix where possible.  The area thus outlined was incised through and through with a #15 scalpel blade.
Consent (Lip)/Introductory Paragraph: The rationale for Mohs was explained to the patient and consent was obtained. The risks, benefits and alternatives to therapy were discussed in detail. Specifically, the risks of lip deformity, changes in the oral aperture, infection, scarring, bleeding, prolonged wound healing, incomplete removal, allergy to anesthesia, nerve injury and recurrence were addressed. Prior to the procedure, the treatment site was clearly identified and confirmed by the patient. All components of Universal Protocol/PAUSE Rule completed.
Suturegard Retention Suture: 0-0 Nylon
O-Z Plasty Text: The defect edges were debeveled with a #15 scalpel blade.  Given the location of the defect, shape of the defect and the proximity to free margins an O-Z plasty (double transposition flap) was deemed most appropriate.  Using a sterile surgical marker, the appropriate transposition flaps were drawn incorporating the defect and placing the expected incisions within the relaxed skin tension lines where possible.    The area thus outlined was incised deep to adipose tissue with a #15 scalpel blade.  The skin margins were undermined to an appropriate distance in all directions utilizing iris scissors.  Hemostasis was achieved with electrocautery.  The flaps were then transposed into place, one clockwise and the other counterclockwise, and anchored with interrupted buried subcutaneous sutures.
Advancement Flap (Single) Text: The defect edges were debeveled with a #15 scalpel blade.  Given the location of the defect and the proximity to free margins a single advancement flap was deemed most appropriate.  Using a sterile surgical marker, an appropriate advancement flap was drawn incorporating the defect and placing the expected incisions within the relaxed skin tension lines where possible.    The area thus outlined was incised deep to adipose tissue with a #15 scalpel blade.  The skin margins were undermined to an appropriate distance in all directions utilizing iris scissors.
Unna Boot Text: An Unna boot was placed to help immobilize the limb and facilitate more rapid healing.
Ftsg Text: The defect edges were debeveled with a #15 scalpel blade.  Given the location of the defect, shape of the defect and the proximity to free margins a full thickness skin graft was deemed most appropriate.  Using a sterile surgical marker, the primary defect shape was transferred to the donor site. The area thus outlined was incised deep to adipose tissue with a #15 scalpel blade.  The harvested graft was then trimmed of adipose tissue until only dermis and epidermis was left.  The skin margins of the secondary defect were undermined to an appropriate distance in all directions utilizing iris scissors.  The secondary defect was closed with interrupted buried subcutaneous sutures.  The skin edges were then re-apposed with running  sutures.  The skin graft was then placed in the primary defect and oriented appropriately.
Dermal Autograft Text: The defect edges were debeveled with a #15 scalpel blade.  Given the location of the defect, shape of the defect and the proximity to free margins a dermal autograft was deemed most appropriate.  Using a sterile surgical marker, the primary defect shape was transferred to the donor site. The area thus outlined was incised deep to adipose tissue with a #15 scalpel blade.  The harvested graft was then trimmed of adipose and epidermal tissue until only dermis was left.  The skin graft was then placed in the primary defect and oriented appropriately.
Area H Indication Text: Tumors in this location are included in Area H (eyelids, eyebrows, nose, lips, chin, ear, pre-auricular, post-auricular, temple, genitalia, hands, feet, ankles and areola).  Tissue conservation is critical in these anatomic locations.
Donor Site Anesthesia Type: same as repair anesthesia
Consent (Scalp)/Introductory Paragraph: The rationale for Mohs was explained to the patient and consent was obtained. The risks, benefits and alternatives to therapy were discussed in detail. Specifically, the risks of changes in hair growth pattern secondary to repair, infection, scarring, bleeding, prolonged wound healing, incomplete removal, allergy to anesthesia, nerve injury and recurrence were addressed. Prior to the procedure, the treatment site was clearly identified and confirmed by the patient. All components of Universal Protocol/PAUSE Rule completed.
Hemostasis: Electrocautery
Localized Dermabrasion With Wire Brush Text: The patient was draped in routine manner.  Localized dermabrasion using 3 x 17 mm wire brush was performed in routine manner to papillary dermis. This spot dermabrasion is being performed to complete skin cancer reconstruction. It also will eliminate the other sun damaged precancerous cells that are known to be part of the regional effect of a lifetime's worth of sun exposure. This localized dermabrasion is therapeutic and should not be considered cosmetic in any regard.
Purse String (Intermediate) Text: Given the location of the defect and the characteristics of the surrounding skin a purse string intermediate closure was deemed most appropriate.  Undermining was performed circumfirentially around the surgical defect.  A purse string suture was then placed and tightened.
Bilateral Helical Rim Advancement Flap Text: The defect edges were debeveled with a #15 blade scalpel.  Given the location of the defect and the proximity to free margins (helical rim) a bilateral helical rim advancement flap was deemed most appropriate.  Using a sterile surgical marker, the appropriate advancement flaps were drawn incorporating the defect and placing the expected incisions between the helical rim and antihelix where possible.  The area thus outlined was incised through and through with a #15 scalpel blade.  With a skin hook and iris scissors, the flaps were gently and sharply undermined and freed up.
Dressing (No Sutures): pressure dressing with telfa
Bi-Rhombic Flap Text: The defect edges were debeveled with a #15 scalpel blade.  Given the location of the defect and the proximity to free margins a bi-rhombic flap was deemed most appropriate.  Using a sterile surgical marker, an appropriate rhombic flap was drawn incorporating the defect. The area thus outlined was incised deep to adipose tissue with a #15 scalpel blade.  The skin margins were undermined to an appropriate distance in all directions utilizing iris scissors.
Mohs Method Verbiage: An incision at a 45 degree angle following the standard Mohs approach was done and the specimen was harvested as a microscopic controlled layer.
Previous Accession (Optional): [id number]
Mohs Rapid Report Verbiage: The area of clinically evident tumor was marked with skin marking ink and appropriately hatched.  The initial incision was made following the Mohs approach through the skin.  The specimen was taken to the lab, divided into the necessary number of pieces, chromacoded and processed according to the Mohs protocol.  This was repeated in successive stages until a tumor free defect was achieved.
Retention Suture Text: Retention sutures were placed to support the closure and prevent dehiscence.
Suturegard Intro: Intraoperative tissue expansion was performed, utilizing the SUTUREGARD device, in order to reduce wound tension.
Cartilage Graft Text: The defect edges were debeveled with a #15 scalpel blade.  Given the location of the defect, shape of the defect, the fact the defect involved a full thickness cartilage defect a cartilage graft was deemed most appropriate.  An appropriate donor site was identified, cleansed, and anesthetized. The cartilage graft was then harvested and transferred to the recipient site, oriented appropriately and then sutured into place.  The secondary defect was then repaired using a primary closure.
Mauc Instructions: By selecting yes to the question below the MAUC number will be added into the note.  This will be calculated automatically based on the diagnosis chosen, the size entered, the body zone selected (H,M,L) and the specific indications you chose. You will also have the option to override the Mohs AUC if you disagree with the automatically calculated number and this option is found in the Case Summary tab.
Information: Selecting Yes will display possible errors in your note based on the variables you have selected. This validation is only offered as a suggestion for you. PLEASE NOTE THAT THE VALIDATION TEXT WILL BE REMOVED WHEN YOU FINALIZE YOUR NOTE. IF YOU WANT TO FAX A PRELIMINARY NOTE YOU WILL NEED TO TOGGLE THIS TO 'NO' IF YOU DO NOT WANT IT IN YOUR FAXED NOTE.
Mohs Case Number: [id number]
Undermining Location (Optional): in the superficial subcutaneous fat
O-T Plasty Text: The defect edges were debeveled with a #15 scalpel blade.  Given the location of the defect, shape of the defect and the proximity to free margins an O-T plasty was deemed most appropriate.  Using a sterile surgical marker, an appropriate O-T plasty was drawn incorporating the defect and placing the expected incisions within the relaxed skin tension lines where possible.    The area thus outlined was incised deep to adipose tissue with a #15 scalpel blade.  The skin margins were undermined to an appropriate distance in all directions utilizing iris scissors.
Graft Donor Site Dermal Sutures (Optional): 5-0 Polysorb
Body Location Override (Optional - Billing Will Still Be Based On Selected Body Map Location If Applicable): right ear
Graft Donor Site Bandage (Optional-Leave Blank If You Don't Want In Note): Aquaplast was fitted to the graft site and sewn into place. A pressure bandage were applied to the donor site and over the aquaplast bolster.
Where Do You Want The Question To Include Opioid Counseling Located?: Case Summary Tab
Repair Hemostasis (Optional): Pinpoint electrocautery
Suturegard Body: The suture ends were repeatedly re-tightened and re-clamped to achieve the desired tissue expansion.
Composite Graft Text: The defect edges were debeveled with a #15 scalpel blade.  Given the location of the defect, shape of the defect, the proximity to free margins and the fact the defect was full thickness a composite graft was deemed most appropriate.  The defect was outline and then transferred to the donor site.  A full thickness graft was then excised from the donor site. The graft was then placed in the primary defect, oriented appropriately and then sutured into place.  The secondary defect was then repaired using a primary closure.
Rhomboid Transposition Flap Text: The defect edges were debeveled with a #15 scalpel blade.  Given the location of the defect and the proximity to free margins a rhomboid transposition flap was deemed most appropriate.  Using a sterile surgical marker, an appropriate rhomboid flap was drawn incorporating the defect.    The area thus outlined was incised deep to adipose tissue with a #15 scalpel blade.  The skin margins were undermined to an appropriate distance in all directions utilizing iris scissors.
Partial Purse String (Intermediate) Text: Given the location of the defect and the characteristics of the surrounding skin an intermediate purse string closure was deemed most appropriate.  Undermining was performed circumfirentially around the surgical defect.  A purse string suture was then placed and tightened. Wound tension only allowed a partial closure of the circular defect.
Xenograft Text: The defect edges were debeveled with a #15 scalpel blade.  Given the location of the defect, shape of the defect and the proximity to free margins a xenograft was deemed most appropriate.  The graft was then trimmed to fit the size of the defect.  The graft was then placed in the primary defect and oriented appropriately.
Deep Sutures: 5-0 Maxon
Nostril Rim Text: The closure involved the nostril rim.
Rotation Flap Text: The defect edges were debeveled with a #15 scalpel blade.  Given the location of the defect, shape of the defect and the proximity to free margins a rotation flap was deemed most appropriate.  Using a sterile surgical marker, an appropriate rotation flap was drawn incorporating the defect and placing the expected incisions within the relaxed skin tension lines where possible.    The area thus outlined was incised deep to adipose tissue with a #15 scalpel blade.  The skin margins were undermined to an appropriate distance in all directions utilizing iris scissors.
Complex Repair Preamble Text (Leave Blank If You Do Not Want): Extensive wide undermining was performed.
Consent (Ear)/Introductory Paragraph: The rationale for Mohs was explained to the patient and consent was obtained. The risks, benefits and alternatives to therapy were discussed in detail. Specifically, the risks of ear deformity, infection, scarring, bleeding, prolonged wound healing, incomplete removal, allergy to anesthesia, nerve injury and recurrence were addressed. Prior to the procedure, the treatment site was clearly identified and confirmed by the patient. All components of Universal Protocol/PAUSE Rule completed.
Island Pedicle Flap Text: The defect edges were debeveled with a #15 scalpel blade.  Given the location of the defect, shape of the defect and the proximity to free margins an island pedicle advancement flap was deemed most appropriate.  Using a sterile surgical marker, an appropriate advancement flap was drawn incorporating the defect, outlining the appropriate donor tissue and placing the expected incisions within the relaxed skin tension lines where possible.    The area thus outlined was incised deep to adipose tissue with a #15 scalpel blade.  The skin margins were undermined to an appropriate distance in all directions around the primary defect and laterally outward around the island pedicle utilizing iris scissors.  There was minimal undermining beneath the pedicle flap.
Modified Advancement Flap Text: The defect edges were debeveled with a #15 scalpel blade.  Given the location of the defect, shape of the defect and the proximity to free margins a modified advancement flap was deemed most appropriate.  Using a sterile surgical marker, an appropriate advancement flap was drawn incorporating the defect and placing the expected incisions within the relaxed skin tension lines where possible.    The area thus outlined was incised deep to adipose tissue with a #15 scalpel blade.  The skin margins were undermined to an appropriate distance in all directions utilizing iris scissors.
V-Y Flap Text: The defect edges were debeveled with a #15 scalpel blade.  Given the location of the defect, shape of the defect and the proximity to free margins a V-Y flap was deemed most appropriate.  Using a sterile surgical marker, an appropriate advancement flap was drawn incorporating the defect and placing the expected incisions within the relaxed skin tension lines where possible.    The area thus outlined was incised deep to adipose tissue with a #15 scalpel blade.  The skin margins were undermined to an appropriate distance in all directions utilizing iris scissors.
Purse String (Simple) Text: Given the location of the defect and the characteristics of the surrounding skin a purse string closure was deemed most appropriate.  Undermining was performed circumfirentially around the surgical defect.  A purse string suture was then placed and tightened.
Suture Removal: 7 days
Undermining Type: Entire Wound
Eye Protection Verbiage: Before proceeding with the stage, a plastic scleral shield was inserted. The globe was anesthetized with a few drops of 1% lidocaine with 1:100,000 epinephrine. Then, an appropriate sized scleral shield was chosen and coated with lacrilube ointment. The shield was gently inserted and left in place for the duration of each stage. After the stage was completed, the shield was gently removed.
Epidermal Closure Graft Donor Site (Optional): running
Double O-Z Flap Text: The defect edges were debeveled with a #15 scalpel blade.  Given the location of the defect, shape of the defect and the proximity to free margins a Double O-Z flap was deemed most appropriate.  Using a sterile surgical marker, an appropriate transposition flap was drawn incorporating the defect and placing the expected incisions within the relaxed skin tension lines where possible. The area thus outlined was incised deep to adipose tissue with a #15 scalpel blade.  The skin margins were undermined to an appropriate distance in all directions utilizing iris scissors.
Island Pedicle Flap-Requiring Vessel Identification Text: The defect edges were debeveled with a #15 scalpel blade.  Given the location of the defect, shape of the defect and the proximity to free margins an island pedicle advancement flap was deemed most appropriate.  Using a sterile surgical marker, an appropriate advancement flap was drawn, based on the axial vessel mentioned above, incorporating the defect, outlining the appropriate donor tissue and placing the expected incisions within the relaxed skin tension lines where possible.    The area thus outlined was incised deep to adipose tissue with a #15 scalpel blade.  The skin margins were undermined to an appropriate distance in all directions around the primary defect and laterally outward around the island pedicle utilizing iris scissors.  There was minimal undermining beneath the pedicle flap.
Post-Care Instructions: I reviewed with the patient in detail post-care instructions. Patient is not to engage in any heavy lifting, exercise, or swimming for the next 14 days. Should the patient develop any fevers, chills, bleeding, severe pain patient will contact the office immediately.
Epidermal Autograft Text: The defect edges were debeveled with a #15 scalpel blade.  Given the location of the defect, shape of the defect and the proximity to free margins an epidermal autograft was deemed most appropriate.  Using a sterile surgical marker, the primary defect shape was transferred to the donor site. The epidermal graft was then harvested.  The skin graft was then placed in the primary defect and oriented appropriately.
Consent (Marginal Mandibular)/Introductory Paragraph: The rationale for Mohs was explained to the patient and consent was obtained. The risks, benefits and alternatives to therapy were discussed in detail. Specifically, the risks of damage to the marginal mandibular branch of the facial nerve, infection, scarring, bleeding, prolonged wound healing, incomplete removal, allergy to anesthesia, and recurrence were addressed. Prior to the procedure, the treatment site was clearly identified and confirmed by the patient. All components of Universal Protocol/PAUSE Rule completed.
Debridement Text: The wound edges were debrided prior to proceeding with the closure to facilitate wound healing.
Medical Necessity Statement: Based on my medical judgement, Mohs surgery is the most appropriate treatment for this cancer compared to other treatments.
Manual Repair Warning Statement: We plan on removing the manually selected variable below in favor of our much easier automatic structured text blocks found in the previous tab. We decided to do this to help make the flow better and give you the full power of structured data. Manual selection is never going to be ideal in our platform and I would encourage you to avoid using manual selection from this point on, especially since I will be sunsetting this feature. It is important that you do one of two things with the customized text below. First, you can save all of the text in a word file so you can have it for future reference. Second, transfer the text to the appropriate area in the Library tab. Lastly, if there is a flap or graft type which we do not have you need to let us know right away so I can add it in before the variable is hidden. No need to panic, we plan to give you roughly 6 months to make the change.
Crescentic Advancement Flap Text: The defect edges were debeveled with a #15 scalpel blade.  Given the location of the defect and the proximity to free margins a crescentic advancement flap was deemed most appropriate.  Using a sterile surgical marker, the appropriate advancement flap was drawn incorporating the defect and placing the expected incisions within the relaxed skin tension lines where possible.    The area thus outlined was incised deep to adipose tissue with a #15 scalpel blade.  The skin margins were undermined to an appropriate distance in all directions utilizing iris scissors.
Home Suture Removal Text: Patient was provided instructions on removing sutures and will remove their sutures at home.  If they have any questions or difficulties they will call the office.
Inflammation Suggestive Of Cancer Camouflage Histology Text: There was a dense lymphocytic infiltrate which prevented adequate histologic evaluation of adjacent structures.
Bcc Infiltrative Histology Text: There were numerous aggregates of basaloid cells demonstrating an infiltrative pattern.
Referring Physician (Optional): Kacie Dave NP
No Repair - Repaired With Adjacent Surgical Defect Text (Leave Blank If You Do Not Want): After obtaining clear surgical margins the defect was repaired concurrently with another surgical defect which was in close approximation.
Repair Anesthesia Method: local infiltration
Double Island Pedicle Flap Text: The defect edges were debeveled with a #15 scalpel blade.  Given the location of the defect, shape of the defect and the proximity to free margins a double island pedicle advancement flap was deemed most appropriate.  Using a sterile surgical marker, an appropriate advancement flap was drawn incorporating the defect, outlining the appropriate donor tissue and placing the expected incisions within the relaxed skin tension lines where possible.    The area thus outlined was incised deep to adipose tissue with a #15 scalpel blade.  The skin margins were undermined to an appropriate distance in all directions around the primary defect and laterally outward around the island pedicle utilizing iris scissors.  There was minimal undermining beneath the pedicle flap.
Cheek Interpolation Flap Text: A decision was made to reconstruct the defect utilizing an interpolation axial flap and a staged reconstruction.  A telfa template was made of the defect.  This telfa template was then used to outline the Cheek Interpolation flap.  The donor area for the pedicle flap was then injected with anesthesia.  The flap was excised through the skin and subcutaneous tissue down to the layer of the underlying musculature.  The interpolation flap was carefully excised within this deep plane to maintain its blood supply.  The edges of the donor site were undermined.   The donor site was closed in a primary fashion.  The pedicle was then rotated into position and sutured.  Once the tube was sutured into place, adequate blood supply was confirmed with blanching and refill.  The pedicle was then wrapped with xeroform gauze and dressed appropriately with a telfa and gauze bandage to ensure continued blood supply and protect the attached pedicle.
Chonodrocutaneous Helical Advancement Flap Text: The defect edges were debeveled with a #15 scalpel blade.  Given the location of the defect and the proximity to free margins a chondrocutaneous helical advancement flap was deemed most appropriate.  Using a sterile surgical marker, the appropriate advancement flap was drawn incorporating the defect and placing the expected incisions within the relaxed skin tension lines where possible.    The area thus outlined was incised deep to adipose tissue with a #15 scalpel blade.  The skin margins were undermined to an appropriate distance in all directions utilizing iris scissors.
Secondary Intention Text (Leave Blank If You Do Not Want): The defect will heal with secondary intention.
No Residual Tumor Seen Histology Text: There were no malignant cells seen in the sections examined.
Consent (Nose)/Introductory Paragraph: The rationale for Mohs was explained to the patient and consent was obtained. The risks, benefits and alternatives to therapy were discussed in detail. Specifically, the risks of nasal deformity, changes in the flow of air through the nose, infection, scarring, bleeding, prolonged wound healing, incomplete removal, allergy to anesthesia, nerve injury and recurrence were addressed. Prior to the procedure, the treatment site was clearly identified and confirmed by the patient. All components of Universal Protocol/PAUSE Rule completed.
Vermilion Border Text: The closure involved the vermilion border.
Surgeon/Pathologist Verbiage (Will Incorporate Name Of Surgeon From Intro If Not Blank): operated in two distinct and integrated capacities as the surgeon and pathologist.
Subsequent Stages Histo Method Verbiage: Using a similar technique to that described above, a thin layer of tissue was removed from all areas where tumor was visible on the previous stage.  The tissue was again oriented, mapped, dyed, and processed as above.
Tarsorrhaphy Text: A tarsorrhaphy was performed using Frost sutures.
Z Plasty Text: The lesion was extirpated to the level of the fat with a #15 scalpel blade.  Given the location of the defect, shape of the defect and the proximity to free margins a Z-plasty was deemed most appropriate for repair.  Using a sterile surgical marker, the appropriate transposition arms of the Z-plasty were drawn incorporating the defect and placing the expected incisions within the relaxed skin tension lines where possible.    The area thus outlined was incised deep to adipose tissue with a #15 scalpel blade.  The skin margins were undermined to an appropriate distance in all directions utilizing iris scissors.  The opposing transposition arms were then transposed into place in opposite direction and anchored with interrupted buried subcutaneous sutures.
Posterior Auricular Interpolation Flap Text: A decision was made to reconstruct the defect utilizing an interpolation axial flap and a staged reconstruction.  A telfa template was made of the defect.  This telfa template was then used to outline the posterior auricular interpolation flap.  The donor area for the pedicle flap was then injected with anesthesia.  The flap was excised through the skin and subcutaneous tissue down to the layer of the underlying musculature.  The pedicle flap was carefully excised within this deep plane to maintain its blood supply.  The edges of the donor site were undermined.   The donor site was closed in a primary fashion.  The pedicle was then rotated into position and sutured.  Once the tube was sutured into place, adequate blood supply was confirmed with blanching and refill.  The pedicle was then wrapped with xeroform gauze and dressed appropriately with a telfa and gauze bandage to ensure continued blood supply and protect the attached pedicle.
Alar Island Pedicle Flap Text: The defect edges were debeveled with a #15 scalpel blade.  Given the location of the defect, shape of the defect and the proximity to the alar rim an island pedicle advancement flap was deemed most appropriate.  Using a sterile surgical marker, an appropriate advancement flap was drawn incorporating the defect, outlining the appropriate donor tissue and placing the expected incisions within the nasal ala running parallel to the alar rim. The area thus outlined was incised with a #15 scalpel blade.  The skin margins were undermined minimally to an appropriate distance in all directions around the primary defect and laterally outward around the island pedicle utilizing iris scissors.  There was minimal undermining beneath the pedicle flap.
Split-Thickness Skin Graft Text: The defect edges were debeveled with a #15 scalpel blade.  Given the location of the defect, shape of the defect and the proximity to free margins a split thickness skin graft was deemed most appropriate.  Using a sterile surgical marker, the primary defect shape was transferred to the donor site. The split thickness graft was then harvested.  The skin graft was then placed in the primary defect and oriented appropriately.
Island Pedicle Flap With Canthal Suspension Text: The defect edges were debeveled with a #15 scalpel blade.  Given the location of the defect, shape of the defect and the proximity to free margins an island pedicle advancement flap was deemed most appropriate.  Using a sterile surgical marker, an appropriate advancement flap was drawn incorporating the defect, outlining the appropriate donor tissue and placing the expected incisions within the relaxed skin tension lines where possible. The area thus outlined was incised deep to adipose tissue with a #15 scalpel blade.  The skin margins were undermined to an appropriate distance in all directions around the primary defect and laterally outward around the island pedicle utilizing iris scissors.  There was minimal undermining beneath the pedicle flap. A suspension suture was placed in the canthal tendon to prevent tension and prevent ectropion.
Banner Transposition Flap Text: The defect edges were debeveled with a #15 scalpel blade.  Given the location of the defect and the proximity to free margins a Banner transposition flap was deemed most appropriate.  Using a sterile surgical marker, an appropriate flap drawn around the defect. The area thus outlined was incised deep to adipose tissue with a #15 scalpel blade.  The skin margins were undermined to an appropriate distance in all directions utilizing iris scissors.
Consent Type: Consent 1 (Standard)
Hatchet Flap Text: The defect edges were debeveled with a #15 scalpel blade.  Given the location of the defect, shape of the defect and the proximity to free margins a hatchet flap was deemed most appropriate.  Using a sterile surgical marker, an appropriate hatchet flap was drawn incorporating the defect and placing the expected incisions within the relaxed skin tension lines where possible.    The area thus outlined was incised deep to adipose tissue with a #15 scalpel blade.  The skin margins were undermined to an appropriate distance in all directions utilizing iris scissors.
Consent (Near Eyelid Margin)/Introductory Paragraph: The rationale for Mohs was explained to the patient and consent was obtained. The risks, benefits and alternatives to therapy were discussed in detail. Specifically, the risks of ectropion or eyelid deformity, infection, scarring, bleeding, prolonged wound healing, incomplete removal, allergy to anesthesia, nerve injury and recurrence were addressed. Prior to the procedure, the treatment site was clearly identified and confirmed by the patient. All components of Universal Protocol/PAUSE Rule completed.
Same Histology In Subsequent Stages Text: The pattern and morphology of the tumor is as described in the first stage.
Helical Rim Text: The closure involved the helical rim.
Trilobed Flap Text: The defect edges were debeveled with a #15 scalpel blade.  Given the location of the defect and the proximity to free margins a trilobed flap was deemed most appropriate.  Using a sterile surgical marker, an appropriate trilobed flap drawn around the defect.    The area thus outlined was incised deep to adipose tissue with a #15 scalpel blade.  The skin margins were undermined to an appropriate distance in all directions utilizing iris scissors.
Cheiloplasty (Less Than 50%) Text: A decision was made to reconstruct the defect with a  cheiloplasty.  The defect was undermined extensively.  Additional obicularis oris muscle was excised with a 15 blade scalpel.  The defect was converted into a full thickness wedge, of less than 50% of the vertical height of the lip, to facilite a better cosmetic result.  Small vessels were then tied off with 5-0 monocyrl. The obicularis oris, superficial fascia, adipose and dermis were then reapproximated.  After the deeper layers were approximated the epidermis was reapproximated with particular care given to realign the vermilion border.
Mohs Histo Method Verbiage: Each section was then chromacoded and processed in the Mohs lab using the Mohs protocol and submitted for frozen section.
Dorsal Nasal Flap Text: The defect edges were debeveled with a #15 scalpel blade.  Given the location of the defect and the proximity to free margins a dorsal nasal flap was deemed most appropriate.  Using a sterile surgical marker, an appropriate dorsal nasal flap was drawn around the defect.    The area thus outlined was incised deep to adipose tissue with a #15 scalpel blade.  The skin margins were undermined to an appropriate distance in all directions utilizing iris scissors.
Burow's Graft Text: The defect edges were debeveled with a #15 scalpel blade.  Given the location of the defect, shape of the defect, the proximity to free margins and the presence of a standing cone deformity a Burow's skin graft was deemed most appropriate. The standing cone was removed and this tissue was then trimmed to the shape of the primary defect. The adipose tissue was also removed until only dermis and epidermis were left.  The skin margins of the secondary defect were undermined to an appropriate distance in all directions utilizing iris scissors.  The secondary defect was closed with interrupted buried subcutaneous sutures.  The skin edges were then re-apposed with running  sutures.  The skin graft was then placed in the primary defect and oriented appropriately.
Nasal Turnover Hinge Flap Text: The defect edges were debeveled with a #15 scalpel blade.  Given the size, depth, location of the defect and the defect being full thickness a nasal turnover hinge flap was deemed most appropriate.  Using a sterile surgical marker, an appropriate hinge flap was drawn incorporating the defect. The area thus outlined was incised with a #15 scalpel blade. The flap was designed to recreate the nasal mucosal lining and the alar rim. The skin margins were undermined to an appropriate distance in all directions utilizing iris scissors.
Orbicularis Oris Muscle Flap Text: The defect edges were debeveled with a #15 scalpel blade.  Given that the defect affected the competency of the oral sphincter an orbicularis oris muscle flap was deemed most appropriate to restore this competency and normal muscle function.  Using a sterile surgical marker, an appropriate flap was drawn incorporating the defect. The area thus outlined was incised with a #15 scalpel blade.
Zygomaticofacial Flap Text: Given the location of the defect, shape of the defect and the proximity to free margins a zygomaticofacial flap was deemed most appropriate for repair.  Using a sterile surgical marker, the appropriate flap was drawn incorporating the defect and placing the expected incisions within the relaxed skin tension lines where possible. The area thus outlined was incised deep to adipose tissue with a #15 scalpel blade with preservation of a vascular pedicle.  The skin margins were undermined to an appropriate distance in all directions utilizing iris scissors.  The flap was then placed into the defect and anchored with interrupted buried subcutaneous sutures.
Nasalis-Muscle-Based Myocutaneous Island Pedicle Flap Text: Using a #15 blade, an incision was made around the donor flap to the level of the nasalis muscle. Wide lateral undermining was then performed in both the subcutaneous plane above the nasalis muscle, and in a submuscular plane just above periosteum. This allowed the formation of a free nasalis muscle axial pedicle (based on the angular artery) which was still attached to the actual cutaneous flap, increasing its mobility and vascular viability. Hemostasis was obtained with pinpoint electrocoagulation. The flap was mobilized into position and the pivotal anchor points positioned and stabilized with buried interrupted sutures. Subcutaneous and dermal tissues were closed in a multilayered fashion with sutures. Tissue redundancies were excised, and the epidermal edges were apposed without significant tension and sutured with sutures.
Brow Lift Text: A midfrontal incision was made medially to the defect to allow access to the tissues just superior to the left eyebrow. Following careful dissection inferiorly in a supraperiosteal plane to the level of the left eyebrow, several 3-0 monocryl sutures were used to resuspend the eyebrow orbicularis oculi muscular unit to the superior frontal bone periosteum. This resulted in an appropriate reapproximation of static eyebrow symmetry and correction of the left brow ptosis.
Hemigard Postcare Instructions: The HEMIGARD strips are to remain completely dry for at least 5-7 days.
Staging Info: By selecting yes to the question above you will include information on AJCC 8 tumor staging in your Mohs note. Information on tumor staging will be automatically added for SCCs on the head and neck. AJCC 8 includes tumor size, tumor depth, perineural involvement and bone invasion.
Peng Advancement Flap Text: The defect edges were debeveled with a #15 scalpel blade.  Given the location of the defect, shape of the defect and the proximity to free margins a Peng advancement flap was deemed most appropriate.  Using a sterile surgical marker, an appropriate advancement flap was drawn incorporating the defect and placing the expected incisions within the relaxed skin tension lines where possible. The area thus outlined was incised deep to adipose tissue with a #15 scalpel blade.  The skin margins were undermined to an appropriate distance in all directions utilizing iris scissors.
Hemigard Intro: Due to skin fragility and wound tension, it was decided to use HEMIGARD adhesive retention suture devices to permit a linear closure. The skin was cleaned and dried for a 6cm distance away from the wound. Excessive hair, if present, was removed to allow for adhesion.

## 2021-06-16 ENCOUNTER — HOSPITAL ENCOUNTER (OUTPATIENT)
Dept: RADIOLOGY | Facility: MEDICAL CENTER | Age: 76
End: 2021-06-16
Attending: FAMILY MEDICINE
Payer: MEDICARE

## 2021-06-16 DIAGNOSIS — Z12.31 ENCOUNTER FOR SCREENING MAMMOGRAM FOR MALIGNANT NEOPLASM OF BREAST: ICD-10-CM

## 2021-06-16 PROCEDURE — 77063 BREAST TOMOSYNTHESIS BI: CPT

## 2021-07-20 ENCOUNTER — TELEPHONE (OUTPATIENT)
Dept: MEDICAL GROUP | Facility: LAB | Age: 76
End: 2021-07-20

## 2021-07-20 NOTE — TELEPHONE ENCOUNTER
ESTABLISHED PATIENT PRE-VISIT PLANNING     Patient was contacted to complete PVP.     Note: Patient will not be contacted if there is no indication to call.     1.  Reviewed notes from the last few office visits within the medical group: Yes    2.  If any orders were placed at last visit or intended to be done for this visit (i.e. 6 mos follow-up), do we have Results/Consult Notes?         •  Labs - Labs ordered, NOT completed. Patient advised to complete prior to next appointment.  Note: If patient appointment is for lab review and patient did not complete labs, check with provider if OK to reschedule patient until labs completed.       •  Imaging - Imaging ordered, completed and results are in chart.       •  Referrals - Referral ordered, patient has NOT been seen.    3. Is this appointment scheduled as a Hospital Follow-Up? No    4.  Immunizations were updated in Epic using Reconcile Outside Information activity? Yes    5.  Patient is due for the following Health Maintenance Topics:   Health Maintenance Due   Topic Date Due   • LUNG CANCER SCREENING  Never done   • IMM ZOSTER VACCINES (2 of 2) 06/08/2021     6.  AHA (Pulse8) form printed for Provider? No, patient does not have any open alerts

## 2021-07-21 ENCOUNTER — HOSPITAL ENCOUNTER (OUTPATIENT)
Dept: LAB | Facility: MEDICAL CENTER | Age: 76
End: 2021-07-21
Attending: FAMILY MEDICINE
Payer: MEDICARE

## 2021-07-21 DIAGNOSIS — Z13.6 SCREENING FOR CARDIOVASCULAR CONDITION: ICD-10-CM

## 2021-07-21 DIAGNOSIS — R79.9 ABNORMAL FINDING OF BLOOD CHEMISTRY, UNSPECIFIED: ICD-10-CM

## 2021-07-21 DIAGNOSIS — R20.8 DECREASED SENSATION OF FOOT: ICD-10-CM

## 2021-07-21 LAB
CHOLEST SERPL-MCNC: 200 MG/DL (ref 100–199)
EST. AVERAGE GLUCOSE BLD GHB EST-MCNC: 114 MG/DL
FASTING STATUS PATIENT QL REPORTED: NORMAL
HBA1C MFR BLD: 5.6 % (ref 4–5.6)
HDLC SERPL-MCNC: 52 MG/DL
LDLC SERPL CALC-MCNC: 126 MG/DL
TRIGL SERPL-MCNC: 109 MG/DL (ref 0–149)
VIT B12 SERPL-MCNC: 358 PG/ML (ref 211–911)

## 2021-07-21 PROCEDURE — 80061 LIPID PANEL: CPT

## 2021-07-21 PROCEDURE — 36415 COLL VENOUS BLD VENIPUNCTURE: CPT

## 2021-07-21 PROCEDURE — 82607 VITAMIN B-12: CPT

## 2021-07-21 PROCEDURE — 83036 HEMOGLOBIN GLYCOSYLATED A1C: CPT

## 2021-07-27 ENCOUNTER — OFFICE VISIT (OUTPATIENT)
Dept: MEDICAL GROUP | Facility: LAB | Age: 76
End: 2021-07-27
Payer: MEDICARE

## 2021-07-27 VITALS
HEIGHT: 63 IN | HEART RATE: 71 BPM | BODY MASS INDEX: 24.63 KG/M2 | WEIGHT: 139 LBS | TEMPERATURE: 98.2 F | OXYGEN SATURATION: 95 % | SYSTOLIC BLOOD PRESSURE: 122 MMHG | RESPIRATION RATE: 12 BRPM | DIASTOLIC BLOOD PRESSURE: 66 MMHG

## 2021-07-27 DIAGNOSIS — Z23 NEED FOR VACCINATION: ICD-10-CM

## 2021-07-27 DIAGNOSIS — F43.21 GRIEF: ICD-10-CM

## 2021-07-27 DIAGNOSIS — F41.1 GENERALIZED ANXIETY DISORDER: ICD-10-CM

## 2021-07-27 DIAGNOSIS — E78.00 PURE HYPERCHOLESTEROLEMIA: ICD-10-CM

## 2021-07-27 PROCEDURE — 99214 OFFICE O/P EST MOD 30 MIN: CPT | Mod: 25 | Performed by: FAMILY MEDICINE

## 2021-07-27 PROCEDURE — 90750 HZV VACC RECOMBINANT IM: CPT | Performed by: FAMILY MEDICINE

## 2021-07-27 PROCEDURE — 90471 IMMUNIZATION ADMIN: CPT | Performed by: FAMILY MEDICINE

## 2021-07-27 RX ORDER — LORAZEPAM 1 MG/1
1 TABLET ORAL EVERY 4 HOURS PRN
Qty: 30 TABLET | Refills: 0 | Status: SHIPPED | OUTPATIENT
Start: 2021-08-27 | End: 2021-09-26

## 2021-07-27 RX ORDER — LORAZEPAM 1 MG/1
1 TABLET ORAL EVERY 4 HOURS PRN
Qty: 30 TABLET | Refills: 0 | Status: SHIPPED | OUTPATIENT
Start: 2021-07-27 | End: 2021-10-14 | Stop reason: SDUPTHER

## 2021-07-27 ASSESSMENT — FIBROSIS 4 INDEX: FIB4 SCORE: 1.09

## 2021-07-27 NOTE — PROGRESS NOTES
"Subjective:     CC: Follow up anxiety    HPI:   Dina presents today with:    Anxiety  She reports she is still having significant amount of anxiety and depressed mood leading to loss of her partner about 8 months ago.  Low motivation, hard to get out of bed at times.  Episodic anxiety, she does use Ativan intermittently.  She will use this a few times weekly, sometimes every day if symptoms are flared.  She does think the Lexapro is help with anxiety as well.  Referral was previously sent to psychiatry but she had not wanted to do a virtual visit.    Cholesterol  Recently elevated LDL.  The 10-year ASCVD risk score (Houstonboyd ROWAN Jr., et al., 2013) is: 19.3%    Values used to calculate the score:      Age: 76 years      Sex: Female      Is Non- : No      Diabetic: No      Tobacco smoker: Yes      Systolic Blood Pressure: 122 mmHg      Is BP treated: No      HDL Cholesterol: 52 mg/dL      Total Cholesterol: 200 mg/dL      Health Maintenance: Completed    Medications, past medical history, allergies, and social history have been reviewed and updated.    ROS:  See HPI    Objective:       Exam:  /66 (BP Location: Right arm, Patient Position: Sitting, BP Cuff Size: Adult)   Pulse 71   Temp 36.8 °C (98.2 °F)   Resp 12   Ht 1.6 m (5' 3\")   Wt 63 kg (139 lb)   LMP 05/30/1992   SpO2 95%   BMI 24.62 kg/m²  Body mass index is 24.62 kg/m².    Constitutional: Alert. Well appearing. No distress.  Skin: Warm, dry, good turgor, no visible rashes.  Eye: Equal, round and reactive to light, conjunctiva clear, lids normal.  ENMT: Moist mucous membranes. Normal dentition.  Respiratory: Normal effort.   Neuro: Moves all four extremities. No facial droop.  Psych: Answers questions appropriately.  Teary at times.    Assessment & Plan:     76 y.o. female with the following -     1. Generalized anxiety disorder  2. Grief  Stable but continued anxiety and depressive symptoms.  She relates this to death of " her partner about 8 months ago.  Continue Lexapro, Ativan refilled and encouraged limited use.  PDMP reviewed.  She does have referral placed to psychiatry and therapy and she plans to schedule.  - LORazepam (ATIVAN) 1 MG Tab; Take 1 tablet by mouth every four hours as needed for Anxiety for up to 30 days.  Dispense: 30 tablet; Refill: 0  - LORazepam (ATIVAN) 1 MG Tab; Take 1 tablet by mouth every four hours as needed for Anxiety for up to 30 days.  Dispense: 30 tablet; Refill: 0    3. Pure hypercholesterolemia  Declines statin at this point.  Discussed lifestyle measures.    4. Need for vaccination  - Shingrix Vaccine      Please note that this note was created using voice recognition software.

## 2021-07-28 ENCOUNTER — APPOINTMENT (RX ONLY)
Dept: URBAN - METROPOLITAN AREA CLINIC 36 | Facility: CLINIC | Age: 76
Setting detail: DERMATOLOGY
End: 2021-07-28

## 2021-07-28 PROBLEM — C44.91 BASAL CELL CARCINOMA OF SKIN, UNSPECIFIED: Status: ACTIVE | Noted: 2021-07-28

## 2021-07-28 PROCEDURE — ? MOHS SURGERY

## 2021-07-28 PROCEDURE — ? ADDITIONAL NOTES

## 2021-07-28 NOTE — PROCEDURE: ADDITIONAL NOTES
Detail Level: Zone
Render Risk Assessment In Note?: yes
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 She has not been seen in ENT since 2017. I spoke to Dr Belen Mccauley who will see her for an exam and if there is visible tumor in the EAC will perform surgery, othwerise will send back to me for Mohs. On my exam which was limited due to lack of adequate viewing of the canal, i could not appreciate visible tumor. Her residual basal cell carcinoma sitting at the edge of the EAC is clinically obvious

## 2021-07-28 NOTE — PROCEDURE: MOHS SURGERY
Provider Procedure Text (D): After obtaining clear surgical margins the defect was repaired by another provider.
O-T Plasty Text: The defect edges were debeveled with a #15 scalpel blade.  Given the location of the defect, shape of the defect and the proximity to free margins an O-T plasty was deemed most appropriate.  Using a sterile surgical marker, an appropriate O-T plasty was drawn incorporating the defect and placing the expected incisions within the relaxed skin tension lines where possible.    The area thus outlined was incised deep to adipose tissue with a #15 scalpel blade.  The skin margins were undermined to an appropriate distance in all directions utilizing iris scissors.
Bilobed Flap Text: The defect edges were debeveled with a #15 scalpel blade.  Given the location of the defect and the proximity to free margins a bilobe flap was deemed most appropriate.  Using a sterile surgical marker, an appropriate bilobe flap drawn around the defect.    The area thus outlined was incised deep to adipose tissue with a #15 scalpel blade.  The skin margins were undermined to an appropriate distance in all directions utilizing iris scissors.
Helical Rim Advancement Flap Text: The defect edges were debeveled with a #15 blade scalpel.  Given the location of the defect and the proximity to free margins (helical rim) a double helical rim advancement flap was deemed most appropriate.  Using a sterile surgical marker, the appropriate advancement flaps were drawn incorporating the defect and placing the expected incisions between the helical rim and antihelix where possible.  The area thus outlined was incised through and through with a #15 scalpel blade.  With a skin hook and iris scissors, the flaps were gently and sharply undermined and freed up.
Stage 15: Additional Anesthesia Volume In Cc: 0
Tumor Debulked?: curette
Vermilion Border Text: The closure involved the vermilion border.
Lazy S Intermediate Repair Preamble Text (Leave Blank If You Do Not Want): Undermining was performed with blunt dissection.
Staging Info: By selecting yes to the question above you will include information on AJCC 8 tumor staging in your Mohs note. Information on tumor staging will be automatically added for SCCs on the head and neck. AJCC 8 includes tumor size, tumor depth, perineural involvement and bone invasion.
Use A Different Wound Care And Dressing When There Are No Sutures?: Yes
Repair Type: None (only Mohs)
Pain Refusal Text: I offered to prescribe pain medication but the patient refused to take this medication.
Otolaryngologist Procedure Text (B): After obtaining clear surgical margins the patient was sent to otolaryngology for surgical repair.  The patient understands they will receive post-surgical care and follow-up from the referring physician's office.
Orbicularis Oris Muscle Flap Text: The defect edges were debeveled with a #15 scalpel blade.  Given that the defect affected the competency of the oral sphincter an orbicularis oris muscle flap was deemed most appropriate to restore this competency and normal muscle function.  Using a sterile surgical marker, an appropriate flap was drawn incorporating the defect. The area thus outlined was incised with a #15 scalpel blade.
Quadrant Reporting?: no
Consent (Spinal Accessory)/Introductory Paragraph: The rationale for Mohs was explained to the patient and consent was obtained. The risks, benefits and alternatives to therapy were discussed in detail. Specifically, the risks of damage to the spinal accessory nerve, infection, scarring, bleeding, prolonged wound healing, incomplete removal, allergy to anesthesia, and recurrence were addressed. Prior to the procedure, the treatment site was clearly identified and confirmed by the patient. All components of Universal Protocol/PAUSE Rule completed.
Chonodrocutaneous Helical Advancement Flap Text: The defect edges were debeveled with a #15 scalpel blade.  Given the location of the defect and the proximity to free margins a chondrocutaneous helical advancement flap was deemed most appropriate.  Using a sterile surgical marker, the appropriate advancement flap was drawn incorporating the defect and placing the expected incisions within the relaxed skin tension lines where possible.    The area thus outlined was incised deep to adipose tissue with a #15 scalpel blade.  The skin margins were undermined to an appropriate distance in all directions utilizing iris scissors.
Repair Anesthesia Type: 1% lidocaine with epinephrine
Include Tumor Staging In Mohs Note?: Please Select the Appropriate Response
O-L Flap Text: The defect edges were debeveled with a #15 scalpel blade.  Given the location of the defect, shape of the defect and the proximity to free margins an O-L flap was deemed most appropriate.  Using a sterile surgical marker, an appropriate advancement flap was drawn incorporating the defect and placing the expected incisions within the relaxed skin tension lines where possible.    The area thus outlined was incised deep to adipose tissue with a #15 scalpel blade.  The skin margins were undermined to an appropriate distance in all directions utilizing iris scissors.
Plastic Surgeon Procedure Text (D): After obtaining clear surgical margins the patient was sent to plastics for surgical repair.  The patient understands they will receive post-surgical care and follow-up from the referring physician's office.
Ftsg Text: The defect edges were debeveled with a #15 scalpel blade.  Given the location of the defect, shape of the defect and the proximity to free margins a full thickness skin graft was deemed most appropriate.  Using a sterile surgical marker, the primary defect shape was transferred to the donor site. The area thus outlined was incised deep to adipose tissue with a #15 scalpel blade.  The harvested graft was then trimmed of adipose tissue until only dermis and epidermis was left.  The skin margins of the secondary defect were undermined to an appropriate distance in all directions utilizing iris scissors.  The secondary defect was closed with interrupted buried subcutaneous sutures.  The skin edges were then re-apposed with running  sutures.  The skin graft was then placed in the primary defect and oriented appropriately.
Bilobed Transposition Flap Text: The defect edges were debeveled with a #15 scalpel blade.  Given the location of the defect and the proximity to free margins a bilobed transposition flap was deemed most appropriate.  Using a sterile surgical marker, an appropriate bilobe flap drawn around the defect.    The area thus outlined was incised deep to adipose tissue with a #15 scalpel blade.  The skin margins were undermined to an appropriate distance in all directions utilizing iris scissors.
Unna Boot Text: An Unna boot was placed to help immobilize the limb and facilitate more rapid healing.
Special Stains Stage 1 - Results: Base On Clearance Noted Above
Number Of Stages: 1
No Residual Tumor Seen Histology Text: There were no malignant cells seen in the sections examined.
H Plasty Text: Given the location of the defect, shape of the defect and the proximity to free margins a H-plasty was deemed most appropriate for repair.  Using a sterile surgical marker, the appropriate advancement arms of the H-plasty were drawn incorporating the defect and placing the expected incisions within the relaxed skin tension lines where possible. The area thus outlined was incised deep to adipose tissue with a #15 scalpel blade. The skin margins were undermined to an appropriate distance in all directions utilizing iris scissors.  The opposing advancement arms were then advanced into place in opposite direction and anchored with interrupted buried subcutaneous sutures.
Referred To Oculoplastics For Closure Text (Leave Blank If You Do Not Want): After obtaining clear surgical margins the patient was sent to oculoplastics for surgical repair.  The patient understands they will receive post-surgical care and follow-up from the referring physician's office.
Hemigard Intro: Due to skin fragility and wound tension, it was decided to use HEMIGARD adhesive retention suture devices to permit a linear closure. The skin was cleaned and dried for a 6cm distance away from the wound. Excessive hair, if present, was removed to allow for adhesion.
Star Wedge Flap Text: The defect edges were debeveled with a #15 scalpel blade.  Given the location of the defect, shape of the defect and the proximity to free margins a star wedge flap was deemed most appropriate.  Using a sterile surgical marker, an appropriate rotation flap was drawn incorporating the defect and placing the expected incisions within the relaxed skin tension lines where possible. The area thus outlined was incised deep to adipose tissue with a #15 scalpel blade.  The skin margins were undermined to an appropriate distance in all directions utilizing iris scissors.
Muscle Hinge Flap Text: The defect edges were debeveled with a #15 scalpel blade.  Given the size, depth and location of the defect and the proximity to free margins a muscle hinge flap was deemed most appropriate.  Using a sterile surgical marker, an appropriate hinge flap was drawn incorporating the defect. The area thus outlined was incised with a #15 scalpel blade.  The skin margins were undermined to an appropriate distance in all directions utilizing iris scissors.
Complex Repair And Flap Additional Text (Will Appearing After The Standard Complex Repair Text): The complex repair was not sufficient to completely close the primary defect. The remaining additional defect was repaired with the flap mentioned below.
Asc Procedure Text (B): After obtaining clear surgical margins the patient was sent to an ASC for surgical repair.  The patient understands they will receive post-surgical care and follow-up from the ASC physician.
Bilateral Helical Rim Advancement Flap Text: The defect edges were debeveled with a #15 blade scalpel.  Given the location of the defect and the proximity to free margins (helical rim) a bilateral helical rim advancement flap was deemed most appropriate.  Using a sterile surgical marker, the appropriate advancement flaps were drawn incorporating the defect and placing the expected incisions between the helical rim and antihelix where possible.  The area thus outlined was incised through and through with a #15 scalpel blade.  With a skin hook and iris scissors, the flaps were gently and sharply undermined and freed up.
Mucosal Advancement Flap Text: Given the location of the defect, shape of the defect and the proximity to free margins a mucosal advancement flap was deemed most appropriate. Incisions were made with a 15 blade scalpel in the appropriate fashion along the cutaneous vermilion border and the mucosal lip. The remaining actinically damaged mucosal tissue was excised.  The mucosal advancement flap was then elevated to the gingival sulcus with care taken to preserve the neurovascular structures and advanced into the primary defect. Care was taken to ensure that precise realignment of the vermilion border was achieved.
Keystone Flap Text: The defect edges were debeveled with a #15 scalpel blade.  Given the location of the defect, shape of the defect a keystone flap was deemed most appropriate.  Using a sterile surgical marker, an appropriate keystone flap was drawn incorporating the defect, outlining the appropriate donor tissue and placing the expected incisions within the relaxed skin tension lines where possible. The area thus outlined was incised deep to adipose tissue with a #15 scalpel blade.  The skin margins were undermined to an appropriate distance in all directions around the primary defect and laterally outward around the flap utilizing iris scissors.
Repair Hemostasis (Optional): Pinpoint electrocautery
Repair Anesthesia Method: local infiltration
Advancement Flap (Single) Text: The defect edges were debeveled with a #15 scalpel blade.  Given the location of the defect and the proximity to free margins a single advancement flap was deemed most appropriate.  Using a sterile surgical marker, an appropriate advancement flap was drawn incorporating the defect and placing the expected incisions within the relaxed skin tension lines where possible.    The area thus outlined was incised deep to adipose tissue with a #15 scalpel blade.  The skin margins were undermined to an appropriate distance in all directions utilizing iris scissors.
Suturegard Body: The suture ends were repeatedly re-tightened and re-clamped to achieve the desired tissue expansion.
Undermining Location (Optional): in the superficial subcutaneous fat
Alar Island Pedicle Flap Text: The defect edges were debeveled with a #15 scalpel blade.  Given the location of the defect, shape of the defect and the proximity to the alar rim an island pedicle advancement flap was deemed most appropriate.  Using a sterile surgical marker, an appropriate advancement flap was drawn incorporating the defect, outlining the appropriate donor tissue and placing the expected incisions within the nasal ala running parallel to the alar rim. The area thus outlined was incised with a #15 scalpel blade.  The skin margins were undermined minimally to an appropriate distance in all directions around the primary defect and laterally outward around the island pedicle utilizing iris scissors.  There was minimal undermining beneath the pedicle flap.
Consent (Nose)/Introductory Paragraph: The rationale for Mohs was explained to the patient and consent was obtained. The risks, benefits and alternatives to therapy were discussed in detail. Specifically, the risks of nasal deformity, changes in the flow of air through the nose, infection, scarring, bleeding, prolonged wound healing, incomplete removal, allergy to anesthesia, nerve injury and recurrence were addressed. Prior to the procedure, the treatment site was clearly identified and confirmed by the patient. All components of Universal Protocol/PAUSE Rule completed.
Bi-Rhombic Flap Text: The defect edges were debeveled with a #15 scalpel blade.  Given the location of the defect and the proximity to free margins a bi-rhombic flap was deemed most appropriate.  Using a sterile surgical marker, an appropriate rhombic flap was drawn incorporating the defect. The area thus outlined was incised deep to adipose tissue with a #15 scalpel blade.  The skin margins were undermined to an appropriate distance in all directions utilizing iris scissors.
Surgeon/Pathologist Verbiage (Will Incorporate Name Of Surgeon From Intro If Not Blank): operated in two distinct and integrated capacities as the surgeon and pathologist.
Anesthesia Volume In Cc: 6
Area L Indication Text: Tumors in this location are included in Area L (trunk and extremities).  Mohs surgery is indicated for larger tumors, or tumors with aggressive histologic features, in these anatomic locations.
Z Plasty Text: The lesion was extirpated to the level of the fat with a #15 scalpel blade.  Given the location of the defect, shape of the defect and the proximity to free margins a Z-plasty was deemed most appropriate for repair.  Using a sterile surgical marker, the appropriate transposition arms of the Z-plasty were drawn incorporating the defect and placing the expected incisions within the relaxed skin tension lines where possible.    The area thus outlined was incised deep to adipose tissue with a #15 scalpel blade.  The skin margins were undermined to an appropriate distance in all directions utilizing iris scissors.  The opposing transposition arms were then transposed into place in opposite direction and anchored with interrupted buried subcutaneous sutures.
Mid-Level Procedure Text (D): After obtaining clear surgical margins the patient was sent to a mid-level provider for surgical repair.  The patient understands they will receive post-surgical care and follow-up from the mid-level provider.
Banner Transposition Flap Text: The defect edges were debeveled with a #15 scalpel blade.  Given the location of the defect and the proximity to free margins a Banner transposition flap was deemed most appropriate.  Using a sterile surgical marker, an appropriate flap drawn around the defect. The area thus outlined was incised deep to adipose tissue with a #15 scalpel blade.  The skin margins were undermined to an appropriate distance in all directions utilizing iris scissors.
O-Z Flap Text: The defect edges were debeveled with a #15 scalpel blade.  Given the location of the defect, shape of the defect and the proximity to free margins an O-Z flap was deemed most appropriate.  Using a sterile surgical marker, an appropriate transposition flap was drawn incorporating the defect and placing the expected incisions within the relaxed skin tension lines where possible. The area thus outlined was incised deep to adipose tissue with a #15 scalpel blade.  The skin margins were undermined to an appropriate distance in all directions utilizing iris scissors.
Suturegard Retention Suture: 0-0 Nylon
Wound Care: Vaseline
Brow Lift Text: A midfrontal incision was made medially to the defect to allow access to the tissues just superior to the left eyebrow. Following careful dissection inferiorly in a supraperiosteal plane to the level of the left eyebrow, several 3-0 monocryl sutures were used to resuspend the eyebrow orbicularis oculi muscular unit to the superior frontal bone periosteum. This resulted in an appropriate reapproximation of static eyebrow symmetry and correction of the left brow ptosis.
Medical Necessity Statement: Based on my medical judgement, Mohs surgery is the most appropriate treatment for this cancer compared to other treatments.
Purse String (Intermediate) Text: Given the location of the defect and the characteristics of the surrounding skin a purse string intermediate closure was deemed most appropriate.  Undermining was performed circumfirentially around the surgical defect.  A purse string suture was then placed and tightened.
Complex Repair Preamble Text (Leave Blank If You Do Not Want): Extensive wide undermining was performed.
Ear Star Wedge Flap Text: The defect edges were debeveled with a #15 blade scalpel.  Given the location of the defect and the proximity to free margins (helical rim) an ear star wedge flap was deemed most appropriate.  Using a sterile surgical marker, the appropriate flap was drawn incorporating the defect and placing the expected incisions between the helical rim and antihelix where possible.  The area thus outlined was incised through and through with a #15 scalpel blade.
Area M Indication Text: Tumors in this location are included in Area M (cheek, forehead, scalp, neck, jawline and pretibial skin).  Mohs surgery is indicated for tumors in these anatomic locations.
Estimated Blood Loss (Cc): minimal
Mohs Case Number: [id number]
Nasalis-Muscle-Based Myocutaneous Island Pedicle Flap Text: Using a #15 blade, an incision was made around the donor flap to the level of the nasalis muscle. Wide lateral undermining was then performed in both the subcutaneous plane above the nasalis muscle, and in a submuscular plane just above periosteum. This allowed the formation of a free nasalis muscle axial pedicle (based on the angular artery) which was still attached to the actual cutaneous flap, increasing its mobility and vascular viability. Hemostasis was obtained with pinpoint electrocoagulation. The flap was mobilized into position and the pivotal anchor points positioned and stabilized with buried interrupted sutures. Subcutaneous and dermal tissues were closed in a multilayered fashion with sutures. Tissue redundancies were excised, and the epidermal edges were apposed without significant tension and sutured with sutures.
Epidermal Closure Graft Donor Site (Optional): running
Suturegard Intro: Intraoperative tissue expansion was performed, utilizing the SUTUREGARD device, in order to reduce wound tension.
Surgeon: Alberta Duran MD
Tissue Cultured Epidermal Autograft Text: The defect edges were debeveled with a #15 scalpel blade.  Given the location of the defect, shape of the defect and the proximity to free margins a tissue cultured epidermal autograft was deemed most appropriate.  The graft was then trimmed to fit the size of the defect.  The graft was then placed in the primary defect and oriented appropriately.
Hemostasis: Electrocautery
Cartilage Graft Text: The defect edges were debeveled with a #15 scalpel blade.  Given the location of the defect, shape of the defect, the fact the defect involved a full thickness cartilage defect a cartilage graft was deemed most appropriate.  An appropriate donor site was identified, cleansed, and anesthetized. The cartilage graft was then harvested and transferred to the recipient site, oriented appropriately and then sutured into place.  The secondary defect was then repaired using a primary closure.
Deep Sutures: 5-0 Maxon
Eye Protection Verbiage: Before proceeding with the stage, a plastic scleral shield was inserted. The globe was anesthetized with a few drops of 1% lidocaine with 1:100,000 epinephrine. Then, an appropriate sized scleral shield was chosen and coated with lacrilube ointment. The shield was gently inserted and left in place for the duration of each stage. After the stage was completed, the shield was gently removed.
Mauc Instructions: By selecting yes to the question below the MAUC number will be added into the note.  This will be calculated automatically based on the diagnosis chosen, the size entered, the body zone selected (H,M,L) and the specific indications you chose. You will also have the option to override the Mohs AUC if you disagree with the automatically calculated number and this option is found in the Case Summary tab.
Mohs Rapid Report Verbiage: The area of clinically evident tumor was marked with skin marking ink and appropriately hatched.  The initial incision was made following the Mohs approach through the skin.  The specimen was taken to the lab, divided into the necessary number of pieces, chromacoded and processed according to the Mohs protocol.  This was repeated in successive stages until a tumor free defect was achieved.
Mercedes Flap Text: The defect edges were debeveled with a #15 scalpel blade.  Given the location of the defect, shape of the defect and the proximity to free margins a Mercedes flap was deemed most appropriate.  Using a sterile surgical marker, an appropriate advancement flap was drawn incorporating the defect and placing the expected incisions within the relaxed skin tension lines where possible. The area thus outlined was incised deep to adipose tissue with a #15 scalpel blade.  The skin margins were undermined to an appropriate distance in all directions utilizing iris scissors.
Graft Donor Site Bandage (Optional-Leave Blank If You Don't Want In Note): Aquaplast was fitted to the graft site and sewn into place. A pressure bandage were applied to the donor site and over the aquaplast bolster.
Epidermal Autograft Text: The defect edges were debeveled with a #15 scalpel blade.  Given the location of the defect, shape of the defect and the proximity to free margins an epidermal autograft was deemed most appropriate.  Using a sterile surgical marker, the primary defect shape was transferred to the donor site. The epidermal graft was then harvested.  The skin graft was then placed in the primary defect and oriented appropriately.
Undermining Type: Entire Wound
Melolabial Interpolation Flap Text: A decision was made to reconstruct the defect utilizing an interpolation axial flap and a staged reconstruction.  A telfa template was made of the defect.  This telfa template was then used to outline the melolabial interpolation flap.  The donor area for the pedicle flap was then injected with anesthesia.  The flap was excised through the skin and subcutaneous tissue down to the layer of the underlying musculature.  The pedicle flap was carefully excised within this deep plane to maintain its blood supply.  The edges of the donor site were undermined.   The donor site was closed in a primary fashion.  The pedicle was then rotated into position and sutured.  Once the tube was sutured into place, adequate blood supply was confirmed with blanching and refill.  The pedicle was then wrapped with xeroform gauze and dressed appropriately with a telfa and gauze bandage to ensure continued blood supply and protect the attached pedicle.
Tarsorrhaphy Text: A tarsorrhaphy was performed using Frost sutures.
Closure 3 Information: This tab is for additional flaps and grafts above and beyond our usual structured repairs.  Please note if you enter information here it will not currently bill and you will need to add the billing information manually.
No Repair - Repaired With Adjacent Surgical Defect Text (Leave Blank If You Do Not Want): After obtaining clear surgical margins the defect was repaired concurrently with another surgical defect which was in close approximation.
Suture Removal: 7 days
Epidermal Sutures: 5-0 Surgipro
Rhomboid Transposition Flap Text: The defect edges were debeveled with a #15 scalpel blade.  Given the location of the defect and the proximity to free margins a rhomboid transposition flap was deemed most appropriate.  Using a sterile surgical marker, an appropriate rhomboid flap was drawn incorporating the defect.    The area thus outlined was incised deep to adipose tissue with a #15 scalpel blade.  The skin margins were undermined to an appropriate distance in all directions utilizing iris scissors.
Cheek-To-Nose Interpolation Flap Text: A decision was made to reconstruct the defect utilizing an interpolation axial flap and a staged reconstruction.  A telfa template was made of the defect.  This telfa template was then used to outline the Cheek-To-Nose Interpolation flap.  The donor area for the pedicle flap was then injected with anesthesia.  The flap was excised through the skin and subcutaneous tissue down to the layer of the underlying musculature.  The interpolation flap was carefully excised within this deep plane to maintain its blood supply.  The edges of the donor site were undermined.   The donor site was closed in a primary fashion.  The pedicle was then rotated into position and sutured.  Once the tube was sutured into place, adequate blood supply was confirmed with blanching and refill.  The pedicle was then wrapped with xeroform gauze and dressed appropriately with a telfa and gauze bandage to ensure continued blood supply and protect the attached pedicle.
Peng Advancement Flap Text: The defect edges were debeveled with a #15 scalpel blade.  Given the location of the defect, shape of the defect and the proximity to free margins a Peng advancement flap was deemed most appropriate.  Using a sterile surgical marker, an appropriate advancement flap was drawn incorporating the defect and placing the expected incisions within the relaxed skin tension lines where possible. The area thus outlined was incised deep to adipose tissue with a #15 scalpel blade.  The skin margins were undermined to an appropriate distance in all directions utilizing iris scissors.
Graft Cartilage Fenestration Text: The cartilage was fenestrated with a 2mm punch biopsy to help facilitate graft survival and healing.
Crescentic Advancement Flap Text: The defect edges were debeveled with a #15 scalpel blade.  Given the location of the defect and the proximity to free margins a crescentic advancement flap was deemed most appropriate.  Using a sterile surgical marker, the appropriate advancement flap was drawn incorporating the defect and placing the expected incisions within the relaxed skin tension lines where possible.    The area thus outlined was incised deep to adipose tissue with a #15 scalpel blade.  The skin margins were undermined to an appropriate distance in all directions utilizing iris scissors.
Rotation Flap Text: The defect edges were debeveled with a #15 scalpel blade.  Given the location of the defect, shape of the defect and the proximity to free margins a rotation flap was deemed most appropriate.  Using a sterile surgical marker, an appropriate rotation flap was drawn incorporating the defect and placing the expected incisions within the relaxed skin tension lines where possible.    The area thus outlined was incised deep to adipose tissue with a #15 scalpel blade.  The skin margins were undermined to an appropriate distance in all directions utilizing iris scissors.
Consent (Lip)/Introductory Paragraph: The rationale for Mohs was explained to the patient and consent was obtained. The risks, benefits and alternatives to therapy were discussed in detail. Specifically, the risks of lip deformity, changes in the oral aperture, infection, scarring, bleeding, prolonged wound healing, incomplete removal, allergy to anesthesia, nerve injury and recurrence were addressed. Prior to the procedure, the treatment site was clearly identified and confirmed by the patient. All components of Universal Protocol/PAUSE Rule completed.
Consent Type: Consent 1 (Standard)
Localized Dermabrasion With Wire Brush Text: The patient was draped in routine manner.  Localized dermabrasion using 3 x 17 mm wire brush was performed in routine manner to papillary dermis. This spot dermabrasion is being performed to complete skin cancer reconstruction. It also will eliminate the other sun damaged precancerous cells that are known to be part of the regional effect of a lifetime's worth of sun exposure. This localized dermabrasion is therapeutic and should not be considered cosmetic in any regard.
Retention Suture Text: Retention sutures were placed to support the closure and prevent dehiscence.
Dressing (No Sutures): pressure dressing with telfa
Consent 3/Introductory Paragraph: I gave the patient a chance to ask questions they had about the procedure.  Following this I explained the Mohs procedure and consent was obtained. The risks, benefits and alternatives to therapy were discussed in detail. Specifically, the risks of infection, scarring, bleeding, prolonged wound healing, incomplete removal, allergy to anesthesia, nerve injury and recurrence were addressed. Prior to the procedure, the treatment site was clearly identified and confirmed by the patient. All components of Universal Protocol/PAUSE Rule completed.
Island Pedicle Flap-Requiring Vessel Identification Text: The defect edges were debeveled with a #15 scalpel blade.  Given the location of the defect, shape of the defect and the proximity to free margins an island pedicle advancement flap was deemed most appropriate.  Using a sterile surgical marker, an appropriate advancement flap was drawn, based on the axial vessel mentioned above, incorporating the defect, outlining the appropriate donor tissue and placing the expected incisions within the relaxed skin tension lines where possible.    The area thus outlined was incised deep to adipose tissue with a #15 scalpel blade.  The skin margins were undermined to an appropriate distance in all directions around the primary defect and laterally outward around the island pedicle utilizing iris scissors.  There was minimal undermining beneath the pedicle flap.
Xenograft Text: The defect edges were debeveled with a #15 scalpel blade.  Given the location of the defect, shape of the defect and the proximity to free margins a xenograft was deemed most appropriate.  The graft was then trimmed to fit the size of the defect.  The graft was then placed in the primary defect and oriented appropriately.
Hemigard Postcare Instructions: The HEMIGARD strips are to remain completely dry for at least 5-7 days.
Manual Repair Warning Statement: We plan on removing the manually selected variable below in favor of our much easier automatic structured text blocks found in the previous tab. We decided to do this to help make the flow better and give you the full power of structured data. Manual selection is never going to be ideal in our platform and I would encourage you to avoid using manual selection from this point on, especially since I will be sunsetting this feature. It is important that you do one of two things with the customized text below. First, you can save all of the text in a word file so you can have it for future reference. Second, transfer the text to the appropriate area in the Library tab. Lastly, if there is a flap or graft type which we do not have you need to let us know right away so I can add it in before the variable is hidden. No need to panic, we plan to give you roughly 6 months to make the change.
Previous Accession (Optional): [id number]
Modified Advancement Flap Text: The defect edges were debeveled with a #15 scalpel blade.  Given the location of the defect, shape of the defect and the proximity to free margins a modified advancement flap was deemed most appropriate.  Using a sterile surgical marker, an appropriate advancement flap was drawn incorporating the defect and placing the expected incisions within the relaxed skin tension lines where possible.    The area thus outlined was incised deep to adipose tissue with a #15 scalpel blade.  The skin margins were undermined to an appropriate distance in all directions utilizing iris scissors.
Tumor Depth: Less than 6mm from granular layer and no invasion beyond the subcutaneous fat
Trilobed Flap Text: The defect edges were debeveled with a #15 scalpel blade.  Given the location of the defect and the proximity to free margins a trilobed flap was deemed most appropriate.  Using a sterile surgical marker, an appropriate trilobed flap drawn around the defect.    The area thus outlined was incised deep to adipose tissue with a #15 scalpel blade.  The skin margins were undermined to an appropriate distance in all directions utilizing iris scissors.
Mohs Method Verbiage: An incision at a 45 degree angle following the standard Mohs approach was done and the specimen was harvested as a microscopic controlled layer.
Double O-Z Flap Text: The defect edges were debeveled with a #15 scalpel blade.  Given the location of the defect, shape of the defect and the proximity to free margins a Double O-Z flap was deemed most appropriate.  Using a sterile surgical marker, an appropriate transposition flap was drawn incorporating the defect and placing the expected incisions within the relaxed skin tension lines where possible. The area thus outlined was incised deep to adipose tissue with a #15 scalpel blade.  The skin margins were undermined to an appropriate distance in all directions utilizing iris scissors.
Split-Thickness Skin Graft Text: The defect edges were debeveled with a #15 scalpel blade.  Given the location of the defect, shape of the defect and the proximity to free margins a split thickness skin graft was deemed most appropriate.  Using a sterile surgical marker, the primary defect shape was transferred to the donor site. The split thickness graft was then harvested.  The skin graft was then placed in the primary defect and oriented appropriately.
Same Histology In Subsequent Stages Text: The pattern and morphology of the tumor is as described in the first stage.
Consent 1/Introductory Paragraph: The rationale for Mohs was explained to the patient and consent was obtained. The risks, benefits and alternatives to therapy were discussed in detail. Specifically, the risks of infection, scarring, bleeding, prolonged wound healing, incomplete removal, allergy to anesthesia, nerve injury and recurrence were addressed. Prior to the procedure, the treatment site was clearly identified and confirmed by the patient. All components of Universal Protocol/PAUSE Rule completed.
Anesthesia Volume In Cc: 9
Post-Care Instructions: I reviewed with the patient in detail post-care instructions. Patient is not to engage in any heavy lifting, exercise, or swimming for the next 14 days. Should the patient develop any fevers, chills, bleeding, severe pain patient will contact the office immediately.
Inflammation Suggestive Of Cancer Camouflage Histology Text: There was a dense lymphocytic infiltrate which prevented adequate histologic evaluation of adjacent structures.
Island Pedicle Flap Text: The defect edges were debeveled with a #15 scalpel blade.  Given the location of the defect, shape of the defect and the proximity to free margins an island pedicle advancement flap was deemed most appropriate.  Using a sterile surgical marker, an appropriate advancement flap was drawn incorporating the defect, outlining the appropriate donor tissue and placing the expected incisions within the relaxed skin tension lines where possible.    The area thus outlined was incised deep to adipose tissue with a #15 scalpel blade.  The skin margins were undermined to an appropriate distance in all directions around the primary defect and laterally outward around the island pedicle utilizing iris scissors.  There was minimal undermining beneath the pedicle flap.
O-T Advancement Flap Text: The defect edges were debeveled with a #15 scalpel blade.  Given the location of the defect, shape of the defect and the proximity to free margins an O-T advancement flap was deemed most appropriate.  Using a sterile surgical marker, an appropriate advancement flap was drawn incorporating the defect and placing the expected incisions within the relaxed skin tension lines where possible.    The area thus outlined was incised deep to adipose tissue with a #15 scalpel blade.  The skin margins were undermined to an appropriate distance in all directions utilizing iris scissors.
Paramedian Forehead Flap Text: A decision was made to reconstruct the defect utilizing an interpolation axial flap and a staged reconstruction.  A telfa template was made of the defect.  This telfa template was then used to outline the paramedian forehead pedicle flap.  The donor area for the pedicle flap was then injected with anesthesia.  The flap was excised through the skin and subcutaneous tissue down to the layer of the underlying musculature.  The pedicle flap was carefully excised within this deep plane to maintain its blood supply.  The edges of the donor site were undermined.   The donor site was closed in a primary fashion.  The pedicle was then rotated into position and sutured.  Once the tube was sutured into place, adequate blood supply was confirmed with blanching and refill.  The pedicle was then wrapped with xeroform gauze and dressed appropriately with a telfa and gauze bandage to ensure continued blood supply and protect the attached pedicle.
Graft Donor Site Dermal Sutures (Optional): 5-0 Polysorb
Nasal Turnover Hinge Flap Text: The defect edges were debeveled with a #15 scalpel blade.  Given the size, depth, location of the defect and the defect being full thickness a nasal turnover hinge flap was deemed most appropriate.  Using a sterile surgical marker, an appropriate hinge flap was drawn incorporating the defect. The area thus outlined was incised with a #15 scalpel blade. The flap was designed to recreate the nasal mucosal lining and the alar rim. The skin margins were undermined to an appropriate distance in all directions utilizing iris scissors.
Zygomaticofacial Flap Text: Given the location of the defect, shape of the defect and the proximity to free margins a zygomaticofacial flap was deemed most appropriate for repair.  Using a sterile surgical marker, the appropriate flap was drawn incorporating the defect and placing the expected incisions within the relaxed skin tension lines where possible. The area thus outlined was incised deep to adipose tissue with a #15 scalpel blade with preservation of a vascular pedicle.  The skin margins were undermined to an appropriate distance in all directions utilizing iris scissors.  The flap was then placed into the defect and anchored with interrupted buried subcutaneous sutures.
Bcc Infiltrative Histology Text: There were numerous aggregates of basaloid cells demonstrating an infiltrative pattern.
Consent 2/Introductory Paragraph: Mohs surgery was explained to the patient and consent was obtained. The risks, benefits and alternatives to therapy were discussed in detail. Specifically, the risks of infection, scarring, bleeding, prolonged wound healing, incomplete removal, allergy to anesthesia, nerve injury and recurrence were addressed. Prior to the procedure, the treatment site was clearly identified and confirmed by the patient. All components of Universal Protocol/PAUSE Rule completed.
Burow's Graft Text: The defect edges were debeveled with a #15 scalpel blade.  Given the location of the defect, shape of the defect, the proximity to free margins and the presence of a standing cone deformity a Burow's skin graft was deemed most appropriate. The standing cone was removed and this tissue was then trimmed to the shape of the primary defect. The adipose tissue was also removed until only dermis and epidermis were left.  The skin margins of the secondary defect were undermined to an appropriate distance in all directions utilizing iris scissors.  The secondary defect was closed with interrupted buried subcutaneous sutures.  The skin edges were then re-apposed with running  sutures.  The skin graft was then placed in the primary defect and oriented appropriately.
Wound Care (No Sutures): Petrolatum
Referring Physician (Optional): Kacie Dave NP
Purse String (Simple) Text: Given the location of the defect and the characteristics of the surrounding skin a purse string closure was deemed most appropriate.  Undermining was performed circumfirentially around the surgical defect.  A purse string suture was then placed and tightened.
Cheiloplasty (Less Than 50%) Text: A decision was made to reconstruct the defect with a  cheiloplasty.  The defect was undermined extensively.  Additional obicularis oris muscle was excised with a 15 blade scalpel.  The defect was converted into a full thickness wedge, of less than 50% of the vertical height of the lip, to facilite a better cosmetic result.  Small vessels were then tied off with 5-0 monocyrl. The obicularis oris, superficial fascia, adipose and dermis were then reapproximated.  After the deeper layers were approximated the epidermis was reapproximated with particular care given to realign the vermilion border.
Advancement Flap (Double) Text: The defect edges were debeveled with a #15 scalpel blade.  Given the location of the defect and the proximity to free margins a double advancement flap was deemed most appropriate.  Using a sterile surgical marker, the appropriate advancement flaps were drawn incorporating the defect and placing the expected incisions within the relaxed skin tension lines where possible.    The area thus outlined was incised deep to adipose tissue with a #15 scalpel blade.  The skin margins were undermined to an appropriate distance in all directions utilizing iris scissors.
Subsequent Stages Histo Method Verbiage: Using a similar technique to that described above, a thin layer of tissue was removed from all areas where tumor was visible on the previous stage.  The tissue was again oriented, mapped, dyed, and processed as above.
Non-Graft Cartilage Fenestration Text: The cartilage was fenestrated with a 2mm punch biopsy to help facilitate healing.
Rhombic Flap Text: The defect edges were debeveled with a #15 scalpel blade.  Given the location of the defect and the proximity to free margins a rhombic flap was deemed most appropriate.  Using a sterile surgical marker, an appropriate rhombic flap was drawn incorporating the defect.    The area thus outlined was incised deep to adipose tissue with a #15 scalpel blade.  The skin margins were undermined to an appropriate distance in all directions utilizing iris scissors.
Detail Level: Detailed
Helical Rim Text: The closure involved the helical rim.
Alternatives Discussed Intro (Do Not Add Period): I discussed alternative treatments to Mohs surgery and specifically discussed the risks and benefits of
Posterior Auricular Interpolation Flap Text: A decision was made to reconstruct the defect utilizing an interpolation axial flap and a staged reconstruction.  A telfa template was made of the defect.  This telfa template was then used to outline the posterior auricular interpolation flap.  The donor area for the pedicle flap was then injected with anesthesia.  The flap was excised through the skin and subcutaneous tissue down to the layer of the underlying musculature.  The pedicle flap was carefully excised within this deep plane to maintain its blood supply.  The edges of the donor site were undermined.   The donor site was closed in a primary fashion.  The pedicle was then rotated into position and sutured.  Once the tube was sutured into place, adequate blood supply was confirmed with blanching and refill.  The pedicle was then wrapped with xeroform gauze and dressed appropriately with a telfa and gauze bandage to ensure continued blood supply and protect the attached pedicle.
S Plasty Text: Given the location and shape of the defect, and the orientation of relaxed skin tension lines, an S-plasty was deemed most appropriate for repair.  Using a sterile surgical marker, the appropriate outline of the S-plasty was drawn, incorporating the defect and placing the expected incisions within the relaxed skin tension lines where possible.  The area thus outlined was incised deep to adipose tissue with a #15 scalpel blade.  The skin margins were undermined to an appropriate distance in all directions utilizing iris scissors. The skin flaps were advanced over the defect.  The opposing margins were then approximated with interrupted buried subcutaneous sutures.
Advancement-Rotation Flap Text: The defect edges were debeveled with a #15 scalpel blade.  Given the location of the defect, shape of the defect and the proximity to free margins an advancement-rotation flap was deemed most appropriate.  Using a sterile surgical marker, an appropriate flap was drawn incorporating the defect and placing the expected incisions within the relaxed skin tension lines where possible. The area thus outlined was incised deep to adipose tissue with a #15 scalpel blade.  The skin margins were undermined to an appropriate distance in all directions utilizing iris scissors.
Island Pedicle Flap With Canthal Suspension Text: The defect edges were debeveled with a #15 scalpel blade.  Given the location of the defect, shape of the defect and the proximity to free margins an island pedicle advancement flap was deemed most appropriate.  Using a sterile surgical marker, an appropriate advancement flap was drawn incorporating the defect, outlining the appropriate donor tissue and placing the expected incisions within the relaxed skin tension lines where possible. The area thus outlined was incised deep to adipose tissue with a #15 scalpel blade.  The skin margins were undermined to an appropriate distance in all directions around the primary defect and laterally outward around the island pedicle utilizing iris scissors.  There was minimal undermining beneath the pedicle flap. A suspension suture was placed in the canthal tendon to prevent tension and prevent ectropion.
Where Do You Want The Question To Include Opioid Counseling Located?: Case Summary Tab
Cheek Interpolation Flap Text: A decision was made to reconstruct the defect utilizing an interpolation axial flap and a staged reconstruction.  A telfa template was made of the defect.  This telfa template was then used to outline the Cheek Interpolation flap.  The donor area for the pedicle flap was then injected with anesthesia.  The flap was excised through the skin and subcutaneous tissue down to the layer of the underlying musculature.  The interpolation flap was carefully excised within this deep plane to maintain its blood supply.  The edges of the donor site were undermined.   The donor site was closed in a primary fashion.  The pedicle was then rotated into position and sutured.  Once the tube was sutured into place, adequate blood supply was confirmed with blanching and refill.  The pedicle was then wrapped with xeroform gauze and dressed appropriately with a telfa and gauze bandage to ensure continued blood supply and protect the attached pedicle.
Debridement Text: The wound edges were debrided prior to proceeding with the closure to facilitate wound healing.
Consent (Marginal Mandibular)/Introductory Paragraph: The rationale for Mohs was explained to the patient and consent was obtained. The risks, benefits and alternatives to therapy were discussed in detail. Specifically, the risks of damage to the marginal mandibular branch of the facial nerve, infection, scarring, bleeding, prolonged wound healing, incomplete removal, allergy to anesthesia, and recurrence were addressed. Prior to the procedure, the treatment site was clearly identified and confirmed by the patient. All components of Universal Protocol/PAUSE Rule completed.
Dermal Autograft Text: The defect edges were debeveled with a #15 scalpel blade.  Given the location of the defect, shape of the defect and the proximity to free margins a dermal autograft was deemed most appropriate.  Using a sterile surgical marker, the primary defect shape was transferred to the donor site. The area thus outlined was incised deep to adipose tissue with a #15 scalpel blade.  The harvested graft was then trimmed of adipose and epidermal tissue until only dermis was left.  The skin graft was then placed in the primary defect and oriented appropriately.
Transposition Flap Text: The defect edges were debeveled with a #15 scalpel blade.  Given the location of the defect and the proximity to free margins a transposition flap was deemed most appropriate.  Using a sterile surgical marker, an appropriate transposition flap was drawn incorporating the defect.    The area thus outlined was incised deep to adipose tissue with a #15 scalpel blade.  The skin margins were undermined to an appropriate distance in all directions utilizing iris scissors.
Consent (Temporal Branch)/Introductory Paragraph: The rationale for Mohs was explained to the patient and consent was obtained. The risks, benefits and alternatives to therapy were discussed in detail. Specifically, the risks of damage to the temporal branch of the facial nerve, infection, scarring, bleeding, prolonged wound healing, incomplete removal, allergy to anesthesia, and recurrence were addressed. Prior to the procedure, the treatment site was clearly identified and confirmed by the patient. All components of Universal Protocol/PAUSE Rule completed.
Consent (Scalp)/Introductory Paragraph: The rationale for Mohs was explained to the patient and consent was obtained. The risks, benefits and alternatives to therapy were discussed in detail. Specifically, the risks of changes in hair growth pattern secondary to repair, infection, scarring, bleeding, prolonged wound healing, incomplete removal, allergy to anesthesia, nerve injury and recurrence were addressed. Prior to the procedure, the treatment site was clearly identified and confirmed by the patient. All components of Universal Protocol/PAUSE Rule completed.
Date Of Previous Biopsy (Optional): 5/4/21
Home Suture Removal Text: Patient was provided instructions on removing sutures and will remove their sutures at home.  If they have any questions or difficulties they will call the office.
Double O-Z Plasty Text: The defect edges were debeveled with a #15 scalpel blade.  Given the location of the defect, shape of the defect and the proximity to free margins a Double O-Z plasty (double transposition flap) was deemed most appropriate.  Using a sterile surgical marker, the appropriate transposition flaps were drawn incorporating the defect and placing the expected incisions within the relaxed skin tension lines where possible. The area thus outlined was incised deep to adipose tissue with a #15 scalpel blade.  The skin margins were undermined to an appropriate distance in all directions utilizing iris scissors.  Hemostasis was achieved with electrocautery.  The flaps were then transposed into place, one clockwise and the other counterclockwise, and anchored with interrupted buried subcutaneous sutures.
Nostril Rim Text: The closure involved the nostril rim.
Spiral Flap Text: The defect edges were debeveled with a #15 scalpel blade.  Given the location of the defect, shape of the defect and the proximity to free margins a spiral flap was deemed most appropriate.  Using a sterile surgical marker, an appropriate rotation flap was drawn incorporating the defect and placing the expected incisions within the relaxed skin tension lines where possible. The area thus outlined was incised deep to adipose tissue with a #15 scalpel blade.  The skin margins were undermined to an appropriate distance in all directions utilizing iris scissors.
Bcc Histology Text: There were numerous aggregates of basaloid cells.
Complex Repair And Graft Additional Text (Will Appearing After The Standard Complex Repair Text): The complex repair was not sufficient to completely close the primary defect. The remaining additional defect was repaired with the graft mentioned below.
Full Thickness Lip Wedge Repair (Flap) Text: Given the location of the defect and the proximity to free margins a full thickness wedge repair was deemed most appropriate.  Using a sterile surgical marker, the appropriate repair was drawn incorporating the defect and placing the expected incisions perpendicular to the vermilion border.  The vermilion border was also meticulously outlined to ensure appropriate reapproximation during the repair.  The area thus outlined was incised through and through with a #15 scalpel blade.  The muscularis and dermis were reaproximated with deep sutures following hemostasis. Care was taken to realign the vermilion border before proceeding with the superficial closure.  Once the vermilion was realigned the superfical and mucosal closure was finished.
Composite Graft Text: The defect edges were debeveled with a #15 scalpel blade.  Given the location of the defect, shape of the defect, the proximity to free margins and the fact the defect was full thickness a composite graft was deemed most appropriate.  The defect was outline and then transferred to the donor site.  A full thickness graft was then excised from the donor site. The graft was then placed in the primary defect, oriented appropriately and then sutured into place.  The secondary defect was then repaired using a primary closure.
Double Island Pedicle Flap Text: The defect edges were debeveled with a #15 scalpel blade.  Given the location of the defect, shape of the defect and the proximity to free margins a double island pedicle advancement flap was deemed most appropriate.  Using a sterile surgical marker, an appropriate advancement flap was drawn incorporating the defect, outlining the appropriate donor tissue and placing the expected incisions within the relaxed skin tension lines where possible.    The area thus outlined was incised deep to adipose tissue with a #15 scalpel blade.  The skin margins were undermined to an appropriate distance in all directions around the primary defect and laterally outward around the island pedicle utilizing iris scissors.  There was minimal undermining beneath the pedicle flap.
Cheiloplasty (Complex) Text: A decision was made to reconstruct the defect with a  cheiloplasty.  The defect was undermined extensively.  Additional obicularis oris muscle was excised with a 15 blade scalpel.  The defect was converted into a full thickness wedge to facilite a better cosmetic result.  Small vessels were then tied off with 5-0 monocyrl. The obicularis oris, superficial fascia, adipose and dermis were then reapproximated.  After the deeper layers were approximated the epidermis was reapproximated with particular care given to realign the vermilion border.
Ear Wedge Repair Text: A wedge excision was completed by carrying down an excision through the full thickness of the ear and cartilage with an inward facing Burow's triangle. The wound was then closed in a layered fashion.
Information: Selecting Yes will display possible errors in your note based on the variables you have selected. This validation is only offered as a suggestion for you. PLEASE NOTE THAT THE VALIDATION TEXT WILL BE REMOVED WHEN YOU FINALIZE YOUR NOTE. IF YOU WANT TO FAX A PRELIMINARY NOTE YOU WILL NEED TO TOGGLE THIS TO 'NO' IF YOU DO NOT WANT IT IN YOUR FAXED NOTE.
Hatchet Flap Text: The defect edges were debeveled with a #15 scalpel blade.  Given the location of the defect, shape of the defect and the proximity to free margins a hatchet flap was deemed most appropriate.  Using a sterile surgical marker, an appropriate hatchet flap was drawn incorporating the defect and placing the expected incisions within the relaxed skin tension lines where possible.    The area thus outlined was incised deep to adipose tissue with a #15 scalpel blade.  The skin margins were undermined to an appropriate distance in all directions utilizing iris scissors.
Consent (Near Eyelid Margin)/Introductory Paragraph: The rationale for Mohs was explained to the patient and consent was obtained. The risks, benefits and alternatives to therapy were discussed in detail. Specifically, the risks of ectropion or eyelid deformity, infection, scarring, bleeding, prolonged wound healing, incomplete removal, allergy to anesthesia, nerve injury and recurrence were addressed. Prior to the procedure, the treatment site was clearly identified and confirmed by the patient. All components of Universal Protocol/PAUSE Rule completed.
Postop Diagnosis: same
Retention Suture Bite Size: 1 mm
O-Z Plasty Text: The defect edges were debeveled with a #15 scalpel blade.  Given the location of the defect, shape of the defect and the proximity to free margins an O-Z plasty (double transposition flap) was deemed most appropriate.  Using a sterile surgical marker, the appropriate transposition flaps were drawn incorporating the defect and placing the expected incisions within the relaxed skin tension lines where possible.    The area thus outlined was incised deep to adipose tissue with a #15 scalpel blade.  The skin margins were undermined to an appropriate distance in all directions utilizing iris scissors.  Hemostasis was achieved with electrocautery.  The flaps were then transposed into place, one clockwise and the other counterclockwise, and anchored with interrupted buried subcutaneous sutures.
Partial Purse String (Intermediate) Text: Given the location of the defect and the characteristics of the surrounding skin an intermediate purse string closure was deemed most appropriate.  Undermining was performed circumfirentially around the surgical defect.  A purse string suture was then placed and tightened. Wound tension only allowed a partial closure of the circular defect.
W Plasty Text: The lesion was extirpated to the level of the fat with a #15 scalpel blade.  Given the location of the defect, shape of the defect and the proximity to free margins a W-plasty was deemed most appropriate for repair.  Using a sterile surgical marker, the appropriate transposition arms of the W-plasty were drawn incorporating the defect and placing the expected incisions within the relaxed skin tension lines where possible.    The area thus outlined was incised deep to adipose tissue with a #15 scalpel blade.  The skin margins were undermined to an appropriate distance in all directions utilizing iris scissors.  The opposing transposition arms were then transposed into place in opposite direction and anchored with interrupted buried subcutaneous sutures.
Secondary Intention Text (Leave Blank If You Do Not Want): The defect will heal with secondary intention.
Donor Site Anesthesia Type: same as repair anesthesia
Mart-1 - Negative Histology Text: MART-1 staining demonstrates a normal density and pattern of melanocytes along the dermal-epidermal junction. The surgical margins are negative for tumor cells.
V-Y Plasty Text: The defect edges were debeveled with a #15 scalpel blade.  Given the location of the defect, shape of the defect and the proximity to free margins an V-Y advancement flap was deemed most appropriate.  Using a sterile surgical marker, an appropriate advancement flap was drawn incorporating the defect and placing the expected incisions within the relaxed skin tension lines where possible.    The area thus outlined was incised deep to adipose tissue with a #15 scalpel blade.  The skin margins were undermined to an appropriate distance in all directions utilizing iris scissors.
Dorsal Nasal Flap Text: The defect edges were debeveled with a #15 scalpel blade.  Given the location of the defect and the proximity to free margins a dorsal nasal flap was deemed most appropriate.  Using a sterile surgical marker, an appropriate dorsal nasal flap was drawn around the defect.    The area thus outlined was incised deep to adipose tissue with a #15 scalpel blade.  The skin margins were undermined to an appropriate distance in all directions utilizing iris scissors.
Mart-1 - Positive Histology Text: MART-1 staining demonstrates areas of higher density and clustering of melanocytes with Pagetoid spread upwards within the epidermis. The surgical margins are positive for tumor cells.
Area H Indication Text: Tumors in this location are included in Area H (eyelids, eyebrows, nose, lips, chin, ear, pre-auricular, post-auricular, temple, genitalia, hands, feet, ankles and areola).  Tissue conservation is critical in these anatomic locations.
Interpolation Flap Text: A decision was made to reconstruct the defect utilizing an interpolation axial flap and a staged reconstruction.  A telfa template was made of the defect.  This telfa template was then used to outline the interpolation flap.  The donor area for the pedicle flap was then injected with anesthesia.  The flap was excised through the skin and subcutaneous tissue down to the layer of the underlying musculature.  The interpolation flap was carefully excised within this deep plane to maintain its blood supply.  The edges of the donor site were undermined.   The donor site was closed in a primary fashion.  The pedicle was then rotated into position and sutured.  Once the tube was sutured into place, adequate blood supply was confirmed with blanching and refill.  The pedicle was then wrapped with xeroform gauze and dressed appropriately with a telfa and gauze bandage to ensure continued blood supply and protect the attached pedicle.
Melolabial Transposition Flap Text: The defect edges were debeveled with a #15 scalpel blade.  Given the location of the defect and the proximity to free margins a melolabial flap was deemed most appropriate.  Using a sterile surgical marker, an appropriate melolabial transposition flap was drawn incorporating the defect.    The area thus outlined was incised deep to adipose tissue with a #15 scalpel blade.  The skin margins were undermined to an appropriate distance in all directions utilizing iris scissors.
Closure 2 Information: This tab is for additional flaps and grafts, including complex repair and grafts and complex repair and flaps. You can also specify a different location for the additional defect, if the location is the same you do not need to select a new one. We will insert the automated text for the repair you select below just as we do for solitary flaps and grafts. Please note that at this time if you select a location with a different insurance zone you will need to override the ICD10 and CPT if appropriate.
V-Y Flap Text: The defect edges were debeveled with a #15 scalpel blade.  Given the location of the defect, shape of the defect and the proximity to free margins a V-Y flap was deemed most appropriate.  Using a sterile surgical marker, an appropriate advancement flap was drawn incorporating the defect and placing the expected incisions within the relaxed skin tension lines where possible.    The area thus outlined was incised deep to adipose tissue with a #15 scalpel blade.  The skin margins were undermined to an appropriate distance in all directions utilizing iris scissors.
Mastoid Interpolation Flap Text: A decision was made to reconstruct the defect utilizing an interpolation axial flap and a staged reconstruction.  A telfa template was made of the defect.  This telfa template was then used to outline the mastoid interpolation flap.  The donor area for the pedicle flap was then injected with anesthesia.  The flap was excised through the skin and subcutaneous tissue down to the layer of the underlying musculature.  The pedicle flap was carefully excised within this deep plane to maintain its blood supply.  The edges of the donor site were undermined.   The donor site was closed in a primary fashion.  The pedicle was then rotated into position and sutured.  Once the tube was sutured into place, adequate blood supply was confirmed with blanching and refill.  The pedicle was then wrapped with xeroform gauze and dressed appropriately with a telfa and gauze bandage to ensure continued blood supply and protect the attached pedicle.
Consent (Ear)/Introductory Paragraph: The rationale for Mohs was explained to the patient and consent was obtained. The risks, benefits and alternatives to therapy were discussed in detail. Specifically, the risks of ear deformity, infection, scarring, bleeding, prolonged wound healing, incomplete removal, allergy to anesthesia, nerve injury and recurrence were addressed. Prior to the procedure, the treatment site was clearly identified and confirmed by the patient. All components of Universal Protocol/PAUSE Rule completed.
Burow's Advancement Flap Text: The defect edges were debeveled with a #15 scalpel blade.  Given the location of the defect and the proximity to free margins a Burow's advancement flap was deemed most appropriate.  Using a sterile surgical marker, the appropriate advancement flap was drawn incorporating the defect and placing the expected incisions within the relaxed skin tension lines where possible.    The area thus outlined was incised deep to adipose tissue with a #15 scalpel blade.  The skin margins were undermined to an appropriate distance in all directions utilizing iris scissors.
Wound Check: 6 weeks
Mohs Histo Method Verbiage: Each section was then chromacoded and processed in the Mohs lab using the Mohs protocol and submitted for frozen section.
Partial Purse String (Simple) Text: Given the location of the defect and the characteristics of the surrounding skin a simple purse string closure was deemed most appropriate.  Undermining was performed circumfirentially around the surgical defect.  A purse string suture was then placed and tightened. Wound tension only allowed a partial closure of the circular defect.
Epidermal Closure: running cuticular
A-T Advancement Flap Text: The defect edges were debeveled with a #15 scalpel blade.  Given the location of the defect, shape of the defect and the proximity to free margins an A-T advancement flap was deemed most appropriate.  Using a sterile surgical marker, an appropriate advancement flap was drawn incorporating the defect and placing the expected incisions within the relaxed skin tension lines where possible.    The area thus outlined was incised deep to adipose tissue with a #15 scalpel blade.  The skin margins were undermined to an appropriate distance in all directions utilizing iris scissors.
Skin Substitute Text: The defect edges were debeveled with a #15 scalpel blade.  Given the location of the defect, shape of the defect and the proximity to free margins a skin substitute graft was deemed most appropriate.  The graft material was trimmed to fit the size of the defect. The graft was then placed in the primary defect and oriented appropriately.
Staged Advancement Flap Text: The defect edges were debeveled with a #15 scalpel blade.  Given the location of the defect, shape of the defect and the proximity to free margins a staged advancement flap was deemed most appropriate.  Using a sterile surgical marker, an appropriate advancement flap was drawn incorporating the defect and placing the expected incisions within the relaxed skin tension lines where possible. The area thus outlined was incised deep to adipose tissue with a #15 scalpel blade.  The skin margins were undermined to an appropriate distance in all directions utilizing iris scissors.

## 2021-07-29 ENCOUNTER — APPOINTMENT (RX ONLY)
Dept: URBAN - METROPOLITAN AREA CLINIC 36 | Facility: CLINIC | Age: 76
Setting detail: DERMATOLOGY
End: 2021-07-29

## 2021-08-17 ENCOUNTER — OFFICE VISIT (OUTPATIENT)
Dept: BEHAVIORAL HEALTH | Facility: CLINIC | Age: 76
End: 2021-08-17
Payer: MEDICARE

## 2021-08-17 DIAGNOSIS — F43.29 GRIEF REACTION WITH PROLONGED BEREAVEMENT: ICD-10-CM

## 2021-08-17 DIAGNOSIS — F41.1 GENERALIZED ANXIETY DISORDER: ICD-10-CM

## 2021-08-17 PROCEDURE — 90791 PSYCH DIAGNOSTIC EVALUATION: CPT | Performed by: MARRIAGE & FAMILY THERAPIST

## 2021-08-17 NOTE — PROGRESS NOTES
[facility]   Initial Assessment    Name: Dina Crews  MRN: [id number]  : 1945  Age: 76 y.o.  Date of assessment: 2021  PCP: Roger Larson M.D.  Persons in attendance: Patient  Total session time: 45 minutes      CHIEF COMPLAINT AND HISTORY OF PRESENTING PROBLEM:  (as stated by Patient):  Dina Crews is a 76 y.o., White female referred for assessment by Roger Larson*.  Primary presenting issue includes   Chief Complaint   Patient presents with   • Initial  Evaluation   .The patient reports that her wife passed away 1 year ago. She stated that they have been together for 45 years and everything reminds her.       BEHAVIORAL HEALTH TREATMENT HISTORY  Does patient/parent report a history of prior behavioral health treatment for patient? Yes:    Dates Level of Care Facilty/Provider Diagnosis/Problem Medications          At 21       2/3 years ago                                                            History of untreated behavioral health issues identified? Yes  Does patient/parent report change in appetite or weight loss/gain? Lost 20 pounds.recently  Does patient/parent report physical pain? No              Indicate if pain is acute or chronic, and location: N/A              Pain scale rating:           FAMILY/SOCIAL HISTORY  Current living situation/household members: Lives alone  Does patient/parent report a family history of behavioral health issues, diagnoses, or treatment?   Family History   Problem Relation Age of Onset   • Lung Disease Mother         COPD   • Cancer Mother         Thyroid cancer   • Cancer Father         d. 72 Stomach cancer   • Diabetes Father    • Cancer Sister 40        Breast cancer   • GI Disease Sister         diverticulitis   • Cancer Sister         breast          EMPLOYMENT/RESOURCES  Is the patient currently employed? Retired  Does the patient/parent report adequate financial resources? Becoming a  stressor.       HISTORY:  Does patient report current or past enlistment? No               [If yes, complete below items]  Does patient report history of exposure to combat? No      SPIRITUAL/CULTURAL/IDENTITY:  What are the patient's/family's spiritual beliefs or practices? Spiritual than Faith      ABUSE/NEGLECT/TRAUMA SCREENING  Does patient report feeling “unsafe” in his/her home, or afraid of anyone? No  Does patient report any history of physical, sexual, or emotional abuse? No  Is there evidence of neglect by self? No  Is there evidence of neglect by a caregiver? No                                                                                                          SAFETY ASSESSMENT - SELF  Does patient acknowledge current or past symptoms of dangerousness to self? Yes  Recent change in frequency/specificity/intensity of suicidal thoughts or self-harm behavior? Yes  Current access to firearms, medications, or other identified means of suicide/self-harm? Yes  If yes, willing to restrict access to means of suicide/self-harm? Yes      Current Suicide Risk: Low  Crisis Safety Plan completed and copy given to patient: No      SAFETY ASSESSMENT - OTHERS  Recent change in frequency/specificity/intensity of thoughts or threats to harm others? No  If Yes:  Current access to firearms/other identified means of harm?   If yes, willing to restrict access to weapons/means of harm?     Current Homicide Risk:  Not applicable  Crisis Safety Plan completed and copy given to patient? No  Based on information provided during the current assessment, is a mandated “duty to warn” being exercised? No      SUBSTANCE USE/ADDICTION HISTORY  Patient denies use of any substance/addictive behaviors No    If No:  Is there a family history of substance use/addiction? No  Does patient acknowledge or parent/significant other report use of/dependence on substances? No  Last time patient used alcohol: N/A  Within the past week?  No  Last time patient used marijuana: N/A  Within the past month? No  Any other street drugs ever tried even once? No  Any use of prescription medications/pills without a prescription, or for reasons others than originally prescribed?  No  Any other addictive behavior reported (gambling, shopping, sex)? No     Drug History:  Amphetamine:      Cannibis:      Cocaine:      Ecstasy:      Hallucinogen:      Inhalant:       Opiate:      Other:      Sedative:           MENTAL STATUS/OBSERVATIONS              Participation: Active verbal participation, Attentive and Engaged  Grooming: Casual  Orientation:Alert and Fully Oriented   Behavior: Calm  Eye contact: Good          Mood:Depressed  Affect:Flexible, Full range, Sad and Tearful  Thought process: Logical and Goal-directed  Thought content:  Within normal limits  Speech: Rate within normal limits and Volume within normal limits  Perception: Within normal limits  Memory: No gross evidence of memory deficits  Insight: Adequate  Judgment:  Adequate  Other:               Family/couple interaction observations: N/A      Patient's motivation/readiness for change: Good    Topics addressed in psychotherapy include: The patient would like help in managing her moods and bereavement.     Care plan completed: No  Does patient express agreement with the above plan? Yes     Diagnosis:  1. JUNIOR (generalized anxiety disorder)    2. Grief reaction with prolonged bereavement        Referral appointment(s) scheduled? No       NAHOMI Angel

## 2021-08-18 ENCOUNTER — PATIENT MESSAGE (OUTPATIENT)
Dept: HEALTH INFORMATION MANAGEMENT | Facility: OTHER | Age: 76
End: 2021-08-18

## 2021-08-23 PROBLEM — F43.29 GRIEF REACTION WITH PROLONGED BEREAVEMENT: Status: ACTIVE | Noted: 2021-08-23

## 2021-08-31 ENCOUNTER — OFFICE VISIT (OUTPATIENT)
Dept: BEHAVIORAL HEALTH | Facility: CLINIC | Age: 76
End: 2021-08-31
Payer: MEDICARE

## 2021-08-31 DIAGNOSIS — F33.1 MODERATE EPISODE OF RECURRENT MAJOR DEPRESSIVE DISORDER (HCC): ICD-10-CM

## 2021-08-31 DIAGNOSIS — F41.1 GENERALIZED ANXIETY DISORDER: ICD-10-CM

## 2021-08-31 DIAGNOSIS — F43.29 GRIEF REACTION WITH PROLONGED BEREAVEMENT: ICD-10-CM

## 2021-08-31 PROCEDURE — 90834 PSYTX W PT 45 MINUTES: CPT | Performed by: MARRIAGE & FAMILY THERAPIST

## 2021-08-31 NOTE — PROGRESS NOTES
[facility]   Therapy Progress Note        Name: Dina Crews  MRN: [id number]  : 1945  Age: 76 y.o.  Date of assessment: 2021  PCP: Roger Larson M.D.  Persons in attendance: Patient  Total session time: 45 minutes      Topics addressed in psychotherapy include: Met with the patient in this clinician’s office for an individual therapy session. Discussed with the patient the upcoming [place] (September) for her wife who passed away a year ago. Acknowledged the patients grief and provided . Discussed with the patient , other losses in her life, what her wife (Viki) meant to others, and what supports that she has. Worked on increasing positive memories and helped process that patients grief. Referred the patient to [facility] for group counseling.    Objective Observations:   Participation:Active verbal participation, Attentive and Engaged   Grooming:Casual   Cognition:Alert and Fully Oriented   Eye Contact:Good   Mood:Sad, mournful   Affect:Full range, Congruent with content, Sad and Tearful   Thought Process:Logical and Goal-directed   Speech:Rate within normal limits and Volume within normal limits    Current Risk:   Suicide: low   Homicide: low   Self-Harm: low   Relapse: low   Safety Plan Reviewed: not applicable    Care Plan Updated: No    Does patient express agreement with the above plan? Yes     Diagnosis:  1. JUNIOR (generalized anxiety disorder)    2. Moderate episode of recurrent major depressive disorder (HCC)    3. Grief reaction with prolonged bereavement        Referral appointment(s) scheduled? No       NAHOMI Angel

## 2021-09-14 ENCOUNTER — APPOINTMENT (OUTPATIENT)
Dept: BEHAVIORAL HEALTH | Facility: CLINIC | Age: 76
End: 2021-09-14
Payer: MEDICARE

## 2021-09-15 ENCOUNTER — TELEPHONE (OUTPATIENT)
Dept: MEDICAL GROUP | Facility: LAB | Age: 76
End: 2021-09-15

## 2021-09-15 NOTE — TELEPHONE ENCOUNTER
ESTABLISHED PATIENT PRE-VISIT PLANNING     Patient was NOT contacted to complete PVP.     Note: Patient will not be contacted if there is no indication to call.     1.  Reviewed notes from the last few office visits within the medical group: Yes    2.  If any orders were placed at last visit or intended to be done for this visit (i.e. 6 mos follow-up), do we have Results/Consult Notes?         •  Labs - Labs were not ordered at last office visit.  Note: If patient appointment is for lab review and patient did not complete labs, check with provider if OK to reschedule patient until labs completed.       •  Imaging - Imaging was not ordered at last office visit.       •  Referrals - Referral ordered, patient was seen and consult notes are in chart. Care Teams updated  YES.    3. Is this appointment scheduled as a Hospital Follow-Up? No    4.  Immunizations were updated in Epic using Reconcile Outside Information activity? Yes    5.  Patient is due for the following Health Maintenance Topics:   Health Maintenance Due   Topic Date Due   • LUNG CANCER SCREENING  Never done   • IMM INFLUENZA (1) Never done         6.  AHA (Pulse8) form printed for Provider? No, patient does not have any open alerts

## 2021-09-24 ENCOUNTER — APPOINTMENT (OUTPATIENT)
Dept: BEHAVIORAL HEALTH | Facility: CLINIC | Age: 76
End: 2021-09-24
Payer: MEDICARE

## 2021-10-13 ENCOUNTER — TELEPHONE (OUTPATIENT)
Dept: MEDICAL GROUP | Facility: LAB | Age: 76
End: 2021-10-13

## 2021-10-13 NOTE — TELEPHONE ENCOUNTER
ESTABLISHED PATIENT PRE-VISIT PLANNING     Patient was NOT contacted to complete PVP.     Note: Patient will not be contacted if there is no indication to call.     1.  Reviewed notes from the last few office visits within the medical group: Yes    2.  If any orders were placed at last visit or intended to be done for this visit (i.e. 6 mos follow-up), do we have Results/Consult Notes?         •  Labs - Labs were not ordered at last office visit.  Note: If patient appointment is for lab review and patient did not complete labs, check with provider if OK to reschedule patient until labs completed.       •  Imaging - Imaging was not ordered at last office visit.       •  Referrals - No referrals were ordered at last office visit.    3. Is this appointment scheduled as a Hospital Follow-Up? No    4.  Immunizations were updated in Epic using Reconcile Outside Information activity? Yes    5.  Patient is due for the following Health Maintenance Topics:   Health Maintenance Due   Topic Date Due   • LUNG CANCER SCREENING  Never done   • IMM INFLUENZA (1) Never done   • Annual Wellness Visit  10/13/2021         6.  AHA (Pulse8) form printed for Provider? No, patient does not have any open alerts

## 2021-10-14 ENCOUNTER — OFFICE VISIT (OUTPATIENT)
Dept: MEDICAL GROUP | Facility: LAB | Age: 76
End: 2021-10-14
Payer: MEDICARE

## 2021-10-14 VITALS
OXYGEN SATURATION: 96 % | WEIGHT: 136 LBS | TEMPERATURE: 98.1 F | SYSTOLIC BLOOD PRESSURE: 108 MMHG | RESPIRATION RATE: 12 BRPM | DIASTOLIC BLOOD PRESSURE: 66 MMHG | BODY MASS INDEX: 24.1 KG/M2 | HEIGHT: 63 IN | HEART RATE: 70 BPM

## 2021-10-14 DIAGNOSIS — K21.9 GASTROESOPHAGEAL REFLUX DISEASE: ICD-10-CM

## 2021-10-14 DIAGNOSIS — F41.1 GENERALIZED ANXIETY DISORDER: ICD-10-CM

## 2021-10-14 PROCEDURE — 99214 OFFICE O/P EST MOD 30 MIN: CPT | Performed by: FAMILY MEDICINE

## 2021-10-14 RX ORDER — LORAZEPAM 1 MG/1
1 TABLET ORAL EVERY 4 HOURS PRN
Qty: 30 TABLET | Refills: 0 | Status: SHIPPED | OUTPATIENT
Start: 2021-11-14 | End: 2021-12-14

## 2021-10-14 RX ORDER — LORAZEPAM 1 MG/1
1 TABLET ORAL EVERY 4 HOURS PRN
Qty: 30 TABLET | Refills: 0 | Status: SHIPPED | OUTPATIENT
Start: 2021-10-14 | End: 2021-11-13

## 2021-10-14 RX ORDER — ESCITALOPRAM OXALATE 20 MG/1
20 TABLET ORAL DAILY
Qty: 90 TABLET | Refills: 3 | Status: SHIPPED | OUTPATIENT
Start: 2021-10-14 | End: 2022-08-04 | Stop reason: SDUPTHER

## 2021-10-14 RX ORDER — PANTOPRAZOLE SODIUM 40 MG/1
40 TABLET, DELAYED RELEASE ORAL DAILY
Qty: 90 TABLET | Refills: 3 | Status: SHIPPED | OUTPATIENT
Start: 2021-10-14 | End: 2022-07-15 | Stop reason: SDUPTHER

## 2021-10-14 ASSESSMENT — FIBROSIS 4 INDEX: FIB4 SCORE: 1.09

## 2021-10-14 NOTE — PROGRESS NOTES
"Subjective:     CC: Anxiety, med refills    HPI:   Dina presents today follow-up on anxiety.  She reports this is mildly improved but continued.  Recently had 1 year anniversary of her wife's death and does think this provided some closure release.  She continues on Lexapro 20 mg daily, uses Ativan for anxiety intermittently-sometimes daily but other times will go a week without using.  She is seeing a therapist.  At this point she is very hesitant to try any further tapering of Ativan, she does feel like anxiety is improved just knowing she has it.  She is not noting any issues with sedation, no falls.    Medications, past medical history, allergies, and social history have been reviewed and updated.    ROS:  See HPI      Objective:       Exam:  /66 (BP Location: Right arm, Patient Position: Sitting, BP Cuff Size: Adult)   Pulse 70   Temp 36.7 °C (98.1 °F)   Resp 12   Ht 1.6 m (5' 3\")   Wt 61.7 kg (136 lb)   LMP 05/30/1992   SpO2 96%   BMI 24.09 kg/m²  Body mass index is 24.09 kg/m².    Constitutional: Alert. Well appearing. No distress.  Skin: Warm, dry, good turgor, no visible rashes.  Eye: Equal, round and reactive to light, conjunctiva clear, lids normal.  ENMT: Moist mucous membranes.   Respiratory: Normal effort.  Neuro: Moves all four extremities. No facial droop.  Psych: Answers questions appropriately. Normal affect and mood.      Assessment & Plan:     76 y.o. female with the following -     1. Generalized anxiety disorder  Chronic, mild improvement.  Continue Lexapro.  She has been working on tapering Ativan but is very hesitant to taper further at this point.  Continue Ativan as needed for intermittent anxiety and discussed continued attempts to limit use.  She is provided #30 tabs with 1 refill, plan for this to last 90 days.  She will continue to follow with therapist, discussed continuing to work on behavioral strategies to help with episodic anxiety.  - escitalopram (LEXAPRO) 20 MG " tablet; Take 1 Tablet by mouth every day.  Dispense: 90 Tablet; Refill: 3  - LORazepam (ATIVAN) 1 MG Tab; Take 1 Tablet by mouth every four hours as needed for Anxiety for up to 30 days.  Dispense: 30 Tablet; Refill: 0  - LORazepam (ATIVAN) 1 MG Tab; Take 1 Tablet by mouth every four hours as needed for Anxiety for up to 30 days.  Dispense: 30 Tablet; Refill: 0    2. Gastroesophageal reflux disease  Stable, continue Protonix.  - pantoprazole (PROTONIX) 40 MG Tablet Delayed Response; Take 1 Tablet by mouth every day.  Dispense: 90 Tablet; Refill: 3      Please note that this note was created using voice recognition software.

## 2021-11-02 ENCOUNTER — APPOINTMENT (RX ONLY)
Dept: URBAN - METROPOLITAN AREA CLINIC 35 | Facility: CLINIC | Age: 76
Setting detail: DERMATOLOGY
End: 2021-11-02

## 2021-11-02 DIAGNOSIS — B35.3 TINEA PEDIS: ICD-10-CM

## 2021-11-02 DIAGNOSIS — D18.0 HEMANGIOMA: ICD-10-CM

## 2021-11-02 DIAGNOSIS — Z71.89 OTHER SPECIFIED COUNSELING: ICD-10-CM

## 2021-11-02 DIAGNOSIS — Z85.828 PERSONAL HISTORY OF OTHER MALIGNANT NEOPLASM OF SKIN: ICD-10-CM

## 2021-11-02 DIAGNOSIS — L81.4 OTHER MELANIN HYPERPIGMENTATION: ICD-10-CM

## 2021-11-02 DIAGNOSIS — L82.1 OTHER SEBORRHEIC KERATOSIS: ICD-10-CM

## 2021-11-02 DIAGNOSIS — D22 MELANOCYTIC NEVI: ICD-10-CM

## 2021-11-02 PROBLEM — D18.01 HEMANGIOMA OF SKIN AND SUBCUTANEOUS TISSUE: Status: ACTIVE | Noted: 2021-11-02

## 2021-11-02 PROBLEM — D22.5 MELANOCYTIC NEVI OF TRUNK: Status: ACTIVE | Noted: 2021-11-02

## 2021-11-02 PROBLEM — D48.5 NEOPLASM OF UNCERTAIN BEHAVIOR OF SKIN: Status: ACTIVE | Noted: 2021-11-02

## 2021-11-02 PROCEDURE — 99214 OFFICE O/P EST MOD 30 MIN: CPT | Mod: 25

## 2021-11-02 PROCEDURE — ? BIOPSY BY SHAVE METHOD

## 2021-11-02 PROCEDURE — ? SEPARATE AND IDENTIFIABLE DOCUMENTATION

## 2021-11-02 PROCEDURE — ? PRESCRIPTION

## 2021-11-02 PROCEDURE — ? SURGICAL DECISION MAKING

## 2021-11-02 PROCEDURE — ? COUNSELING

## 2021-11-02 PROCEDURE — ? TREATMENT REGIMEN

## 2021-11-02 PROCEDURE — ? OBSERVATION AND MEASURE

## 2021-11-02 PROCEDURE — 11102 TANGNTL BX SKIN SINGLE LES: CPT

## 2021-11-02 RX ORDER — AMMONIUM LACTATE 120 MG/G
1 CREAM TOPICAL TWICE DAILY
Qty: 385 | Refills: 11 | Status: ERX | COMMUNITY
Start: 2021-11-02

## 2021-11-02 RX ORDER — KETOCONAZOLE 20 MG/G
1 CREAM TOPICAL TWICE A DAY
Qty: 60 | Refills: 11 | Status: ERX | COMMUNITY
Start: 2021-11-02

## 2021-11-02 RX ADMIN — KETOCONAZOLE 1: 20 CREAM TOPICAL at 00:00

## 2021-11-02 RX ADMIN — AMMONIUM LACTATE 1: 120 CREAM TOPICAL at 00:00

## 2021-11-02 ASSESSMENT — LOCATION DETAILED DESCRIPTION DERM
LOCATION DETAILED: LEFT PROXIMAL PRETIBIAL REGION
LOCATION DETAILED: RIGHT CLAVICULAR NECK
LOCATION DETAILED: EPIGASTRIC SKIN
LOCATION DETAILED: LEFT PROXIMAL DORSAL FOREARM
LOCATION DETAILED: RIGHT LATERAL SHOULDER
LOCATION DETAILED: RIGHT PLANTAR FOREFOOT OVERLYING 3RD METATARSAL
LOCATION DETAILED: RIGHT MEDIAL SUPERIOR CHEST
LOCATION DETAILED: LEFT PLANTAR FOREFOOT OVERLYING 2ND METATARSAL
LOCATION DETAILED: LEFT RIB CAGE
LOCATION DETAILED: LEFT MID-UPPER BACK
LOCATION DETAILED: LEFT LATERAL MALAR CHEEK
LOCATION DETAILED: RIGHT SUPERIOR LATERAL MIDBACK
LOCATION DETAILED: RIGHT CAVUM CONCHA
LOCATION DETAILED: RIGHT SUPERIOR LATERAL MALAR CHEEK
LOCATION DETAILED: LEFT SUPERIOR UPPER BACK
LOCATION DETAILED: RIGHT SUPERIOR MEDIAL BUCCAL CHEEK
LOCATION DETAILED: RIGHT PROXIMAL DORSAL FOREARM

## 2021-11-02 ASSESSMENT — LOCATION SIMPLE DESCRIPTION DERM
LOCATION SIMPLE: RIGHT FOREARM
LOCATION SIMPLE: ABDOMEN
LOCATION SIMPLE: LEFT UPPER BACK
LOCATION SIMPLE: LEFT FOREARM
LOCATION SIMPLE: RIGHT SHOULDER
LOCATION SIMPLE: RIGHT CHEEK
LOCATION SIMPLE: LEFT PRETIBIAL REGION
LOCATION SIMPLE: RIGHT PLANTAR SURFACE
LOCATION SIMPLE: LEFT PLANTAR SURFACE
LOCATION SIMPLE: LEFT CHEEK
LOCATION SIMPLE: RIGHT EAR
LOCATION SIMPLE: RIGHT LOWER BACK
LOCATION SIMPLE: RIGHT ANTERIOR NECK
LOCATION SIMPLE: CHEST

## 2021-11-02 ASSESSMENT — LOCATION ZONE DERM
LOCATION ZONE: FEET
LOCATION ZONE: TRUNK
LOCATION ZONE: FACE
LOCATION ZONE: LEG
LOCATION ZONE: NECK
LOCATION ZONE: ARM
LOCATION ZONE: EAR

## 2021-11-02 NOTE — HPI: FULL BODY SKIN EXAMINATION
What Type Of Note Output Would You Prefer (Optional)?: Bullet Format
What Is The Reason For Today's Visit?: Full Body Skin Examination
What Is The Reason For Today's Visit? (Being Monitored For X): the re-examination of lesions previously examined
8

## 2021-11-02 NOTE — PROCEDURE: TREATMENT REGIMEN
Detail Level: Simple
Initiate Treatment: ketoconazole 2 % topical cream Massage in to feet, between toes and over toe nails twice a day until clear, then once a day after showering.\\n\\nammonium lactate 12 % topical cream Massage into feet, between toes and over toe nails twice a day until clear, then once a day after showering.

## 2021-11-02 NOTE — PROCEDURE: BIOPSY BY SHAVE METHOD
Detail Level: Detailed
Depth Of Biopsy: dermis
Was A Bandage Applied: Yes
Size Of Lesion In Cm: 0.3
Biopsy Type: H and E
Biopsy Method: Dermablade
Anesthesia Type: 1% lidocaine with 1:100,000 epinephrine and a 1:12 solution of 8.4% sodium bicarbonate
Anesthesia Volume In Cc: 1
Additional Anesthesia Volume In Cc (Will Not Render If 0): 0
Hemostasis: Pressure
Wound Care: Petrolatum
Dressing: pressure dressing
Destruction After The Procedure: No
Type Of Destruction Used: Curettage
Curettage Text: The wound bed was treated with curettage after the biopsy was performed.
Electrodesiccation And Curettage Text: The wound bed was treated with electrodesiccation and curettage after the biopsy was
Lab: [id number]
Lab Facility: 
Consent: Written consent was obtained and risks were reviewed including but not limited to scarring, infection, bleeding, scabbing, incomplete removal, nerve damage and allergy to anesthesia.
Post-Care Instructions: I reviewed with the patient in detail post-care instructions. Patient is to keep the biopsy site dry overnight, and then apply white petrolatum twice daily until healed. Patient may apply hydrogen peroxide soaks to remove any crusting.
Notification Instructions: Patient will be notified of biopsy results. However, patient instructed to call the office if not contacted within 2 weeks.
Billing Type: Third-Party Bill
Information: Selecting Yes will display possible errors in your note based on the variables you have selected. This validation is only offered as a suggestion for you. PLEASE NOTE THAT THE VALIDATION TEXT WILL BE REMOVED WHEN YOU FINALIZE YOUR NOTE. IF YOU WANT TO FAX A PRELIMINARY NOTE YOU WILL NEED TO TOGGLE THIS TO 'NO' IF YOU DO NOT WANT IT IN YOUR FAXED NOTE.

## 2021-11-02 NOTE — PROCEDURE: SURGICAL DECISION MAKING
Date Of Surgery - Today Or Tomorrow?: today
Risk Assessment Explanation (Does Not Render In The Note): Clinical determination of the probability and/or consequences of an event, such as surgery. Clinical assessment of the level of risk is affected by the nature of the event under consideration for the patient. Modifier 57 is used to indicate an Evaluation and Management (E/M) service resulted in the initial decision to perform surgery either the day before a major surgery (90 day global) or the day of a major surgery.
Initial Decision For Surgery?: Yes
Discussion: We discussed not only the risks of the procedure but also the likely causey that further treatment will be required to treat lesion. This could involve another visit here to destroy or excise  lesion, a visit to a Mohs or general or plastic surgeon, scarring, limited  activity, cosmetic imperfections.
Complexity (Necessary For Coding; Major - 90 Day Global With Some Exceptions; Minor - 10 Day Global): minor

## 2022-01-04 ENCOUNTER — TELEPHONE (OUTPATIENT)
Dept: MEDICAL GROUP | Facility: LAB | Age: 77
End: 2022-01-04

## 2022-01-04 NOTE — TELEPHONE ENCOUNTER
ESTABLISHED PATIENT PRE-VISIT PLANNING     Patient was NOT contacted to complete PVP.     Note: Patient will not be contacted if there is no indication to call.     1.  Reviewed notes from the last few office visits within the medical group: Yes    2.  If any orders were placed at last visit or intended to be done for this visit (i.e. 6 mos follow-up), do we have Results/Consult Notes?         •  Labs - Labs were not ordered at last office visit.  Note: If patient appointment is for lab review and patient did not complete labs, check with provider if OK to reschedule patient until labs completed.       •  Imaging - Imaging was not ordered at last office visit.       •  Referrals - No referrals were ordered at last office visit.    3. Is this appointment scheduled as a Hospital Follow-Up? No    4.  Immunizations were updated in Epic using Reconcile Outside Information activity? Yes    5.  Patient is due for the following Health Maintenance Topics:   Health Maintenance Due   Topic Date Due   • LUNG CANCER SCREENING  Never done   • IMM INFLUENZA (1) Never done   • COVID-19 Vaccine (3 - Booster for Pfizer series) 09/06/2021   • Annual Wellness Visit  10/13/2021         6.  AHA (Pulse8) form printed for Provider? Yes

## 2022-01-11 ENCOUNTER — OFFICE VISIT (OUTPATIENT)
Dept: MEDICAL GROUP | Facility: LAB | Age: 77
End: 2022-01-11
Payer: MEDICARE

## 2022-01-11 VITALS
TEMPERATURE: 97.1 F | OXYGEN SATURATION: 95 % | WEIGHT: 138.8 LBS | HEIGHT: 63 IN | SYSTOLIC BLOOD PRESSURE: 114 MMHG | DIASTOLIC BLOOD PRESSURE: 64 MMHG | BODY MASS INDEX: 24.59 KG/M2 | HEART RATE: 70 BPM

## 2022-01-11 DIAGNOSIS — F41.1 GENERALIZED ANXIETY DISORDER: ICD-10-CM

## 2022-01-11 DIAGNOSIS — Z85.818 HISTORY OF MALIGNANT NEOPLASM OF PAROTID GLAND: ICD-10-CM

## 2022-01-11 DIAGNOSIS — F33.1 MODERATE EPISODE OF RECURRENT MAJOR DEPRESSIVE DISORDER (HCC): ICD-10-CM

## 2022-01-11 DIAGNOSIS — Z87.891 PERSONAL HISTORY OF NICOTINE DEPENDENCE: ICD-10-CM

## 2022-01-11 PROCEDURE — 99214 OFFICE O/P EST MOD 30 MIN: CPT | Performed by: FAMILY MEDICINE

## 2022-01-11 RX ORDER — LORAZEPAM 1 MG/1
1 TABLET ORAL EVERY 8 HOURS PRN
Qty: 30 TABLET | Refills: 0 | Status: SHIPPED | OUTPATIENT
Start: 2022-01-11 | End: 2022-04-11

## 2022-01-11 ASSESSMENT — PATIENT HEALTH QUESTIONNAIRE - PHQ9
SUM OF ALL RESPONSES TO PHQ9 QUESTIONS 1 AND 2: 0
6. FEELING BAD ABOUT YOURSELF - OR THAT YOU ARE A FAILURE OR HAVE LET YOURSELF OR YOUR FAMILY DOWN: NOT AL ALL
1. LITTLE INTEREST OR PLEASURE IN DOING THINGS: NOT AT ALL
8. MOVING OR SPEAKING SO SLOWLY THAT OTHER PEOPLE COULD HAVE NOTICED. OR THE OPPOSITE, BEING SO FIGETY OR RESTLESS THAT YOU HAVE BEEN MOVING AROUND A LOT MORE THAN USUAL: NOT AT ALL
3. TROUBLE FALLING OR STAYING ASLEEP OR SLEEPING TOO MUCH: NOT AT ALL
SUM OF ALL RESPONSES TO PHQ QUESTIONS 1-9: 0
5. POOR APPETITE OR OVEREATING: NOT AT ALL
7. TROUBLE CONCENTRATING ON THINGS, SUCH AS READING THE NEWSPAPER OR WATCHING TELEVISION: NOT AT ALL
4. FEELING TIRED OR HAVING LITTLE ENERGY: NOT AT ALL
9. THOUGHTS THAT YOU WOULD BE BETTER OFF DEAD, OR OF HURTING YOURSELF: NOT AT ALL
2. FEELING DOWN, DEPRESSED, IRRITABLE, OR HOPELESS: NOT AT ALL

## 2022-01-11 ASSESSMENT — FIBROSIS 4 INDEX: FIB4 SCORE: 1.09

## 2022-01-11 NOTE — PROGRESS NOTES
"Subjective:     CC: Mood, right ear, lymph node    HPI:   Dina presents today with:    Anxiety/depression  She does think depression has been well controlled with Lexapro but anxiety continues.  She does use Ativan intermittently but does not use regularly on a daily basis.  She was seeing therapist but did not find this super helpful, she would like to try with a different therapist.    Right ear  She is concerned about foreign body in the right ear canal.    History of parotid CA  She is following with ENT -Dr. Carbajal.  She has noticed possible mild enlargement to the lymph node in the left neck. No recent URI, no infections, no dental pain or infection.    Medications, past medical history, allergies, and social history have been reviewed and updated.      Objective:       Exam:  /64 (BP Location: Left arm, Patient Position: Sitting, BP Cuff Size: Adult)   Pulse 70   Temp 36.2 °C (97.1 °F) (Temporal)   Ht 1.6 m (5' 3\")   Wt 63 kg (138 lb 12.8 oz)   LMP 05/30/1992   SpO2 95%   BMI 24.59 kg/m²  Body mass index is 24.59 kg/m².    Constitutional: Alert. Well appearing. No distress.  Skin: Warm, dry, good turgor, no visible rashes.  Eye: Equal, round and reactive to light, conjunctiva clear, lids normal.  ENMT: Moist mucous membranes.  Bilateral ear canals are clear, bilateral tympanic membranes normal.  There is a left anterior cervical lymph node that is mildly prominent as compared to the right, it is non-tender and mobile.  Respiratory: Normal effort.  Neuro: Moves all four extremities. No facial droop.  Psych: Answers questions appropriately. Normal affect and mood.    Assessment & Plan:     76 y.o. female with the following -     1. Generalized anxiety disorder  2. Moderate episode of recurrent major depressive disorder (HCC)  Continue Lexapro, continue Ativan as needed for episodic anxiety.  Discussed not increasing use of Ativan, avoiding daily use, possible side effects.  PDMP reviewed.  She " also plans to establish with a new counselor.  - LORazepam (ATIVAN) 1 MG Tab; Take 1 Tablet by mouth every 8 hours as needed for Anxiety for up to 90 days.  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  Encouraged her to schedule her planned follow-up exam with ENT but will hold on additional work-up at this point.    4. Personal history of nicotine dependence  No prior lung cancer screening.  Currently smoking.  She is referred for lung cancer screening.  Encouraged cessation.  - REFERRAL TO LUNG CANCER SCREENING PROGRAM      Please note that this note was created using voice recognition software.

## 2022-01-11 NOTE — PROGRESS NOTES
Annual Health Assessment Questions:    1.  Are you currently engaging in any exercise or physical activity? No    2.  How would you describe your mood or emotional well-being today? anxious    3.  Have you had any falls in the last year? No    4.  Have you noticed any problems with your balance or had difficulty walking? No    5.  In the last six months have you experienced any leakage of urine? Yes    6. DPA/Advanced Directive: Patient has Advanced Directive on file.

## 2022-01-18 ENCOUNTER — TELEPHONE (OUTPATIENT)
Dept: HEMATOLOGY ONCOLOGY | Facility: MEDICAL CENTER | Age: 77
End: 2022-01-18

## 2022-01-18 NOTE — TELEPHONE ENCOUNTER
Received referral to lung cancer screening program.  Chart review to assess for lung cancer screening program eligibility.   1. Age 55-77 yrs of age? Yes [age] y.o.  2. 30 pack year hx of smoking, or greater? No 0.5 [id number]= 28.5pkyr hx  3. Current smoker or if quit, has pt quit within last 15 yrs?Yes  Current smoker  4. Any signs or symptoms of lung cancer? None noted  5. Previous history of lung cancer? None noted  6. Chest CT within past 12 mos.? None noted  Patient does not meet eligibility criteria. LCSP scheduling notified to schedule the shared decision making visit.      Patient DOES NOT meet criteria for LCSP due to her smoking history. She does not meet the 30 pack year history minimum.

## 2022-03-05 ENCOUNTER — PATIENT MESSAGE (OUTPATIENT)
Dept: HEALTH INFORMATION MANAGEMENT | Facility: OTHER | Age: 77
End: 2022-03-05
Payer: MEDICARE

## 2022-04-05 ENCOUNTER — TELEPHONE (OUTPATIENT)
Dept: MEDICAL GROUP | Facility: LAB | Age: 77
End: 2022-04-05
Payer: MEDICARE

## 2022-04-05 NOTE — TELEPHONE ENCOUNTER
ESTABLISHED PATIENT PRE-VISIT PLANNING     Patient was NOT contacted to complete PVP.     Note: Patient will not be contacted if there is no indication to call.     1.  Reviewed notes from the last few office visits within the medical group: Yes    2.  If any orders were placed at last visit or intended to be done for this visit (i.e. 6 mos follow-up), do we have Results/Consult Notes?         •  Labs - Labs were not ordered at last office visit.  Note: If patient appointment is for lab review and patient did not complete labs, check with provider if OK to reschedule patient until labs completed.       •  Imaging - Imaging was not ordered at last office visit.       •  Referrals - No referrals were ordered at last office visit.    3. Is this appointment scheduled as a Hospital Follow-Up? No    4.  Immunizations were updated in Epic using Reconcile Outside Information activity? Yes    5.  Patient is due for the following Health Maintenance Topics:   Health Maintenance Due   Topic Date Due   • LUNG CANCER SCREENING  Never done   • Annual Wellness Visit  10/13/2021         6.  AHA (Pulse8) form printed for Provider? No, already completed

## 2022-04-12 ENCOUNTER — APPOINTMENT (OUTPATIENT)
Dept: MEDICAL GROUP | Facility: LAB | Age: 77
End: 2022-04-12
Payer: MEDICARE

## 2022-04-19 ENCOUNTER — TELEPHONE (OUTPATIENT)
Dept: HEMATOLOGY ONCOLOGY | Facility: MEDICAL CENTER | Age: 77
End: 2022-04-19

## 2022-04-19 ENCOUNTER — OFFICE VISIT (OUTPATIENT)
Dept: MEDICAL GROUP | Facility: LAB | Age: 77
End: 2022-04-19
Payer: MEDICARE

## 2022-04-19 VITALS
OXYGEN SATURATION: 98 % | SYSTOLIC BLOOD PRESSURE: 136 MMHG | BODY MASS INDEX: 22.68 KG/M2 | DIASTOLIC BLOOD PRESSURE: 74 MMHG | TEMPERATURE: 97.5 F | WEIGHT: 128 LBS | HEIGHT: 63 IN | HEART RATE: 80 BPM | RESPIRATION RATE: 14 BRPM

## 2022-04-19 DIAGNOSIS — E78.00 PURE HYPERCHOLESTEROLEMIA: ICD-10-CM

## 2022-04-19 DIAGNOSIS — F41.1 GENERALIZED ANXIETY DISORDER: ICD-10-CM

## 2022-04-19 DIAGNOSIS — Z87.891 PERSONAL HISTORY OF NICOTINE DEPENDENCE: ICD-10-CM

## 2022-04-19 DIAGNOSIS — R73.03 PREDIABETES: ICD-10-CM

## 2022-04-19 PROCEDURE — 99214 OFFICE O/P EST MOD 30 MIN: CPT | Performed by: FAMILY MEDICINE

## 2022-04-19 RX ORDER — LORAZEPAM 1 MG/1
1 TABLET ORAL EVERY 8 HOURS PRN
Qty: 30 TABLET | Refills: 0 | Status: SHIPPED | OUTPATIENT
Start: 2022-04-19 | End: 2022-07-15 | Stop reason: SDUPTHER

## 2022-04-19 ASSESSMENT — FIBROSIS 4 INDEX: FIB4 SCORE: 1.09

## 2022-04-19 NOTE — TELEPHONE ENCOUNTER
Received referral to lung cancer screening program.  Chart review to assess for lung cancer screening program eligibility.   1. Age 55-77 yrs of age? Yes 76 y.o.  2. 30 pack year hx of smoking, or greater? No 0.5 [id number]= 22.5pkyr hx  3. Current smoker or if quit, has pt quit within last 15 yrs?Yes  Current Smoker  4. Any signs or symptoms of lung cancer? None noted  5. Previous history of lung cancer? None noted  6. Chest CT within past 12 mos.? None noted  Patient does not meet eligibility criteria. LCSP scheduling notified to schedule the shared decision making visit.      Patient DOES NOT meet criteria for LCSP due to their smoking history. Pt is at a 22.5 pack year history and the minimum to qualify is 30.

## 2022-04-19 NOTE — PROGRESS NOTES
"Subjective:     CC: Anxiety    HPI:   Dina presents today to follow-up on anxiety.  She has been working on taking Ativan less but is still continuing to take this a few times per week, recent family stress as well.  Has not gotten the see new therapist yet, did not click with previous therapist.    Using Ativan 1-3 times weekly.  Continues on Lexapro daily.    She was previously excluded from lung cancer screening program due to not technically meeting 30-pack-year history.  Today she reports that she has been smoking between a half a pack and 1 pack/day for 57 years.  On average three-quarter pack per day.      Medications, past medical history, allergies, and social history have been reviewed and updated.      Objective:       Exam:  /74 (BP Location: Right arm, Patient Position: Sitting, BP Cuff Size: Adult)   Pulse 80   Temp 36.4 °C (97.5 °F)   Resp 14   Ht 1.6 m (5' 3\")   Wt 58.1 kg (128 lb)   LMP 05/30/1992   SpO2 98%   BMI 22.67 kg/m²  Body mass index is 22.67 kg/m².    Constitutional: Alert. Well appearing. No distress.  Skin: Warm, dry, good turgor, no visible rashes.  Eye: Equal, round and reactive to light, conjunctiva clear, lids normal.  Respiratory: Normal effort.   Neuro: Moves all four extremities. No facial droop.  Psych: Answers questions appropriately. Normal affect and mood.      Assessment & Plan:     76 y.o. female with the following -     1. Generalized anxiety disorder  Continue Lexapro.  Continue Ativan as needed.  Discussed continuing to attempt to taper limit Ativan use.  PDMP reviewed.  Follow-up 3 months.  - LORazepam (ATIVAN) 1 MG Tab; Take 1 Tablet by mouth every 8 hours as needed for Anxiety for up to 90 days.  Dispense: 30 Tablet; Refill: 0    2. Prediabetes  Recheck labs.  - CBC WITH DIFFERENTIAL; Future  - Comp Metabolic Panel; Future  - HEMOGLOBIN A1C; Future    3. Pure hypercholesterolemia  Recheck lipids.  - Lipid Profile; Future    4. Personal history of " nicotine dependence  She was previously excluded from lung cancer screening program due to not technically meeting 30-pack-year history.  Today she reports that she has been smoking between 1/2 and 1 pack/day for 57 years.  On average three-quarter pack per day.  Pack-year history of 42.  - REFERRAL TO LUNG CANCER SCREENING PROGRAM         Please note that this note was created using voice recognition software.

## 2022-06-15 ENCOUNTER — TELEPHONE (OUTPATIENT)
Dept: HEALTH INFORMATION MANAGEMENT | Facility: OTHER | Age: 77
End: 2022-06-15

## 2022-07-15 DIAGNOSIS — F41.1 GENERALIZED ANXIETY DISORDER: ICD-10-CM

## 2022-07-15 DIAGNOSIS — K21.9 GASTROESOPHAGEAL REFLUX DISEASE: ICD-10-CM

## 2022-07-15 RX ORDER — LORAZEPAM 1 MG/1
1 TABLET ORAL EVERY 8 HOURS PRN
Qty: 30 TABLET | Refills: 0 | Status: SHIPPED | OUTPATIENT
Start: 2022-07-15 | End: 2022-10-04 | Stop reason: SDUPTHER

## 2022-07-15 RX ORDER — PANTOPRAZOLE SODIUM 40 MG/1
40 TABLET, DELAYED RELEASE ORAL DAILY
Qty: 90 TABLET | Refills: 3 | Status: SHIPPED | OUTPATIENT
Start: 2022-07-15 | End: 2022-10-04

## 2022-07-15 NOTE — TELEPHONE ENCOUNTER
1. Caller Name: Dina Crews                        Call Back Number: [phone]      How would the patient prefer to be contacted with a response: MyChart message    Patient called requesting a refill of her medications. She also stated that she's no longer taking the escitalopram because the price went up and she cannot afford this. She's wondering if an alternative can be sent in for this. Pharmacy is [facility] on [facility], patient is also scheduled to see Dr. Larson for a follow up on 08/04/2022.     Received request via: Patient    Was the patient seen in the last year in this department? Yes  LOV 04/19/2022  Does the patient have an active prescription (recently filled or refills available) for medication(s) requested? No

## 2022-07-28 ENCOUNTER — TELEPHONE (OUTPATIENT)
Dept: MEDICAL GROUP | Facility: LAB | Age: 77
End: 2022-07-28
Payer: MEDICARE

## 2022-07-28 NOTE — TELEPHONE ENCOUNTER
ESTABLISHED PATIENT PRE-VISIT PLANNING     Patient was contacted to complete PVP.     Note: Patient will not be contacted if there is no indication to call.     1.  Reviewed notes from the last few office visits within the medical group: Yes    2.  If any orders were placed at last visit or intended to be done for this visit (i.e. 6 mos follow-up), do we have Results/Consult Notes?         •  Labs - Labs ordered, NOT completed. Patient advised to complete prior to next appointment.  Note: If patient appointment is for lab review and patient did not complete labs, check with provider if OK to reschedule patient until labs completed.       •  Imaging - Imaging was not ordered at last office visit.       •  Referrals - Referral ordered, patient has NOT been seen. did not meet criteria     3. Is this appointment scheduled as a Hospital Follow-Up? No    4.  Immunizations were updated in Epic using Reconcile Outside Information activity? Yes    5.  Patient is due for the following Health Maintenance Topics:   Health Maintenance Due   Topic Date Due   • BONE DENSITY  08/09/2018   • Annual Wellness Visit  10/13/2021   • COVID-19 Vaccine (4 - Booster for Pfizer series) 05/27/2022   • MAMMOGRAM  06/16/2022         6.  AHA (Pulse8) form printed for Provider? No, already completed

## 2022-07-28 NOTE — TELEPHONE ENCOUNTER
Left message for patient to call back regarding pre-visit planning. Please transfer call to [phone].  lvm fasting labs ordered not completed and to change upcoming appt to [facility] since due.

## 2022-08-04 ENCOUNTER — OFFICE VISIT (OUTPATIENT)
Dept: MEDICAL GROUP | Facility: LAB | Age: 77
End: 2022-08-04
Payer: MEDICARE

## 2022-08-04 VITALS
HEART RATE: 86 BPM | WEIGHT: 119.2 LBS | BODY MASS INDEX: 21.12 KG/M2 | TEMPERATURE: 97.6 F | RESPIRATION RATE: 14 BRPM | HEIGHT: 63 IN | OXYGEN SATURATION: 95 % | DIASTOLIC BLOOD PRESSURE: 88 MMHG | SYSTOLIC BLOOD PRESSURE: 124 MMHG

## 2022-08-04 DIAGNOSIS — F41.1 GENERALIZED ANXIETY DISORDER: ICD-10-CM

## 2022-08-04 DIAGNOSIS — F33.2 SEVERE EPISODE OF RECURRENT MAJOR DEPRESSIVE DISORDER, WITHOUT PSYCHOTIC FEATURES (HCC): ICD-10-CM

## 2022-08-04 PROCEDURE — 99214 OFFICE O/P EST MOD 30 MIN: CPT | Performed by: FAMILY MEDICINE

## 2022-08-04 RX ORDER — LORAZEPAM 1 MG/1
1 TABLET ORAL
Qty: 15 TABLET | Refills: 0 | Status: SHIPPED | OUTPATIENT
Start: 2022-08-04 | End: 2022-09-26 | Stop reason: SDUPTHER

## 2022-08-04 RX ORDER — ESCITALOPRAM OXALATE 20 MG/1
20 TABLET ORAL DAILY
Qty: 90 TABLET | Refills: 3 | Status: SHIPPED | OUTPATIENT
Start: 2022-08-04 | End: 2023-01-04 | Stop reason: SDUPTHER

## 2022-08-04 ASSESSMENT — FIBROSIS 4 INDEX: FIB4 SCORE: 1.1

## 2022-08-04 NOTE — PROGRESS NOTES
"Subjective:     CC: Anxiety and depression    HPI:   Dina presents today with concern for worsening anxiety and depression.  Has been maintained on Lexapro but has been off of this for the last month due to increased cost of the medication.  She has had significantly worsened anxiety and depression over the last few weeks.  Lots of home and family stress.  She does report thoughts of self-harm but at this point states she would not act on them.  Reports little social interactions and does think that having more friends or social interaction would help.  Her son is currently visiting her and this has been helpful.  Has been using Ativan much more frequently over the last month as she has been out of the Lexapro.    Medications, past medical history, allergies, and social history have been reviewed and updated.      Objective:       Exam:  /88 (BP Location: Left arm, Patient Position: Sitting, BP Cuff Size: Adult)   Pulse 86   Temp 36.4 °C (97.6 °F)   Resp 14   Ht 1.6 m (5' 3\")   Wt 54.1 kg (119 lb 3.2 oz)   LMP 05/30/1992   SpO2 95%   BMI 21.12 kg/m²  Body mass index is 21.12 kg/m².    Constitutional: Alert.  Mild distress at times, teary, anxious.  Skin: Warm, dry, good turgor, no visible rashes.  Eye: Equal, round and reactive to light, conjunctiva clear, lids normal.  ENMT: Moist mucous membranes.  Neuro: Moves all four extremities. No facial droop.  Psych: Answers questions appropriately. Anxious affect and mood.      Assessment & Plan:     77 y.o. female with the following -     1. Generalized anxiety disorder  Severely worsened over the last few weeks, she has been out of Lexapro for the last month due to cost issues.  We changed pharmacies and did a GoodRx coupon and Lexapro should be affordable for her for the next 90 days.  We will provide early refill for Ativan as she has had to use this more frequently due to being out of Lexapro.  - escitalopram (LEXAPRO) 20 MG tablet; Take 1 Tablet by " mouth every day.  Dispense: 90 Tablet; Refill: 3  - LORazepam (ATIVAN) 1 MG Tab; Take 1 Tablet by mouth 1 time a day as needed for Anxiety for up to 30 days.  Dispense: 15 Tablet; Refill: 0  - Referral to Behavioral Health    2. Severe episode of recurrent major depressive disorder, without psychotic features (HCC)  Severely worsened recently, reports thoughts of self-harm but no current plan to act on this.  We extensively discussed a safety plan including going to the ER if needed.  Restarting Lexapro as above.  Urgent referral was placed to behavioral health for therapy.  Close follow-up scheduled next week.    Please note that this note was created using voice recognition software.

## 2022-08-11 ENCOUNTER — APPOINTMENT (OUTPATIENT)
Dept: MEDICAL GROUP | Facility: LAB | Age: 77
End: 2022-08-11
Payer: MEDICARE

## 2022-08-17 ENCOUNTER — TELEPHONE (OUTPATIENT)
Dept: MEDICAL GROUP | Facility: LAB | Age: 77
End: 2022-08-17
Payer: MEDICARE

## 2022-09-26 DIAGNOSIS — F41.1 GENERALIZED ANXIETY DISORDER: ICD-10-CM

## 2022-09-27 RX ORDER — LORAZEPAM 1 MG/1
1 TABLET ORAL
Qty: 5 TABLET | Refills: 0 | Status: SHIPPED | OUTPATIENT
Start: 2022-09-27 | End: 2022-10-04

## 2022-09-29 ENCOUNTER — TELEPHONE (OUTPATIENT)
Dept: MEDICAL GROUP | Facility: LAB | Age: 77
End: 2022-09-29
Payer: MEDICARE

## 2022-09-29 NOTE — TELEPHONE ENCOUNTER
Pt called asking about lorazepam refill, I let her know a 10 day supply was sent to [facility] on [address] but Dr alcazar wants her to come in to be seen before her next refill.  We scheduled her to come in next Tuesday.

## 2022-10-03 ENCOUNTER — TELEPHONE (OUTPATIENT)
Dept: MEDICAL GROUP | Facility: LAB | Age: 77
End: 2022-10-03
Payer: MEDICARE

## 2022-10-03 NOTE — TELEPHONE ENCOUNTER
Left message for patient to call back regarding pre-visit planning. Please transfer call to [phone].  Lvm to remind of labs and to see if she wanted to chg appt to [facility]

## 2022-10-03 NOTE — TELEPHONE ENCOUNTER
ESTABLISHED PATIENT PRE-VISIT PLANNING     Patient was contacted to complete PVP.     Note: Patient will not be contacted if there is no indication to call.     1.  Reviewed notes from the last few office visits within the medical group: Yes    2.  If any orders were placed at last visit or intended to be done for this visit (i.e. 6 mos follow-up), do we have Results/Consult Notes?           Labs - Labs ordered, NOT completed. Patient advised to complete prior to next appointment.  Note: If patient appointment is for lab review and patient did not complete labs, check with provider if OK to reschedule patient until labs completed.         Imaging - Imaging was not ordered at last office visit.         Referrals - No referrals were ordered at last office visit.    3. Is this appointment scheduled as a Hospital Follow-Up? No    4.  Immunizations were updated in Epic using Reconcile Outside Information activity? Yes    5.  Patient is due for the following Health Maintenance Topics:   Health Maintenance Due   Topic Date Due    IMM HEP B VACCINE (1 of 3 - 3-dose series) Never done    BONE DENSITY  08/09/2018    Annual Wellness Visit  10/13/2021    COVID-19 Vaccine (4 - Booster for Pfizer series) 05/27/2022    MAMMOGRAM  06/16/2022    IMM INFLUENZA (1) Never done     6.  AHA (Pulse8) form printed for Provider? N/A

## 2022-10-04 ENCOUNTER — OFFICE VISIT (OUTPATIENT)
Dept: MEDICAL GROUP | Facility: LAB | Age: 77
End: 2022-10-04
Payer: MEDICARE

## 2022-10-04 VITALS
TEMPERATURE: 98.4 F | OXYGEN SATURATION: 97 % | HEART RATE: 60 BPM | WEIGHT: 121 LBS | RESPIRATION RATE: 14 BRPM | SYSTOLIC BLOOD PRESSURE: 130 MMHG | BODY MASS INDEX: 21.44 KG/M2 | HEIGHT: 63 IN | DIASTOLIC BLOOD PRESSURE: 74 MMHG

## 2022-10-04 DIAGNOSIS — F41.1 GENERALIZED ANXIETY DISORDER: ICD-10-CM

## 2022-10-04 DIAGNOSIS — F33.2 SEVERE EPISODE OF RECURRENT MAJOR DEPRESSIVE DISORDER, WITHOUT PSYCHOTIC FEATURES (HCC): ICD-10-CM

## 2022-10-04 PROCEDURE — 99214 OFFICE O/P EST MOD 30 MIN: CPT | Performed by: FAMILY MEDICINE

## 2022-10-04 RX ORDER — LORAZEPAM 1 MG/1
1 TABLET ORAL EVERY 8 HOURS PRN
Qty: 30 TABLET | Refills: 0 | Status: SHIPPED | OUTPATIENT
Start: 2022-10-18 | End: 2022-11-17

## 2022-10-04 ASSESSMENT — ANXIETY QUESTIONNAIRES
3. WORRYING TOO MUCH ABOUT DIFFERENT THINGS: MORE THAN HALF THE DAYS
2. NOT BEING ABLE TO STOP OR CONTROL WORRYING: MORE THAN HALF THE DAYS
7. FEELING AFRAID AS IF SOMETHING AWFUL MIGHT HAPPEN: MORE THAN HALF THE DAYS
1. FEELING NERVOUS, ANXIOUS, OR ON EDGE: MORE THAN HALF THE DAYS
5. BEING SO RESTLESS THAT IT IS HARD TO SIT STILL: SEVERAL DAYS
4. TROUBLE RELAXING: MORE THAN HALF THE DAYS
6. BECOMING EASILY ANNOYED OR IRRITABLE: SEVERAL DAYS
GAD7 TOTAL SCORE: 12

## 2022-10-04 ASSESSMENT — PATIENT HEALTH QUESTIONNAIRE - PHQ9
SUM OF ALL RESPONSES TO PHQ QUESTIONS 1-9: 4
5. POOR APPETITE OR OVEREATING: 0 - NOT AT ALL
CLINICAL INTERPRETATION OF PHQ2 SCORE: 2

## 2022-10-04 ASSESSMENT — FIBROSIS 4 INDEX: FIB4 SCORE: 1.1

## 2022-10-04 NOTE — PROGRESS NOTES
"Subjective:     CC: Depression, anxiety    HPI:   Dina presents today to follow-up on depression and anxiety.  Both are significantly improved with restarting Lexapro.  Depression symptoms nearly resolved, still with some anxiety but overall this is much improved.  Continues to use Ativan although limiting use and generally only using once per 1 to 2 weeks.  Therapy has been cost prohibitive for her.  No thoughts of self-harm.    Medications, past medical history, allergies, and social history have been reviewed and updated.      Objective:       Exam:  /74 (BP Location: Right arm, Patient Position: Sitting, BP Cuff Size: Adult)   Pulse 60   Temp 36.9 °C (98.4 °F)   Resp 14   Ht 1.6 m (5' 3\")   Wt 54.9 kg (121 lb)   LMP 05/30/1992   SpO2 97%   BMI 21.43 kg/m²  Body mass index is 21.43 kg/m².    Constitutional: Alert. Well appearing. No distress.  Skin: Warm, dry, good turgor, no visible rashes.  Respiratory: Normal effort.   Neuro: Moves all four extremities. No facial droop.  Psych: Answers questions appropriately. Normal affect and mood.      Assessment & Plan:     77 y.o. female with the following -     1. Generalized anxiety disorder  Improved but still with occasional anxiety, she much prefers to have Ativan on hand if needed even if she rarely uses it.  PDMP reviewed.  Rx provided today to fill 10/18.  Discussed continuing to try to limit use of this.  Continue Lexapro.  - LORazepam (ATIVAN) 1 MG Tab; Take 1 Tablet by mouth every 8 hours as needed for Anxiety for up to 30 days.  Dispense: 30 Tablet; Refill: 0    2. Severe episode of recurrent major depressive disorder, without psychotic features (HCC)  Much improved with Lexapro, continue Lexapro 20 mg daily.  We discussed therapy but this has been cost prohibitive for her so she will hold off at this point.      Please note that this note was created using voice recognition software.      "

## 2022-10-11 NOTE — PROGRESS NOTES
"Paged Dr. Ruiz, \"    FYI sepsis triggered. Pt had increased confusion overnight. UA looks questionable for UTI looks like she got a one time dose for rocephin. UC not back do we want to treat or wait? Can't get BP d/t pt agitation with BP check     \"  " Received referral to lung cancer screening program.  Chart review to assess for lung cancer screening program eligibility.   1. Age 55-77 yrs of age? Yes 71 y.o.  2. 30 pack year hx of smoking, or greater? Yes 1 [id number] hx  3. Current smoker or if quit, has pt quit within last 15 yrs?Yes, quit 2012  4. Any signs or symptoms of lung cancer? No    5. Previous history of lung cancer? No  6. Chest CT within past 12 mos.? No  Patient DOES meet eligibility criteria - [facility] scheduling notified to schedule the shared decision making visit.

## 2022-10-25 ENCOUNTER — DOCUMENTATION (OUTPATIENT)
Dept: HEALTH INFORMATION MANAGEMENT | Facility: OTHER | Age: 77
End: 2022-10-25
Payer: MEDICARE

## 2022-12-05 ENCOUNTER — OFFICE VISIT (OUTPATIENT)
Dept: MEDICAL GROUP | Facility: LAB | Age: 77
End: 2022-12-05
Payer: MEDICARE

## 2022-12-05 VITALS
OXYGEN SATURATION: 97 % | SYSTOLIC BLOOD PRESSURE: 110 MMHG | HEART RATE: 62 BPM | HEIGHT: 63 IN | TEMPERATURE: 98.7 F | DIASTOLIC BLOOD PRESSURE: 74 MMHG | WEIGHT: 118 LBS | RESPIRATION RATE: 15 BRPM | BODY MASS INDEX: 20.91 KG/M2

## 2022-12-05 DIAGNOSIS — R05.1 ACUTE COUGH: ICD-10-CM

## 2022-12-05 DIAGNOSIS — R06.2 WHEEZE: ICD-10-CM

## 2022-12-05 PROCEDURE — 99214 OFFICE O/P EST MOD 30 MIN: CPT | Performed by: FAMILY MEDICINE

## 2022-12-05 RX ORDER — ALBUTEROL SULFATE 90 UG/1
2 AEROSOL, METERED RESPIRATORY (INHALATION) EVERY 4 HOURS PRN
Qty: 1 EACH | Refills: 0 | Status: SHIPPED | OUTPATIENT
Start: 2022-12-05 | End: 2023-01-04

## 2022-12-05 RX ORDER — METHYLPREDNISOLONE 4 MG/1
TABLET ORAL
Qty: 21 TABLET | Refills: 0 | Status: SHIPPED | OUTPATIENT
Start: 2022-12-05 | End: 2023-01-04

## 2022-12-05 ASSESSMENT — FIBROSIS 4 INDEX: FIB4 SCORE: 1.1

## 2022-12-05 NOTE — PROGRESS NOTES
Subjective:   Dina Crews is a 77 y.o. female here today for   Chief Complaint   Patient presents with    URI     X 1 week or so,        #URI:  -Patient states has had 1 week of symptoms including congestion, cough.  Describes the cough as a dry, hacking cough, worse at night.  Keeping her up at night.  Recently she started developing a chest tightness with slight shortness of breath.  Denies any fevers, chills.  Does endorse some achiness that is started today.  Is currently not treating with any medications at this time.  -Significant history of half a pack a day smoker for 45 years.  No previous history of asthma or obstructive lung disease.  -Denies any headaches, sinus pressure, hemoptysis, abdominal pain, nausea, vomiting, diarrhea.    Allergies   Allergen Reactions    Penicillins Rash     Rxn - Late 1990's      Ciprofloxacin      dizziness    Codeine Vomiting    Morphine Rash     Localized red rash after morphine administration    Vicodin [Hydrocodone-Acetaminophen] Itching         Current medicines (including changes today)  Current Outpatient Medications   Medication Sig Dispense Refill    escitalopram (LEXAPRO) 20 MG tablet Take 1 Tablet by mouth every day. 90 Tablet 3    polyethylene glycol/lytes (MIRALAX) Pack Take 1 Packet by mouth 1 time daily as needed. No good BM in 24 hrs      Cholecalciferol 2000 UNIT Tab Take 2,000 Units by mouth every day.       No current facility-administered medications for this visit.     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    ROS   -See HPI       Objective:     Physical Exam:  /74   Pulse 62   Temp 37.1 °C (98.7 °F) (Temporal)   Resp 15   Ht 1.6 m (5' 2.99\")   Wt 53.5 kg (118 lb)   SpO2 97%  Body mass index is 20.91 kg/m².   Constitutional: Alert, no distress.  Skin: Warm, dry, good turgor, no rashes in visible areas.  Eye: Equal, round and reactive, conjunctiva clear, lids normal.  Respiratory: Unlabored respiratory effort, auscultation of lungs shows extensive wheezing both inspiration and expiratory throughout all lung fields.  No rhonchi or rales noted.  Cardiovascular: Normal S1, S2, no murmur, no edema.  Abdomen: Soft, non-tender, no masses, no hepatosplenomegaly.  Psych: Alert and oriented x3, normal affect and mood.    Assessment and Plan:     At this time patient is experiencing symptoms of URI; however, I am concerned about the possibility of underlying obstructive lung disease being exacerbated by recent infection given findings of continued cough and wheezing.  Because of this we will treat with a Medrol Dosepak as well as albuterol inhaler.  We will complete chest x-ray to rule out any other pulmonary etiology at this time.  Discussed conservative treatment measures.  We will follow-up with patient with results from x-ray.  If no improvement over the next week patient will follow-up with PCP at that time.    1. Acute cough  - DX-CHEST-2 VIEWS; Future  - methylPREDNISolone (MEDROL DOSEPAK) 4 MG Tablet Therapy Pack; As directed on the packaging label.  Dispense: 21 Tablet; Refill: 0  - albuterol 108 (90 Base) MCG/ACT Aero Soln inhalation aerosol; Inhale 2 Puffs every four hours as needed for Shortness of Breath for up to 30 days.  Dispense: 1 Each; Refill: 0    2. Wheeze  - DX-CHEST-2 VIEWS; Future  - methylPREDNISolone (MEDROL DOSEPAK) 4 MG Tablet Therapy Pack; As directed on the " packaging label.  Dispense: 21 Tablet; Refill: 0  - albuterol 108 (90 Base) MCG/ACT Aero Soln inhalation aerosol; Inhale 2 Puffs every four hours as needed for Shortness of Breath for up to 30 days.  Dispense: 1 Each; Refill: 0    Followup: No follow-ups on file.         PLEASE NOTE: This dictation was created using voice recognition software. I have made every reasonable attempt to correct obvious errors, but I expect that there are errors of grammar and possibly content that I did not discover before finalizing the note.

## 2022-12-21 DIAGNOSIS — K21.9 GASTROESOPHAGEAL REFLUX DISEASE: ICD-10-CM

## 2022-12-21 RX ORDER — PANTOPRAZOLE SODIUM 40 MG/1
40 TABLET, DELAYED RELEASE ORAL DAILY
Qty: 90 TABLET | Refills: 3 | Status: SHIPPED | OUTPATIENT
Start: 2022-12-21 | End: 2023-05-05 | Stop reason: SDUPTHER

## 2022-12-21 NOTE — TELEPHONE ENCOUNTER
Received request via: Patient    Was the patient seen in the last year in this department? Yes  12/5/22  Does the patient have an active prescription (recently filled or refills available) for medication(s) requested? No    Does the patient have [facility] and need 100 day supply (blood pressure, diabetes and cholesterol meds only)?

## 2022-12-22 ENCOUNTER — TELEPHONE (OUTPATIENT)
Dept: MEDICAL GROUP | Facility: LAB | Age: 77
End: 2022-12-22
Payer: MEDICARE

## 2022-12-22 NOTE — TELEPHONE ENCOUNTER
ESTABLISHED PATIENT PRE-VISIT PLANNING     Patient was NOT contacted to complete PVP.     Note: Patient will not be contacted if there is no indication to call.     1.  Reviewed notes from the last few office visits within the medical group: Yes    2.  If any orders were placed at last visit or intended to be done for this visit (i.e. 6 mos follow-up), do we have Results/Consult Notes?           Labs - Labs ordered, completed on 12/5/22 and results are in chart.  Note: If patient appointment is for lab review and patient did not complete labs, check with provider if OK to reschedule patient until labs completed.         Imaging - Imaging ordered, completed and results are in chart.         Referrals - No referrals were ordered at last office visit.    3. Is this appointment scheduled as a Hospital Follow-Up? No    4.  Immunizations were updated in Epic using Reconcile Outside Information activity? Yes    5.  Patient is due for the following Health Maintenance Topics:   Health Maintenance Due   Topic Date Due    BONE DENSITY  08/09/2018    Annual Wellness Visit  10/13/2021    COVID-19 Vaccine (4 - Booster for Pfizer series) 03/24/2022    IMM INFLUENZA (1) Never done   6.  AHA (Pulse8) form printed for Provider? N/A

## 2023-01-04 ENCOUNTER — OFFICE VISIT (OUTPATIENT)
Dept: MEDICAL GROUP | Facility: LAB | Age: 78
End: 2023-01-04
Payer: MEDICARE

## 2023-01-04 VITALS
HEART RATE: 70 BPM | TEMPERATURE: 97.5 F | WEIGHT: 124.4 LBS | HEIGHT: 63 IN | RESPIRATION RATE: 12 BRPM | DIASTOLIC BLOOD PRESSURE: 72 MMHG | BODY MASS INDEX: 22.04 KG/M2 | SYSTOLIC BLOOD PRESSURE: 134 MMHG | OXYGEN SATURATION: 94 %

## 2023-01-04 DIAGNOSIS — F33.2 SEVERE EPISODE OF RECURRENT MAJOR DEPRESSIVE DISORDER, WITHOUT PSYCHOTIC FEATURES (HCC): ICD-10-CM

## 2023-01-04 DIAGNOSIS — F41.1 GENERALIZED ANXIETY DISORDER: ICD-10-CM

## 2023-01-04 DIAGNOSIS — K57.92 DIVERTICULITIS: ICD-10-CM

## 2023-01-04 PROCEDURE — 99214 OFFICE O/P EST MOD 30 MIN: CPT | Performed by: FAMILY MEDICINE

## 2023-01-04 RX ORDER — ESCITALOPRAM OXALATE 20 MG/1
20 TABLET ORAL DAILY
Qty: 90 TABLET | Refills: 3 | Status: SHIPPED | OUTPATIENT
Start: 2023-01-04 | End: 2023-05-05 | Stop reason: SDUPTHER

## 2023-01-04 RX ORDER — LORAZEPAM 1 MG/1
1 TABLET ORAL EVERY 8 HOURS PRN
Qty: 30 TABLET | Refills: 0 | Status: SHIPPED | OUTPATIENT
Start: 2023-01-04 | End: 2023-02-03

## 2023-01-04 RX ORDER — SULFAMETHOXAZOLE AND TRIMETHOPRIM 800; 160 MG/1; MG/1
1 TABLET ORAL 2 TIMES DAILY
Qty: 14 TABLET | Refills: 0 | Status: SHIPPED | OUTPATIENT
Start: 2023-01-04 | End: 2023-01-11

## 2023-01-04 RX ORDER — METRONIDAZOLE 500 MG/1
500 TABLET ORAL 3 TIMES DAILY
Qty: 21 TABLET | Refills: 0 | Status: SHIPPED | OUTPATIENT
Start: 2023-01-04 | End: 2023-01-11

## 2023-01-04 ASSESSMENT — FIBROSIS 4 INDEX: FIB4 SCORE: 1.1

## 2023-01-04 NOTE — PROGRESS NOTES
"Subjective:     CC: Med refills, concern for dicerticulitis    HPI:   Dina presents today for medication refills and with concern for diverticulitis.    Has had left lower quadrant pain for about the last week that feels very similar to prior bouts of diverticulitis.  Last instance was 2020.  She is also had 5 days without a bowel movement.  No fevers, no nausea or vomiting.  No melena or blood in stool.  She did have a colonoscopy in 2020 after the last instance.    Needs refills of Lexapro and Ativan.  Reports anxiety and depression well controlled.  Limits use of Ativan only for instances of severe panic/anxiety.    Medications, past medical history, allergies, and social history have been reviewed and updated.      Objective:       Exam:  /72 (BP Location: Right arm, Patient Position: Sitting, BP Cuff Size: Adult)   Pulse 70   Temp 36.4 °C (97.5 °F)   Resp 12   Ht 1.6 m (5' 2.99\")   Wt 56.4 kg (124 lb 6.4 oz)   LMP 05/30/1992   SpO2 94%   BMI 22.04 kg/m²  Body mass index is 22.04 kg/m².    Constitutional: Alert. Well appearing. No distress.  Skin: Warm, dry, good turgor, no visible rashes.  Eye: Equal, round and reactive to light, conjunctiva clear, lids normal.  ENMT: Moist mucous membranes. Normal dentition.  Respiratory: Normal effort. Lungs are clear to auscultation bilaterally.  Cardiovascular: Regular rate and rhythm. Normal S1/S2. No murmurs, rubs or gallops.   Abdomen: Soft.  Discrete left lower quadrant tenderness without rebound or guarding.  Nondistended.  Neuro: Moves all four extremities. No facial droop.  Psych: Answers questions appropriately. Normal affect and mood.    Assessment & Plan:     77 y.o. female with the following -     1. Diverticulitis  Presents with 1 week of left lower quadrant pain that she reports feels similar to previous episodes of diverticulitis.  She does have discrete left lower quadrant tenderness without rebound or guarding.  New evidence that " diverticulitis does not require antibiotics but given no improvement over the last week we will treat with oral antibiotics.  She has penicillin allergy and Cipro intolerance.  Bactrim and Flagyl as below.  Discussed ER precautions.  I also discussed constipation treatment with MiraLAX and OTC stool softeners.  - sulfamethoxazole-trimethoprim (BACTRIM DS) 800-160 MG tablet; Take 1 Tablet by mouth 2 times a day for 7 days.  Dispense: 14 Tablet; Refill: 0  - metroNIDAZOLE (FLAGYL) 500 MG Tab; Take 1 Tablet by mouth 3 times a day for 7 days.  Dispense: 21 Tablet; Refill: 0    2. Generalized anxiety disorder  3. Severe episode of recurrent major depressive disorder, without psychotic features (HCC)  Mood stable, doing well with Lexapro and occasional Ativan as needed for panic symptoms.  Continue both.  PDMP reviewed.  - escitalopram (LEXAPRO) 20 MG tablet; Take 1 Tablet by mouth every day.  Dispense: 90 Tablet; Refill: 3  - LORazepam (ATIVAN) 1 MG Tab; Take 1 Tablet by mouth every 8 hours as needed for Anxiety for up to 30 days.  Dispense: 30 Tablet; Refill: 0          Please note that this note was created using voice recognition software.

## 2023-03-01 ENCOUNTER — TELEPHONE (OUTPATIENT)
Dept: MEDICAL GROUP | Facility: LAB | Age: 78
End: 2023-03-01
Payer: MEDICARE

## 2023-03-01 NOTE — TELEPHONE ENCOUNTER
1. Caller Name: Dina Crews                          Call Back Number: [phone] (home)         How would the patient prefer to be contacted with a response:     LOV-1/4/23 pt LVM stating she is having an issue with diverticulitis. Can you send a medication to [facility]

## 2023-03-28 ENCOUNTER — TELEPHONE (OUTPATIENT)
Dept: MEDICAL GROUP | Facility: LAB | Age: 78
End: 2023-03-28
Payer: MEDICARE

## 2023-03-28 NOTE — TELEPHONE ENCOUNTER
ESTABLISHED PATIENT PRE-VISIT PLANNING     Patient was NOT contacted to complete PVP.     Note: Patient will not be contacted if there is no indication to call.     1.  Reviewed notes from the last few office visits within the medical group: Yes    2.  If any orders were placed at last visit or intended to be done for this visit (i.e. 6 mos follow-up), do we have Results/Consult Notes?           Labs - Labs were not ordered at last office visit.  Note: If patient appointment is for lab review and patient did not complete labs, check with provider if OK to reschedule patient until labs completed.         Imaging - Imaging was not ordered at last office visit.         Referrals - No referrals were ordered at last office visit.    3. Is this appointment scheduled as a Hospital Follow-Up? No    4.  Immunizations were updated in Epic using Reconcile Outside Information activity? Yes    5.  Patient is due for the following Health Maintenance Topics:   Health Maintenance Due   Topic Date Due    BONE DENSITY  08/09/2018    Annual Wellness Visit  10/13/2021    COVID-19 Vaccine (4 - Booster for Pfizer series) 03/24/2022    IMM INFLUENZA (1) Never done   6.  AHA (Pulse8) form printed for Provider? N/A

## 2023-05-04 ENCOUNTER — TELEPHONE (OUTPATIENT)
Dept: HEALTH INFORMATION MANAGEMENT | Facility: OTHER | Age: 78
End: 2023-05-04

## 2023-05-04 ENCOUNTER — TELEPHONE (OUTPATIENT)
Dept: MEDICAL GROUP | Facility: LAB | Age: 78
End: 2023-05-04
Payer: MEDICARE

## 2023-05-04 NOTE — TELEPHONE ENCOUNTER
ESTABLISHED PATIENT PRE-VISIT PLANNING     Patient was NOT contacted to complete PVP.     Note: Patient will not be contacted if there is no indication to call.     1.  Reviewed notes from the last few office visits within the medical group: Yes    2.  If any orders were placed at last visit or intended to be done for this visit (i.e. 6 mos follow-up), do we have Results/Consult Notes?           Labs - Labs ordered, but not to be completed until unclear.  Note: If patient appointment is for lab review and patient did not complete labs, check with provider if OK to reschedule patient until labs completed.         Imaging - Imaging was not ordered at last office visit.         Referrals - No referrals were ordered at last office visit.    3. Is this appointment scheduled as a Hospital Follow-Up? No    4.  Immunizations were updated in Epic using Reconcile Outside Information activity? Yes    5.  Patient is due for the following Health Maintenance Topics:   Health Maintenance Due   Topic Date Due    BONE DENSITY  08/09/2018    Annual Wellness Visit  10/13/2021    COVID-19 Vaccine (4 - Booster for Pfizer series) 03/24/2022     6.  AHA (Pulse8) form printed for Provider? N/A

## 2023-05-05 ENCOUNTER — OFFICE VISIT (OUTPATIENT)
Dept: MEDICAL GROUP | Facility: LAB | Age: 78
End: 2023-05-05
Payer: MEDICARE

## 2023-05-05 VITALS
HEART RATE: 82 BPM | RESPIRATION RATE: 14 BRPM | HEIGHT: 63 IN | BODY MASS INDEX: 21.44 KG/M2 | SYSTOLIC BLOOD PRESSURE: 136 MMHG | TEMPERATURE: 98.4 F | WEIGHT: 121 LBS | DIASTOLIC BLOOD PRESSURE: 82 MMHG | OXYGEN SATURATION: 96 %

## 2023-05-05 DIAGNOSIS — R59.9 ENLARGED LYMPH NODE: ICD-10-CM

## 2023-05-05 DIAGNOSIS — F41.1 GENERALIZED ANXIETY DISORDER: ICD-10-CM

## 2023-05-05 DIAGNOSIS — S16.1XXA STRAIN OF NECK MUSCLE, INITIAL ENCOUNTER: ICD-10-CM

## 2023-05-05 DIAGNOSIS — K21.9 GASTROESOPHAGEAL REFLUX DISEASE: ICD-10-CM

## 2023-05-05 PROCEDURE — 99214 OFFICE O/P EST MOD 30 MIN: CPT | Performed by: FAMILY MEDICINE

## 2023-05-05 RX ORDER — PANTOPRAZOLE SODIUM 40 MG/1
40 TABLET, DELAYED RELEASE ORAL DAILY
Qty: 90 TABLET | Refills: 3 | Status: SHIPPED | OUTPATIENT
Start: 2023-05-05 | End: 2023-10-12

## 2023-05-05 RX ORDER — LORAZEPAM 1 MG/1
1 TABLET ORAL EVERY 8 HOURS PRN
Qty: 30 TABLET | Refills: 0 | Status: SHIPPED | OUTPATIENT
Start: 2023-05-05 | End: 2023-06-04

## 2023-05-05 RX ORDER — ESCITALOPRAM OXALATE 20 MG/1
20 TABLET ORAL DAILY
Qty: 90 TABLET | Refills: 3 | Status: SHIPPED | OUTPATIENT
Start: 2023-05-05 | End: 2023-10-12 | Stop reason: SDUPTHER

## 2023-05-05 NOTE — PROGRESS NOTES
"Subjective:     CC: Neck pain/stiffness, med refills    HPI:   Dina presents today with concern for left sided neck pain and stiffness that began 5 days ago after trying to lift a heavy case of water.  No traumatic injury.  Pain extends from just below the jawline down to above her clavicle.    Also needs medication refills of Protonix, Lexapro, Ativan.  Anxiety has been relatively well controlled and she very rarely has to use Ativan but likes to have it on hand if needed.    No recent upper respiratory symptoms including cough or congestion.  No dental work or dental pain.  No fevers.  No ear pain.    Medications, past medical history, allergies, and social history have been reviewed and updated.      Objective:       Exam:  /82 (BP Location: Right arm, Patient Position: Sitting, BP Cuff Size: Adult)   Pulse 82   Temp 36.9 °C (98.4 °F)   Resp 14   Ht 1.6 m (5' 2.99\")   Wt 54.9 kg (121 lb)   LMP 07/12/1986   SpO2 96%   BMI 21.44 kg/m²  Body mass index is 21.44 kg/m².    Constitutional: Alert. Well appearing. No distress.  Skin: Warm, dry, good turgor, no visible rashes.  Eye: Equal, round and reactive to light, conjunctiva clear, lids normal.  Neck: Palpable tightness/spasm to the left neck musculature along with enlarged left cervical lymph node.  Node is ~ 2 x 2 cm and tender.  No overlying skin erythema or fluctuance.  No C-spine tenderness.  ENMT: Moist mucous membranes.  Oropharynx is clear.  No obvious oral or dental infection.  Bilateral tympanic membranes are clear.  Respiratory: Normal effort.   Neuro: Moves all four extremities. No facial droop.  Psych: Answers questions appropriately. Normal affect and mood.      Assessment & Plan:     77 y.o. female with the following -     1. Strain of neck muscle, initial encounter  Left neck pain/stiffness after she strained trying to lift heavy object.  Palpable tightness/spasm on exam.  Recommend anti-inflammatories and heat.    2. Enlarged lymph " node  To the left neck.  No obvious underlying cause besides possible soft tissue injury with neck strain as above.  No recent infectious symptoms and no signs of URI or dental infection on exam.  Close follow-up scheduled to ensure this resolves.    3. Generalized anxiety disorder  Stable, doing well with current medications.  Continue Lexapro along with rare Ativan use.  - escitalopram (LEXAPRO) 20 MG tablet; Take 1 Tablet by mouth every day.  Dispense: 90 Tablet; Refill: 3  - LORazepam (ATIVAN) 1 MG Tab; Take 1 Tablet by mouth every 8 hours as needed for Anxiety for up to 30 days.  Dispense: 30 Tablet; Refill: 0    4. Gastroesophageal reflux disease  Stable, continue Protonix.  - pantoprazole (PROTONIX) 40 MG Tablet Delayed Response; Take 1 Tablet by mouth every day.  Dispense: 90 Tablet; Refill: 3    Please note that this note was created using voice recognition software.

## 2023-05-11 ENCOUNTER — OFFICE VISIT (OUTPATIENT)
Dept: URGENT CARE | Facility: CLINIC | Age: 78
End: 2023-05-11
Payer: MEDICARE

## 2023-05-11 ENCOUNTER — TELEPHONE (OUTPATIENT)
Dept: MEDICAL GROUP | Facility: LAB | Age: 78
End: 2023-05-11

## 2023-05-11 ENCOUNTER — HOSPITAL ENCOUNTER (OUTPATIENT)
Facility: MEDICAL CENTER | Age: 78
End: 2023-05-11
Attending: PHYSICIAN ASSISTANT
Payer: MEDICARE

## 2023-05-11 ENCOUNTER — HOSPITAL ENCOUNTER (OUTPATIENT)
Dept: RADIOLOGY | Facility: MEDICAL CENTER | Age: 78
End: 2023-05-11
Attending: PHYSICIAN ASSISTANT
Payer: MEDICARE

## 2023-05-11 VITALS
RESPIRATION RATE: 18 BRPM | OXYGEN SATURATION: 98 % | TEMPERATURE: 97.8 F | DIASTOLIC BLOOD PRESSURE: 84 MMHG | SYSTOLIC BLOOD PRESSURE: 128 MMHG | HEART RATE: 66 BPM | HEIGHT: 63 IN | BODY MASS INDEX: 21.44 KG/M2 | WEIGHT: 121 LBS

## 2023-05-11 DIAGNOSIS — M54.2 CERVICAL PAIN: ICD-10-CM

## 2023-05-11 DIAGNOSIS — Z01.812 PRE-PROCEDURAL LABORATORY EXAMINATIONS: ICD-10-CM

## 2023-05-11 DIAGNOSIS — R59.0 CERVICAL LYMPHADENOPATHY: ICD-10-CM

## 2023-05-11 DIAGNOSIS — M54.2 NECK PAIN: ICD-10-CM

## 2023-05-11 DIAGNOSIS — J39.2 ERYTHEMA OF PHARYNX: ICD-10-CM

## 2023-05-11 DIAGNOSIS — S16.1XXA STRAIN OF NECK MUSCLE, INITIAL ENCOUNTER: ICD-10-CM

## 2023-05-11 LAB
INT CON NEG: NORMAL
INT CON POS: NORMAL
S PYO AG THROAT QL: NEGATIVE

## 2023-05-11 PROCEDURE — 99214 OFFICE O/P EST MOD 30 MIN: CPT | Performed by: PHYSICIAN ASSISTANT

## 2023-05-11 PROCEDURE — 1125F AMNT PAIN NOTED PAIN PRSNT: CPT | Performed by: FAMILY MEDICINE

## 2023-05-11 PROCEDURE — 87880 STREP A ASSAY W/OPTIC: CPT | Performed by: PHYSICIAN ASSISTANT

## 2023-05-11 PROCEDURE — 76536 US EXAM OF HEAD AND NECK: CPT

## 2023-05-11 PROCEDURE — 87070 CULTURE OTHR SPECIMN AEROBIC: CPT

## 2023-05-11 RX ORDER — CYCLOBENZAPRINE HCL 5 MG
5 TABLET ORAL 3 TIMES DAILY PRN
Qty: 30 TABLET | Refills: 0 | Status: SHIPPED | OUTPATIENT
Start: 2023-05-11

## 2023-05-11 ASSESSMENT — PAIN SCALES - GENERAL: PAINLEVEL: 8=MODERATE-SEVERE PAIN

## 2023-05-11 ASSESSMENT — ENCOUNTER SYMPTOMS: NECK PAIN: 1

## 2023-05-11 NOTE — PROGRESS NOTES
Subjective:   Dina Crews is a 77 y.o. female who presents for Neck Pain (X2 weeks, painful to move neck )  Patient presents with severe left anterior neck pain left greater than 2 weeks duration.  She reports she saw her primary care physician about this who noted some lymphadenopathy.  No obvious source of infection.  Did suspect a strain related to lifting water.  The pain has worsened now.  She is unable to turn her head from one side to the other.  She feels unsafe driving.  She denies significant sore throat.  No fevers or chills.  No myalgias.  She describes the pain as 8 on a scale of 1-10.  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  She denies significant seasonal allergies, sinus pressure or URI symptoms.          Review of Systems   Musculoskeletal:  Positive for neck pain.       Medications:  Cholecalciferol Tabs  cyclobenzaprine  escitalopram  LORazepam Tabs  pantoprazole Tbec  polyethylene glycol/lytes Pack    Allergies:             Penicillins, Ciprofloxacin, Codeine, Morphine, and Vicodin [hydrocodone-acetaminophen]    Surgical History:         Past Surgical History:   Procedure Laterality Date    NEL BY LAPAROSCOPY  4/20/2015    Performed by Gaudencio Johnson M.D. at SURGERY SAME DAY [facility]    LOW ANTERIOR RESECTION ROBOTIC  1/28/2014    Performed by Ismael Rocha M.D. at SURGERY [facility]S    ABDOMINAL HYSTERECTOMY TOTAL      HYSTERECTOMY, VAGINAL      and BSO    OTHER ABDOMINAL SURGERY      partial colectomy    PRIMARY C SECTION      REPEAT C SECTION      SHOULDER ARTHROSCOPY         Past Social Hx:  Dina Crews  reports that she has been smoking cigarettes. She started smoking about 59 years ago. She has a 22.50 pack-year smoking history. She has never used smokeless tobacco. She reports current drug use. Drugs: Oral and Marijuana. She reports that she does not drink alcohol.  "    Past Family Hx:   Dina Crews family history includes Cancer in her father, mother, and sister; Cancer (age of onset: 40) in her sister; Diabetes in her father; GI Disease in her sister; Lung Disease in her mother.       Problem list, medications, and allergies reviewed by myself today in Epic.     Objective:     /84   Pulse 66   Temp 36.6 °C (97.8 °F) (Temporal)   Resp 18   Ht 1.6 m (5' 3\")   Wt 54.9 kg (121 lb)   LMP 07/12/1986   SpO2 98%   BMI 21.43 kg/m²     Physical Exam  Vitals and nursing note reviewed.   Constitutional:       General: She is not in acute distress.     Appearance: Normal appearance. She is not ill-appearing, toxic-appearing or diaphoretic.   HENT:      Head: Normocephalic.      Right Ear: External ear normal.      Left Ear: External ear normal.      Nose: Nose normal.      Mouth/Throat:      Mouth: Mucous membranes are moist.   Eyes:      Extraocular Movements: Extraocular movements intact.      Conjunctiva/sclera: Conjunctivae normal.      Pupils: Pupils are equal, round, and reactive to light.   Neck:        Comments: Tender posterior cervical lymph node approximately 2 x 2 cm.  Exquisitely tender to touch.  1/2 x 1/2 cm anterior cervical lymph node also noted.  Range of motion to neck limited by 75% normal to plantar fashion due to pain.  She is unable to extend her neck for throat swab.  TMs are clear.  Erythema is noted to posterior pharynx.  Cardiovascular:      Rate and Rhythm: Normal rate.      Pulses: Normal pulses.   Pulmonary:      Effort: Pulmonary effort is normal. No respiratory distress.      Comments: Lungs CTA bilaterally, no rhonchi rales or wheezes  Musculoskeletal:      Cervical back: Normal range of motion.   Skin:     General: Skin is warm.      Findings: No lesion or rash.   Neurological:      General: No focal deficit present.      Mental Status: She is alert and oriented to person, place, and time.   Psychiatric:         Thought Content: Thought " content normal.         Judgment: Judgment normal.     There is no tenderness to the midline cervical spine or left cervical paraspinous musculature    Assessment/Plan:     Diagnosis and Associated Orders:     1. Erythema of pharynx  - POCT Rapid Strep A    2. Cervical pain  - cyclobenzaprine (FLEXERIL) 5 mg tablet; Take 1 Tablet by mouth 3 times a day as needed for Muscle Spasms.  Dispense: 30 Tablet; Refill: 0  - US-SOFT TISSUES OF HEAD - NECK; Future    3. Cervical lymphadenopathy  - CULTURE THROAT; Future  - US-SOFT TISSUES OF HEAD - NECK; Future    4. Neck pain  - CULTURE THROAT; Future  - US-SOFT TISSUES OF HEAD - NECK; Future        Comments/MDM:  Patient has had greater than 2-week history of progressive cervical lymphadenopathy associated with significant neck pain and inability to move her neck.  There is no midline cervical spine tenderness or paraspinous tenderness.  She is exquisitely tender palpable anterior posterior node/mass.  She has not improved with over-the-counter medications.  Rapid strep negative.  We will send throat culture.  Recommending ultrasound of soft tissues of head given progressive nature of symptoms.  At this time she is able to swallow and breathe without difficulty.  If her symptoms worsen recommend follow-up in the ED setting.  Recommend follow-up with PCP with results of ultrasound.  We will be in touch via [employer].  I personally reviewed prior external notes and test results pertinent to today's visit. Supportive care, natural history, differential diagnoses, and indications for immediate follow-up discussed. Return to clinic or go to ED if symptoms worsen or persist.  Red flag symptoms discussed.  Patient/Parent/Guardian voices understanding. Follow-up with your primary care provider in 3-5 days.  All side effects of medication discussed including allergic response, GI upset, tendon injury, rash, sedation etc    Please note that this dictation was created using voice  recognition software. I have made a reasonable attempt to correct obvious errors, but I expect that there are errors of grammar and possibly content that I did not discover before finalizing the note.    This note was electronically signed by Joselin Ponce PA-C

## 2023-05-11 NOTE — TELEPHONE ENCOUNTER
1. Caller Name: Dina Crews                         Call Back Number: [phone] (home)         How would the patient prefer to be contacted with a response:     Patient lvm stating her neck pain is 8 out of 10. Asking for advice

## 2023-05-12 ENCOUNTER — HOSPITAL ENCOUNTER (OUTPATIENT)
Dept: LAB | Facility: MEDICAL CENTER | Age: 78
End: 2023-05-12
Attending: PHYSICIAN ASSISTANT
Payer: MEDICARE

## 2023-05-12 DIAGNOSIS — R59.0 CERVICAL LYMPHADENOPATHY: ICD-10-CM

## 2023-05-12 DIAGNOSIS — M54.2 CERVICAL PAIN: ICD-10-CM

## 2023-05-12 DIAGNOSIS — M54.2 NECK PAIN: ICD-10-CM

## 2023-05-12 DIAGNOSIS — Z01.812 PRE-PROCEDURAL LABORATORY EXAMINATIONS: ICD-10-CM

## 2023-05-12 LAB
ALBUMIN SERPL BCP-MCNC: 4.4 G/DL (ref 3.2–4.9)
BASOPHILS # BLD AUTO: 1.2 % (ref 0–1.8)
BASOPHILS # BLD: 0.1 K/UL (ref 0–0.12)
BUN SERPL-MCNC: 9 MG/DL (ref 8–22)
CALCIUM ALBUM COR SERPL-MCNC: 9.6 MG/DL (ref 8.5–10.5)
CALCIUM SERPL-MCNC: 9.9 MG/DL (ref 8.4–10.2)
CHLORIDE SERPL-SCNC: 103 MMOL/L (ref 96–112)
CO2 SERPL-SCNC: 23 MMOL/L (ref 20–33)
CREAT SERPL-MCNC: 0.51 MG/DL (ref 0.5–1.4)
EOSINOPHIL # BLD AUTO: 0.11 K/UL (ref 0–0.51)
EOSINOPHIL NFR BLD: 1.3 % (ref 0–6.9)
ERYTHROCYTE [DISTWIDTH] IN BLOOD BY AUTOMATED COUNT: 48.9 FL (ref 35.9–50)
GFR SERPLBLD CREATININE-BSD FMLA CKD-EPI: 97 ML/MIN/1.73 M 2
GLUCOSE SERPL-MCNC: 99 MG/DL (ref 65–99)
HCT VFR BLD AUTO: 45.3 % (ref 37–47)
HGB BLD-MCNC: 15.6 G/DL (ref 12–16)
IMM GRANULOCYTES # BLD AUTO: 0.02 K/UL (ref 0–0.11)
IMM GRANULOCYTES NFR BLD AUTO: 0.2 % (ref 0–0.9)
LYMPHOCYTES # BLD AUTO: 2.03 K/UL (ref 1–4.8)
LYMPHOCYTES NFR BLD: 23.5 % (ref 22–41)
MCH RBC QN AUTO: 31.8 PG (ref 27–33)
MCHC RBC AUTO-ENTMCNC: 34.4 G/DL (ref 33.6–35)
MCV RBC AUTO: 92.4 FL (ref 81.4–97.8)
MONOCYTES # BLD AUTO: 0.58 K/UL (ref 0–0.85)
MONOCYTES NFR BLD AUTO: 6.7 % (ref 0–13.4)
NEUTROPHILS # BLD AUTO: 5.8 K/UL (ref 2–7.15)
NEUTROPHILS NFR BLD: 67.1 % (ref 44–72)
NRBC # BLD AUTO: 0 K/UL
NRBC BLD-RTO: 0 /100 WBC
PHOSPHATE SERPL-MCNC: 3.8 MG/DL (ref 2.5–4.5)
PLATELET # BLD AUTO: 371 K/UL (ref 164–446)
PMV BLD AUTO: 9.9 FL (ref 9–12.9)
POTASSIUM SERPL-SCNC: 4.2 MMOL/L (ref 3.6–5.5)
RBC # BLD AUTO: 4.9 M/UL (ref 4.2–5.4)
SODIUM SERPL-SCNC: 139 MMOL/L (ref 135–145)
TSH SERPL DL<=0.005 MIU/L-ACNC: 1.88 UIU/ML (ref 0.38–5.33)
WBC # BLD AUTO: 8.6 K/UL (ref 4.8–10.8)

## 2023-05-12 PROCEDURE — 84443 ASSAY THYROID STIM HORMONE: CPT

## 2023-05-12 PROCEDURE — 85025 COMPLETE CBC W/AUTO DIFF WBC: CPT

## 2023-05-12 PROCEDURE — 80069 RENAL FUNCTION PANEL: CPT

## 2023-05-12 PROCEDURE — 36415 COLL VENOUS BLD VENIPUNCTURE: CPT

## 2023-05-13 LAB
BACTERIA SPEC RESP CULT: NORMAL
SIGNIFICANT IND 70042: NORMAL
SITE SITE: NORMAL
SOURCE SOURCE: NORMAL

## 2023-05-18 ENCOUNTER — PATIENT MESSAGE (OUTPATIENT)
Dept: MEDICAL GROUP | Facility: LAB | Age: 78
End: 2023-05-18
Payer: MEDICARE

## 2023-05-18 DIAGNOSIS — R59.9 ENLARGED LYMPH NODE: ICD-10-CM

## 2023-05-19 ENCOUNTER — HOSPITAL ENCOUNTER (OUTPATIENT)
Dept: LAB | Facility: MEDICAL CENTER | Age: 78
End: 2023-05-19
Attending: FAMILY MEDICINE
Payer: MEDICARE

## 2023-05-19 DIAGNOSIS — R59.9 ENLARGED LYMPH NODE: ICD-10-CM

## 2023-05-19 LAB
ANION GAP SERPL CALC-SCNC: 12 MMOL/L (ref 7–16)
BUN SERPL-MCNC: 7 MG/DL (ref 8–22)
CALCIUM SERPL-MCNC: 9.9 MG/DL (ref 8.4–10.2)
CHLORIDE SERPL-SCNC: 102 MMOL/L (ref 96–112)
CO2 SERPL-SCNC: 25 MMOL/L (ref 20–33)
CREAT SERPL-MCNC: 0.56 MG/DL (ref 0.5–1.4)
GFR SERPLBLD CREATININE-BSD FMLA CKD-EPI: 95 ML/MIN/1.73 M 2
GLUCOSE SERPL-MCNC: 101 MG/DL (ref 65–99)
POTASSIUM SERPL-SCNC: 4.9 MMOL/L (ref 3.6–5.5)
SODIUM SERPL-SCNC: 139 MMOL/L (ref 135–145)

## 2023-05-19 PROCEDURE — 36415 COLL VENOUS BLD VENIPUNCTURE: CPT

## 2023-05-19 PROCEDURE — 80048 BASIC METABOLIC PNL TOTAL CA: CPT

## 2023-05-22 ENCOUNTER — TELEPHONE (OUTPATIENT)
Dept: MEDICAL GROUP | Facility: LAB | Age: 78
End: 2023-05-22
Payer: MEDICARE

## 2023-05-22 NOTE — TELEPHONE ENCOUNTER
ESTABLISHED PATIENT PRE-VISIT PLANNING     Patient was NOT contacted to complete PVP.     Note: Patient will not be contacted if there is no indication to call.     1.  Reviewed notes from the last few office visits within the medical group: Yes    2.  If any orders were placed at last visit or intended to be done for this visit (i.e. 6 mos follow-up), do we have Results/Consult Notes?           Labs - Labs ordered, completed on 5/19/23 and results are in chart.  Note: If patient appointment is for lab review and patient did not complete labs, check with provider if OK to reschedule patient until labs completed.         Imaging - Imaging was not ordered at last office visit.         Referrals - No referrals were ordered at last office visit.    3. Is this appointment scheduled as a Hospital Follow-Up? No    4.  Immunizations were updated in Epic using Reconcile Outside Information activity? Yes    5.  Patient is due for the following Health Maintenance Topics:   Health Maintenance Due   Topic Date Due    BONE DENSITY  08/09/2018    Annual Wellness Visit  10/13/2021    COVID-19 Vaccine (4 - Booster for Pfizer series) 03/24/2022     6.  AHA (Pulse8) form printed for Provider? N/A

## 2023-05-24 ENCOUNTER — HOSPITAL ENCOUNTER (OUTPATIENT)
Dept: RADIOLOGY | Facility: MEDICAL CENTER | Age: 78
End: 2023-05-24
Attending: PHYSICIAN ASSISTANT
Payer: MEDICARE

## 2023-05-24 DIAGNOSIS — R59.0 CERVICAL LYMPHADENOPATHY: ICD-10-CM

## 2023-05-24 DIAGNOSIS — M54.2 NECK PAIN: ICD-10-CM

## 2023-05-24 DIAGNOSIS — M54.2 CERVICAL PAIN: ICD-10-CM

## 2023-05-24 PROCEDURE — 70491 CT SOFT TISSUE NECK W/DYE: CPT

## 2023-05-24 PROCEDURE — 700117 HCHG RX CONTRAST REV CODE 255: Performed by: PHYSICIAN ASSISTANT

## 2023-05-24 RX ADMIN — IOHEXOL 80 ML: 350 INJECTION, SOLUTION INTRAVENOUS at 08:08

## 2023-10-04 ENCOUNTER — TELEPHONE (OUTPATIENT)
Dept: MEDICAL GROUP | Facility: LAB | Age: 78
End: 2023-10-04
Payer: MEDICARE

## 2023-10-04 NOTE — TELEPHONE ENCOUNTER
ESTABLISHED PATIENT PRE-VISIT PLANNING     Patient was NOT contacted to complete PVP.     Note: Patient will not be contacted if there is no indication to call.     1.  Reviewed notes from the last few office visits within the medical group: Yes    2.  If any orders were placed at last visit or intended to be done for this visit (i.e. 6 mos follow-up), do we have Results/Consult Notes?           Labs - Labs ordered, completed on 5/19/23 and results are in chart.  Note: If patient appointment is for lab review and patient did not complete labs, check with provider if OK to reschedule patient until labs completed.         Imaging - Imaging was not ordered at last office visit.         Referrals - No referrals were ordered at last office visit.    3. Is this appointment scheduled as a Hospital Follow-Up? No    4.  Immunizations were updated in Epic using Reconcile Outside Information activity? Yes    5.  Patient is due for the following Health Maintenance Topics:   Health Maintenance Due   Topic Date Due    Lung Cancer Screening Shared Decision Making  Never done    Bone Density Scan  08/09/2018    Annual Wellness Visit  10/13/2021    COVID-19 Vaccine (4 - Pfizer series) 03/24/2022    Influenza Vaccine (1) Never done     6.  AHA (Pulse8) form printed for Provider? N/A

## 2023-10-12 ENCOUNTER — OFFICE VISIT (OUTPATIENT)
Dept: MEDICAL GROUP | Facility: LAB | Age: 78
End: 2023-10-12
Payer: MEDICARE

## 2023-10-12 VITALS
HEART RATE: 80 BPM | HEIGHT: 63 IN | BODY MASS INDEX: 21.93 KG/M2 | OXYGEN SATURATION: 96 % | TEMPERATURE: 97.9 F | RESPIRATION RATE: 14 BRPM | DIASTOLIC BLOOD PRESSURE: 66 MMHG | WEIGHT: 123.8 LBS | SYSTOLIC BLOOD PRESSURE: 134 MMHG

## 2023-10-12 DIAGNOSIS — F17.210 NICOTINE DEPENDENCE, CIGARETTES, UNCOMPLICATED: ICD-10-CM

## 2023-10-12 DIAGNOSIS — R25.2 NOCTURNAL MUSCLE CRAMP: ICD-10-CM

## 2023-10-12 DIAGNOSIS — F41.1 GENERALIZED ANXIETY DISORDER: ICD-10-CM

## 2023-10-12 DIAGNOSIS — M81.0 OSTEOPOROSIS, POST-MENOPAUSAL: ICD-10-CM

## 2023-10-12 DIAGNOSIS — E78.00 PURE HYPERCHOLESTEROLEMIA: ICD-10-CM

## 2023-10-12 PROCEDURE — 3078F DIAST BP <80 MM HG: CPT | Performed by: FAMILY MEDICINE

## 2023-10-12 PROCEDURE — 3075F SYST BP GE 130 - 139MM HG: CPT | Performed by: FAMILY MEDICINE

## 2023-10-12 PROCEDURE — 99214 OFFICE O/P EST MOD 30 MIN: CPT | Performed by: FAMILY MEDICINE

## 2023-10-12 RX ORDER — ESCITALOPRAM OXALATE 20 MG/1
20 TABLET ORAL DAILY
Qty: 90 TABLET | Refills: 3 | Status: SHIPPED | OUTPATIENT
Start: 2023-10-12

## 2023-10-12 RX ORDER — LORAZEPAM 1 MG/1
1 TABLET ORAL EVERY 4 HOURS PRN
Qty: 30 TABLET | Refills: 0 | Status: SHIPPED | OUTPATIENT
Start: 2023-10-12 | End: 2023-11-11

## 2023-10-12 ASSESSMENT — PATIENT HEALTH QUESTIONNAIRE - PHQ9: CLINICAL INTERPRETATION OF PHQ2 SCORE: 0

## 2023-10-12 NOTE — PROGRESS NOTES
"Subjective:     CC: Follow up, med refills, cramps    HPI:   Dina presents today for follow-up and to discuss medication refills and cramps.  Has been getting nocturnal cramps in both calfs.  Recently had a severe episode tonight after she went swimming.  Has had more mild symptoms other nights.  She does not have calf cramping with activity.    Anxiety is overall stable with daily Lexapro and occasional Ativan as needed for worsened anxiety symptoms. PDMP reviewed.    Medications, past medical history, allergies, and social history have been reviewed and updated.    Objective:       Exam:  /66 (BP Location: Right arm, Patient Position: Sitting, BP Cuff Size: Adult)   Pulse 80   Temp 36.6 °C (97.9 °F)   Resp 14   Ht 1.6 m (5' 3\")   Wt 56.2 kg (123 lb 12.8 oz)   LMP 07/12/1986   SpO2 96%   BMI 21.93 kg/m²  Body mass index is 21.93 kg/m².    Constitutional: Alert. Well appearing. No distress.  Skin: Warm, dry, good turgor, no visible rashes.  ENMT: Moist mucous membranes.   Respiratory: Normal effort. Lungs are clear to auscultation bilaterally.  Cardiovascular: Regular rate and rhythm. Normal S1/S2. No murmurs, rubs or gallops.   Neuro: Moves all four extremities. No facial droop.  Psych: Answers questions appropriately. Normal affect and mood.    Assessment & Plan:     78 y.o. female with the following -     1. Generalized anxiety disorder  Chronic, stable.  Continues on Lexapro daily along with occasional Ativan use for episodes of worsened anxiety.  PDMP reviewed.  Refills provided today.  - LORazepam (ATIVAN) 1 MG Tab; Take 1 Tablet by mouth every four hours as needed for Anxiety for up to 30 days.  Dispense: 30 Tablet; Refill: 0  - escitalopram (LEXAPRO) 20 MG tablet; Take 1 Tablet by mouth every day.  Dispense: 90 Tablet; Refill: 3    2. Nocturnal muscle cramp  Bilateral calf cramps at night.  No calf pain/cramping with exertion.  Discussed hydration, stretching, general activity prior to bed. "  We also discussed OTC supplements that she could try including magnesium, vitamin D, vitamin B complex.  Follow-up if not improving.    3. Osteoporosis, post-menopausal  Last DEXA 2016, repeat.  - DS-BONE DENSITY STUDY (DEXA); Future    4. Nicotine dependence, uncomplicated (Current Smoker)  No prior lung cancer screening.  - REFERRAL TO LUNG CANCER SCREENING PROGRAM; Future    5. Pure hypercholesterolemia  Recheck lipids.  - Lipid Profile; Future      Please note that this note was created using voice recognition software.

## 2023-10-23 ENCOUNTER — TELEPHONE (OUTPATIENT)
Dept: SLEEP MEDICINE | Facility: MEDICAL CENTER | Age: 78
End: 2023-10-23
Payer: MEDICARE

## 2023-10-23 NOTE — TELEPHONE ENCOUNTER
1. Age 50-77 yrs of age? 78 rs    2. Total Years Smoking cigarettes?  55 yrs  3. 20 pack year hx of smoking, or greater (average packs per day)?   55 pk yrs @ 1pk/ day     4. Current smoker or if quit, has pt quit within last 15 yrs?  Current    5. Completed Lung Cancer screen CT with [facility] previously?  NONE  6. Anything noted on previous CT involving lungs? (Nodules, Mass, Etc.)  NONE  7. Any signs or symptoms of lung cancer? ( New / change to Cough, Wheezing, S.O.B., coughing up blood, unexplained weight loss within last year)?   NONE    8. Previous history of lung cancer?   NONE

## 2024-01-12 ENCOUNTER — OFFICE VISIT (OUTPATIENT)
Dept: MEDICAL GROUP | Facility: LAB | Age: 79
End: 2024-01-12
Payer: MEDICARE

## 2024-01-12 VITALS
TEMPERATURE: 97.6 F | RESPIRATION RATE: 12 BRPM | SYSTOLIC BLOOD PRESSURE: 130 MMHG | OXYGEN SATURATION: 99 % | HEIGHT: 63 IN | WEIGHT: 123 LBS | HEART RATE: 67 BPM | BODY MASS INDEX: 21.79 KG/M2 | DIASTOLIC BLOOD PRESSURE: 72 MMHG

## 2024-01-12 DIAGNOSIS — F33.41 RECURRENT MAJOR DEPRESSIVE DISORDER, IN PARTIAL REMISSION (HCC): ICD-10-CM

## 2024-01-12 DIAGNOSIS — R42 EPISODE OF DIZZINESS: ICD-10-CM

## 2024-01-12 DIAGNOSIS — F41.1 GENERALIZED ANXIETY DISORDER: ICD-10-CM

## 2024-01-12 DIAGNOSIS — R73.03 PREDIABETES: ICD-10-CM

## 2024-01-12 DIAGNOSIS — E78.00 PURE HYPERCHOLESTEROLEMIA: ICD-10-CM

## 2024-01-12 PROCEDURE — 3075F SYST BP GE 130 - 139MM HG: CPT | Performed by: FAMILY MEDICINE

## 2024-01-12 PROCEDURE — 99214 OFFICE O/P EST MOD 30 MIN: CPT | Performed by: FAMILY MEDICINE

## 2024-01-12 PROCEDURE — 3078F DIAST BP <80 MM HG: CPT | Performed by: FAMILY MEDICINE

## 2024-01-12 RX ORDER — LORAZEPAM 1 MG/1
1 TABLET ORAL EVERY 8 HOURS PRN
Qty: 30 TABLET | Refills: 0 | Status: SHIPPED | OUTPATIENT
Start: 2024-01-12 | End: 2024-02-11

## 2024-01-12 RX ORDER — LORAZEPAM 1 MG/1
1 TABLET ORAL EVERY 4 HOURS PRN
COMMUNITY
End: 2024-01-12 | Stop reason: SDUPTHER

## 2024-01-12 ASSESSMENT — PATIENT HEALTH QUESTIONNAIRE - PHQ9
7. TROUBLE CONCENTRATING ON THINGS, SUCH AS READING THE NEWSPAPER OR WATCHING TELEVISION: NOT AT ALL
SUM OF ALL RESPONSES TO PHQ9 QUESTIONS 1 AND 2: 0
8. MOVING OR SPEAKING SO SLOWLY THAT OTHER PEOPLE COULD HAVE NOTICED. OR THE OPPOSITE, BEING SO FIGETY OR RESTLESS THAT YOU HAVE BEEN MOVING AROUND A LOT MORE THAN USUAL: NOT AT ALL
SUM OF ALL RESPONSES TO PHQ QUESTIONS 1-9: 0
6. FEELING BAD ABOUT YOURSELF - OR THAT YOU ARE A FAILURE OR HAVE LET YOURSELF OR YOUR FAMILY DOWN: NOT AL ALL
2. FEELING DOWN, DEPRESSED, IRRITABLE, OR HOPELESS: NOT AT ALL
9. THOUGHTS THAT YOU WOULD BE BETTER OFF DEAD, OR OF HURTING YOURSELF: NOT AT ALL
3. TROUBLE FALLING OR STAYING ASLEEP OR SLEEPING TOO MUCH: NOT AT ALL
5. POOR APPETITE OR OVEREATING: NOT AT ALL
4. FEELING TIRED OR HAVING LITTLE ENERGY: NOT AT ALL
1. LITTLE INTEREST OR PLEASURE IN DOING THINGS: NOT AT ALL

## 2024-01-12 NOTE — PROGRESS NOTES
"Subjective:     CC: Follow up, episode of dizziness    HPI:   Dina is a 78-year-old female with a history that includes anxiety and depression who presents for follow-up and to discuss episode of dizziness 1 month ago.  Had brief episode that lasted about 10 minutes that featured some dizziness/off-balance, nausea with dry heaving, and significant cold sweats.  No obvious trigger, she had just been working around the house.  Does report that time she forgets to take Lexapro but otherwise no new medication changes.  Continued rare use of Ativan and had not taken prior to the episode.  No chest pain or shortness of breath with the episode.  No further episodes since then.  Felt well about 15 minutes afterwards.    Anxiety and depression relatively well-controlled but she does endorse some loneliness.  Has friends around but family is in [place].    Medications, past medical history, allergies, and social history have been reviewed and updated.      Objective:       Exam:  /72   Pulse 67   Temp 36.4 °C (97.6 °F) (Temporal)   Resp 12   Ht 1.6 m (5' 3\")   Wt 55.8 kg (123 lb)   LMP 07/12/1986   SpO2 99%   BMI 21.79 kg/m²  Body mass index is 21.79 kg/m².    Constitutional: Alert. Well appearing. No distress.  Skin: Warm, dry, good turgor, no visible rashes.  ENMT: Moist mucous membranes. Normal dentition.  Respiratory: Normal effort. Lungs are clear to auscultation bilaterally.  Cardiovascular: Regular rate and rhythm. Normal S1/S2. No murmurs, rubs or gallops.   Neuro: Moves all four extremities. No facial droop.  Psych: Answers questions appropriately. Normal affect and mood.    Assessment & Plan:     78 y.o. female with the following -     1. Episode of dizziness  Episode of dizziness, nausea/dry heaving, cold sweats 1 month ago without obvious trigger.  Lasted about 10 minutes and she felt well about 15 minutes afterwards.  No further episodes.  Suspect vasovagal episode although not sure what " triggered it.  Will check basic labs as below.  Discussed ER precautions.   - CBC WITH DIFFERENTIAL; Future  - Comp Metabolic Panel; Future  - TSH WITH REFLEX TO FT4; Future    2. Generalized anxiety disorder  Continue Lexapro, continues to use Ativan rarely for increased episodes of severe anxiety.  PDMP reviewed.  - LORazepam (ATIVAN) 1 MG Tab; Take 1 Tablet by mouth every 8 hours as needed for Anxiety for up to 30 days.  Dispense: 30 Tablet; Refill: 0    3. Recurrent major depressive disorder, in partial remission (HCC)  Continue Lexapro.  Does endorse some loneliness, encouraged her to reach out to friends in the area.  Discussed possible therapy but she declines.    4. Pure hypercholesterolemia  Repeat lipids ordered at prior visit.    5. Prediabetes  Check labs, limit simple carbs and sugars.  - CBC WITH DIFFERENTIAL; Future  - Comp Metabolic Panel; Future  - TSH WITH REFLEX TO FT4; Future  - HEMOGLOBIN A1C; Future    Please note that this note was created using voice recognition software.

## 2024-02-19 ENCOUNTER — APPOINTMENT (OUTPATIENT)
Dept: RADIOLOGY | Facility: MEDICAL CENTER | Age: 79
End: 2024-02-19
Attending: EMERGENCY MEDICINE
Payer: MEDICARE

## 2024-02-19 ENCOUNTER — HOSPITAL ENCOUNTER (EMERGENCY)
Facility: MEDICAL CENTER | Age: 79
End: 2024-02-19
Attending: EMERGENCY MEDICINE
Payer: MEDICARE

## 2024-02-19 VITALS
SYSTOLIC BLOOD PRESSURE: 177 MMHG | BODY MASS INDEX: 21.79 KG/M2 | RESPIRATION RATE: 16 BRPM | OXYGEN SATURATION: 94 % | WEIGHT: 123 LBS | HEART RATE: 61 BPM | TEMPERATURE: 97 F | DIASTOLIC BLOOD PRESSURE: 92 MMHG | HEIGHT: 63 IN

## 2024-02-19 DIAGNOSIS — S00.81XA ABRASION OF FACE, INITIAL ENCOUNTER: ICD-10-CM

## 2024-02-19 DIAGNOSIS — S09.90XA CLOSED HEAD INJURY, INITIAL ENCOUNTER: ICD-10-CM

## 2024-02-19 DIAGNOSIS — S42.294A OTHER CLOSED NONDISPLACED FRACTURE OF PROXIMAL END OF RIGHT HUMERUS, INITIAL ENCOUNTER: ICD-10-CM

## 2024-02-19 PROCEDURE — 73090 X-RAY EXAM OF FOREARM: CPT | Mod: RT

## 2024-02-19 PROCEDURE — 700102 HCHG RX REV CODE 250 W/ 637 OVERRIDE(OP): Performed by: EMERGENCY MEDICINE

## 2024-02-19 PROCEDURE — 70450 CT HEAD/BRAIN W/O DYE: CPT

## 2024-02-19 PROCEDURE — 73130 X-RAY EXAM OF HAND: CPT | Mod: RT

## 2024-02-19 PROCEDURE — 73060 X-RAY EXAM OF HUMERUS: CPT | Mod: RT

## 2024-02-19 PROCEDURE — 99284 EMERGENCY DEPT VISIT MOD MDM: CPT

## 2024-02-19 PROCEDURE — A9270 NON-COVERED ITEM OR SERVICE: HCPCS | Performed by: EMERGENCY MEDICINE

## 2024-02-19 PROCEDURE — 73080 X-RAY EXAM OF ELBOW: CPT | Mod: RT

## 2024-02-19 PROCEDURE — 700101 HCHG RX REV CODE 250: Performed by: EMERGENCY MEDICINE

## 2024-02-19 PROCEDURE — 304217 HCHG IRRIGATION SYSTEM

## 2024-02-19 PROCEDURE — 73030 X-RAY EXAM OF SHOULDER: CPT | Mod: RT

## 2024-02-19 RX ORDER — ACETAMINOPHEN 325 MG/1
650 TABLET ORAL ONCE
Status: COMPLETED | OUTPATIENT
Start: 2024-02-19 | End: 2024-02-19

## 2024-02-19 RX ORDER — GABAPENTIN 300 MG/1
300 CAPSULE ORAL ONCE
Status: COMPLETED | OUTPATIENT
Start: 2024-02-19 | End: 2024-02-19

## 2024-02-19 RX ORDER — LIDOCAINE HYDROCHLORIDE AND EPINEPHRINE BITARTRATE 20; .01 MG/ML; MG/ML
10 INJECTION, SOLUTION SUBCUTANEOUS ONCE
Status: DISCONTINUED | OUTPATIENT
Start: 2024-02-19 | End: 2024-02-19

## 2024-02-19 RX ORDER — OXYCODONE HYDROCHLORIDE 5 MG/1
5 TABLET ORAL ONCE
Status: COMPLETED | OUTPATIENT
Start: 2024-02-19 | End: 2024-02-19

## 2024-02-19 RX ORDER — GABAPENTIN 100 MG/1
100 CAPSULE ORAL 3 TIMES DAILY
Qty: 30 CAPSULE | Refills: 0 | Status: SHIPPED | OUTPATIENT
Start: 2024-02-19 | End: 2024-02-29

## 2024-02-19 RX ORDER — OXYCODONE HYDROCHLORIDE AND ACETAMINOPHEN 5; 325 MG/1; MG/1
2 TABLET ORAL ONCE
Status: COMPLETED | OUTPATIENT
Start: 2024-02-19 | End: 2024-02-19

## 2024-02-19 RX ORDER — IBUPROFEN 400 MG/1
400 TABLET ORAL EVERY 6 HOURS PRN
Qty: 18 TABLET | Refills: 0 | Status: SHIPPED | OUTPATIENT
Start: 2024-02-19 | End: 2024-02-23

## 2024-02-19 RX ORDER — ACETAMINOPHEN 500 MG
500-1000 TABLET ORAL EVERY 6 HOURS PRN
Qty: 30 TABLET | Refills: 0 | Status: SHIPPED | OUTPATIENT
Start: 2024-02-19 | End: 2024-02-26

## 2024-02-19 RX ORDER — IBUPROFEN 600 MG/1
600 TABLET ORAL ONCE
Status: COMPLETED | OUTPATIENT
Start: 2024-02-19 | End: 2024-02-19

## 2024-02-19 RX ORDER — OXYCODONE HYDROCHLORIDE 5 MG/1
5 TABLET ORAL EVERY 4 HOURS PRN
Qty: 18 TABLET | Refills: 0 | Status: SHIPPED | OUTPATIENT
Start: 2024-02-19 | End: 2024-02-22

## 2024-02-19 RX ADMIN — GABAPENTIN 300 MG: 300 CAPSULE ORAL at 19:37

## 2024-02-19 RX ADMIN — ACETAMINOPHEN 650 MG: 325 TABLET ORAL at 19:37

## 2024-02-19 RX ADMIN — Medication 3 ML: at 17:56

## 2024-02-19 RX ADMIN — IBUPROFEN 600 MG: 600 TABLET, FILM COATED ORAL at 19:37

## 2024-02-19 RX ADMIN — OXYCODONE HYDROCHLORIDE 5 MG: 5 TABLET ORAL at 19:37

## 2024-02-19 RX ADMIN — OXYCODONE AND ACETAMINOPHEN 2 TABLET: 5; 325 TABLET ORAL at 17:56

## 2024-02-20 NOTE — ED TRIAGE NOTES
"Bib friend for above complaints.     Chief Complaint   Patient presents with    GLF     Tripped in garage and fell hitting head and landing on right arm. Small laceration to forehead. Right arm/shoulder pain. Denies LOC, or blood thinners.      BP (!) 146/77   Pulse (!) 54   Temp 36.1 °C (97 °F) (Temporal)   Resp 16   Ht 1.6 m (5' 3\")   Wt 55.8 kg (123 lb)   LMP 07/12/1986   SpO2 99%   Breastfeeding No   BMI 21.79 kg/m²     "

## 2024-02-20 NOTE — DISCHARGE INSTRUCTIONS
Please discuss the following findings with your regular doctor:  CT-HEAD W/O   Final Result      1.  There is no acute intracranial hemorrhage or infarct.      2.  White matter lucencies most consistent with small vessel ischemic change versus demyelination or gliosis.      3.  There is cerebral atrophy.         DX-HAND 3+ RIGHT   Final Result      No evidence of fracture or dislocation.   Findings are consistent with osteoarthritis.      DX-FOREARM RIGHT   Final Result      1.  There is no acute right forearm fracture.      DX-ELBOW-COMPLETE 3+ RIGHT   Final Result      1.  Limited right elbow series demonstrating no acute displaced fracture..      DX-HUMERUS 2+ RIGHT   Final Result      No evidence of fracture or dislocation.      DX-SHOULDER 2+ RIGHT   Final Result      1.  There are findings suspicious for possible impacted fracture of the right humeral head and neck junction although fracture line is difficult to visualize.   2.  Interval progression of extensive degenerative changes throughout the right shoulder and rotator cuff attachment.        Labs Reviewed - No data to display

## 2024-02-20 NOTE — ED NOTES
Pt states she tripped about 45 minutes ago hitting the right anterior portion of her head. Pt states she fell onto her right side and complains of right shoulder and arm pain. Pt states she fell from ground level. Pt denies blood thinners, LOC, and neck pain on palpation or movement. Pt has good CMS in extremities. Pt has CMS in right hand but is not able to move right arm due to pain near the humerus.

## 2024-02-20 NOTE — ED PROVIDER NOTES
ER Provider Note    Scribed for Carl Salinas M.d. by Brandon Zamorano. 2/19/2024  5:42 PM    Primary Care Provider: Roger Larson M.D.    CHIEF COMPLAINT  Chief Complaint   Patient presents with    GLF     Tripped in garage and fell hitting head and landing on right arm. Small laceration to forehead. Right arm/shoulder pain. Denies LOC, or blood thinners.      LIMITATION TO HISTORY   Select: : None    HPI/ROS  OUTSIDE HISTORIAN(S):  None        Dina rCews is a 78 y.o. female who presents to the ED for a ground level fall onset one hour ago. The patient reports she tripped while walking and fell onto her right side. She adds that she hit the right side of her head from the fall as well. She reports experiencing right arm, wrist, and shoulder pain. She denies the use of blood thinners. She also denies loss of consciousness, chest pain, pelvic pain, or neck pain. No alleviating or exacerbating factors noted. Patient has a history of Hyperlipidemia, GERD, and Diverticulitis.    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 at SURGERY SAME DAY [facility]    LOW ANTERIOR RESECTION ROBOTIC  1/28/2014    Performed by Ismael QUINTANILLA  "SALLY Rocha. at SURGERY [facility] ORS    ABDOMINAL HYSTERECTOMY TOTAL      HYSTERECTOMY, VAGINAL      and BSO    OTHER ABDOMINAL SURGERY      partial colectomy    PRIMARY C SECTION      REPEAT C SECTION      SHOULDER ARTHROSCOPY         FAMILY HISTORY  Family History   Problem Relation Age of Onset    Lung Disease Mother         COPD    Cancer Mother         Thyroid cancer    Cancer Father         d. 72 Stomach cancer    Diabetes Father     Cancer Sister 40        Breast cancer    GI Disease Sister         diverticulitis    Cancer Sister         breast       SOCIAL HISTORY   reports that she has been smoking cigarettes. She started smoking about 60 years ago. She has a 24 pack-year smoking history. She has never used smokeless tobacco. She reports current drug use. Drugs: Oral and Marijuana. She reports that she does not drink alcohol.    CURRENT MEDICATIONS  Discharge Medication List as of 2/19/2024  8:10 PM        CONTINUE these medications which have NOT CHANGED    Details   escitalopram (LEXAPRO) 20 MG tablet Take 1 Tablet by mouth every day., Disp-90 Tablet, R-3, Normal      !! cyclobenzaprine (FLEXERIL) 5 mg tablet Take 1 Tablet by mouth 3 times a day as needed for Moderate Pain., Disp-30 Tablet, R-0, Normal      !! cyclobenzaprine (FLEXERIL) 5 mg tablet Take 1 Tablet by mouth 3 times a day as needed for Muscle Spasms., Disp-30 Tablet, R-0, Normal      polyethylene glycol/lytes (MIRALAX) Pack Take 1 Packet by mouth 1 time daily as needed. No good BM in 24 hrs, OTC      Cholecalciferol 2000 UNIT Tab Take 2,000 Units by mouth every day., Historical Med       !! - Potential duplicate medications found. Please discuss with provider.          ALLERGIES  Penicillins, Ciprofloxacin, Codeine, Morphine, and Vicodin [hydrocodone-acetaminophen]    PHYSICAL EXAM  BP (!) 146/77   Pulse (!) 54   Temp 36.1 °C (97 °F) (Temporal)   Resp 16   Ht 1.6 m (5' 3\")   Wt 55.8 kg (123 lb)   LMP 07/12/1986   SpO2 99%   BMI " 21.79 kg/m²     Constitutional: Well appearing well-nourished, no apparent distress, no evidence of shock, no evidence of pain  HENT:  Normocephalic, atraumatic, no Quintana sign, raccoon eyes or evidence of CSF drainage, mouth is intact with normal dentition  Eyes: PERRLA, EOMI,   Neck: No midline tenderness or step-offs  Cardiovascular: Regular rate and rhythm, No murmurs, No rubs, No gallops.   Thorax & Lungs: No chest wall tenderness. Normal chest excursions no paradoxical motion, no subcutaneous emphysema, the breath sounds are clear and equal bilaterally, no wheezes, rhonchi, or rales  Abdomen: Abdomen no distention, ecchymosis. The abdomen is normal in appearance normal bowel sounds. There is no rigidity, no rebound  Skin: No lesions, ecchymosis, or gross deformity noted  Back: No tenderness to palpation along the thoracic or lumbar spine at midline, no deformities noted,  :   No CVA tenderness.   Extremities: Good range of motion without tenderness, deformity, pulses 2+ in all 4 extremities  Pelvis: No laxity or tenderness with palpation or compression  Neurologic: Patient is alert and oriented to person place and time. Cranial nerves III through XII are intact. Sensory and motor functions are intact. Strength is 5 out of 5 for flexion and extension in all 4 extremities. No evidence of incontinence.  Psychiatric: Affect normal, Judgment normal, Mood normal.       DIAGNOSTIC STUDIES & PROCEDURES    Radiology:   The attending Emergency Physician has independently interpreted the diagnostic imaging associated with this visit and is awaiting the final reading from the radiologist, which will be displayed below.    Preliminary interpretation is a follows: no ICH  Radiologist interpretation:    CT-HEAD W/O   Final Result      1.  There is no acute intracranial hemorrhage or infarct.      2.  White matter lucencies most consistent with small vessel ischemic change versus demyelination or gliosis.      3.  There is  cerebral atrophy.         DX-HAND 3+ RIGHT   Final Result      No evidence of fracture or dislocation.   Findings are consistent with osteoarthritis.      DX-FOREARM RIGHT   Final Result      1.  There is no acute right forearm fracture.      DX-ELBOW-COMPLETE 3+ RIGHT   Final Result      1.  Limited right elbow series demonstrating no acute displaced fracture..      DX-HUMERUS 2+ RIGHT   Final Result      No evidence of fracture or dislocation.      DX-SHOULDER 2+ RIGHT   Final Result      1.  There are findings suspicious for possible impacted fracture of the right humeral head and neck junction although fracture line is difficult to visualize.   2.  Interval progression of extensive degenerative changes throughout the right shoulder and rotator cuff attachment.         COURSE & MEDICAL DECISION MAKING    ED Observation Status? No; Patient does not meet criteria for ED Observation.     INITIAL ASSESSMENT AND PLAN  Care Narrative: Dina Crews is a 78 y.o. female who presents following a ground level fall.       5:42 PM - Patient seen and evaluated at bedside.  Patient will be treated with Percocet 5-325 mg for her symptoms. Ordered CT head without, DX-elbow right, DX-forearm right, DX-hand right, DX-shoulder right, and DX-humerus right to evaluate. She understands and agrees to the plan of care.     7:32 PM - Treated patient with Tylenol 650 mg, Roxicodone 5 mg, Neurontin 300 mg, and Motrin 600 mg for her continued pain. Paged Ortho.    7:40 PM - I discussed the patient's case and the above findings with Dr. Brar (Ortho) who would like to see the patient for follow-up as an outpatient.    7:59 PM - Patient was reevaluated at bedside. Discussed radiology results with the patient and informed them of the possible humeral fracture. Patient had the opportunity to ask any questions. The plan for discharge was discussed with them and they were told to return for any new or worsening symptoms. She was also  informed of the plans for follow up. Patient is understanding and agreeable to the plan for discharge.    No C/T/L spine step-offs or deformities, No C/T/L spine tenderness to palpation.   Patient with no chest wall tenderness  Patient with no abdominal tenderness  Patient [place] head CT rule positive therefore used CT to rule out ICH  X-rays concerning for impacted fracture of proximal humeral neck  No evidence of dislocation at this time  Patient initially given Percocet     Prior to discharge and due to pharmacies mostly being close patient given ibuprofen, gabapentin, acetaminophen, oxycodone      Counseled patient regarding gabapentin and oxycodone making falls more likely and patient agrees to take these cautious li and judiciously and only if Tylenol and ibuprofen ineffective at controlling pain                   DISPOSITION AND DISCUSSIONS  I have discussed management of the patient with the following physicians and VINNY's: Dr. Brar (Ortho)    Discussion of management with other [facility] or appropriate source(s): None     Escalation of care considered, and ultimately not performed: acute inpatient care management, however at this time, the patient is most appropriate for outpatient management.    Barriers to care at this time, including but not limited to:  None .     I reviewed prescription monitoring program for patient's narcotic use before prescribing a scheduled drug.The patient will not drink alcohol nor drive with prescribed medications. The patient will return for new or worsening symptoms and is stable at the time of discharge.    The patient is referred to a primary physician for blood pressure management, diabetic screening, and for all other preventative health concerns.    In prescribing controlled substances to this patient, I certify that I have obtained and reviewed the medical history of Dina Crews. I have also made a good shilpi effort to obtain applicable records from other  providers who have treated the patient and records did not demonstrate any increased risk of substance abuse that would prevent me from prescribing controlled substances.     I have conducted a physical exam and documented it. I have reviewed Ms. Crews’s prescription history as maintained by the [place] Prescription Monitoring Program.     I have assessed the patient’s risk for abuse, dependency, and addiction using the validated Opioid Risk Tool available at https://www.mdcalc.com/fdkkoa-dmxn-anun-ort-narcotic-abuse.     Given the above, I believe the benefits of controlled substance therapy outweigh the risks. The reasons for prescribing controlled substances include non-narcotic, oral analgesic alternatives have been inadequate for pain control. Accordingly, I have discussed the risk and benefits, treatment plan, and alternative therapies with the patient.     DISPOSITION:  Patient will be discharged home in stable condition.    FOLLOW UP:  Roger Larson M.D.  [address]  [facility] [id number] [phone]    In 3 days      [facility], Emergency Dept  [address]  [facility] [id number] [phone]    If symptoms worsen    Drake Brar M.D.  [address] [facility] [address]  [phone]      call to schedule follow up appointment      OUTPATIENT MEDICATIONS:  Discharge Medication List as of 2/19/2024  8:10 PM        START taking these medications    Details   acetaminophen (TYLENOL) 500 MG Tab Take 1-2 Tablets by mouth every 6 hours as needed for Mild Pain for up to 7 days., Disp-30 Tablet, R-0, Normal      oxyCODONE immediate-release (ROXICODONE) 5 MG Tab Take 1 Tablet by mouth every four hours as needed for Severe Pain for up to 3 days., Disp-18 Tablet, R-0, Normal      gabapentin (NEURONTIN) 100 MG Cap Take 1 Capsule by mouth 3 times a day for 10 days., Disp-30 Capsule, R-0, Normal      ibuprofen (MOTRIN) 400 MG Tab Take 1 Tablet by mouth  every 6 hours as needed for Mild Pain for up to 4 days., Disp-18 Tablet, R-0, Normal           FINAL IMPRESSION   1. Closed head injury, initial encounter    2. Other closed nondisplaced fracture of proximal end of right humerus, initial encounter    3. Abrasion of face, initial encounter      Brandon HOLCOMB (Scribe), am scribing for, and in the presence of, Carl Salinas M.D..    Electronically signed by: Brandon Zamorano (Scribe), 2/19/2024    ICarl M.D. personally performed the services described in this documentation, as scribed by Brandon Zamorano in my presence, and it is both accurate and complete.    The note accurately reflects work and decisions made by me.  Carl Salinas M.D.  2/19/2024  11:19 PM

## 2024-02-22 ENCOUNTER — APPOINTMENT (OUTPATIENT)
Dept: RADIOLOGY | Facility: MEDICAL CENTER | Age: 79
End: 2024-02-22
Attending: EMERGENCY MEDICINE
Payer: MEDICARE

## 2024-02-22 ENCOUNTER — HOSPITAL ENCOUNTER (EMERGENCY)
Facility: MEDICAL CENTER | Age: 79
End: 2024-02-22
Attending: EMERGENCY MEDICINE
Payer: MEDICARE

## 2024-02-22 VITALS
WEIGHT: 119.71 LBS | TEMPERATURE: 100 F | HEART RATE: 81 BPM | HEIGHT: 63 IN | SYSTOLIC BLOOD PRESSURE: 159 MMHG | DIASTOLIC BLOOD PRESSURE: 91 MMHG | OXYGEN SATURATION: 95 % | BODY MASS INDEX: 21.21 KG/M2 | RESPIRATION RATE: 16 BRPM
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
## 2024-02-22 DIAGNOSIS — S42.201A CLOSED FRACTURE OF PROXIMAL END OF RIGHT HUMERUS, UNSPECIFIED FRACTURE MORPHOLOGY, INITIAL ENCOUNTER: ICD-10-CM

## 2024-02-22 PROCEDURE — 96372 THER/PROPH/DIAG INJ SC/IM: CPT

## 2024-02-22 PROCEDURE — 73030 X-RAY EXAM OF SHOULDER: CPT | Mod: RT

## 2024-02-22 PROCEDURE — 99284 EMERGENCY DEPT VISIT MOD MDM: CPT

## 2024-02-22 PROCEDURE — 73060 X-RAY EXAM OF HUMERUS: CPT | Mod: RT

## 2024-02-22 PROCEDURE — 700111 HCHG RX REV CODE 636 W/ 250 OVERRIDE (IP): Mod: JZ | Performed by: EMERGENCY MEDICINE

## 2024-02-22 RX ORDER — OXYCODONE HYDROCHLORIDE 10 MG/1
10 TABLET ORAL EVERY 4 HOURS PRN
Qty: 18 TABLET | Refills: 0 | Status: SHIPPED | OUTPATIENT
Start: 2024-02-22 | End: 2024-02-27

## 2024-02-22 RX ORDER — OXYCODONE HYDROCHLORIDE 10 MG/1
10 TABLET ORAL EVERY 4 HOURS PRN
Qty: 18 TABLET | Refills: 0 | Status: SHIPPED | OUTPATIENT
Start: 2024-02-22 | End: 2024-02-22

## 2024-02-22 RX ORDER — HYDROMORPHONE HYDROCHLORIDE 1 MG/ML
1 INJECTION, SOLUTION INTRAMUSCULAR; INTRAVENOUS; SUBCUTANEOUS ONCE
Status: COMPLETED | OUTPATIENT
Start: 2024-02-22 | End: 2024-02-22

## 2024-02-22 RX ORDER — HYDROMORPHONE HYDROCHLORIDE 1 MG/ML
1 INJECTION, SOLUTION INTRAMUSCULAR; INTRAVENOUS; SUBCUTANEOUS ONCE
Status: DISCONTINUED | OUTPATIENT
Start: 2024-02-22 | End: 2024-02-22

## 2024-02-22 RX ADMIN — HYDROMORPHONE HYDROCHLORIDE 1 MG: 1 INJECTION, SOLUTION INTRAMUSCULAR; INTRAVENOUS; SUBCUTANEOUS at 14:40

## 2024-02-22 NOTE — ED TRIAGE NOTES
Pt appears uncomfortable Splinting her RUE with other arm   Moaning and wincing    Pt states she had to take 2 oxycodone and went through them faster

## 2024-02-22 NOTE — DISCHARGE INSTRUCTIONS
Please discuss the following findings with your regular doctor:  DX-HUMERUS 2+ RIGHT   Final Result            Acute comminuted fracture of the right greater tuberosity.      DX-SHOULDER 2+ RIGHT   Final Result         Acute comminuted fracture of the right greater tuberosity.        Labs Reviewed - No data to display

## 2024-02-22 NOTE — ED PROVIDER NOTES
ER Provider Note    Scribed for Carl Salinas M.d. by Venkata Madison. 2/22/2024  2:15 PM    Primary Care Provider: Roger Larson M.D.    CHIEF COMPLAINT  Chief Complaint   Patient presents with    Arm Pain     Pt s/p GLF trip and fall on Tuesday  Dx RT humerous fracture and CHI  C/O unbearable arm pain and wants  more pain meds  Pt not wearing sling o/A  Pt has not made her ortho F/U luba't     EXTERNAL RECORDS REVIEWED  Other Reviewed note written by myself from patient's ED visit on 2/19/24    HPI/ROS  LIMITATION TO HISTORY   Select: : None    OUTSIDE HISTORIAN(S):  None     Dina Crews is a 78 y.o. female who presents to the ED for evaluation of right arm pain, onset two days ago. The patient was seen in the ED three days ago after a ground level fall. Imaging at this time revealed a possible fracture of the right humeral head and neck. The patient was discharged with a prescription for Ibuprofen and Tylenol, as well as Oxycodone and Gabapentin. Since being discharged the patient states her pain has gotten significantly worse. She has taken all the prescribed oxycodone and her other medications do not adequately control her pain. This prompted her to return to the ED for further evaluation and pain control. She denies any other known pain or injuries at this time. The patient does not take blood thinners.     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 at SURGERY SAME DAY [facility] ORS    LOW ANTERIOR RESECTION ROBOTIC  1/28/2014    Performed by Ismael Rocha M.D. at SURGERY [facility] ORS    ABDOMINAL HYSTERECTOMY TOTAL      HYSTERECTOMY, VAGINAL      and BSO    OTHER ABDOMINAL SURGERY      partial colectomy    PRIMARY C SECTION      REPEAT C SECTION      SHOULDER ARTHROSCOPY         FAMILY HISTORY  Family History   Problem Relation Age of Onset    Lung Disease Mother         COPD    Cancer Mother         Thyroid cancer    Cancer Father         d. 72 Stomach cancer    Diabetes Father     Cancer Sister 40        Breast cancer    GI Disease Sister         diverticulitis    Cancer Sister         breast       SOCIAL HISTORY   reports that she has been smoking cigarettes. She started smoking about 60 years ago. She has a 24 pack-year smoking history. She has never used smokeless tobacco. She reports current drug use. Drugs: Oral and Marijuana. She reports that she does not drink alcohol.    CURRENT MEDICATIONS  Previous Medications    ACETAMINOPHEN (TYLENOL) 500 MG TAB    Take 1-2 Tablets by mouth every 6 hours as needed for Mild Pain for up to 7 days.    CHOLECALCIFEROL 2000 UNIT TAB    Take 2,000 Units by mouth every day.    CYCLOBENZAPRINE (FLEXERIL) 5 MG TABLET    Take 1 Tablet by mouth 3 times a day as needed for Moderate Pain.    CYCLOBENZAPRINE (FLEXERIL) 5 MG TABLET    Take 1 Tablet by mouth 3 times a day as needed for Muscle Spasms.    ESCITALOPRAM (LEXAPRO) 20 MG TABLET    Take 1 Tablet by mouth every day.    GABAPENTIN (NEURONTIN) 100 MG CAP    Take 1 Capsule by mouth 3 times a day for 10 days.    IBUPROFEN (MOTRIN) 400 MG TAB    Take 1 Tablet by mouth every 6 hours as needed for Mild Pain for up to 4 days.     "OXYCODONE IMMEDIATE-RELEASE (ROXICODONE) 5 MG TAB    Take 1 Tablet by mouth every four hours as needed for Severe Pain for up to 3 days.    POLYETHYLENE GLYCOL/LYTES (MIRALAX) PACK    Take 1 Packet by mouth 1 time daily as needed. No good BM in 24 hrs       ALLERGIES  Penicillins, Ciprofloxacin, Codeine, Morphine, and Vicodin [hydrocodone-acetaminophen]    PHYSICAL EXAM  BP (!) 159/91   Pulse 81   Temp 37.8 °C (100 °F) (Temporal)   Resp 16   Ht 1.6 m (5' 3\")   Wt 54.3 kg (119 lb 11.4 oz)   LMP 07/12/1986   SpO2 95%   BMI 21.21 kg/m²   Constitutional: Alert in no apparent distress.  HENT: No signs of trauma, Bilateral external ears normal, Nose normal. Uvula midline.   Eyes: Pupils are equal and reactive, Conjunctiva normal, Non-icteric.   Neck: Normal range of motion, No tenderness, Supple, No stridor.   Lymphatic: No lymphadenopathy noted.   Cardiovascular: Regular rate and rhythm, no murmurs.   Thorax & Lungs: Normal breath sounds, No respiratory distress, No wheezing, No chest tenderness.   Abdomen: Soft, No tenderness, No peritoneal signs, No masses, No pulsatile masses.   Skin: Warm, Dry, No erythema, No rash.   Back: No bony tenderness, No CVA tenderness.   Extremities: Intact distal pulses, No edema, Diffuse bruising throughout right humerus, Normal radial, median, and ulnar nerve function bilaterally. Less than 3 second capillary refill and 2+ peripheral pulses bilaterally. Normal flexion and extension.  SILT distally.   Musculoskeletal: Good range of motion in all major joints. No tenderness to palpation or major deformities noted.   Neurologic: Alert , Normal motor function, Normal sensory function, No focal deficits noted.   Psychiatric: Affect normal, Judgment normal, Mood normal.          DIAGNOSTIC STUDIES    Radiology:   The attending emergency physician has independently interpreted the diagnostic imaging associated with this visit and am waiting the final reading from the radiologist. "   Preliminary interpretation is a follows: +fx    Radiologist interpretation:     DX-HUMERUS 2+ RIGHT   Final Result            Acute comminuted fracture of the right greater tuberosity.      DX-SHOULDER 2+ RIGHT   Final Result         Acute comminuted fracture of the right greater tuberosity.           COURSE & MEDICAL DECISION MAKING     ED Observation Status? No; Patient does not meet criteria for ED Observation.     INITIAL ASSESSMENT, COURSE AND PLAN  Care Narrative: 78 y.o. F p/w CC of right arm pain, onset two days ago     2:15 PM - Patient seen and examined at bedside. Discussed plan of care, including imaging of the patient's arm and medication for her pain. Patient agrees to the plan of care. The patient will be medicated with Dilaudid injection 1 mg. Ordered for DX-Humerus right and DX-Shoulder right to evaluate her symptoms.      2:56 PM Paged Orthopedics.     3:26 PM I discussed the patient's case and the above findings with Dr. Heath (Ortho) who agreed to follow-up with the patient as an outpatient.    3:46 PM The patient will be medicated with Oxycodone one tablet 10 mg prior to discharge.  She will be provided with discharge instructions, and specifically instructed to call this time to ensure Orthopedic follow-up.   Patient with breakthrough pain from fracture noted by me on last visit  Patient given additional oxycodone for home and instructed to call orthopedic surgery for follow-up appointment      DISPOSITION AND DISCUSSIONS  I have discussed management of the patient with the following physicians and VINNY's:    Dr. Heath (Ortho)     Escalation of care considered, and ultimately not performed: acute inpatient care management, however at this time, the patient is most appropriate for outpatient management.    Barriers to care at this time, including but not limited to:  None known .     The patient will return for new or worsening symptoms and is stable at the time of discharge.    I reviewed  prescription monitoring program for patient's narcotic use before prescribing a scheduled drug.The patient will not drink alcohol nor drive with prescribed medications. The patient will return for new or worsening symptoms and is stable at the time of discharge.    The patient is referred to a primary physician for blood pressure management, diabetic screening, and for all other preventative health concerns.    In prescribing controlled substances to this patient, I certify that I have obtained and reviewed the medical history of Dina Crews. I have also made a good shilpi effort to obtain applicable records from other providers who have treated the patient and records did not demonstrate any increased risk of substance abuse that would prevent me from prescribing controlled substances.     I have conducted a physical exam and documented it. I have reviewed Ms. Crews’s prescription history as maintained by the [place] Prescription Monitoring Program.     I have assessed the patient’s risk for abuse, dependency, and addiction using the validated Opioid Risk Tool available at https://www.mdcalc.com/kbozua-hcnk-chen-ort-narcotic-abuse.     Given the above, I believe the benefits of controlled substance therapy outweigh the risks. The reasons for prescribing controlled substances include non-narcotic, oral analgesic alternatives have been inadequate for pain control. Accordingly, I have discussed the risk and benefits, treatment plan, and alternative therapies with the patient.      DISPOSITION:  Patient will be discharged home in stable condition.    FOLLOW UP:  Burt Heath M.D.  [address] [place] [id number] [phone]      call to schedule orthopedic surgery follow up    [facility], Emergency Dept  [address]  [place] [id number] [phone]    If symptoms worsen      OUTPATIENT MEDICATIONS:  New Prescriptions    OXYCODONE IMMEDIATE-RELEASE  (ROXICODONE) 10 MG IMMEDIATE RELEASE TABLET    Take 1 Tablet by mouth every four hours as needed for Severe Pain for up to 5 days.         FINAL DIAGNOSIS  1. Closed fracture of proximal end of right humerus, unspecified fracture morphology, initial encounter    2. Fall, initial encounter        Venkata HOLCOMB (Scribe), am scribing for, and in the presence of, Carl Salinas M.D..    Electronically signed by: Venkata Madison (Scribe), 2/22/2024    ICarl M.D. personally performed the services described in this documentation, as scribed by Venkata Madison in my presence, and it is both accurate and complete.      The note accurately reflects work and decisions made by me.  Carl Salinas M.D.  2/22/2024  5:35 PM

## 2024-04-05 ENCOUNTER — OFFICE VISIT (OUTPATIENT)
Dept: MEDICAL GROUP | Facility: LAB | Age: 79
End: 2024-04-05
Payer: MEDICARE

## 2024-04-05 VITALS
SYSTOLIC BLOOD PRESSURE: 130 MMHG | OXYGEN SATURATION: 98 % | HEART RATE: 69 BPM | HEIGHT: 63 IN | WEIGHT: 116 LBS | TEMPERATURE: 97.8 F | DIASTOLIC BLOOD PRESSURE: 70 MMHG | BODY MASS INDEX: 20.55 KG/M2 | RESPIRATION RATE: 12 BRPM

## 2024-04-05 DIAGNOSIS — F41.1 GAD (GENERALIZED ANXIETY DISORDER): ICD-10-CM

## 2024-04-05 DIAGNOSIS — R63.4 WEIGHT LOSS: ICD-10-CM

## 2024-04-05 DIAGNOSIS — R73.03 PREDIABETES: ICD-10-CM

## 2024-04-05 DIAGNOSIS — R79.89 ELEVATED TSH: ICD-10-CM

## 2024-04-05 DIAGNOSIS — F33.2 SEVERE EPISODE OF RECURRENT MAJOR DEPRESSIVE DISORDER, WITHOUT PSYCHOTIC FEATURES (HCC): ICD-10-CM

## 2024-04-05 DIAGNOSIS — E78.00 PURE HYPERCHOLESTEROLEMIA: ICD-10-CM

## 2024-04-05 PROCEDURE — 3075F SYST BP GE 130 - 139MM HG: CPT | Performed by: FAMILY MEDICINE

## 2024-04-05 PROCEDURE — 3078F DIAST BP <80 MM HG: CPT | Performed by: FAMILY MEDICINE

## 2024-04-05 PROCEDURE — 99214 OFFICE O/P EST MOD 30 MIN: CPT | Performed by: FAMILY MEDICINE

## 2024-04-05 RX ORDER — BUSPIRONE HYDROCHLORIDE 5 MG/1
5 TABLET ORAL 2 TIMES DAILY
Qty: 60 TABLET | Refills: 3 | Status: SHIPPED | OUTPATIENT
Start: 2024-04-05

## 2024-04-05 RX ORDER — LORAZEPAM 1 MG/1
.5-1 TABLET ORAL EVERY 4 HOURS PRN
Qty: 30 TABLET | Refills: 0 | Status: SHIPPED | OUTPATIENT
Start: 2024-04-05 | End: 2024-05-05

## 2024-04-05 NOTE — PROGRESS NOTES
"Subjective:     CC: Anxiety. Weight loss    HPI:   Dina presents today discuss anxiety and weight loss.  History of chronic depression and anxiety but anxiety worsened recently after she had a fall with right humerus fracture.  Following with orthopedics and is now out of the sling and doing PT.  She continues on Lexapro 20 mg daily but also uses Ativan intermittently for episodic anxiety.  Does feel that this is very helpful for her at times severe anxiety symptoms.  Does not take when driving and she is careful to avoid activities with fall risk after taking the Ativan.  She is no longer taking oxycodone for the arm fracture.    She is also had about 7 pounds weight loss over the last 3 months.  Appetite appetite does seem to be down.  She has chronic fatigue but nothing worse.  No persistent night sweats or fevers.  No nausea, vomiting, or diarrhea.  No chronic cough.    Medications, past medical history, allergies, and social history have been reviewed and updated.      Objective:       Exam:  /70   Pulse 69   Temp 36.6 °C (97.8 °F) (Temporal)   Resp 12   Ht 1.6 m (5' 3\")   Wt 52.6 kg (116 lb)   LMP 07/12/1986   SpO2 98%   BMI 20.55 kg/m²  Body mass index is 20.55 kg/m².    Constitutional: Alert. Well appearing. No distress.  Skin: Warm, dry, good turgor, no visible rashes.  ENMT: Moist mucous membranes.   Neck: Supple, no cervical LAD, no supraclavicular LAD.  Respiratory: Normal effort. Lungs are clear to auscultation bilaterally.  Cardiovascular: Regular rate and rhythm. Normal S1/S2. No murmurs, rubs or gallops.   Neuro: Moves all four extremities. No facial droop.  Psych: Answers questions appropriately. Normal affect and mood.      Assessment & Plan:     78 y.o. female with the following -     1. JUNIOR (generalized anxiety disorder)  2. Severe episode of recurrent major depressive disorder, without psychotic features (HCC)  Persistent and severe anxiety symptoms.  Continue Lexapro 20 mg " daily, add BuSpar.  Continue Ativan as needed for episodic severe anxiety.  Discussed trying to limit use, not taking with other sedating medications, avoiding activities with fall risk after taking.  Close follow-up scheduled in 1 month.  - busPIRone (BUSPAR) 5 MG tablet; Take 1 Tablet by mouth 2 times a day.  Dispense: 60 Tablet; Refill: 3  - LORazepam (ATIVAN) 1 MG Tab; Take 0.5-1 Tablets by mouth every four hours as needed for Anxiety for up to 30 days.  Dispense: 30 Tablet; Refill: 0    3. Elevated TSH  Follow TSH  - TSH WITH REFLEX TO FT4; Future    4. Prediabetes  Follow A1c  - CBC WITH DIFFERENTIAL; Future  - Comp Metabolic Panel; Future  - HEMOGLOBIN A1C; Future    5. Weight loss  7 pound weight loss over 3 months.  Suspect likely secondary to poor appetite with anxiety and she does not have concerning associated symptoms besides some chronic fatigue.  Checking labs including thyroid and inflammatory markers.  Close follow-up in 1 month.  - TSH WITH REFLEX TO FT4; Future  - Sed Rate; Future  - CRP QUANTITIVE (NON-CARDIAC); Future    6. Pure hypercholesterolemia  Check lipids  - Lipid Profile; Future      Please note that this note was created using voice recognition software.

## 2024-04-17 ENCOUNTER — TELEPHONE (OUTPATIENT)
Dept: HEALTH INFORMATION MANAGEMENT | Facility: OTHER | Age: 79
End: 2024-04-17

## 2024-05-01 ENCOUNTER — HOSPITAL ENCOUNTER (OUTPATIENT)
Dept: LAB | Facility: MEDICAL CENTER | Age: 79
End: 2024-05-01
Attending: FAMILY MEDICINE
Payer: MEDICARE

## 2024-05-01 DIAGNOSIS — R63.4 WEIGHT LOSS: ICD-10-CM

## 2024-05-01 DIAGNOSIS — E78.00 PURE HYPERCHOLESTEROLEMIA: ICD-10-CM

## 2024-05-01 DIAGNOSIS — R73.03 PREDIABETES: ICD-10-CM

## 2024-05-01 DIAGNOSIS — R79.89 ELEVATED TSH: ICD-10-CM

## 2024-05-01 LAB
ALBUMIN SERPL BCP-MCNC: 4.2 G/DL (ref 3.2–4.9)
ALBUMIN/GLOB SERPL: 1.4 G/DL
ALP SERPL-CCNC: 93 U/L (ref 30–99)
ALT SERPL-CCNC: 6 U/L (ref 2–50)
ANION GAP SERPL CALC-SCNC: 10 MMOL/L (ref 7–16)
AST SERPL-CCNC: 12 U/L (ref 12–45)
BASOPHILS # BLD AUTO: 1.2 % (ref 0–1.8)
BASOPHILS # BLD: 0.08 K/UL (ref 0–0.12)
BILIRUB SERPL-MCNC: 0.3 MG/DL (ref 0.1–1.5)
BUN SERPL-MCNC: 13 MG/DL (ref 8–22)
CALCIUM ALBUM COR SERPL-MCNC: 9.3 MG/DL (ref 8.5–10.5)
CALCIUM SERPL-MCNC: 9.5 MG/DL (ref 8.4–10.2)
CHLORIDE SERPL-SCNC: 106 MMOL/L (ref 96–112)
CHOLEST SERPL-MCNC: 187 MG/DL (ref 100–199)
CO2 SERPL-SCNC: 25 MMOL/L (ref 20–33)
CREAT SERPL-MCNC: 0.59 MG/DL (ref 0.5–1.4)
CRP SERPL HS-MCNC: <0.3 MG/DL (ref 0–0.75)
EOSINOPHIL # BLD AUTO: 0.15 K/UL (ref 0–0.51)
EOSINOPHIL NFR BLD: 2.3 % (ref 0–6.9)
ERYTHROCYTE [DISTWIDTH] IN BLOOD BY AUTOMATED COUNT: 53.8 FL (ref 35.9–50)
ERYTHROCYTE [SEDIMENTATION RATE] IN BLOOD BY WESTERGREN METHOD: 5 MM/HOUR (ref 0–25)
EST. AVERAGE GLUCOSE BLD GHB EST-MCNC: 111 MG/DL
FASTING STATUS PATIENT QL REPORTED: NORMAL
GFR SERPLBLD CREATININE-BSD FMLA CKD-EPI: 93 ML/MIN/1.73 M 2
GLOBULIN SER CALC-MCNC: 2.9 G/DL (ref 1.9–3.5)
GLUCOSE SERPL-MCNC: 92 MG/DL (ref 65–99)
HBA1C MFR BLD: 5.5 % (ref 4–5.6)
HCT VFR BLD AUTO: 43.3 % (ref 37–47)
HDLC SERPL-MCNC: 63 MG/DL
HGB BLD-MCNC: 14.2 G/DL (ref 12–16)
IMM GRANULOCYTES # BLD AUTO: 0.02 K/UL (ref 0–0.11)
IMM GRANULOCYTES NFR BLD AUTO: 0.3 % (ref 0–0.9)
LDLC SERPL CALC-MCNC: 105 MG/DL
LYMPHOCYTES # BLD AUTO: 1.62 K/UL (ref 1–4.8)
LYMPHOCYTES NFR BLD: 24.7 % (ref 22–41)
MCH RBC QN AUTO: 31.3 PG (ref 27–33)
MCHC RBC AUTO-ENTMCNC: 32.8 G/DL (ref 32.2–35.5)
MCV RBC AUTO: 95.4 FL (ref 81.4–97.8)
MONOCYTES # BLD AUTO: 0.42 K/UL (ref 0–0.85)
MONOCYTES NFR BLD AUTO: 6.4 % (ref 0–13.4)
NEUTROPHILS # BLD AUTO: 4.27 K/UL (ref 1.82–7.42)
NEUTROPHILS NFR BLD: 65.1 % (ref 44–72)
NRBC # BLD AUTO: 0 K/UL
NRBC BLD-RTO: 0 /100 WBC (ref 0–0.2)
PLATELET # BLD AUTO: 322 K/UL (ref 164–446)
PMV BLD AUTO: 10 FL (ref 9–12.9)
POTASSIUM SERPL-SCNC: 4.1 MMOL/L (ref 3.6–5.5)
PROT SERPL-MCNC: 7.1 G/DL (ref 6–8.2)
RBC # BLD AUTO: 4.54 M/UL (ref 4.2–5.4)
SODIUM SERPL-SCNC: 141 MMOL/L (ref 135–145)
TRIGL SERPL-MCNC: 93 MG/DL (ref 0–149)
TSH SERPL DL<=0.005 MIU/L-ACNC: 3.66 UIU/ML (ref 0.38–5.33)
WBC # BLD AUTO: 6.6 K/UL (ref 4.8–10.8)

## 2024-05-03 ENCOUNTER — OFFICE VISIT (OUTPATIENT)
Dept: MEDICAL GROUP | Facility: LAB | Age: 79
End: 2024-05-03
Payer: MEDICARE

## 2024-05-03 VITALS
HEART RATE: 72 BPM | OXYGEN SATURATION: 98 % | RESPIRATION RATE: 12 BRPM | BODY MASS INDEX: 20.73 KG/M2 | TEMPERATURE: 98.1 F | HEIGHT: 63 IN | WEIGHT: 117 LBS | DIASTOLIC BLOOD PRESSURE: 70 MMHG | SYSTOLIC BLOOD PRESSURE: 130 MMHG

## 2024-05-03 DIAGNOSIS — R63.4 WEIGHT LOSS: ICD-10-CM

## 2024-05-03 DIAGNOSIS — F33.2 SEVERE EPISODE OF RECURRENT MAJOR DEPRESSIVE DISORDER, WITHOUT PSYCHOTIC FEATURES (HCC): ICD-10-CM

## 2024-05-03 DIAGNOSIS — F41.1 GAD (GENERALIZED ANXIETY DISORDER): ICD-10-CM

## 2024-05-03 PROCEDURE — 99214 OFFICE O/P EST MOD 30 MIN: CPT | Performed by: FAMILY MEDICINE

## 2024-05-03 PROCEDURE — 3078F DIAST BP <80 MM HG: CPT | Performed by: FAMILY MEDICINE

## 2024-05-03 PROCEDURE — 3075F SYST BP GE 130 - 139MM HG: CPT | Performed by: FAMILY MEDICINE

## 2024-05-03 ASSESSMENT — PATIENT HEALTH QUESTIONNAIRE - PHQ9
SUM OF ALL RESPONSES TO PHQ QUESTIONS 1-9: 6
5. POOR APPETITE OR OVEREATING: 0 - NOT AT ALL
CLINICAL INTERPRETATION OF PHQ2 SCORE: 4

## 2024-05-03 ASSESSMENT — ANXIETY QUESTIONNAIRES
2. NOT BEING ABLE TO STOP OR CONTROL WORRYING: SEVERAL DAYS
1. FEELING NERVOUS, ANXIOUS, OR ON EDGE: SEVERAL DAYS
GAD7 TOTAL SCORE: 4
5. BEING SO RESTLESS THAT IT IS HARD TO SIT STILL: NOT AT ALL
IF YOU CHECKED OFF ANY PROBLEMS ON THIS QUESTIONNAIRE, HOW DIFFICULT HAVE THESE PROBLEMS MADE IT FOR YOU TO DO YOUR WORK, TAKE CARE OF THINGS AT HOME, OR GET ALONG WITH OTHER PEOPLE: SOMEWHAT DIFFICULT
6. BECOMING EASILY ANNOYED OR IRRITABLE: SEVERAL DAYS
7. FEELING AFRAID AS IF SOMETHING AWFUL MIGHT HAPPEN: NOT AT ALL
4. TROUBLE RELAXING: NOT AT ALL
3. WORRYING TOO MUCH ABOUT DIFFERENT THINGS: SEVERAL DAYS

## 2024-05-03 ASSESSMENT — FIBROSIS 4 INDEX: FIB4 SCORE: 1.16

## 2024-05-03 NOTE — PROGRESS NOTES
Subjective:     CC: Follow-up depression and anxiety    HPI:   Dina presents today to follow-up on depression and anxiety.  She does note that mood seems much improved recently.  She continues on Lexapro 20 mg daily.  Did not end up starting the BuSpar.  Very little lorazepam use.    JUNIOR-7 Questionnaire    Feeling nervous, anxious, or on edge: Several days  Not being able to sop or control worrying: Several days  Worrying too much about different things: Several days  Trouble relaxing: Not at all  Being so restless that it's hard to sit still: Not at all  Becoming easily annoyed or irritable: Several days  Feeling afraid as if something awful might happen: Not at all  Total: 4    Interpretation of JUNIOR 7 Total Score   Score Severity :  0-4 No Anxiety   5-9 Mild Anxiety  10-14 Moderate Anxiety  15-21 Severe Anxiety     Depression Screening    Little interest or pleasure in doing things?  3 - nearly every day   Feeling down, depressed , or hopeless? 1 - several days   Trouble falling or staying asleep, or sleeping too much?  0 - not at all   Feeling tired or having little energy?  2 - more than half the days   Poor appetite or overeating?  0 - not at all   Feeling bad about yourself - or that you are a failure or have let yourself or your family down? 0 - not at all   Trouble concentrating on things, such as reading the newspaper or watching television? 0 - not at all   Moving or speaking so slowly that other people could have noticed.  Or the opposite - being so fidgety or restless that you have been moving around a lot more than usual?  0 - not at all   Thoughts that you would be better off dead, or of hurting yourself?  0 - not at all   Patient Health Questionnaire Score: 6       If depressive symptoms identified deferred to follow up visit unless specifically addressed in assesment and plan.    Interpretation of PHQ-9 Total Score   Score Severity   1-4 No Depression   5-9 Mild Depression   10-14 Moderate  "Depression   15-19 Moderately Severe Depression   20-27 Severe Depression    Medications, past medical history, allergies, and social history have been reviewed and updated.      Objective:       Exam:  /70   Pulse 72   Temp 36.7 °C (98.1 °F) (Temporal)   Resp 12   Ht 1.6 m (5' 3\")   Wt 53.1 kg (117 lb)   LMP 07/12/1986   SpO2 98%   BMI 20.73 kg/m²  Body mass index is 20.73 kg/m².    Constitutional: Alert. Well appearing. No distress.  Respiratory: Normal effort.   Neuro: Moves all four extremities. No facial droop.  Psych: Answers questions appropriately. Normal affect and mood.      Assessment & Plan:     78 y.o. female with the following -     1. JUNIOR (generalized anxiety disorder)  2. Severe episode of recurrent major depressive disorder, without psychotic features (HCC)  Improving, continue Lexapro 20 mg daily.  She had not yet started the BuSpar and was thinking of using this as needed.  We discussed that it works best as a scheduled medication and she may consider starting it.  Has had to use Ativan very little and discussed continuing to try to limit use.     3. Weight loss  Stabilized with 1 pound weight gain since last visit.  Lab workup reassuring.  Suspect this was more likely secondary to low appetite with anxiety and depression.  Holding on any additional workup for now but can workup further in the future if needed.      Please note that this note was created using voice recognition software.      " Show Asc Variables: Yes

## 2024-05-28 ENCOUNTER — APPOINTMENT (RX ONLY)
Dept: URBAN - METROPOLITAN AREA CLINIC 35 | Facility: CLINIC | Age: 79
Setting detail: DERMATOLOGY
End: 2024-05-28

## 2024-05-28 DIAGNOSIS — D485 NEOPLASM OF UNCERTAIN BEHAVIOR OF SKIN: ICD-10-CM | Status: INADEQUATELY CONTROLLED

## 2024-05-28 PROBLEM — D48.5 NEOPLASM OF UNCERTAIN BEHAVIOR OF SKIN: Status: ACTIVE | Noted: 2024-05-28

## 2024-05-28 PROCEDURE — 11102 TANGNTL BX SKIN SINGLE LES: CPT

## 2024-05-28 PROCEDURE — ? SURGICAL DECISION MAKING

## 2024-05-28 PROCEDURE — ? BIOPSY BY SHAVE METHOD

## 2024-05-28 ASSESSMENT — LOCATION SIMPLE DESCRIPTION DERM: LOCATION SIMPLE: RIGHT ZYGOMA

## 2024-05-28 ASSESSMENT — LOCATION DETAILED DESCRIPTION DERM: LOCATION DETAILED: RIGHT LATERAL ZYGOMA

## 2024-05-28 ASSESSMENT — LOCATION ZONE DERM: LOCATION ZONE: FACE

## 2024-05-28 NOTE — PROCEDURE: SURGICAL DECISION MAKING
Discussion: We discussed not only the risks of the procedure but also the likely causey that further treatment will be required to treat lesion. This could involve another visit here to destroy or excise  lesion, a visit to a Mohs or general or plastic surgeon, scarring, limited  activity, cosmetic imperfections.
Identified Risk Factors Documented?: yes
Date Of Surgery - Today Or Tomorrow?: today
Complexity (Necessary For Coding; Major - 90 Day Global With Some Exceptions; Minor - 10 Day Global): minor
Risk Assessment Explanation (Does Not Render In The Note): Clinical determination of the probability and/or consequences of an event, such as surgery. Clinical assessment of the level of risk is affected by the nature of the event under consideration for the patient. Modifier 57 is used to indicate an Evaluation and Management (E/M) service resulted in the initial decision to perform surgery either the day before a major surgery (90 day global) or the day of a major surgery.

## 2024-05-28 NOTE — PROCEDURE: BIOPSY BY SHAVE METHOD
Detail Level: Detailed
Depth Of Biopsy: dermis
Was A Bandage Applied: Yes
Size Of Lesion In Cm: 0.7
X Size Of Lesion In Cm: 0
Biopsy Type: H and E
Biopsy Method: Dermablade
Anesthesia Type: 1% lidocaine without epinephrine
Hemostasis: Aluminum Chloride
Wound Care: Petrolatum
Dressing: pressure dressing
Destruction After The Procedure: No
Type Of Destruction Used: Curettage
Curettage Text: The wound bed was treated with curettage after the biopsy was performed.
Electrodesiccation And Curettage Text: The wound bed was treated with electrodesiccation and curettage after the biopsy was
Lab: [id number]
Lab Facility: 
Consent: Verbal consent was obtained and risks were reviewed including but not limited to scarring, infection, bleeding, scabbing, incomplete removal, nerve damage and allergy to anesthesia.
Post-Care Instructions: I reviewed with the patient in detail post-care instructions. Patient is to keep the biopsy site dry overnight, and then apply white petrolatum twice daily until healed. Patient may apply hydrogen peroxide soaks to remove any crusting.
Notification Instructions: Patient will be notified of biopsy results. However, patient instructed to call the office if not contacted within 2 weeks.
Billing Type: Third-Party Bill
Information: Selecting Yes will display possible errors in your note based on the variables you have selected. This validation is only offered as a suggestion for you. PLEASE NOTE THAT THE VALIDATION TEXT WILL BE REMOVED WHEN YOU FINALIZE YOUR NOTE. IF YOU WANT TO FAX A PRELIMINARY NOTE YOU WILL NEED TO TOGGLE THIS TO 'NO' IF YOU DO NOT WANT IT IN YOUR FAXED NOTE.

## 2024-06-06 ENCOUNTER — APPOINTMENT (RX ONLY)
Dept: URBAN - METROPOLITAN AREA CLINIC 36 | Facility: CLINIC | Age: 79
Setting detail: DERMATOLOGY
End: 2024-06-06

## 2024-06-06 PROBLEM — C44.319 BASAL CELL CARCINOMA OF SKIN OF OTHER PARTS OF FACE: Status: ACTIVE | Noted: 2024-06-06

## 2024-06-06 PROCEDURE — 13132 CMPLX RPR F/C/C/M/N/AX/G/H/F: CPT

## 2024-06-06 PROCEDURE — 17311 MOHS 1 STAGE H/N/HF/G: CPT

## 2024-06-06 PROCEDURE — ? MOHS SURGERY

## 2024-06-06 NOTE — PROCEDURE: MOHS SURGERY
Epidermal Autograft Text: The defect edges were debeveled with a #15 scalpel blade.  Given the location of the defect, shape of the defect and the proximity to free margins an epidermal autograft was deemed most appropriate.  Using a sterile surgical marker, the primary defect shape was transferred to the donor site. The epidermal graft was then harvested.  The skin graft was then placed in the primary defect and oriented appropriately.
Display The Individual Mohs Indications As Separate Paragraphs: Yes
Cheek-To-Nose Interpolation Flap Text: A decision was made to reconstruct the defect utilizing an interpolation axial flap and a staged reconstruction.  A telfa template was made of the defect.  This telfa template was then used to outline the Cheek-To-Nose Interpolation flap.  The donor area for the pedicle flap was then injected with anesthesia.  The flap was excised through the skin and subcutaneous tissue down to the layer of the underlying musculature.  The interpolation flap was carefully excised within this deep plane to maintain its blood supply.  The edges of the donor site were undermined.   The donor site was closed in a primary fashion.  The pedicle was then rotated into position and sutured.  Once the tube was sutured into place, adequate blood supply was confirmed with blanching and refill.  The pedicle was then wrapped with xeroform gauze and dressed appropriately with a telfa and gauze bandage to ensure continued blood supply and protect the attached pedicle.
Intermediate Repair And Flap Additional Text (Will Appearing After The Standard Complex Repair Text): The intermediate repair was not sufficient to completely close the primary defect. The remaining additional defect was repaired with the flap mentioned below.
Special Stains Stage 12 - Results: Base On Clearance Noted Above
Staging Info: By selecting yes to the question above you will include information on AJCC 8 tumor staging in your Mohs note. Information on tumor staging will be automatically added for SCCs on the head and neck. AJCC 8 includes tumor size, tumor depth, perineural involvement and bone invasion.
Complex Requirements: Involvement Of Free Margin?: No
Trilobed Flap Text: The defect edges were debeveled with a #15 scalpel blade.  Given the location of the defect and the proximity to free margins a trilobed flap was deemed most appropriate.  Using a sterile surgical marker, an appropriate trilobed flap drawn around the defect.    The area thus outlined was incised deep to adipose tissue with a #15 scalpel blade.  The skin margins were undermined to an appropriate distance in all directions utilizing iris scissors.
Double O-Z Flap Text: The defect edges were debeveled with a #15 scalpel blade.  Given the location of the defect, shape of the defect and the proximity to free margins a Double O-Z flap was deemed most appropriate.  Using a sterile surgical marker, an appropriate transposition flap was drawn incorporating the defect and placing the expected incisions within the relaxed skin tension lines where possible. The area thus outlined was incised deep to adipose tissue with a #15 scalpel blade.  The skin margins were undermined to an appropriate distance in all directions utilizing iris scissors.
Quadrants Reporting?: 0
Same Histology In Subsequent Stages Text: The pattern and morphology of the tumor is as described in the first stage.
Referred To Oculoplastics For Closure Text (Leave Blank If You Do Not Want): After obtaining clear surgical margins the patient was sent to oculoplastics for surgical repair.  The patient understands they will receive post-surgical care and follow-up from the referring physician's office.
Mauc Instructions: By selecting yes to the question below the MAUC number will be added into the note.  This will be calculated automatically based on the diagnosis chosen, the size entered, the body zone selected (H,M,L) and the specific indications you chose. You will also have the option to override the Mohs AUC if you disagree with the automatically calculated number and this option is found in the Case Summary tab.
No Residual Tumor Seen Histology Text: There were no malignant cells seen in the sections examined.
Mohs Rapid Report Verbiage: The area of clinically evident tumor was marked with skin marking ink and appropriately hatched.  The initial incision was made following the Mohs approach through the skin.  The specimen was taken to the lab, divided into the necessary number of pieces, chromacoded and processed according to the Mohs protocol.  This was repeated in successive stages until a tumor free defect was achieved.
Closure 4 Information: This tab is for additional flaps and grafts above and beyond our usual structured repairs.  Please note if you enter information here it will not currently bill and you will need to add the billing information manually.
Graft Donor Site Bandage (Optional-Leave Blank If You Don't Want In Note): Aquaplast was fitted to the graft site and sewn into place. A pressure bandage were applied to the donor site and over the aquaplast bolster.
Mid-Level Procedure Text (C): After obtaining clear surgical margins the patient was sent to a mid-level provider for surgical repair.  The patient understands they will receive post-surgical care and follow-up from the mid-level provider.
Epidermal Sutures: 6-0 Surgipro
O-L Flap Text: The defect edges were debeveled with a #15 scalpel blade.  Given the location of the defect, shape of the defect and the proximity to free margins an O-L flap was deemed most appropriate.  Using a sterile surgical marker, an appropriate advancement flap was drawn incorporating the defect and placing the expected incisions within the relaxed skin tension lines where possible.    The area thus outlined was incised deep to adipose tissue with a #15 scalpel blade.  The skin margins were undermined to an appropriate distance in all directions utilizing iris scissors.
Is There Documentation In The Chart Showing Discussion Of Changes With Another Physician?: Please Select the Appropriate Response
Unna Boot Text: An Unna boot was placed to help immobilize the limb and facilitate more rapid healing.
Bilobed Transposition Flap Text: The defect edges were debeveled with a #15 scalpel blade.  Given the location of the defect and the proximity to free margins a bilobed transposition flap was deemed most appropriate.  Using a sterile surgical marker, an appropriate bilobe flap drawn around the defect.    The area thus outlined was incised deep to adipose tissue with a #15 scalpel blade.  The skin margins were undermined to an appropriate distance in all directions utilizing iris scissors.
Otolaryngologist Procedure Text (F): After obtaining clear surgical margins the patient was sent to otolaryngology for surgical repair.  The patient understands they will receive post-surgical care and follow-up from the referring physician's office.
Surgeon Performing Repair (Optional): Alberta Duran MD
Consent (Marginal Mandibular)/Introductory Paragraph: The rationale for Mohs was explained to the patient and consent was obtained. The risks, benefits and alternatives to therapy were discussed in detail. Specifically, the risks of damage to the marginal mandibular branch of the facial nerve, infection, scarring, bleeding, prolonged wound healing, incomplete removal, allergy to anesthesia, and recurrence were addressed. Prior to the procedure, the treatment site was clearly identified and confirmed by the patient. All components of Universal Protocol/PAUSE Rule completed.
Advancement-Rotation Flap Text: The defect edges were debeveled with a #15 scalpel blade.  Given the location of the defect, shape of the defect and the proximity to free margins an advancement-rotation flap was deemed most appropriate.  Using a sterile surgical marker, an appropriate flap was drawn incorporating the defect and placing the expected incisions within the relaxed skin tension lines where possible. The area thus outlined was incised deep to adipose tissue with a #15 scalpel blade.  The skin margins were undermined to an appropriate distance in all directions utilizing iris scissors.
Eyelid Full Thickness Repair - 50217: The eyelid defect was full thickness which required a wedge repair of the eyelid. Special care was taken to ensure that the eyelid margin was realligned when placing sutures.
Closure 2 Information: This tab is for additional flaps and grafts, including complex repair and grafts and complex repair and flaps. You can also specify a different location for the additional defect, if the location is the same you do not need to select a new one. We will insert the automated text for the repair you select below just as we do for solitary flaps and grafts. Please note that at this time if you select a location with a different insurance zone you will need to override the ICD10 and CPT if appropriate.
Partial Purse String (Intermediate) Text: Given the location of the defect and the characteristics of the surrounding skin an intermediate purse string closure was deemed most appropriate.  Undermining was performed circumfirentially around the surgical defect.  A purse string suture was then placed and tightened. Wound tension only allowed a partial closure of the circular defect.
Home Suture Removal Text: Patient was provided instructions on removing sutures and will remove their sutures at home.  If they have any questions or difficulties they will call the office.
Inflammation Suggestive Of Cancer Camouflage Histology Text: There was a dense lymphocytic infiltrate which prevented adequate histologic evaluation of adjacent structures.
Bilobed Flap Text: The defect edges were debeveled with a #15 scalpel blade.  Given the location of the defect and the proximity to free margins a bilobe flap was deemed most appropriate.  Using a sterile surgical marker, an appropriate bilobe flap drawn around the defect.    The area thus outlined was incised deep to adipose tissue with a #15 scalpel blade.  The skin margins were undermined to an appropriate distance in all directions utilizing iris scissors.
Ear Wedge Repair Text: A wedge excision was completed by carrying down an excision through the full thickness of the ear and cartilage with an inward facing Burow's triangle. The wound was then closed in a layered fashion.
Post-Care Instructions: I reviewed with the patient in detail post-care instructions. Patient is not to engage in any heavy lifting, exercise, or swimming for the next 14 days. Should the patient develop any fevers, chills, bleeding, severe pain patient will contact the office immediately.
Asc Procedure Text (F): After obtaining clear surgical margins the patient was sent to an ASC for surgical repair.  The patient understands they will receive post-surgical care and follow-up from the ASC physician.
Abbe Flap (Upper To Lower Lip) Text: The defect of the lower lip was assessed and measured.  Given the location and size of the defect, an Abbe flap was deemed most appropriate.  Using a sterile surgical marker, an appropriate Abbe flap was measured and drawn on the upper lip. Local anesthesia was then infiltrated.  A scalpel was then used to incise the upper lip through and through the skin, vermilion, muscle and mucosa, leaving the flap pedicled on the opposite side.  The flap was then rotated and transferred to the lower lip defect.  The flap was then sutured into place with a three layer technique, closing the orbicularis oris muscle layer with subcutaneous buried sutures, followed by a mucosal layer and an epidermal layer.
Dorsal Nasal Flap Text: The defect edges were debeveled with a #15 scalpel blade.  Given the location of the defect and the proximity to free margins a dorsal nasal flap was deemed most appropriate.  Using a sterile surgical marker, an appropriate dorsal nasal flap was drawn around the defect.    The area thus outlined was incised deep to adipose tissue with a #15 scalpel blade.  The skin margins were undermined to an appropriate distance in all directions utilizing iris scissors.
Consent (Spinal Accessory)/Introductory Paragraph: The rationale for Mohs was explained to the patient and consent was obtained. The risks, benefits and alternatives to therapy were discussed in detail. Specifically, the risks of damage to the spinal accessory nerve, infection, scarring, bleeding, prolonged wound healing, incomplete removal, allergy to anesthesia, and recurrence were addressed. Prior to the procedure, the treatment site was clearly identified and confirmed by the patient. All components of Universal Protocol/PAUSE Rule completed.
Advancement Flap (Double) Text: The defect edges were debeveled with a #15 scalpel blade.  Given the location of the defect and the proximity to free margins a double advancement flap was deemed most appropriate.  Using a sterile surgical marker, the appropriate advancement flaps were drawn incorporating the defect and placing the expected incisions within the relaxed skin tension lines where possible.    The area thus outlined was incised deep to adipose tissue with a #15 scalpel blade.  The skin margins were undermined to an appropriate distance in all directions utilizing iris scissors.
Localized Dermabrasion With Wire Brush Text: The patient was draped in routine manner.  Localized dermabrasion using 3 x 17 mm wire brush was performed in routine manner to papillary dermis. This spot dermabrasion is being performed to complete skin cancer reconstruction. It also will eliminate the other sun damaged precancerous cells that are known to be part of the regional effect of a lifetime's worth of sun exposure. This localized dermabrasion is therapeutic and should not be considered cosmetic in any regard.
Rotation Flap Text: The defect edges were debeveled with a #15 scalpel blade.  Given the location of the defect, shape of the defect and the proximity to free margins a rotation flap was deemed most appropriate.  Using a sterile surgical marker, an appropriate rotation flap was drawn incorporating the defect and placing the expected incisions within the relaxed skin tension lines where possible.    The area thus outlined was incised deep to adipose tissue with a #15 scalpel blade.  The skin margins were undermined to an appropriate distance in all directions utilizing iris scissors.
Stage 4: Additional Anesthesia Type: 1% lidocaine with epinephrine
Complex Repair And Flap Additional Text (Will Appearing After The Standard Complex Repair Text): The complex repair was not sufficient to completely close the primary defect. The remaining additional defect was repaired with the flap mentioned below.
Abbe Flap (Lower To Upper Lip) Text: The defect of the upper lip was assessed and measured.  Given the location and size of the defect, an Abbe flap was deemed most appropriate.  Using a sterile surgical marker, an appropriate Abbe flap was measured and drawn on the lower lip. Local anesthesia was then infiltrated. A scalpel was then used to incise the upper lip through and through the skin, vermilion, muscle and mucosa, leaving the flap pedicled on the opposite side.  The flap was then rotated and transferred to the lower lip defect.  The flap was then sutured into place with a three layer technique, closing the orbicularis oris muscle layer with subcutaneous buried sutures, followed by a mucosal layer and an epidermal layer.
Anesthesia Volume In Cc: 3
Melolabial Transposition Flap Text: The defect edges were debeveled with a #15 scalpel blade.  Given the location of the defect and the proximity to free margins a melolabial flap was deemed most appropriate.  Using a sterile surgical marker, an appropriate melolabial transposition flap was drawn incorporating the defect.    The area thus outlined was incised deep to adipose tissue with a #15 scalpel blade.  The skin margins were undermined to an appropriate distance in all directions utilizing iris scissors.
Plastic Surgeon Procedure Text (F): After obtaining clear surgical margins the patient was sent to plastics for surgical repair.  The patient understands they will receive post-surgical care and follow-up from the referring physician's office.
Hatchet Flap Text: The defect edges were debeveled with a #15 scalpel blade.  Given the location of the defect, shape of the defect and the proximity to free margins a hatchet flap was deemed most appropriate.  Using a sterile surgical marker, an appropriate hatchet flap was drawn incorporating the defect and placing the expected incisions within the relaxed skin tension lines where possible.    The area thus outlined was incised deep to adipose tissue with a #15 scalpel blade.  The skin margins were undermined to an appropriate distance in all directions utilizing iris scissors.
Nasalis Myocutaneous Flap Text: Using a #15 blade, an incision was made around the donor flap to the level of the nasalis muscle. Wide lateral undermining was then performed in both the subcutaneous plane above the nasalis muscle, and in a submuscular plane just above periosteum. This allowed the formation of a free nasalis muscle axial pedicle which was still attached to the actual cutaneous flap, increasing its mobility and vascular viability. Hemostasis was obtained with pinpoint electrocoagulation. The flap was mobilized into position and the pivotal anchor points positioned and stabilized with buried interrupted sutures. Subcutaneous and dermal tissues were closed in a multilayered fashion with sutures. Tissue redundancies were excised, and the epidermal edges were apposed without significant tension and sutured with sutures.
Pinch Graft Text: The defect edges were debeveled with a #15 scalpel blade. Given the location of the defect, shape of the defect and the proximity to free margins a pinch graft was deemed most appropriate. Using a sterile surgical marker, the primary defect shape was transferred to the donor site. The area thus outlined was incised deep to adipose tissue with a #15 scalpel blade.  The harvested graft was then trimmed of adipose tissue until only dermis and epidermis was left. The skin margins of the secondary defect were undermined to an appropriate distance in all directions utilizing iris scissors.  The secondary defect was closed with interrupted buried subcutaneous sutures.  The skin edges were then re-apposed with running  sutures.  The skin graft was then placed in the primary defect and oriented appropriately.
Subsequent Stages Histo Method Verbiage: Using a similar technique to that described above, a thin layer of tissue was removed from all areas where tumor was visible on the previous stage.  The tissue was again oriented, mapped, dyed, and processed as above.
Graft Donor Site Dermal Sutures (Optional): 5-0 Polysorb
Z Plasty Text: The lesion was extirpated to the level of the fat with a #15 scalpel blade.  Given the location of the defect, shape of the defect and the proximity to free margins a Z-plasty was deemed most appropriate for repair.  Using a sterile surgical marker, the appropriate transposition arms of the Z-plasty were drawn incorporating the defect and placing the expected incisions within the relaxed skin tension lines where possible.    The area thus outlined was incised deep to adipose tissue with a #15 scalpel blade.  The skin margins were undermined to an appropriate distance in all directions utilizing iris scissors.  The opposing transposition arms were then transposed into place in opposite direction and anchored with interrupted buried subcutaneous sutures.
M-Plasty Intermediate Repair Preamble Text (Leave Blank If You Do Not Want): Undermining was performed with blunt dissection.
Retention Suture Text: Retention sutures were placed to support the closure and prevent dehiscence.
Full Thickness Lip Wedge Repair (Flap) Text: Given the location of the defect and the proximity to free margins a full thickness wedge repair was deemed most appropriate.  Using a sterile surgical marker, the appropriate repair was drawn incorporating the defect and placing the expected incisions perpendicular to the vermilion border.  The vermilion border was also meticulously outlined to ensure appropriate reapproximation during the repair.  The area thus outlined was incised through and through with a #15 scalpel blade.  The muscularis and dermis were reaproximated with deep sutures following hemostasis. Care was taken to realign the vermilion border before proceeding with the superficial closure.  Once the vermilion was realigned the superfical and mucosal closure was finished.
Medical Necessity Statement: Based on my medical judgement, Mohs surgery is the most appropriate treatment for this cancer compared to other treatments.
Estlander Flap (Lower To Upper Lip) Text: The defect of the lower lip was assessed and measured.  Given the location and size of the defect, an Estlander flap was deemed most appropriate.  Using a sterile surgical marker, an appropriate Estlander flap was measured and drawn on the upper lip. Local anesthesia was then infiltrated. A scalpel was then used to incise the lateral aspect of the flap, through skin, muscle and mucosa, leaving the flap pedicled medially.  The flap was then rotated and positioned to fill the lower lip defect.  The flap was then sutured into place with a three layer technique, closing the orbicularis oris muscle layer with subcutaneous buried sutures, followed by a mucosal layer and an epidermal layer.
Adjacent Tissue Transfer Text: The defect edges were debeveled with a #15 scalpel blade.  Given the location of the defect and the proximity to free margins an adjacent tissue transfer was deemed most appropriate.  Using a sterile surgical marker, an appropriate flap was drawn incorporating the defect and placing the expected incisions within the relaxed skin tension lines where possible.    The area thus outlined was incised deep to adipose tissue with a #15 scalpel blade.  The skin margins were undermined to an appropriate distance in all directions utilizing iris scissors.
Complex Repair And Graft Additional Text (Will Appearing After The Standard Complex Repair Text): The complex repair was not sufficient to completely close the primary defect. The remaining additional defect was repaired with the graft mentioned below.
Helical Rim Text: The closure involved the helical rim.
Consent Type: Consent 1 (Standard)
Muscle Hinge Flap Text: The defect edges were debeveled with a #15 scalpel blade.  Given the size, depth and location of the defect and the proximity to free margins a muscle hinge flap was deemed most appropriate.  Using a sterile surgical marker, an appropriate hinge flap was drawn incorporating the defect. The area thus outlined was incised with a #15 scalpel blade.  The skin margins were undermined to an appropriate distance in all directions utilizing iris scissors.
Localized Dermabrasion With Sand Papertext: The patient was draped in routine manner.  Localized dermabrasion using sterile sand paper was performed in routine manner to papillary dermis. This spot dermabrasion is being performed to complete skin cancer reconstruction. It also will eliminate the other sun damaged precancerous cells that are known to be part of the regional effect of a lifetime's worth of sun exposure. This localized dermabrasion is therapeutic and should not be considered cosmetic in any regard.
Area L Indication Text: Tumors in this location are included in Area L (trunk and extremities).  Mohs surgery is indicated for larger tumors, or tumors with aggressive histologic features, in these anatomic locations.
Mastoid Interpolation Flap Text: A decision was made to reconstruct the defect utilizing an interpolation axial flap and a staged reconstruction.  A telfa template was made of the defect.  This telfa template was then used to outline the mastoid interpolation flap.  The donor area for the pedicle flap was then injected with anesthesia.  The flap was excised through the skin and subcutaneous tissue down to the layer of the underlying musculature.  The pedicle flap was carefully excised within this deep plane to maintain its blood supply.  The edges of the donor site were undermined.   The donor site was closed in a primary fashion.  The pedicle was then rotated into position and sutured.  Once the tube was sutured into place, adequate blood supply was confirmed with blanching and refill.  The pedicle was then wrapped with xeroform gauze and dressed appropriately with a telfa and gauze bandage to ensure continued blood supply and protect the attached pedicle.
Wound Check: 6 weeks
Burow's Advancement Flap Text: The defect edges were debeveled with a #15 scalpel blade.  Given the location of the defect and the proximity to free margins a Burow's advancement flap was deemed most appropriate.  Using a sterile surgical marker, the appropriate advancement flap was drawn incorporating the defect and placing the expected incisions within the relaxed skin tension lines where possible.    The area thus outlined was incised deep to adipose tissue with a #15 scalpel blade.  The skin margins were undermined to an appropriate distance in all directions utilizing iris scissors.
Provider Procedure Text (B): After obtaining clear surgical margins the defect was repaired by another provider.
Nasalis-Muscle-Based Myocutaneous Island Pedicle Flap Text: Using a #15 blade, an incision was made around the donor flap to the level of the nasalis muscle. Wide lateral undermining was then performed in both the subcutaneous plane above the nasalis muscle, and in a submuscular plane just above periosteum. This allowed the formation of a free nasalis muscle axial pedicle (based on the angular artery) which was still attached to the actual cutaneous flap, increasing its mobility and vascular viability. Hemostasis was obtained with pinpoint electrocoagulation. The flap was mobilized into position and the pivotal anchor points positioned and stabilized with buried interrupted sutures. Subcutaneous and dermal tissues were closed in a multilayered fashion with sutures. Tissue redundancies were excised, and the epidermal edges were apposed without significant tension and sutured with sutures.
Advancement Flap (Single) Text: The defect edges were debeveled with a #15 scalpel blade.  Given the location of the defect and the proximity to free margins a single advancement flap was deemed most appropriate.  Using a sterile surgical marker, an appropriate advancement flap was drawn incorporating the defect and placing the expected incisions within the relaxed skin tension lines where possible.    The area thus outlined was incised deep to adipose tissue with a #15 scalpel blade.  The skin margins were undermined to an appropriate distance in all directions utilizing iris scissors.
Eyelid Partial Thickness Repair - 49252: The eyelid defect was partial thickness which required a wedge repair of the eyelid. Special care was taken to ensure that the eyelid margin was realligned when placing sutures.
Double O-Z Plasty Text: The defect edges were debeveled with a #15 scalpel blade.  Given the location of the defect, shape of the defect and the proximity to free margins a Double O-Z plasty (double transposition flap) was deemed most appropriate.  Using a sterile surgical marker, the appropriate transposition flaps were drawn incorporating the defect and placing the expected incisions within the relaxed skin tension lines where possible. The area thus outlined was incised deep to adipose tissue with a #15 scalpel blade.  The skin margins were undermined to an appropriate distance in all directions utilizing iris scissors.  Hemostasis was achieved with electrocautery.  The flaps were then transposed into place, one clockwise and the other counterclockwise, and anchored with interrupted buried subcutaneous sutures.
Number Of Hemigard Strips Per Side: 1
Mucosal Advancement Flap Text: Given the location of the defect, shape of the defect and the proximity to free margins a mucosal advancement flap was deemed most appropriate. Incisions were made with a 15 blade scalpel in the appropriate fashion along the cutaneous vermilion border and the mucosal lip. The remaining actinically damaged mucosal tissue was excised.  The mucosal advancement flap was then elevated to the gingival sulcus with care taken to preserve the neurovascular structures and advanced into the primary defect. Care was taken to ensure that precise realignment of the vermilion border was achieved.
Mustarde Flap Text: The defect edges were debeveled with a #15 scalpel blade.  Given the size, depth and location of the defect and the proximity to free margins a Mustarde flap was deemed most appropriate.  Using a sterile surgical marker, an appropriate flap was drawn incorporating the defect. The area thus outlined was incised with a #15 scalpel blade.  The skin margins were undermined to an appropriate distance in all directions utilizing iris scissors.
Consent 2/Introductory Paragraph: Mohs surgery was explained to the patient and consent was obtained. The risks, benefits and alternatives to therapy were discussed in detail. Specifically, the risks of infection, scarring, bleeding, prolonged wound healing, incomplete removal, allergy to anesthesia, nerve injury and recurrence were addressed. Prior to the procedure, the treatment site was clearly identified and confirmed by the patient. All components of Universal Protocol/PAUSE Rule completed.
Consent (Temporal Branch)/Introductory Paragraph: The rationale for Mohs was explained to the patient and consent was obtained. The risks, benefits and alternatives to therapy were discussed in detail. Specifically, the risks of damage to the temporal branch of the facial nerve, infection, scarring, bleeding, prolonged wound healing, incomplete removal, allergy to anesthesia, and recurrence were addressed. Prior to the procedure, the treatment site was clearly identified and confirmed by the patient. All components of Universal Protocol/PAUSE Rule completed.
Additional Anesthesia Volume In Cc: 6
Date Of Previous Biopsy (Optional): 5/28/24
Where Do You Want The Question To Include Opioid Counseling Located?: Case Summary Tab
Mercedes Flap Text: The defect edges were debeveled with a #15 scalpel blade.  Given the location of the defect, shape of the defect and the proximity to free margins a Mercedes flap was deemed most appropriate.  Using a sterile surgical marker, an appropriate advancement flap was drawn incorporating the defect and placing the expected incisions within the relaxed skin tension lines where possible. The area thus outlined was incised deep to adipose tissue with a #15 scalpel blade.  The skin margins were undermined to an appropriate distance in all directions utilizing iris scissors.
Non-Graft Cartilage Fenestration Text: The cartilage was fenestrated with a 2mm punch biopsy to help facilitate healing.
Previous Accession (Optional): [id number]
Tumor Depth: Less than 6mm from granular layer and no invasion beyond the subcutaneous fat
Eye Protection Verbiage: Before proceeding with the stage, a plastic scleral shield was inserted. The globe was anesthetized with a few drops of 1% lidocaine with 1:100,000 epinephrine. Then, an appropriate sized scleral shield was chosen and coated with lacrilube ointment. The shield was gently inserted and left in place for the duration of each stage. After the stage was completed, the shield was gently removed.
Tumor Debulked?: curette
Alar Island Pedicle Flap Text: The defect edges were debeveled with a #15 scalpel blade.  Given the location of the defect, shape of the defect and the proximity to the alar rim an island pedicle advancement flap was deemed most appropriate.  Using a sterile surgical marker, an appropriate advancement flap was drawn incorporating the defect, outlining the appropriate donor tissue and placing the expected incisions within the nasal ala running parallel to the alar rim. The area thus outlined was incised with a #15 scalpel blade.  The skin margins were undermined minimally to an appropriate distance in all directions around the primary defect and laterally outward around the island pedicle utilizing iris scissors.  There was minimal undermining beneath the pedicle flap.
Cheek Interpolation Flap Text: A decision was made to reconstruct the defect utilizing an interpolation axial flap and a staged reconstruction.  A telfa template was made of the defect.  This telfa template was then used to outline the Cheek Interpolation flap.  The donor area for the pedicle flap was then injected with anesthesia.  The flap was excised through the skin and subcutaneous tissue down to the layer of the underlying musculature.  The interpolation flap was carefully excised within this deep plane to maintain its blood supply.  The edges of the donor site were undermined.   The donor site was closed in a primary fashion.  The pedicle was then rotated into position and sutured.  Once the tube was sutured into place, adequate blood supply was confirmed with blanching and refill.  The pedicle was then wrapped with xeroform gauze and dressed appropriately with a telfa and gauze bandage to ensure continued blood supply and protect the attached pedicle.
Bilateral Rotation Flap Text: The defect edges were debeveled with a #15 scalpel blade. Given the location of the defect, shape of the defect and the proximity to free margins a bilateral rotation flap was deemed most appropriate. Using a sterile surgical marker, an appropriate rotation flap was drawn incorporating the defect and placing the expected incisions within the relaxed skin tension lines where possible. The area thus outlined was incised deep to adipose tissue with a #15 scalpel blade. The skin margins were undermined to an appropriate distance in all directions utilizing iris scissors. Following this, the designed flap was carried over into the primary defect and sutured into place.
Rectangular Flap Text: The defect edges were debeveled with a #15 scalpel blade. Given the location of the defect and the proximity to free margins a rectangular flap was deemed most appropriate. Using a sterile surgical marker, an appropriate rectangular flap was drawn incorporating the defect. The area thus outlined was incised deep to adipose tissue with a #15 scalpel blade. The skin margins were undermined to an appropriate distance in all directions utilizing iris scissors. Following this, the designed flap was carried over into the primary defect and sutured into place.
Undermining Type: Entire Wound
Dressing (No Sutures): pressure dressing with telfa
O-Z Plasty Text: The defect edges were debeveled with a #15 scalpel blade.  Given the location of the defect, shape of the defect and the proximity to free margins an O-Z plasty (double transposition flap) was deemed most appropriate.  Using a sterile surgical marker, the appropriate transposition flaps were drawn incorporating the defect and placing the expected incisions within the relaxed skin tension lines where possible.    The area thus outlined was incised deep to adipose tissue with a #15 scalpel blade.  The skin margins were undermined to an appropriate distance in all directions utilizing iris scissors.  Hemostasis was achieved with electrocautery.  The flaps were then transposed into place, one clockwise and the other counterclockwise, and anchored with interrupted buried subcutaneous sutures.
Retention Suture Bite Size: 1 mm
Estimated Blood Loss (Cc): minimal
Surgical Defect Width In Cm (Optional): 1.2
Island Pedicle Flap With Canthal Suspension Text: The defect edges were debeveled with a #15 scalpel blade.  Given the location of the defect, shape of the defect and the proximity to free margins an island pedicle advancement flap was deemed most appropriate.  Using a sterile surgical marker, an appropriate advancement flap was drawn incorporating the defect, outlining the appropriate donor tissue and placing the expected incisions within the relaxed skin tension lines where possible. The area thus outlined was incised deep to adipose tissue with a #15 scalpel blade.  The skin margins were undermined to an appropriate distance in all directions around the primary defect and laterally outward around the island pedicle utilizing iris scissors.  There was minimal undermining beneath the pedicle flap. A suspension suture was placed in the canthal tendon to prevent tension and prevent ectropion.
Nasal Turnover Hinge Flap Text: The defect edges were debeveled with a #15 scalpel blade.  Given the size, depth, location of the defect and the defect being full thickness a nasal turnover hinge flap was deemed most appropriate.  Using a sterile surgical marker, an appropriate hinge flap was drawn incorporating the defect. The area thus outlined was incised with a #15 scalpel blade. The flap was designed to recreate the nasal mucosal lining and the alar rim. The skin margins were undermined to an appropriate distance in all directions utilizing iris scissors.
Posterior Auricular Interpolation Flap Text: A decision was made to reconstruct the defect utilizing an interpolation axial flap and a staged reconstruction.  A telfa template was made of the defect.  This telfa template was then used to outline the posterior auricular interpolation flap.  The donor area for the pedicle flap was then injected with anesthesia.  The flap was excised through the skin and subcutaneous tissue down to the layer of the underlying musculature.  The pedicle flap was carefully excised within this deep plane to maintain its blood supply.  The edges of the donor site were undermined.   The donor site was closed in a primary fashion.  The pedicle was then rotated into position and sutured.  Once the tube was sutured into place, adequate blood supply was confirmed with blanching and refill.  The pedicle was then wrapped with xeroform gauze and dressed appropriately with a telfa and gauze bandage to ensure continued blood supply and protect the attached pedicle.
O-T Plasty Text: The defect edges were debeveled with a #15 scalpel blade.  Given the location of the defect, shape of the defect and the proximity to free margins an O-T plasty was deemed most appropriate.  Using a sterile surgical marker, an appropriate O-T plasty was drawn incorporating the defect and placing the expected incisions within the relaxed skin tension lines where possible.    The area thus outlined was incised deep to adipose tissue with a #15 scalpel blade.  The skin margins were undermined to an appropriate distance in all directions utilizing iris scissors.
Detail Level: Detailed
Keystone Flap Text: The defect edges were debeveled with a #15 scalpel blade.  Given the location of the defect, shape of the defect a keystone flap was deemed most appropriate.  Using a sterile surgical marker, an appropriate keystone flap was drawn incorporating the defect, outlining the appropriate donor tissue and placing the expected incisions within the relaxed skin tension lines where possible. The area thus outlined was incised deep to adipose tissue with a #15 scalpel blade.  The skin margins were undermined to an appropriate distance in all directions around the primary defect and laterally outward around the flap utilizing iris scissors.
V-Y Plasty Text: The defect edges were debeveled with a #15 scalpel blade.  Given the location of the defect, shape of the defect and the proximity to free margins an V-Y advancement flap was deemed most appropriate.  Using a sterile surgical marker, an appropriate advancement flap was drawn incorporating the defect and placing the expected incisions within the relaxed skin tension lines where possible.    The area thus outlined was incised deep to adipose tissue with a #15 scalpel blade.  The skin margins were undermined to an appropriate distance in all directions utilizing iris scissors.
Alternatives Discussed Intro (Do Not Add Period): I discussed alternative treatments to Mohs surgery and specifically discussed the risks and benefits of
Consent (Ear)/Introductory Paragraph: The rationale for Mohs was explained to the patient and consent was obtained. The risks, benefits and alternatives to therapy were discussed in detail. Specifically, the risks of ear deformity, infection, scarring, bleeding, prolonged wound healing, incomplete removal, allergy to anesthesia, nerve injury and recurrence were addressed. Prior to the procedure, the treatment site was clearly identified and confirmed by the patient. All components of Universal Protocol/PAUSE Rule completed.
Primary Defect Length In Cm (Final Defect Size - Required For Flaps/Grafts): 1.5
Orbicularis Oris Muscle Flap Text: The defect edges were debeveled with a #15 scalpel blade.  Given that the defect affected the competency of the oral sphincter an orbicularis oris muscle flap was deemed most appropriate to restore this competency and normal muscle function.  Using a sterile surgical marker, an appropriate flap was drawn incorporating the defect. The area thus outlined was incised with a #15 scalpel blade.
Perineural Invasion (For Histology - Be Specific If Possible): absent
Donor Site Anesthesia Type: same as repair anesthesia
Hemostasis: Electrocautery
Debridement Text: The wound edges were debrided prior to proceeding with the closure to facilitate wound healing.
Cartilage Graft Text: The defect edges were debeveled with a #15 scalpel blade.  Given the location of the defect, shape of the defect, the fact the defect involved a full thickness cartilage defect a cartilage graft was deemed most appropriate.  An appropriate donor site was identified, cleansed, and anesthetized. The cartilage graft was then harvested and transferred to the recipient site, oriented appropriately and then sutured into place.  The secondary defect was then repaired using a primary closure.
Intermediate Repair And Graft Additional Text (Will Appearing After The Standard Complex Repair Text): The intermediate repair was not sufficient to completely close the primary defect. The remaining additional defect was repaired with the graft mentioned below.
Spiral Flap Text: The defect edges were debeveled with a #15 scalpel blade.  Given the location of the defect, shape of the defect and the proximity to free margins a spiral flap was deemed most appropriate.  Using a sterile surgical marker, an appropriate rotation flap was drawn incorporating the defect and placing the expected incisions within the relaxed skin tension lines where possible. The area thus outlined was incised deep to adipose tissue with a #15 scalpel blade.  The skin margins were undermined to an appropriate distance in all directions utilizing iris scissors.
Nostril Rim Text: The closure involved the nostril rim.
Consent 1/Introductory Paragraph: The rationale for Mohs was explained to the patient and consent was obtained. The risks, benefits and alternatives to therapy were discussed in detail. Specifically, the risks of infection, scarring, bleeding, prolonged wound healing, incomplete removal, allergy to anesthesia, nerve injury and recurrence were addressed. Prior to the procedure, the treatment site was clearly identified and confirmed by the patient. All components of Universal Protocol/PAUSE Rule completed.
Interpolation Flap Text: A decision was made to reconstruct the defect utilizing an interpolation axial flap and a staged reconstruction.  A telfa template was made of the defect.  This telfa template was then used to outline the interpolation flap.  The donor area for the pedicle flap was then injected with anesthesia.  The flap was excised through the skin and subcutaneous tissue down to the layer of the underlying musculature.  The interpolation flap was carefully excised within this deep plane to maintain its blood supply.  The edges of the donor site were undermined.   The donor site was closed in a primary fashion.  The pedicle was then rotated into position and sutured.  Once the tube was sutured into place, adequate blood supply was confirmed with blanching and refill.  The pedicle was then wrapped with xeroform gauze and dressed appropriately with a telfa and gauze bandage to ensure continued blood supply and protect the attached pedicle.
Hemigard Intro: Due to skin fragility and wound tension, it was decided to use HEMIGARD adhesive retention suture devices to permit a linear closure. The skin was cleaned and dried for a 6cm distance away from the wound. Excessive hair, if present, was removed to allow for adhesion.
Ear Star Wedge Flap Text: The defect edges were debeveled with a #15 blade scalpel.  Given the location of the defect and the proximity to free margins (helical rim) an ear star wedge flap was deemed most appropriate.  Using a sterile surgical marker, the appropriate flap was drawn incorporating the defect and placing the expected incisions between the helical rim and antihelix where possible.  The area thus outlined was incised through and through with a #15 scalpel blade.
A-T Advancement Flap Text: The defect edges were debeveled with a #15 scalpel blade.  Given the location of the defect, shape of the defect and the proximity to free margins an A-T advancement flap was deemed most appropriate.  Using a sterile surgical marker, an appropriate advancement flap was drawn incorporating the defect and placing the expected incisions within the relaxed skin tension lines where possible.    The area thus outlined was incised deep to adipose tissue with a #15 scalpel blade.  The skin margins were undermined to an appropriate distance in all directions utilizing iris scissors.
Brow Lift Text: A midfrontal incision was made medially to the defect to allow access to the tissues just superior to the left eyebrow. Following careful dissection inferiorly in a supraperiosteal plane to the level of the left eyebrow, several 3-0 monocryl sutures were used to resuspend the eyebrow orbicularis oculi muscular unit to the superior frontal bone periosteum. This resulted in an appropriate reapproximation of static eyebrow symmetry and correction of the left brow ptosis.
Consent (Scalp)/Introductory Paragraph: The rationale for Mohs was explained to the patient and consent was obtained. The risks, benefits and alternatives to therapy were discussed in detail. Specifically, the risks of changes in hair growth pattern secondary to repair, infection, scarring, bleeding, prolonged wound healing, incomplete removal, allergy to anesthesia, nerve injury and recurrence were addressed. Prior to the procedure, the treatment site was clearly identified and confirmed by the patient. All components of Universal Protocol/PAUSE Rule completed.
Helical Rim Advancement Flap Text: The defect edges were debeveled with a #15 blade scalpel.  Given the location of the defect and the proximity to free margins (helical rim) a double helical rim advancement flap was deemed most appropriate.  Using a sterile surgical marker, the appropriate advancement flaps were drawn incorporating the defect and placing the expected incisions between the helical rim and antihelix where possible.  The area thus outlined was incised through and through with a #15 scalpel blade.  With a skin hook and iris scissors, the flaps were gently and sharply undermined and freed up.
Consent (Lip)/Introductory Paragraph: The rationale for Mohs was explained to the patient and consent was obtained. The risks, benefits and alternatives to therapy were discussed in detail. Specifically, the risks of lip deformity, changes in the oral aperture, infection, scarring, bleeding, prolonged wound healing, incomplete removal, allergy to anesthesia, nerve injury and recurrence were addressed. Prior to the procedure, the treatment site was clearly identified and confirmed by the patient. All components of Universal Protocol/PAUSE Rule completed.
Location Indication Override (Is Already Calculated Based On Selected Body Location): Area H
X Size Of Lesion In Cm (Optional): 0.8
Paramedian Forehead Flap Text: A decision was made to reconstruct the defect utilizing an interpolation axial flap and a staged reconstruction.  A telfa template was made of the defect.  This telfa template was then used to outline the paramedian forehead pedicle flap.  The donor area for the pedicle flap was then injected with anesthesia.  The flap was excised through the skin and subcutaneous tissue down to the layer of the underlying musculature.  The pedicle flap was carefully excised within this deep plane to maintain its blood supply.  The edges of the donor site were undermined.   The donor site was closed in a primary fashion.  The pedicle was then rotated into position and sutured.  Once the tube was sutured into place, adequate blood supply was confirmed with blanching and refill.  The pedicle was then wrapped with xeroform gauze and dressed appropriately with a telfa and gauze bandage to ensure continued blood supply and protect the attached pedicle.
Surgeon/Pathologist Verbiage (Will Incorporate Name Of Surgeon From Intro If Not Blank): operated in two distinct and integrated capacities as the surgeon and pathologist.
O-Z Flap Text: The defect edges were debeveled with a #15 scalpel blade.  Given the location of the defect, shape of the defect and the proximity to free margins an O-Z flap was deemed most appropriate.  Using a sterile surgical marker, an appropriate transposition flap was drawn incorporating the defect and placing the expected incisions within the relaxed skin tension lines where possible. The area thus outlined was incised deep to adipose tissue with a #15 scalpel blade.  The skin margins were undermined to an appropriate distance in all directions utilizing iris scissors.
Suture Removal: 8 days
Tarsorrhaphy Text: A tarsorrhaphy was performed using Frost sutures.
Bi-Rhombic Flap Text: The defect edges were debeveled with a #15 scalpel blade.  Given the location of the defect and the proximity to free margins a bi-rhombic flap was deemed most appropriate.  Using a sterile surgical marker, an appropriate rhombic flap was drawn incorporating the defect. The area thus outlined was incised deep to adipose tissue with a #15 scalpel blade.  The skin margins were undermined to an appropriate distance in all directions utilizing iris scissors.
Cheiloplasty (Less Than 50%) Text: A decision was made to reconstruct the defect with a  cheiloplasty.  The defect was undermined extensively.  Additional obicularis oris muscle was excised with a 15 blade scalpel.  The defect was converted into a full thickness wedge, of less than 50% of the vertical height of the lip, to facilite a better cosmetic result.  Small vessels were then tied off with 5-0 monocyrl. The obicularis oris, superficial fascia, adipose and dermis were then reapproximated.  After the deeper layers were approximated the epidermis was reapproximated with particular care given to realign the vermilion border.
Melolabial Interpolation Flap Text: A decision was made to reconstruct the defect utilizing an interpolation axial flap and a staged reconstruction.  A telfa template was made of the defect.  This telfa template was then used to outline the melolabial interpolation flap.  The donor area for the pedicle flap was then injected with anesthesia.  The flap was excised through the skin and subcutaneous tissue down to the layer of the underlying musculature.  The pedicle flap was carefully excised within this deep plane to maintain its blood supply.  The edges of the donor site were undermined.   The donor site was closed in a primary fashion.  The pedicle was then rotated into position and sutured.  Once the tube was sutured into place, adequate blood supply was confirmed with blanching and refill.  The pedicle was then wrapped with xeroform gauze and dressed appropriately with a telfa and gauze bandage to ensure continued blood supply and protect the attached pedicle.
H Plasty Text: Given the location of the defect, shape of the defect and the proximity to free margins a H-plasty was deemed most appropriate for repair.  Using a sterile surgical marker, the appropriate advancement arms of the H-plasty were drawn incorporating the defect and placing the expected incisions within the relaxed skin tension lines where possible. The area thus outlined was incised deep to adipose tissue with a #15 scalpel blade. The skin margins were undermined to an appropriate distance in all directions utilizing iris scissors.  The opposing advancement arms were then advanced into place in opposite direction and anchored with interrupted buried subcutaneous sutures.
Wound Care (No Sutures): Petrolatum
Ftsg Text: The defect edges were debeveled with a #15 scalpel blade.  Given the location of the defect, shape of the defect and the proximity to free margins a full thickness skin graft was deemed most appropriate.  Using a sterile surgical marker, the primary defect shape was transferred to the donor site. The area thus outlined was incised deep to adipose tissue with a #15 scalpel blade.  The harvested graft was then trimmed of adipose tissue until only dermis and epidermis was left.  The skin margins of the secondary defect were undermined to an appropriate distance in all directions utilizing iris scissors.  The secondary defect was closed with interrupted buried subcutaneous sutures.  The skin edges were then re-apposed with running  sutures.  The skin graft was then placed in the primary defect and oriented appropriately.
Repair Type: Complex Repair
V-Y Flap Text: The defect edges were debeveled with a #15 scalpel blade.  Given the location of the defect, shape of the defect and the proximity to free margins a V-Y flap was deemed most appropriate.  Using a sterile surgical marker, an appropriate advancement flap was drawn incorporating the defect and placing the expected incisions within the relaxed skin tension lines where possible.    The area thus outlined was incised deep to adipose tissue with a #15 scalpel blade.  The skin margins were undermined to an appropriate distance in all directions utilizing iris scissors.
Graft Donor Site Epidermal Sutures (Optional): 5-0 Surgipro
Mart-1 - Negative Histology Text: MART-1 staining demonstrates a normal density and pattern of melanocytes along the dermal-epidermal junction. The surgical margins are negative for tumor cells.
Skin Substitute Text: The defect edges were debeveled with a #15 scalpel blade.  Given the location of the defect, shape of the defect and the proximity to free margins a skin substitute graft was deemed most appropriate.  The graft material was trimmed to fit the size of the defect. The graft was then placed in the primary defect and oriented appropriately.
Area H Indication Text: Tumors in this location are included in Area H (eyelids, eyebrows, nose, lips, chin, ear, pre-auricular, post-auricular, temple, genitalia, hands, feet, ankles and areola).  Tissue conservation is critical in these anatomic locations.
Island Pedicle Flap-Requiring Vessel Identification Text: The defect edges were debeveled with a #15 scalpel blade.  Given the location of the defect, shape of the defect and the proximity to free margins an island pedicle advancement flap was deemed most appropriate.  Using a sterile surgical marker, an appropriate advancement flap was drawn, based on the axial vessel mentioned above, incorporating the defect, outlining the appropriate donor tissue and placing the expected incisions within the relaxed skin tension lines where possible.    The area thus outlined was incised deep to adipose tissue with a #15 scalpel blade.  The skin margins were undermined to an appropriate distance in all directions around the primary defect and laterally outward around the island pedicle utilizing iris scissors.  There was minimal undermining beneath the pedicle flap.
Which Instrument Did You Use For Dermabrasion?: Wire Brush
Transposition Flap Text: The defect edges were debeveled with a #15 scalpel blade.  Given the location of the defect and the proximity to free margins a transposition flap was deemed most appropriate.  Using a sterile surgical marker, an appropriate transposition flap was drawn incorporating the defect.    The area thus outlined was incised deep to adipose tissue with a #15 scalpel blade.  The skin margins were undermined to an appropriate distance in all directions utilizing iris scissors.
Split-Thickness Skin Graft Text: The defect edges were debeveled with a #15 scalpel blade.  Given the location of the defect, shape of the defect and the proximity to free margins a split thickness skin graft was deemed most appropriate.  Using a sterile surgical marker, the primary defect shape was transferred to the donor site. The split thickness graft was then harvested.  The skin graft was then placed in the primary defect and oriented appropriately.
Repair Hemostasis (Optional): Pinpoint electrocautery
Mart-1 - Positive Histology Text: MART-1 staining demonstrates areas of higher density and clustering of melanocytes with Pagetoid spread upwards within the epidermis. The surgical margins are positive for tumor cells.
Epidermal Closure: running cuticular
Partial Purse String (Simple) Text: Given the location of the defect and the characteristics of the surrounding skin a simple purse string closure was deemed most appropriate.  Undermining was performed circumfirentially around the surgical defect.  A purse string suture was then placed and tightened. Wound tension only allowed a partial closure of the circular defect.
Suturegard Body: The suture ends were repeatedly re-tightened and re-clamped to achieve the desired tissue expansion.
Burow's Graft Text: The defect edges were debeveled with a #15 scalpel blade.  Given the location of the defect, shape of the defect, the proximity to free margins and the presence of a standing cone deformity a Burow's skin graft was deemed most appropriate. The standing cone was removed and this tissue was then trimmed to the shape of the primary defect. The adipose tissue was also removed until only dermis and epidermis were left.  The skin margins of the secondary defect were undermined to an appropriate distance in all directions utilizing iris scissors.  The secondary defect was closed with interrupted buried subcutaneous sutures.  The skin edges were then re-apposed with running  sutures.  The skin graft was then placed in the primary defect and oriented appropriately.
Consent (Near Eyelid Margin)/Introductory Paragraph: The rationale for Mohs was explained to the patient and consent was obtained. The risks, benefits and alternatives to therapy were discussed in detail. Specifically, the risks of ectropion or eyelid deformity, infection, scarring, bleeding, prolonged wound healing, incomplete removal, allergy to anesthesia, nerve injury and recurrence were addressed. Prior to the procedure, the treatment site was clearly identified and confirmed by the patient. All components of Universal Protocol/PAUSE Rule completed.
Manual Repair Warning Statement: We plan on removing the manually selected variable below in favor of our much easier automatic structured text blocks found in the previous tab. We decided to do this to help make the flow better and give you the full power of structured data. Manual selection is never going to be ideal in our platform and I would encourage you to avoid using manual selection from this point on, especially since I will be sunsetting this feature. It is important that you do one of two things with the customized text below. First, you can save all of the text in a word file so you can have it for future reference. Second, transfer the text to the appropriate area in the Library tab. Lastly, if there is a flap or graft type which we do not have you need to let us know right away so I can add it in before the variable is hidden. No need to panic, we plan to give you roughly 6 months to make the change.
Depth Of Tumor Invasion (For Histology): dermis
Dermal Autograft Text: The defect edges were debeveled with a #15 scalpel blade.  Given the location of the defect, shape of the defect and the proximity to free margins a dermal autograft was deemed most appropriate.  Using a sterile surgical marker, the primary defect shape was transferred to the donor site. The area thus outlined was incised deep to adipose tissue with a #15 scalpel blade.  The harvested graft was then trimmed of adipose and epidermal tissue until only dermis was left.  The skin graft was then placed in the primary defect and oriented appropriately.
Bcc Infiltrative Histology Text: There were numerous aggregates of basaloid cells demonstrating an infiltrative pattern.
Referring Physician (Optional): Kacie Dave NP
O-T Advancement Flap Text: The defect edges were debeveled with a #15 scalpel blade.  Given the location of the defect, shape of the defect and the proximity to free margins an O-T advancement flap was deemed most appropriate.  Using a sterile surgical marker, an appropriate advancement flap was drawn incorporating the defect and placing the expected incisions within the relaxed skin tension lines where possible.    The area thus outlined was incised deep to adipose tissue with a #15 scalpel blade.  The skin margins were undermined to an appropriate distance in all directions utilizing iris scissors.
Chonodrocutaneous Helical Advancement Flap Text: The defect edges were debeveled with a #15 scalpel blade.  Given the location of the defect and the proximity to free margins a chondrocutaneous helical advancement flap was deemed most appropriate.  Using a sterile surgical marker, the appropriate advancement flap was drawn incorporating the defect and placing the expected incisions within the relaxed skin tension lines where possible.    The area thus outlined was incised deep to adipose tissue with a #15 scalpel blade.  The skin margins were undermined to an appropriate distance in all directions utilizing iris scissors.
Suturegard Retention Suture: 0-0 Nylon
Deep Sutures: 5-0 Maxon
Double Island Pedicle Flap Text: The defect edges were debeveled with a #15 scalpel blade.  Given the location of the defect, shape of the defect and the proximity to free margins a double island pedicle advancement flap was deemed most appropriate.  Using a sterile surgical marker, an appropriate advancement flap was drawn incorporating the defect, outlining the appropriate donor tissue and placing the expected incisions within the relaxed skin tension lines where possible.    The area thus outlined was incised deep to adipose tissue with a #15 scalpel blade.  The skin margins were undermined to an appropriate distance in all directions around the primary defect and laterally outward around the island pedicle utilizing iris scissors.  There was minimal undermining beneath the pedicle flap.
Star Wedge Flap Text: The defect edges were debeveled with a #15 scalpel blade.  Given the location of the defect, shape of the defect and the proximity to free margins a star wedge flap was deemed most appropriate.  Using a sterile surgical marker, an appropriate rotation flap was drawn incorporating the defect and placing the expected incisions within the relaxed skin tension lines where possible. The area thus outlined was incised deep to adipose tissue with a #15 scalpel blade.  The skin margins were undermined to an appropriate distance in all directions utilizing iris scissors.
Modified Advancement Flap Text: The defect edges were debeveled with a #15 scalpel blade.  Given the location of the defect, shape of the defect and the proximity to free margins a modified advancement flap was deemed most appropriate.  Using a sterile surgical marker, an appropriate advancement flap was drawn incorporating the defect and placing the expected incisions within the relaxed skin tension lines where possible.    The area thus outlined was incised deep to adipose tissue with a #15 scalpel blade.  The skin margins were undermined to an appropriate distance in all directions utilizing iris scissors.
Pain Refusal Text: I offered to prescribe pain medication but the patient refused to take this medication.
Staged Advancement Flap Text: The defect edges were debeveled with a #15 scalpel blade.  Given the location of the defect, shape of the defect and the proximity to free margins a staged advancement flap was deemed most appropriate.  Using a sterile surgical marker, an appropriate advancement flap was drawn incorporating the defect and placing the expected incisions within the relaxed skin tension lines where possible. The area thus outlined was incised deep to adipose tissue with a #15 scalpel blade.  The skin margins were undermined to an appropriate distance in all directions utilizing iris scissors.
Xenograft Text: The defect edges were debeveled with a #15 scalpel blade.  Given the location of the defect, shape of the defect and the proximity to free margins a xenograft was deemed most appropriate.  The graft was then trimmed to fit the size of the defect.  The graft was then placed in the primary defect and oriented appropriately.
Simple / Intermediate / Complex Repair - Final Wound Length In Cm: 4
Crescentic Complex Repair Preamble Text (Leave Blank If You Do Not Want): Extensive wide undermining was performed.
Repair Anesthesia Method: local infiltration
Graft Cartilage Fenestration Text: The cartilage was fenestrated with a 2mm punch biopsy to help facilitate graft survival and healing.
Information: Selecting Yes will display possible errors in your note based on the variables you have selected. This validation is only offered as a suggestion for you. PLEASE NOTE THAT THE VALIDATION TEXT WILL BE REMOVED WHEN YOU FINALIZE YOUR NOTE. IF YOU WANT TO FAX A PRELIMINARY NOTE YOU WILL NEED TO TOGGLE THIS TO 'NO' IF YOU DO NOT WANT IT IN YOUR FAXED NOTE.
Undermining Location (Optional): in the superficial subcutaneous fat
Mohs Method Verbiage: An incision at a 45 degree angle following the standard Mohs approach was done and the specimen was harvested as a microscopic controlled layer.
Suturegard Intro: Intraoperative tissue expansion was performed, utilizing the SUTUREGARD device, in order to reduce wound tension.
Crescentic Advancement Flap Text: The defect edges were debeveled with a #15 scalpel blade.  Given the location of the defect and the proximity to free margins a crescentic advancement flap was deemed most appropriate.  Using a sterile surgical marker, the appropriate advancement flap was drawn incorporating the defect and placing the expected incisions within the relaxed skin tension lines where possible.    The area thus outlined was incised deep to adipose tissue with a #15 scalpel blade.  The skin margins were undermined to an appropriate distance in all directions utilizing iris scissors.
Bilateral Helical Rim Advancement Flap Text: The defect edges were debeveled with a #15 blade scalpel.  Given the location of the defect and the proximity to free margins (helical rim) a bilateral helical rim advancement flap was deemed most appropriate.  Using a sterile surgical marker, the appropriate advancement flaps were drawn incorporating the defect and placing the expected incisions between the helical rim and antihelix where possible.  The area thus outlined was incised through and through with a #15 scalpel blade.  With a skin hook and iris scissors, the flaps were gently and sharply undermined and freed up.
Wound Care: Vaseline
Consent (Nose)/Introductory Paragraph: The rationale for Mohs was explained to the patient and consent was obtained. The risks, benefits and alternatives to therapy were discussed in detail. Specifically, the risks of nasal deformity, changes in the flow of air through the nose, infection, scarring, bleeding, prolonged wound healing, incomplete removal, allergy to anesthesia, nerve injury and recurrence were addressed. Prior to the procedure, the treatment site was clearly identified and confirmed by the patient. All components of Universal Protocol/PAUSE Rule completed.
Purse String (Simple) Text: Given the location of the defect and the characteristics of the surrounding skin a purse string closure was deemed most appropriate.  Undermining was performed circumfirentially around the surgical defect.  A purse string suture was then placed and tightened.
Mohs Case Number: [id number]
Banner Transposition Flap Text: The defect edges were debeveled with a #15 scalpel blade.  Given the location of the defect and the proximity to free margins a Banner transposition flap was deemed most appropriate.  Using a sterile surgical marker, an appropriate flap drawn around the defect. The area thus outlined was incised deep to adipose tissue with a #15 scalpel blade.  The skin margins were undermined to an appropriate distance in all directions utilizing iris scissors.
Hemigard Postcare Instructions: The HEMIGARD strips are to remain completely dry for at least 5-7 days.
Vermilion Border Text: The closure involved the vermilion border.
Postop Diagnosis: same
Zygomaticofacial Flap Text: Given the location of the defect, shape of the defect and the proximity to free margins a zygomaticofacial flap was deemed most appropriate for repair.  Using a sterile surgical marker, the appropriate flap was drawn incorporating the defect and placing the expected incisions within the relaxed skin tension lines where possible. The area thus outlined was incised deep to adipose tissue with a #15 scalpel blade with preservation of a vascular pedicle.  The skin margins were undermined to an appropriate distance in all directions utilizing iris scissors.  The flap was then placed into the defect and anchored with interrupted buried subcutaneous sutures.
Secondary Intention Text (Leave Blank If You Do Not Want): The defect will heal with secondary intention.
W Plasty Text: The lesion was extirpated to the level of the fat with a #15 scalpel blade.  Given the location of the defect, shape of the defect and the proximity to free margins a W-plasty was deemed most appropriate for repair.  Using a sterile surgical marker, the appropriate transposition arms of the W-plasty were drawn incorporating the defect and placing the expected incisions within the relaxed skin tension lines where possible.    The area thus outlined was incised deep to adipose tissue with a #15 scalpel blade.  The skin margins were undermined to an appropriate distance in all directions utilizing iris scissors.  The opposing transposition arms were then transposed into place in opposite direction and anchored with interrupted buried subcutaneous sutures.
Island Pedicle Flap Text: The defect edges were debeveled with a #15 scalpel blade.  Given the location of the defect, shape of the defect and the proximity to free margins an island pedicle advancement flap was deemed most appropriate.  Using a sterile surgical marker, an appropriate advancement flap was drawn incorporating the defect, outlining the appropriate donor tissue and placing the expected incisions within the relaxed skin tension lines where possible.    The area thus outlined was incised deep to adipose tissue with a #15 scalpel blade.  The skin margins were undermined to an appropriate distance in all directions around the primary defect and laterally outward around the island pedicle utilizing iris scissors.  There was minimal undermining beneath the pedicle flap.
Tissue Cultured Epidermal Autograft Text: The defect edges were debeveled with a #15 scalpel blade.  Given the location of the defect, shape of the defect and the proximity to free margins a tissue cultured epidermal autograft was deemed most appropriate.  The graft was then trimmed to fit the size of the defect.  The graft was then placed in the primary defect and oriented appropriately.
Double Z Plasty Text: The lesion was extirpated to the level of the fat with a #15 scalpel blade. Given the location of the defect, shape of the defect and the proximity to free margins a double Z-plasty was deemed most appropriate for repair. Using a sterile surgical marker, the appropriate transposition arms of the double Z-plasty were drawn incorporating the defect and placing the expected incisions within the relaxed skin tension lines where possible. The area thus outlined was incised deep to adipose tissue with a #15 scalpel blade. The skin margins were undermined to an appropriate distance in all directions utilizing iris scissors. The opposing transposition arms were then transposed and carried over into place in opposite direction and anchored with interrupted buried subcutaneous sutures.
Bcc Histology Text: There were numerous aggregates of basaloid cells.
Composite Graft Text: The defect edges were debeveled with a #15 scalpel blade.  Given the location of the defect, shape of the defect, the proximity to free margins and the fact the defect was full thickness a composite graft was deemed most appropriate.  The defect was outline and then transferred to the donor site.  A full thickness graft was then excised from the donor site. The graft was then placed in the primary defect, oriented appropriately and then sutured into place.  The secondary defect was then repaired using a primary closure.
Rhombic Flap Text: The defect edges were debeveled with a #15 scalpel blade.  Given the location of the defect and the proximity to free margins a rhombic flap was deemed most appropriate.  Using a sterile surgical marker, an appropriate rhombic flap was drawn incorporating the defect.    The area thus outlined was incised deep to adipose tissue with a #15 scalpel blade.  The skin margins were undermined to an appropriate distance in all directions utilizing iris scissors.
Consent 3/Introductory Paragraph: I gave the patient a chance to ask questions they had about the procedure.  Following this I explained the Mohs procedure and consent was obtained. The risks, benefits and alternatives to therapy were discussed in detail. Specifically, the risks of infection, scarring, bleeding, prolonged wound healing, incomplete removal, allergy to anesthesia, nerve injury and recurrence were addressed. Prior to the procedure, the treatment site was clearly identified and confirmed by the patient. All components of Universal Protocol/PAUSE Rule completed.
No Repair - Repaired With Adjacent Surgical Defect Text (Leave Blank If You Do Not Want): After obtaining clear surgical margins the defect was repaired concurrently with another surgical defect which was in close approximation.
Area M Indication Text: Tumors in this location are included in Area M (cheek, forehead, scalp, neck, jawline and pretibial skin).  Mohs surgery is indicated for tumors in these anatomic locations.
Peng Advancement Flap Text: The defect edges were debeveled with a #15 scalpel blade.  Given the location of the defect, shape of the defect and the proximity to free margins a Peng advancement flap was deemed most appropriate.  Using a sterile surgical marker, an appropriate advancement flap was drawn incorporating the defect and placing the expected incisions within the relaxed skin tension lines where possible. The area thus outlined was incised deep to adipose tissue with a #15 scalpel blade.  The skin margins were undermined to an appropriate distance in all directions utilizing iris scissors.
Rhomboid Transposition Flap Text: The defect edges were debeveled with a #15 scalpel blade.  Given the location of the defect and the proximity to free margins a rhomboid transposition flap was deemed most appropriate.  Using a sterile surgical marker, an appropriate rhomboid flap was drawn incorporating the defect.    The area thus outlined was incised deep to adipose tissue with a #15 scalpel blade.  The skin margins were undermined to an appropriate distance in all directions utilizing iris scissors.
Mohs Histo Method Verbiage: Each section was then chromacoded and processed in the Mohs lab using the Mohs protocol and submitted for frozen section.
Which Eyelid Repair Cpt Are You Using?: 71960
Purse String (Intermediate) Text: Given the location of the defect and the characteristics of the surrounding skin a purse string intermediate closure was deemed most appropriate.  Undermining was performed circumfirentially around the surgical defect.  A purse string suture was then placed and tightened.
Cheiloplasty (Complex) Text: A decision was made to reconstruct the defect with a  cheiloplasty.  The defect was undermined extensively.  Additional obicularis oris muscle was excised with a 15 blade scalpel.  The defect was converted into a full thickness wedge to facilite a better cosmetic result.  Small vessels were then tied off with 5-0 monocyrl. The obicularis oris, superficial fascia, adipose and dermis were then reapproximated.  After the deeper layers were approximated the epidermis was reapproximated with particular care given to realign the vermilion border.
Localized Dermabrasion With 15 Blade Text: The patient was draped in routine manner.  Localized dermabrasion using a 15 blade was performed in routine manner to papillary dermis. This spot dermabrasion is being performed to complete skin cancer reconstruction. It also will eliminate the other sun damaged precancerous cells that are known to be part of the regional effect of a lifetime's worth of sun exposure. This localized dermabrasion is therapeutic and should not be considered cosmetic in any regard.
Epidermal Closure Graft Donor Site (Optional): running
Bill 59 Modifier?: No - Continue to Bill 79 Modifier
Nasolabial Transposition Flap Text: The defect edges were debeveled with a #15 scalpel blade.  Given the size, depth and location of the defect and the proximity to free margins a nasolabial transposition flap was deemed most appropriate. Using a sterile surgical marker, an appropriate flap was drawn incorporating the defect. The area thus outlined was incised with a #15 scalpel blade. The skin margins were undermined to an appropriate distance in all directions utilizing iris scissors. Following this, the designed flap was carried into the primary defect and sutured into place.

## 2024-06-14 ENCOUNTER — APPOINTMENT (RX ONLY)
Dept: URBAN - METROPOLITAN AREA CLINIC 36 | Facility: CLINIC | Age: 79
Setting detail: DERMATOLOGY
End: 2024-06-14

## 2024-06-14 DIAGNOSIS — Z48.02 ENCOUNTER FOR REMOVAL OF SUTURES: ICD-10-CM

## 2024-06-14 PROCEDURE — ? SUTURE REMOVAL (GLOBAL PERIOD)

## 2024-06-14 ASSESSMENT — LOCATION SIMPLE DESCRIPTION DERM: LOCATION SIMPLE: RIGHT ZYGOMA

## 2024-06-14 ASSESSMENT — LOCATION DETAILED DESCRIPTION DERM: LOCATION DETAILED: RIGHT LATERAL ZYGOMA

## 2024-06-14 ASSESSMENT — LOCATION ZONE DERM: LOCATION ZONE: FACE

## 2024-06-14 NOTE — PROCEDURE: SUTURE REMOVAL (GLOBAL PERIOD)
Detail Level: Detailed
Add 70731 Cpt? (Important Note: In 2017 The Use Of 35334 Is Being Tracked By Cms To Determine Future Global Period Reimbursement For Global Periods): no

## 2024-09-04 ENCOUNTER — PATIENT MESSAGE (OUTPATIENT)
Dept: HEALTH INFORMATION MANAGEMENT | Facility: OTHER | Age: 79
End: 2024-09-04

## 2024-09-16 ENCOUNTER — TELEPHONE (OUTPATIENT)
Dept: HEALTH INFORMATION MANAGEMENT | Facility: OTHER | Age: 79
End: 2024-09-16

## 2024-10-22 ENCOUNTER — RX ONLY (OUTPATIENT)
Age: 79
Setting detail: RX ONLY
End: 2024-10-22

## 2024-10-22 RX ORDER — KETOCONAZOLE 20 MG/G
1 CREAM TOPICAL TWICE A DAY
Qty: 60 | Refills: 4 | Status: ERX | COMMUNITY
Start: 2024-10-21

## 2024-10-24 ENCOUNTER — OFFICE VISIT (OUTPATIENT)
Dept: MEDICAL GROUP | Facility: LAB | Age: 79
End: 2024-10-24
Payer: MEDICARE

## 2024-10-24 VITALS
BODY MASS INDEX: 21.12 KG/M2 | SYSTOLIC BLOOD PRESSURE: 128 MMHG | OXYGEN SATURATION: 97 % | HEIGHT: 63 IN | RESPIRATION RATE: 12 BRPM | TEMPERATURE: 98.9 F | WEIGHT: 119.2 LBS | DIASTOLIC BLOOD PRESSURE: 80 MMHG | HEART RATE: 64 BPM

## 2024-10-24 DIAGNOSIS — F33.42 RECURRENT MAJOR DEPRESSIVE DISORDER, IN FULL REMISSION (HCC): ICD-10-CM

## 2024-10-24 DIAGNOSIS — F17.210 CIGARETTE NICOTINE DEPENDENCE WITHOUT COMPLICATION: ICD-10-CM

## 2024-10-24 DIAGNOSIS — F41.1 GAD (GENERALIZED ANXIETY DISORDER): ICD-10-CM

## 2024-10-24 DIAGNOSIS — M81.0 OSTEOPOROSIS, POST-MENOPAUSAL: ICD-10-CM

## 2024-10-24 PROCEDURE — 99214 OFFICE O/P EST MOD 30 MIN: CPT | Performed by: FAMILY MEDICINE

## 2024-10-24 PROCEDURE — 3074F SYST BP LT 130 MM HG: CPT | Performed by: FAMILY MEDICINE

## 2024-10-24 PROCEDURE — 3079F DIAST BP 80-89 MM HG: CPT | Performed by: FAMILY MEDICINE

## 2024-10-24 PROCEDURE — G2211 COMPLEX E/M VISIT ADD ON: HCPCS | Performed by: FAMILY MEDICINE

## 2024-10-24 RX ORDER — LORAZEPAM 1 MG/1
1 TABLET ORAL
Qty: 30 TABLET | Refills: 0 | Status: SHIPPED | OUTPATIENT
Start: 2024-10-24 | End: 2025-01-22

## 2024-10-24 RX ORDER — ESCITALOPRAM OXALATE 20 MG/1
20 TABLET ORAL DAILY
Qty: 90 TABLET | Refills: 3 | Status: SHIPPED | OUTPATIENT
Start: 2024-10-24

## 2024-10-24 ASSESSMENT — FIBROSIS 4 INDEX: FIB4 SCORE: 1.17

## 2024-10-25 NOTE — TELEPHONE ENCOUNTER
[facility]  Outpatient Consultation    Patient Name: David Page  MRN:[id number]  :1939    PCP:  Nathaniel Bundy DO  [phone]    Referring Provider: Dr. Vargas    Cardiologist: Dr. QUINN Salazar    History Obtained From:  patient, electronic medical record     Chief Complaint:   Chief Complaint   Patient presents with    Office Visit     Dx:CHF,Ventricular tachycardia/ 2 weeks PHFU,pt feeling finbe, no c/o    Hospital F/U       HPI:      David Page is a pleasant 85 year old male with significant past medical history of CAD, HFrEF, ventricular tachycardia, s/p [employer] dual-chamber ICD in  on amiodarone, MARY, and hypothyroidism.     [facility] after his ICD delivered a shock. He states that he was resting when this occurred. He had no symptoms leading up to the shock. On presentation, he had normal vital signs. Labs showed Na 132, K 5.2, Cr 1.21, normal troponin, NTproBNP 8,600. ICD interrogation revealed multiple episodes of VT with several rounds of ATP, ultimately followed by a shock. Patient was admitted for the same last week. At that time, he was medically managed with up-titration of his anti-anginal medications. He was also started on mexiletine 150 mg TID. He has a known  of his RPL with left-right collaterals from an OM branch. He otherwise has non-obstructive CAD based on angiogram from . Patient was seen by Dr. Evangelista during hospitalization.    TTE 09/10/24: EF at 31%.  Underwent uneventful VT ablation s/p 24.    Patient presented to the EP clinic today for a post-hospital follow-up visit regarding his ventricular tachycardia. He is doing well overall today from a cardiac perspective and is accompanied by his wife. He reported experiencing a significant improvement in his symptoms.     He denies any chest pain, shortness of breath, or palpitations. Denies any orthopnea, PND, or peripheral edema. Denies any dizziness,  Called and left a voicemail to advise patient.    lightheadedness, or syncope. He denies any bleeding complications such as unusual bruising, epistaxis, hematuria, hematochezia, melena. He denies any muscle aches or pains. The patient has been tolerating their medication regimen.      Past medical history:    Past Medical History:   Diagnosis Date    Abnormal cardiovascular stress test 07/17/2023    AF (atrial fibrillation)  (CMD)     Arthritis     CAD (coronary artery disease)     Chronic systolic CHF (congestive heart failure)  (CMD)     LVEF 25-30%    Closed fracture of multiple ribs of left side, initial encounter 06/19/2024    Closed nondisplaced fracture of right patella with routine healing 08/19/2024    Common wart 06/20/2013    COVID-19 virus infection 08/06/2024    Depressive disorder     Essential (primary) hypertension     GERD (gastroesophageal reflux disease)     High cholesterol     History of falling 06/26/2024    History of ventricular tachycardia 01/20/2023    ICD (implantable cardioverter-defibrillator) in place     Inguinal hernia 12/13/2019    Insomnia     Kidney stone     Lesion of spleen 09/17/2021    Calcified granulomas noted on CT abdomen from 9/15/2021      Major depressive disorder, single episode, unspecified 07/15/2019    Malignant neoplasm of colon (CMD) 02/16/2012    Melanoma  (CMD) 2022    OAB (overactive bladder)     Other fracture of left lower leg, subsequent encounter for closed fracture with routine healing 06/26/2024    Other specified hypothyroidism     Parkinsons disease  (CMD) 01/20/2023    Presence of cardiac pacemaker 01/20/2023    Presence of left artificial knee joint 07/15/2019    Rib fracture 06/19/2024    S/P coronary angiogram 03/2020    mild stenosis of mid LAD-30%, diagonal 40%, left circumflex 20% with OM1 50 to 60%, distal RCA with 100% occlusion    Seborrheic keratosis 12/22/2012    Sigmoid diverticulosis 09/17/2021    Sleep apnea     uses cpap       Past Surgical History:    Past Surgical History:   Procedure  Laterality Date    Colon surgery  1994    partial for colon Ca    Eye surgery Bilateral 2012    cataracts    Hernia repair  2014    Joint replacement Left 07/12/2019    knee    Malignant skin lesion excision Left 11/11/2022    Nasal septum surgery      Pacemaker implant  03/2020    with ICD    Tonsillectomy      Total knee replacement Right 2019       Family History:   Family History   Problem Relation Age of Onset    Stroke Mother     Cancer, Skin Melanoma Mother 80        likely melanoma    Cancer Father         pancreatic    Depression Brother     Other Brother         colectomy for perforation    Diabetes Brother        Allergies:   ALLERGIES:   Allergen Reactions    Lisinopril SWELLING     Swelling in lips cheeks throat         Medications Including OTC:  Current Outpatient Medications   Medication Sig Dispense Refill    metoPROLOL succinate (TOPROL-XL) 100 MG 24 hr tablet Take 1 tablet by mouth daily. 30 tablet 0    AMIODarone (PACERONE) 400 MG tablet Take 1 tablet by mouth every 12 hours for 1 day, THEN 1 tablet daily. (Patient taking differently: 1 tablet daily.) 32 tablet 0    isosorbide dinitrate (ISORDIL) 20 MG tablet Take 1 tablet by mouth in the morning and 1 tablet in the evening. 60 tablet 0    mexiletine (MEXITIL) 200 MG capsule Take 1 capsule by mouth every 8 hours. 90 capsule 0    ranolazine 1000 MG TABLET SR 12 HR Take 1 tablet by mouth in the morning and 1 tablet in the evening. 60 tablet 0    ascorbic acid (Vitamin C) 250 MG tablet Take 250 mg by mouth every evening.      Ferrous Sulfate (Iron) 325 (65 Fe) MG Tab Take 1 tablet by mouth daily.      omeprazole (PrilOSEC) 20 MG capsule Take 20 mg by mouth daily. Indications: Gastroesophageal Reflux Disease      sertraline (ZOLOFT) 25 MG tablet Take 25 mg by mouth daily.      calcium carbonate (TUMS) 500 MG chewable tablet Chew 1 tablet by mouth every 4 hours as needed for Heartburn.      empagliflozin (Jardiance) 10 MG tablet Take 1 tablet by mouth  daily (before breakfast). 90 tablet 2    zolpidem (AMBIEN) 5 MG tablet Take 1 tablet by mouth nightly as needed for Sleep. 30 tablet 0    aspirin 81 MG chewable tablet Chew 81 mg by mouth in the morning and 81 mg in the evening. BID until 9/27/24, then daily starting 9/28      spironolactone (ALDACTONE) 25 MG tablet Take 0.5 tablets by mouth daily. HOLD FOR NOW AS BP on the low side ,resume when his BP is more stable      docusate sodium (Colace) 100 MG capsule Take 1 capsule by mouth 2 times daily as needed for Constipation. Please hold for diarrhea 50 capsule 0    cholecalciferol 25 mcg(1,000 units) tablet Take 4 tablets by mouth daily. (Patient taking differently: Take 100 mcg by mouth every evening.) 120 tablet 3    sertraline (ZOLOFT) 100 MG tablet Take 1 tablet by mouth daily.      atorvastatin (LIPITOR) 20 MG tablet TAKE 1 TABLET BY MOUTH DAILY (Patient taking differently: Take 20 mg by mouth at bedtime. Indications: High Amount of Fats in the Blood) 90 tablet 3    levothyroxine 100 MCG tablet TAKE 1 TABLET BY MOUTH DAILY 90 tablet 3    tamsulosin (FLOMAX) 0.4 MG Cap Take 0.4 mg by mouth at bedtime.      furosemide (LASIX) 20 MG tablet Take 1 tablet by mouth daily. 90 tablet 3    buPROPion XL (WELLBUTRIN XL) 300 MG 24 hr tablet Take 300 mg by mouth daily. Indications: Depression      acetaminophen (TYLENOL) 500 MG tablet Take 1,000 mg by mouth in the morning and 1,000 mg in the evening. Indications: mild pain scale 1-3.      Multiple Vitamins-Minerals (CENTRUM SILVER 50+MEN PO) Take 1 tablet by mouth every evening.       No current facility-administered medications for this visit.     Facility-Administered Medications Ordered in Other Visits   Medication    perflutren lipid microsphere (DEFINITY) injection 2 mL       Social History:   Alcohol and Tobacco History:     Tobacco Use: Quit          Packs/Day:       Years:          reports no history of drug use.    Review of systems:   Eye Problem(s):negative  ENT  Problem(s):negative  Cardiovascular problem(s):negative aside from that mentioned in the HPI  Respiratory problem(s):negative  Gastro-intestinal problem(s):negative  Genito-urinary problem(s):negative  Musculoskeletal problem(s):negative  Integumentary problem(s):negative  Neurological problem(s):negative  Psychiatric problem(s):negative  Endocrine problem(s):negative  Hematologic and/or Lymphatic problem(s):negative     Physical exam:     Vitals:  Vitals:    10/25/24 1041   BP: 124/62   BP Location: LUE - Left upper extremity   Patient Position: Sitting   Cuff Size: Regular   Pulse: (!) 56   Resp: 16   Temp: 96.8 °F (36 °C)   SpO2: 96%   Weight: 90.7 kg (200 lb)   Height: 5' 11\" (1.803 m)           General : Awake, no acute distress, appears comfortable  HEENT: PERRL (Pupils equal, round and reactive to light).  No icterus, no oral thrush, neck supple, no axillary or neck lymphadenopathy.  Cardiovascular system:  Pulse regular, S1, S2 normal, no murmurs , no edema, no bruits.  Pulmonary: Normal respiratory effort. Clear to auscultation, no wheezes, no rales.  Gastrointestinal: Soft , nontender, no hepatosplenmegaly, bowel sounds normoactive.  Genitourinary: No Daigle catheter. No costovertebral angle or suprapubic tenderness.    Extremities: No cyanosis, or clubbing, normal range of motion.  Skin: No rashes or induration.  Psychiatric: Oriented to name, place and time, normal affect and mood.    Data:     CBC:   Lab Results   Component Value Date    WBC 4.7 09/14/2024    WBC 6.81 09/09/2024    RBC 4.05 (L) 09/14/2024    RBC 4.51 (L) 09/09/2024    HGB 11.0 (L) 09/14/2024    HGB 12.3 (L) 09/09/2024    HCT 35.1 (L) 09/14/2024    HCT 40.1 (L) 09/09/2024    MCV 86.7 09/14/2024    MCV 88.9 09/09/2024     09/14/2024     (L) 09/09/2024     CMP:   Recent Labs   Lab 09/14/24  0340 09/13/24  0424 09/12/24  1758 09/12/24  0414 09/09/24  1836 09/09/24  0610 09/04/24  1538 09/03/24  1520 08/28/24  0850  05/10/24  1339 01/29/24  1654   Sodium 135 133* 132* 135   < > 135*   < > 138 141   < > 142   Chloride 103 103 103 106   < > 101   < > 105 108   < > 110   BUN 25* 31* 36* 29*   < > 18   < > 22 19   < > 42*   GFR Estimate,   --   --   --   --   --  >60  --  >60 >60  --  52*   BUN/Creatinine Ratio  --   --   --   --   --   --   --   --   --   --  31*   Potassium 4.0 3.6 3.6 4.1   < > 3.8   < > 4.2 3.9   < > 4.9   Glucose 89 104* 115* 85   < > 117*   < > 89 70   < > 141*   Creatinine 0.98 0.96 1.19* 1.12   < > 1.08   < > 1.34* 1.10   < > 1.36*   GFR Estimate, Non   --   --   --   --   --  >60  --  51* >60  --   --    CALCIUM  --   --   --   --   --  8.9  --  9.3 8.8  --  9.5   Calcium 9.1 9.0 8.7 8.5   < >  --    < >  --   --    < >  --     < > = values in this interval not displayed.     FLP:   Cholesterol (mg/dL)   Date Value   01/29/2024 140     HDL (mg/dL)   Date Value   01/29/2024 43     Cholesterol/ HDL Ratio (no units)   Date Value   01/29/2024 3.3     Triglycerides (mg/dL)   Date Value   01/29/2024 275 (H)     LDL (mg/dL)   Date Value   01/29/2024 42     TSH:    Lab Results   Component Value Date    TSH 0.588 09/13/2024    TSH 24.050 (H) 11/27/2020       ECG's:   Atrial paced rhythm    Device Check 10/03/24:  Interpretation:  Battery longevity 4.8 years  Thresholds and lead impedance stable   0.4%  AP 87.9%  PVC burden 12/hour  HVR no events since ablation 9/11/24  AF burden 0%  Advisory SCP, all therapies are programmed B>AX  Device noted to be functioning within normal limits. No changes recommended.    TTE: Date: 09/10/24  SUMMARY:  1. Left ventricle: The cavity size is mildly dilated. Wall thickness is at the     upper limits of normal. Systolic function is reduced. The ejection fraction     was measured by biplane method of disks. Doppler parameters are consistent     with abnormal left ventricular relaxation (grade 1 diastolic dysfunction).     There is global hyokinesis  with regional worsening. Visually the LVEF     appears to be 30-35%. The ejection fraction is 31%.  2. Aortic valve: Velocity is within the normal range. There is no stenosis.     There is no regurgitation.  3. Mitral valve: Inflow velocity is within the normal range. There is no     evidence for stenosis. There is mild to moderate regurgitation.  4. Right ventricle: The cavity size is normal. Wall thickness is normal.     Systolic function is low normal. Pacemaker lead noted in right ventricle.  5. Right atrium: Pacemaker lead noted in right atrium.  6. Tricuspid valve: There is mild regurgitation.  7. Pulmonic valve: There is no regurgitation.  8. Inferior vena cava: The IVC is normal-sized.  9. Pericardium, extracardiac: There is no pericardial effusion.    Stress Test 07/06/23:   IMPRESSION  1) Abnormal myocardial perfusion imaging showing a moderate size reversible perfusion defect in the mid to basal inferior and inferolateral wall consistent with ischemia  2) Post stress gated SPECT had a calculated ejection fraction of 37 %, with global hypokinesis  3) ECG portion dictated separately   4) Compared to prior study- none     Coronary Angiogram: 09/10/24:   Left main: No significant stenosis and gives rise to LAD and circumflex artery    LAD: Moderate caliber vessel.  Gives rise to at least 2 diagonal multiple septal .  There is a 50% tubular stenosis noted in mid LAD after the diagonal.  The diagonal branch gives rise to collaterals to the posterolateral branch of the right side of the artery which originates before the stenosis    LCx: Small caliber vessel gives rise to 1 obtuse marginal and then continues to AV groove branch.  AV groove branch has 50% stenosis.    RCA: Dominant vessel gives rise to posterior descending artery and large branching posterolateral branch.  One of the branch of posterior branch is 100% occluded which is chronic.  It is filled by left-to-right collateral.    Moderate  conscious sedation:Patient continuously monitored by trained personnel and supervised by me throughout sedation.     Assessment / Plan:     Assessment:     Patient Active Problem List   Diagnosis    Acrochordon    Arthritis    Bradycardia    BPH (benign prostatic hyperplasia)    Cataract    Deviated septum    Constipation    DJD (degenerative joint disease), lumbar    Weakness    HLD (hyperlipidemia)    H/O blood transfusion reaction    MARY (obstructive sleep apnea)    Spondylosis of cervical region without myelopathy or radiculopathy    Ventricular tachycardia  (CMD)    ICD (implantable cardioverter-defibrillator) in place    CAD (coronary artery disease)    HTN (hypertension)    Lumbar stenosis with neurogenic claudication    Dilated cardiomyopathy  (CMD)    History of colon cancer    Adrenal nodule (CMD)    Granuloma of liver    Recurrent major depressive disorder, in partial remission (CMD)    Insomnia    Chronic total occlusion of coronary artery    Malignant melanoma of skin of scalp  (CMD)    Anxiety and depression    Ischemic cardiomyopathy    Hypothyroidism    Smoker    Unsteadiness    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 Site is well healed. Battery longevity 4.8 years.  Plan: Continue remote monitoring. Continue device clionic follow up. Follow up with me in 12 months.     Ventricular Tachycardia  Comment: Frequent runs. Currently asymptomatic. TTE 09/10/24 reveals LVEF 31%. Underwent uneventful ablation s/p 09/11/24.  Plan: Continue mexiletine 200 mg TID, Ranexa 500 mg BID, amiodarone 400 mg qd, metoprolol  succinate 100 mg qd, and aspirin 81 mg qd. Follow-up with me in 6 months.  .  Hypertension  Comment: Blood pressure is well controlled in office today at 124/62 mmHg.  Plan: Continue furosemide 20 mg qd, spironolactone 25 mg half tablet qd, and metoprolol succinate 100 mg qd.     Hyperlipidemia:  Comment: LDL is at 42 from lipid panel on 1/29/2024, at goal.  Plan: Continue atorvastatin 20 mg qd.    On 10/25/24, Joyce BAJWA scribed the services personally performed by Duy Vargas MD.    Thank you for allowing me to participate in the care of this patient. Please don't hesitate to contact me with any questions.     The documentation recorded by the scribe accurately and completely reflects the service(s) Duy BAJWA MD,  personally performed and the decisions made by me.     Dakota Vargas MD, PhD  Cardiac Electrophysiology

## 2024-11-01 ENCOUNTER — TELEPHONE (OUTPATIENT)
Dept: HEALTH INFORMATION MANAGEMENT | Facility: OTHER | Age: 79
End: 2024-11-01

## 2024-11-01 NOTE — TELEPHONE ENCOUNTER
Called to verify program eligibility/LVM with Direct line for call back/Warm Transfer to Patient Outreach [id number]

## 2024-11-10 ENCOUNTER — APPOINTMENT (OUTPATIENT)
Dept: RADIOLOGY | Facility: MEDICAL CENTER | Age: 79
End: 2024-11-10
Attending: EMERGENCY MEDICINE
Payer: MEDICARE

## 2024-11-10 ENCOUNTER — HOSPITAL ENCOUNTER (OUTPATIENT)
Facility: MEDICAL CENTER | Age: 79
End: 2024-11-12
Attending: EMERGENCY MEDICINE | Admitting: HOSPITALIST
Payer: MEDICARE

## 2024-11-10 DIAGNOSIS — R27.0 ATAXIA: ICD-10-CM

## 2024-11-10 DIAGNOSIS — R42 DIZZINESS: ICD-10-CM

## 2024-11-10 PROBLEM — E87.6 HYPOKALEMIA: Status: ACTIVE | Noted: 2024-11-10

## 2024-11-10 PROBLEM — Z71.89 ACP (ADVANCE CARE PLANNING): Status: ACTIVE | Noted: 2024-11-10

## 2024-11-10 PROBLEM — I67.1 INTRACRANIAL ANEURYSM: Status: ACTIVE | Noted: 2024-11-10

## 2024-11-10 LAB
ALBUMIN SERPL BCP-MCNC: 4.9 G/DL (ref 3.2–4.9)
ALBUMIN/GLOB SERPL: 1.6 G/DL
ALP SERPL-CCNC: 101 U/L (ref 30–99)
ALT SERPL-CCNC: 8 U/L (ref 2–50)
ANION GAP SERPL CALC-SCNC: 13 MMOL/L (ref 7–16)
APTT PPP: 30.4 SEC (ref 24.7–36)
AST SERPL-CCNC: 16 U/L (ref 12–45)
BASOPHILS # BLD AUTO: 1.2 % (ref 0–1.8)
BASOPHILS # BLD: 0.1 K/UL (ref 0–0.12)
BILIRUB SERPL-MCNC: 0.5 MG/DL (ref 0.1–1.5)
BUN SERPL-MCNC: 13 MG/DL (ref 8–22)
CALCIUM ALBUM COR SERPL-MCNC: 9.4 MG/DL (ref 8.5–10.5)
CALCIUM SERPL-MCNC: 10.1 MG/DL (ref 8.4–10.2)
CHLORIDE SERPL-SCNC: 103 MMOL/L (ref 96–112)
CO2 SERPL-SCNC: 27 MMOL/L (ref 20–33)
CREAT SERPL-MCNC: 0.55 MG/DL (ref 0.5–1.4)
EKG IMPRESSION: NORMAL
EOSINOPHIL # BLD AUTO: 0.11 K/UL (ref 0–0.51)
EOSINOPHIL NFR BLD: 1.3 % (ref 0–6.9)
ERYTHROCYTE [DISTWIDTH] IN BLOOD BY AUTOMATED COUNT: 56.9 FL (ref 35.9–50)
GFR SERPLBLD CREATININE-BSD FMLA CKD-EPI: 94 ML/MIN/1.73 M 2
GLOBULIN SER CALC-MCNC: 3.1 G/DL (ref 1.9–3.5)
GLUCOSE SERPL-MCNC: 92 MG/DL (ref 65–99)
HCT VFR BLD AUTO: 44.5 % (ref 37–47)
HGB BLD-MCNC: 15 G/DL (ref 12–16)
IMM GRANULOCYTES # BLD AUTO: 0.03 K/UL (ref 0–0.11)
IMM GRANULOCYTES NFR BLD AUTO: 0.4 % (ref 0–0.9)
INR PPP: 0.95 (ref 0.87–1.13)
LYMPHOCYTES # BLD AUTO: 1.59 K/UL (ref 1–4.8)
LYMPHOCYTES NFR BLD: 19.5 % (ref 22–41)
MCH RBC QN AUTO: 32.1 PG (ref 27–33)
MCHC RBC AUTO-ENTMCNC: 33.7 G/DL (ref 32.2–35.5)
MCV RBC AUTO: 95.1 FL (ref 81.4–97.8)
MONOCYTES # BLD AUTO: 0.39 K/UL (ref 0–0.85)
MONOCYTES NFR BLD AUTO: 4.8 % (ref 0–13.4)
NEUTROPHILS # BLD AUTO: 5.94 K/UL (ref 1.82–7.42)
NEUTROPHILS NFR BLD: 72.8 % (ref 44–72)
NRBC # BLD AUTO: 0 K/UL
NRBC BLD-RTO: 0 /100 WBC (ref 0–0.2)
PLATELET # BLD AUTO: 378 K/UL (ref 164–446)
PMV BLD AUTO: 10.6 FL (ref 9–12.9)
POTASSIUM SERPL-SCNC: 3.5 MMOL/L (ref 3.6–5.5)
PROT SERPL-MCNC: 8 G/DL (ref 6–8.2)
PROTHROMBIN TIME: 12.9 SEC (ref 12–14.6)
RBC # BLD AUTO: 4.68 M/UL (ref 4.2–5.4)
SODIUM SERPL-SCNC: 143 MMOL/L (ref 135–145)
TROPONIN T SERPL-MCNC: <6 NG/L (ref 6–19)
WBC # BLD AUTO: 8.2 K/UL (ref 4.8–10.8)

## 2024-11-10 PROCEDURE — G0378 HOSPITAL OBSERVATION PER HR: HCPCS

## 2024-11-10 PROCEDURE — 0042T CT-CEREBRAL PERFUSION ANALYSIS: CPT

## 2024-11-10 PROCEDURE — 70496 CT ANGIOGRAPHY HEAD: CPT

## 2024-11-10 PROCEDURE — 85730 THROMBOPLASTIN TIME PARTIAL: CPT

## 2024-11-10 PROCEDURE — 99407 BEHAV CHNG SMOKING > 10 MIN: CPT | Performed by: HOSPITALIST

## 2024-11-10 PROCEDURE — 83036 HEMOGLOBIN GLYCOSYLATED A1C: CPT

## 2024-11-10 PROCEDURE — A9270 NON-COVERED ITEM OR SERVICE: HCPCS | Performed by: HOSPITALIST

## 2024-11-10 PROCEDURE — 99497 ADVNCD CARE PLAN 30 MIN: CPT | Mod: 25 | Performed by: HOSPITALIST

## 2024-11-10 PROCEDURE — 94760 N-INVAS EAR/PLS OXIMETRY 1: CPT

## 2024-11-10 PROCEDURE — 99285 EMERGENCY DEPT VISIT HI MDM: CPT

## 2024-11-10 PROCEDURE — 700117 HCHG RX CONTRAST REV CODE 255: Performed by: EMERGENCY MEDICINE

## 2024-11-10 PROCEDURE — 71045 X-RAY EXAM CHEST 1 VIEW: CPT

## 2024-11-10 PROCEDURE — 93005 ELECTROCARDIOGRAM TRACING: CPT | Performed by: EMERGENCY MEDICINE

## 2024-11-10 PROCEDURE — 36415 COLL VENOUS BLD VENIPUNCTURE: CPT

## 2024-11-10 PROCEDURE — 70498 CT ANGIOGRAPHY NECK: CPT

## 2024-11-10 PROCEDURE — 85025 COMPLETE CBC W/AUTO DIFF WBC: CPT

## 2024-11-10 PROCEDURE — 85610 PROTHROMBIN TIME: CPT

## 2024-11-10 PROCEDURE — 84484 ASSAY OF TROPONIN QUANT: CPT

## 2024-11-10 PROCEDURE — 700102 HCHG RX REV CODE 250 W/ 637 OVERRIDE(OP): Performed by: HOSPITALIST

## 2024-11-10 PROCEDURE — 93005 ELECTROCARDIOGRAM TRACING: CPT

## 2024-11-10 PROCEDURE — 80053 COMPREHEN METABOLIC PANEL: CPT

## 2024-11-10 PROCEDURE — 99223 1ST HOSP IP/OBS HIGH 75: CPT | Mod: 25 | Performed by: HOSPITALIST

## 2024-11-10 RX ORDER — ATORVASTATIN CALCIUM 40 MG/1
40 TABLET, FILM COATED ORAL EVERY EVENING
Status: DISCONTINUED | OUTPATIENT
Start: 2024-11-10 | End: 2024-11-12

## 2024-11-10 RX ORDER — LORAZEPAM 0.5 MG/1
0.5 TABLET ORAL EVERY 8 HOURS PRN
Status: DISPENSED | OUTPATIENT
Start: 2024-11-10 | End: 2024-11-11

## 2024-11-10 RX ORDER — AMOXICILLIN 250 MG
2 CAPSULE ORAL EVERY EVENING
Status: DISCONTINUED | OUTPATIENT
Start: 2024-11-11 | End: 2024-11-11

## 2024-11-10 RX ORDER — ACETAMINOPHEN 325 MG/1
650 TABLET ORAL EVERY 6 HOURS PRN
Status: CANCELLED | OUTPATIENT
Start: 2024-11-10

## 2024-11-10 RX ORDER — ASPIRIN 300 MG/1
300 SUPPOSITORY RECTAL DAILY
Status: DISCONTINUED | OUTPATIENT
Start: 2024-11-11 | End: 2024-11-12

## 2024-11-10 RX ORDER — ENOXAPARIN SODIUM 100 MG/ML
40 INJECTION SUBCUTANEOUS DAILY
Status: DISCONTINUED | OUTPATIENT
Start: 2024-11-11 | End: 2024-11-12 | Stop reason: HOSPADM

## 2024-11-10 RX ORDER — ASPIRIN 81 MG/1
81 TABLET, CHEWABLE ORAL DAILY
Status: DISCONTINUED | OUTPATIENT
Start: 2024-11-11 | End: 2024-11-12

## 2024-11-10 RX ORDER — LORAZEPAM 1 MG/1
1 TABLET ORAL
Status: DISCONTINUED | OUTPATIENT
Start: 2024-11-10 | End: 2024-11-10

## 2024-11-10 RX ORDER — POLYETHYLENE GLYCOL 3350 17 G/17G
1 POWDER, FOR SOLUTION ORAL
Status: DISCONTINUED | OUTPATIENT
Start: 2024-11-10 | End: 2024-11-11

## 2024-11-10 RX ORDER — KETOCONAZOLE 20 MG/G
CREAM TOPICAL 2 TIMES DAILY PRN
COMMUNITY

## 2024-11-10 RX ORDER — ONDANSETRON 4 MG/1
4 TABLET, ORALLY DISINTEGRATING ORAL EVERY 4 HOURS PRN
Status: DISCONTINUED | OUTPATIENT
Start: 2024-11-10 | End: 2024-11-12 | Stop reason: HOSPADM

## 2024-11-10 RX ORDER — ESCITALOPRAM OXALATE 10 MG/1
20 TABLET ORAL DAILY
Status: DISCONTINUED | OUTPATIENT
Start: 2024-11-11 | End: 2024-11-12 | Stop reason: HOSPADM

## 2024-11-10 RX ORDER — NICOTINE 21 MG/24HR
21 PATCH, TRANSDERMAL 24 HOURS TRANSDERMAL
Status: DISCONTINUED | OUTPATIENT
Start: 2024-11-10 | End: 2024-11-11

## 2024-11-10 RX ORDER — ONDANSETRON 2 MG/ML
4 INJECTION INTRAMUSCULAR; INTRAVENOUS EVERY 4 HOURS PRN
Status: DISCONTINUED | OUTPATIENT
Start: 2024-11-10 | End: 2024-11-12 | Stop reason: HOSPADM

## 2024-11-10 RX ORDER — POTASSIUM CHLORIDE 1500 MG/1
40 TABLET, EXTENDED RELEASE ORAL ONCE
Status: COMPLETED | OUTPATIENT
Start: 2024-11-10 | End: 2024-11-10

## 2024-11-10 RX ADMIN — POTASSIUM CHLORIDE 40 MEQ: 1500 TABLET, EXTENDED RELEASE ORAL at 19:36

## 2024-11-10 RX ADMIN — LORAZEPAM 1 MG: 1 TABLET ORAL at 19:32

## 2024-11-10 RX ADMIN — ATORVASTATIN CALCIUM 40 MG: 40 TABLET, FILM COATED ORAL at 19:36

## 2024-11-10 RX ADMIN — IOHEXOL 40 ML: 350 INJECTION, SOLUTION INTRAVENOUS at 18:02

## 2024-11-10 RX ADMIN — Medication 5 MG: at 21:35

## 2024-11-10 RX ADMIN — IOHEXOL 75 ML: 350 INJECTION, SOLUTION INTRAVENOUS at 17:52

## 2024-11-10 RX ADMIN — LORAZEPAM 0.5 MG: 0.5 TABLET ORAL at 21:35

## 2024-11-10 ASSESSMENT — PATIENT HEALTH QUESTIONNAIRE - PHQ9
1. LITTLE INTEREST OR PLEASURE IN DOING THINGS: MORE THAN HALF THE DAYS
9. THOUGHTS THAT YOU WOULD BE BETTER OFF DEAD, OR OF HURTING YOURSELF: SEVERAL DAYS
6. FEELING BAD ABOUT YOURSELF - OR THAT YOU ARE A FAILURE OR HAVE LET YOURSELF OR YOUR FAMILY DOWN: NOT AL ALL
3. TROUBLE FALLING OR STAYING ASLEEP OR SLEEPING TOO MUCH: SEVERAL DAYS
7. TROUBLE CONCENTRATING ON THINGS, SUCH AS READING THE NEWSPAPER OR WATCHING TELEVISION: SEVERAL DAYS
8. MOVING OR SPEAKING SO SLOWLY THAT OTHER PEOPLE COULD HAVE NOTICED. OR THE OPPOSITE, BEING SO FIGETY OR RESTLESS THAT YOU HAVE BEEN MOVING AROUND A LOT MORE THAN USUAL: NOT AT ALL
SUM OF ALL RESPONSES TO PHQ QUESTIONS 1-9: 11
2. FEELING DOWN, DEPRESSED, IRRITABLE, OR HOPELESS: MORE THAN HALF THE DAYS
5. POOR APPETITE OR OVEREATING: MORE THAN HALF THE DAYS
SUM OF ALL RESPONSES TO PHQ9 QUESTIONS 1 AND 2: 4
4. FEELING TIRED OR HAVING LITTLE ENERGY: MORE THAN HALF THE DAYS

## 2024-11-10 ASSESSMENT — ENCOUNTER SYMPTOMS
FOCAL WEAKNESS: 0
CHILLS: 0
DIZZINESS: 1
MYALGIAS: 0
VOMITING: 0
STRIDOR: 0
ABDOMINAL PAIN: 0
NERVOUS/ANXIOUS: 0
BRUISES/BLEEDS EASILY: 0
EYE DISCHARGE: 0
EYE REDNESS: 0
COUGH: 0
SHORTNESS OF BREATH: 0
FEVER: 0
FLANK PAIN: 0

## 2024-11-10 ASSESSMENT — LIFESTYLE VARIABLES
ON A TYPICAL DAY WHEN YOU DRINK ALCOHOL HOW MANY DRINKS DO YOU HAVE: 0
TOTAL SCORE: 0
ALCOHOL_USE: NO
DOES PATIENT WANT TO STOP DRINKING: NO
HOW MANY TIMES IN THE PAST YEAR HAVE YOU HAD 5 OR MORE DRINKS IN A DAY: 0
HAVE YOU EVER FELT YOU SHOULD CUT DOWN ON YOUR DRINKING: NO
TOTAL SCORE: 0
EVER HAD A DRINK FIRST THING IN THE MORNING TO STEADY YOUR NERVES TO GET RID OF A HANGOVER: NO
HAVE PEOPLE ANNOYED YOU BY CRITICIZING YOUR DRINKING: NO
TOTAL SCORE: 0
CONSUMPTION TOTAL: NEGATIVE
EVER FELT BAD OR GUILTY ABOUT YOUR DRINKING: NO
AVERAGE NUMBER OF DAYS PER WEEK YOU HAVE A DRINK CONTAINING ALCOHOL: 0

## 2024-11-10 ASSESSMENT — COGNITIVE AND FUNCTIONAL STATUS - GENERAL
MOBILITY SCORE: 22
DAILY ACTIVITIY SCORE: 24
CLIMB 3 TO 5 STEPS WITH RAILING: A LITTLE
SUGGESTED CMS G CODE MODIFIER MOBILITY: CJ
SUGGESTED CMS G CODE MODIFIER DAILY ACTIVITY: CH
WALKING IN HOSPITAL ROOM: A LITTLE

## 2024-11-10 ASSESSMENT — PAIN DESCRIPTION - PAIN TYPE: TYPE: ACUTE PAIN

## 2024-11-10 ASSESSMENT — FIBROSIS 4 INDEX: FIB4 SCORE: 1.17

## 2024-11-10 NOTE — ED TRIAGE NOTES
"Chief Complaint   Patient presents with    Dizziness     \"For months\" but most recent episode x2d per pt. Reports also bilat eye blurry vision. Denies CP, SOB, h/a, vomiting. Denies unilateral numbness/tingling or unilateral weakness.     Extremity Weakness     Bilat legs. Denies known fevers, dysuria.      Physical Exam  Pulmonary:      Effort: Pulmonary effort is normal.   Skin:     General: Skin is warm and dry.   Neurological:      Mental Status: She is alert.       BP (!) 148/90   Pulse 89   Temp 37.6 °C (99.6 °F) (Temporal)   Resp 16   Ht 1.6 m (5' 3\")   Wt 54 kg (119 lb)   LMP 07/12/1986   SpO2 93%   BMI 21.08 kg/m²     "

## 2024-11-11 ENCOUNTER — APPOINTMENT (OUTPATIENT)
Dept: CARDIOLOGY | Facility: MEDICAL CENTER | Age: 79
End: 2024-11-11
Attending: HOSPITALIST
Payer: MEDICARE

## 2024-11-11 ENCOUNTER — APPOINTMENT (OUTPATIENT)
Dept: RADIOLOGY | Facility: MEDICAL CENTER | Age: 79
End: 2024-11-11
Attending: HOSPITALIST
Payer: MEDICARE

## 2024-11-11 PROBLEM — Z86.69 HX OF MIGRAINES: Status: ACTIVE | Noted: 2024-11-11

## 2024-11-11 PROBLEM — Z66 DNR (DO NOT RESUSCITATE): Status: ACTIVE | Noted: 2024-11-11

## 2024-11-11 PROBLEM — J43.9 LUNG BLEBS (HCC): Status: ACTIVE | Noted: 2024-11-11

## 2024-11-11 PROBLEM — C44.310 BASAL CELL CARCINOMA (BCC) OF SKIN OF FACE: Status: ACTIVE | Noted: 2024-11-11

## 2024-11-11 PROBLEM — F32.5 MAJOR DEPRESSIVE DISORDER IN FULL REMISSION (HCC): Status: ACTIVE | Noted: 2024-11-11

## 2024-11-11 LAB
ALBUMIN SERPL BCP-MCNC: 4.1 G/DL (ref 3.2–4.9)
ALBUMIN/GLOB SERPL: 1.7 G/DL
ALP SERPL-CCNC: 86 U/L (ref 30–99)
ALT SERPL-CCNC: 7 U/L (ref 2–50)
ANION GAP SERPL CALC-SCNC: 12 MMOL/L (ref 7–16)
AST SERPL-CCNC: 13 U/L (ref 12–45)
BILIRUB SERPL-MCNC: 0.5 MG/DL (ref 0.1–1.5)
BUN SERPL-MCNC: 13 MG/DL (ref 8–22)
CALCIUM ALBUM COR SERPL-MCNC: 9.2 MG/DL (ref 8.5–10.5)
CALCIUM SERPL-MCNC: 9.3 MG/DL (ref 8.4–10.2)
CHLORIDE SERPL-SCNC: 106 MMOL/L (ref 96–112)
CHOLEST SERPL-MCNC: 189 MG/DL (ref 100–199)
CO2 SERPL-SCNC: 23 MMOL/L (ref 20–33)
CREAT SERPL-MCNC: 0.47 MG/DL (ref 0.5–1.4)
ERYTHROCYTE [DISTWIDTH] IN BLOOD BY AUTOMATED COUNT: 54.9 FL (ref 35.9–50)
EST. AVERAGE GLUCOSE BLD GHB EST-MCNC: 108 MG/DL
EST. AVERAGE GLUCOSE BLD GHB EST-MCNC: 114 MG/DL
GFR SERPLBLD CREATININE-BSD FMLA CKD-EPI: 98 ML/MIN/1.73 M 2
GLOBULIN SER CALC-MCNC: 2.4 G/DL (ref 1.9–3.5)
GLUCOSE SERPL-MCNC: 99 MG/DL (ref 65–99)
HBA1C MFR BLD: 5.4 % (ref 4–5.6)
HBA1C MFR BLD: 5.6 % (ref 4–5.6)
HCT VFR BLD AUTO: 38.3 % (ref 37–47)
HDLC SERPL-MCNC: 51 MG/DL
HGB BLD-MCNC: 13 G/DL (ref 12–16)
LDLC SERPL CALC-MCNC: 118 MG/DL
LV EJECT FRACT  99904: 65
LV EJECT FRACT MOD 2C 99903: 65.98
LV EJECT FRACT MOD 4C 99902: 68.77
LV EJECT FRACT MOD BP 99901: 64.54
MAGNESIUM SERPL-MCNC: 2 MG/DL (ref 1.5–2.5)
MCH RBC QN AUTO: 31.7 PG (ref 27–33)
MCHC RBC AUTO-ENTMCNC: 33.9 G/DL (ref 32.2–35.5)
MCV RBC AUTO: 93.4 FL (ref 81.4–97.8)
PLATELET # BLD AUTO: 336 K/UL (ref 164–446)
PMV BLD AUTO: 10.5 FL (ref 9–12.9)
POTASSIUM SERPL-SCNC: 3.9 MMOL/L (ref 3.6–5.5)
PROT SERPL-MCNC: 6.5 G/DL (ref 6–8.2)
RBC # BLD AUTO: 4.1 M/UL (ref 4.2–5.4)
SODIUM SERPL-SCNC: 141 MMOL/L (ref 135–145)
TRIGL SERPL-MCNC: 98 MG/DL (ref 0–149)
TSH SERPL DL<=0.005 MIU/L-ACNC: 3.25 UIU/ML (ref 0.38–5.33)
WBC # BLD AUTO: 7.6 K/UL (ref 4.8–10.8)

## 2024-11-11 PROCEDURE — 83735 ASSAY OF MAGNESIUM: CPT

## 2024-11-11 PROCEDURE — 97162 PT EVAL MOD COMPLEX 30 MIN: CPT

## 2024-11-11 PROCEDURE — 80053 COMPREHEN METABOLIC PANEL: CPT

## 2024-11-11 PROCEDURE — 92610 EVALUATE SWALLOWING FUNCTION: CPT

## 2024-11-11 PROCEDURE — 93306 TTE W/DOPPLER COMPLETE: CPT

## 2024-11-11 PROCEDURE — 97166 OT EVAL MOD COMPLEX 45 MIN: CPT

## 2024-11-11 PROCEDURE — 94760 N-INVAS EAR/PLS OXIMETRY 1: CPT

## 2024-11-11 PROCEDURE — 97535 SELF CARE MNGMENT TRAINING: CPT

## 2024-11-11 PROCEDURE — A9270 NON-COVERED ITEM OR SERVICE: HCPCS | Performed by: HOSPITALIST

## 2024-11-11 PROCEDURE — 85027 COMPLETE CBC AUTOMATED: CPT

## 2024-11-11 PROCEDURE — 93306 TTE W/DOPPLER COMPLETE: CPT | Mod: 26 | Performed by: INTERNAL MEDICINE

## 2024-11-11 PROCEDURE — 700111 HCHG RX REV CODE 636 W/ 250 OVERRIDE (IP): Mod: JZ | Performed by: HOSPITALIST

## 2024-11-11 PROCEDURE — 70551 MRI BRAIN STEM W/O DYE: CPT

## 2024-11-11 PROCEDURE — 99406 BEHAV CHNG SMOKING 3-10 MIN: CPT

## 2024-11-11 PROCEDURE — G0378 HOSPITAL OBSERVATION PER HR: HCPCS

## 2024-11-11 PROCEDURE — 83036 HEMOGLOBIN GLYCOSYLATED A1C: CPT

## 2024-11-11 PROCEDURE — A9270 NON-COVERED ITEM OR SERVICE: HCPCS

## 2024-11-11 PROCEDURE — 80061 LIPID PANEL: CPT

## 2024-11-11 PROCEDURE — 700102 HCHG RX REV CODE 250 W/ 637 OVERRIDE(OP)

## 2024-11-11 PROCEDURE — 99233 SBSQ HOSP IP/OBS HIGH 50: CPT | Mod: 25

## 2024-11-11 PROCEDURE — 84443 ASSAY THYROID STIM HORMONE: CPT

## 2024-11-11 PROCEDURE — 700111 HCHG RX REV CODE 636 W/ 250 OVERRIDE (IP)

## 2024-11-11 PROCEDURE — 36415 COLL VENOUS BLD VENIPUNCTURE: CPT

## 2024-11-11 PROCEDURE — 700102 HCHG RX REV CODE 250 W/ 637 OVERRIDE(OP): Performed by: HOSPITALIST

## 2024-11-11 RX ORDER — NICOTINE 21 MG/24HR
21 PATCH, TRANSDERMAL 24 HOURS TRANSDERMAL
Status: DISCONTINUED | OUTPATIENT
Start: 2024-11-11 | End: 2024-11-12 | Stop reason: HOSPADM

## 2024-11-11 RX ORDER — AMOXICILLIN 250 MG
2 CAPSULE ORAL EVERY EVENING
Status: DISCONTINUED | OUTPATIENT
Start: 2024-11-11 | End: 2024-11-12 | Stop reason: HOSPADM

## 2024-11-11 RX ORDER — LORAZEPAM 2 MG/ML
0.5 INJECTION INTRAMUSCULAR EVERY 6 HOURS PRN
Status: DISCONTINUED | OUTPATIENT
Start: 2024-11-11 | End: 2024-11-12 | Stop reason: HOSPADM

## 2024-11-11 RX ORDER — LORAZEPAM 2 MG/ML
1 INJECTION INTRAMUSCULAR ONCE
Status: COMPLETED | OUTPATIENT
Start: 2024-11-11 | End: 2024-11-11

## 2024-11-11 RX ORDER — POLYETHYLENE GLYCOL 3350 17 G/17G
1 POWDER, FOR SOLUTION ORAL
Status: DISCONTINUED | OUTPATIENT
Start: 2024-11-11 | End: 2024-11-12 | Stop reason: HOSPADM

## 2024-11-11 RX ORDER — MECLIZINE HYDROCHLORIDE 25 MG/1
12.5 TABLET ORAL 2 TIMES DAILY PRN
Status: DISCONTINUED | OUTPATIENT
Start: 2024-11-11 | End: 2024-11-12 | Stop reason: HOSPADM

## 2024-11-11 RX ADMIN — MECLIZINE HYDROCHLORIDE 12.5 MG: 25 TABLET ORAL at 11:33

## 2024-11-11 RX ADMIN — ENOXAPARIN SODIUM 40 MG: 100 INJECTION SUBCUTANEOUS at 17:21

## 2024-11-11 RX ADMIN — NICOTINE TRANSDERMAL SYSTEM 21 MG: 21 PATCH, EXTENDED RELEASE TRANSDERMAL at 11:33

## 2024-11-11 RX ADMIN — Medication 5 MG: at 20:03

## 2024-11-11 RX ADMIN — LORAZEPAM 1 MG: 2 INJECTION INTRAMUSCULAR; INTRAVENOUS at 10:41

## 2024-11-11 RX ADMIN — POLYETHYLENE GLYCOL 3350 1 PACKET: 17 POWDER, FOR SOLUTION ORAL at 20:03

## 2024-11-11 RX ADMIN — ATORVASTATIN CALCIUM 40 MG: 40 TABLET, FILM COATED ORAL at 17:20

## 2024-11-11 RX ADMIN — SENNOSIDES AND DOCUSATE SODIUM 2 TABLET: 50; 8.6 TABLET ORAL at 17:19

## 2024-11-11 RX ADMIN — POLYETHYLENE GLYCOL 3350 1 PACKET: 17 POWDER, FOR SOLUTION ORAL at 09:40

## 2024-11-11 RX ADMIN — ASPIRIN 81 MG: 81 TABLET, CHEWABLE ORAL at 07:40

## 2024-11-11 ASSESSMENT — COGNITIVE AND FUNCTIONAL STATUS - GENERAL
DRESSING REGULAR LOWER BODY CLOTHING: A LOT
HELP NEEDED FOR BATHING: A LOT
STANDING UP FROM CHAIR USING ARMS: A LITTLE
MOBILITY SCORE: 20
MOVING TO AND FROM BED TO CHAIR: A LITTLE
CLIMB 3 TO 5 STEPS WITH RAILING: A LITTLE
PERSONAL GROOMING: A LITTLE
DAILY ACTIVITIY SCORE: 16
TOILETING: A LOT
DRESSING REGULAR UPPER BODY CLOTHING: A LITTLE
WALKING IN HOSPITAL ROOM: A LITTLE
SUGGESTED CMS G CODE MODIFIER DAILY ACTIVITY: CK
SUGGESTED CMS G CODE MODIFIER MOBILITY: CJ

## 2024-11-11 ASSESSMENT — GAIT ASSESSMENTS
GAIT LEVEL OF ASSIST: MINIMAL ASSIST
DEVIATION: SHUFFLED GAIT
DISTANCE (FEET): 125
ASSISTIVE DEVICE: FRONT WHEEL WALKER
DISTANCE (FEET): 15

## 2024-11-11 ASSESSMENT — ACTIVITIES OF DAILY LIVING (ADL): TOILETING: INDEPENDENT

## 2024-11-11 ASSESSMENT — FIBROSIS 4 INDEX: FIB4 SCORE: 1.13

## 2024-11-11 ASSESSMENT — PAIN DESCRIPTION - PAIN TYPE: TYPE: ACUTE PAIN

## 2024-11-11 NOTE — ASSESSMENT & PLAN NOTE
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  I discussed the finding with patient and patient daughter who is a nurse at [employer].  Optimize blood pressure control  Neurology discussed with neurointerventional who recommended outpatient referral.

## 2024-11-11 NOTE — ED NOTES
Pt denied having any needs at this time, no distress noted;    Pt aware that she is waiting for imaging to be completed;

## 2024-11-11 NOTE — CARE PLAN
Problem: Knowledge Deficit - Stroke Education  Goal: Patient's knowledge of stroke and risk factors will improve  Outcome: Progressing     Problem: Psychosocial - Patient Condition  Goal: Patient's ability to verbalize feelings about condition will improve  Outcome: Progressing   The patient is Watcher - Medium risk of patient condition declining or worsening         Progress made toward(s) clinical / shift goals:  pt educated on plan of care and medications. No questions at this time. Oriented to room and instructed to use call bell    Patient is not progressing towards the following goals:

## 2024-11-11 NOTE — PROGRESS NOTES
4 Eyes Skin Assessment Completed by CHELSY delgado and CHELSY huang.    Head WDL  Ears WDL  Nose WDL  Mouth WDL  Neck WDL  Breast/Chest WDL  Shoulder Blades WDL  Spine WDL  (R) Arm/Elbow/Hand WDL  (L) Arm/Elbow/Hand WDL  Abdomen WDL  Groin WDL  Scrotum/Coccyx/Buttocks WDL  (R) Leg WDL  (L) Leg WDL  (R) Heel/Foot/Toe varicose veins  (L) Heel/Foot/Toe varicose veins          Devices In Places Tele Box and Pulse Ox      Interventions In Place N/A    Possible Skin Injury No    Pictures Uploaded Into Epic N/A  Wound Consult Placed N/A  RN Wound Prevention Protocol Ordered No

## 2024-11-11 NOTE — ED NOTES
Medication history reviewed with pt. Med rec is complete.  Allergies reviewed, per pt  Interviewed pt with friend at bedside with permission from pt.    Pt reports that she stopped taking her ESCITALOPRAM 20MG about a week in half ago.     Patient has not had any outpatient antibiotics in the last 30 days.    Pt is not on any anticoagulants

## 2024-11-11 NOTE — PROGRESS NOTES
Bedside swallow eval done with pt. Pt able to swallow thin liquids without coughing. Pt to be evaluated by speech tomorrow

## 2024-11-11 NOTE — THERAPY
Physical Therapy   Initial Evaluation     Patient Name: Dina Crews  Age:  79 y.o., Sex:  female  Medical Record #: [id number]  Today's Date: 11/11/2024     Precautions  Precautions: Fall Risk  Comments: Pt reported GLF 4 mos ago resulting RUE fx    Assessment  Patient is 79 y.o. female with a diagnosis of ataxia, dizziness, r/o CVA.  MRI pending. At time of PT eval pt is not c/o any dizziness, mostly just c/o anxiety r/t not being able to have a cigarette.  Pts strength is equal bilaterally however balance is impaired with LOB present even when using FWW. Given the above feel DC home alone would be unsafe at this time. Recommend continued acute PT to progress mobility and balance. At this time recommend SNF.    Plan    Physical Therapy Initial Treatment Plan   Treatment Plan : Bed Mobility, Gait Training, Neuro Re-Education / Balance, Therapeutic Activities, Therapeutic Exercise, Stair Training, Self Care / Home Evaluation  Treatment Frequency: 5 Times per Week  Duration: Until Therapy Goals Met    DC Equipment Recommendations: Front-Wheel Walker  Discharge Recommendations: Recommend post-acute placement for additional physical therapy services prior to discharge home (vs HHPT, pending progress. Currently balance is impaired and is not safe for DC home.)        11/11/24 1132   Prior Living Situation   Housing / Facility 1 Story House   Steps Into Home 0   Steps In Home 0   Equipment Owned Single Point Cane   Lives with - Patient's Self Care Capacity Alone and Unable to Care For Self   Comments Pt reports son may be coming into town to stay with her for a few days, otherwise does not have much assist from friends   Prior Level of Functional Mobility   Bed Mobility Independent   Transfer Status Independent   Ambulation Independent   Assistive Devices Used Single Point Cane  (occasionally)   Stairs Independent   Cognition    Level of Consciousness Alert   Comments Oriented x4, currently not c/o dizziness,  states it comes and goes   Active ROM Lower Body    Active ROM Lower Body  WDL   Strength Lower Body   Lower Body Strength  WDL   Sensation Lower Body   Lower Extremity Sensation   WDL   Lower Body Muscle Tone   Lower Body Muscle Tone  WDL   Coordination Lower Body    Coordination Lower Body  WDL   Balance Assessment   Sitting Balance (Static) Fair +   Sitting Balance (Dynamic) Fair   Standing Balance (Static) Fair -   Standing Balance (Dynamic) Poor +   Weight Shift Sitting Fair   Weight Shift Standing Fair   Comments improved balance with FWW however still LOB while amb with FWW   Bed Mobility    Supine to Sit Supervised   Sit to Supine Supervised   Gait Analysis   Gait Level Of Assist Minimal Assist   Assistive Device Front Wheel Walker   Distance (Feet) 125   # of Times Distance was Traveled 1   Deviation Shuffled Gait  (lists to the R at times)   Functional Mobility   Sit to Stand Contact Guard Assist   Bed, Chair, Wheelchair Transfer Contact Guard Assist   Activity Tolerance   Standing pt reports fatigue, poor activity tolreance   Short Term Goals    Short Term Goal # 1 Pt will be Indep with bed mobility and sup <> sit in 6 visits.   Short Term Goal # 2 Pt will be able to perform sit <>stand and transfer with FWW Santi in 6 visits.   Short Term Goal # 3 Pt will be able to ambulate 150 ft with FWW Santi in 6 visits.

## 2024-11-11 NOTE — DISCHARGE PLANNING
Received Choice Form at: 11:25   Agency/Facility Name:  [facility] Referral per Choice Form at: 12::11

## 2024-11-11 NOTE — DISCHARGE PLANNING
Case Management Discharge Planning    Admission Date: 11/10/2024  GMLOS:    ALOS: 0    6-Clicks ADL Score: 24  6-Clicks Mobility Score: 22      Anticipated Discharge Dispo: Discharge Disposition: Discharged to home/self care (01)    I spoke to patient at bedside. Confirmed face sheet information. She lives in [place] alone. Reports children live in [place]. She says she uses a cane at baseline, but is interested in a FWW. I obtained choice for [facility]. I discussed home health with patient; at this time, patient politely declined reporting she doesn't believe she'll need it. I requested a FWW order during IDT rounds.    @1240  PT evaluation recommends SNF. Requesting DPA send out referrals.     Care Transition Team Assessment    Information Source  Orientation Level: Oriented X4    Elopement Risk  Legal Hold: No  Elopement Risk: Not at Risk for Elopement    Interdisciplinary Discharge Planning  Lives with - Patient's Self Care Capacity: Alone and Able to Care For Self  Patient or legal guardian wants to designate a caregiver: No  Housing / Facility: [address]  Prior Services: Home-Independent    Vision / Hearing Impairment  Right Eye Vision: Impaired, Wears Glasses  Left Eye Vision: Impaired, Wears Glasses    Domestic Abuse  Possible Abuse/Neglect Reported to:: Not Applicable    Anticipated Discharge Information  Discharge Disposition: Discharged to home/self care (01)

## 2024-11-11 NOTE — ASSESSMENT & PLAN NOTE
On home lexapro but frequently forgets doses, has a history of suicidal attempt many years ago   Plan to restart lexapro tomorrow after work up.

## 2024-11-11 NOTE — ASSESSMENT & PLAN NOTE
I had a discussion with the patient and family [daughter over the phone] regarding goals of care, diagnoses, prognosis, and CODE STATUS. We discussed her prognosis and comorbidities.  The patient has advanced age of 79 years.  She has a number of chronic medical problems including tobacco dependence, hyperlipidemia, anxiety and is presenting with ataxia with CT imaging concerning for small aneurysm.  At this point the patient wants to continue as DNAR/DNI.  She is however open to medical invasive or noninvasive diagnostic and therapeutic interventions.

## 2024-11-11 NOTE — ASSESSMENT & PLAN NOTE
I counseled the patient for 10 minutes regarding smoking cessation using methods such as nicotine replacement therapy with patches and gum and medications such as Wellbutrin or Chantix.  I also discussed with him breathing techniques and meditation as well as regular physical activity, which will assist him with smoking cessation.  Patient is agreeable to trial of nicotine replacement therapy.      Changed nicotine patches to scheduled.  Continue nicotine gum

## 2024-11-11 NOTE — THERAPY
"Occupational Therapy   Initial Evaluation     Patient Name: Dina Crews  Age:  79 y.o., Sex:  female  Medical Record #: [id number]  Today's Date: 11/11/2024     Precautions  Precautions: Fall Risk  Comments: (P) Pt reported GLF 4 mos ago resulting RUE fx    Assessment  Patient is 79 y.o. female who presented 11/10 w/ dizziness and fatigue.  Admitted with a diagnosis of ataxia.  Additional factors influencing patient status / progress: Dizziness, impaired balance, impaired safety awareness.  Pt lives alone in a SLH in [place], [place].  Friends live in the area and are available to assist on a intermittent basis.  PLOF Mod I to Indep for ADL's, transfers and ambulation via intermittent use of SPC and \"Wall/Furniture\" walking.  Pt demonstrated Sup bed mobility, CGA sit/tand, CGA FWW, Min assist toilet transfer, Min assist UBCM, Max assist LBCM, Max assist toileting.  Therapist reviewed environmental/safety awareness, fall precautions, recommended use of FWW for mobility, ADL's and transfers.  Visual pursuits/tracking in all planes intact, convergence/accommodation intact, peripheral intact, eye-hand coordination intact.  Denied change in vision, impaired sensation.      Plan    Occupational Therapy Initial Treatment Plan   Treatment Interventions: (P) Self Care / Activities of Daily Living, Neuro Re-Education / Balance, Therapeutic Activity, Adaptive Equipment  Treatment Frequency: (P) 3 Times per Week  Duration: (P) Until Therapy Goals Met    DC Equipment Recommendations: (P) Unable to determine at this time  Discharge Recommendations: (P) Recommend post-acute placement for additional occupational therapy services prior to discharge home     Subjective    Pt was alert and cooperative w/ tx.     Objective       11/11/24 1039    Services   Is patient using  services for this encounter? No   Initial Contact Note    Initial Contact Note Order Received and Verified, Occupational Therapy Evaluation in " "Progress with Full Report to Follow.   Prior Living Situation   Prior Services Home-Independent   Housing / Facility 1 Story House   Steps Into Home 0   Steps In Home 0   Bathroom Set up Walk In Shower;Built-In Shower Chair   Equipment Owned Single Point Cane   Lives with - Patient's Self Care Capacity Alone and Able to Care For Self   Comments Pt lives alone in a [facility] in [place], [place].  Friends live in the area and are available to assist on a intermittent basis.  PLOF Mod I to Indep for ADL's, transfers and ambulation via intermittent use of SPC and \"Wall/Furniture\" walking.   Prior Level of ADL Function   Self Feeding Independent   Grooming / Hygiene Independent   Bathing Independent   Dressing Independent   Toileting Independent   Prior Level of IADL Function   Medication Management Independent   Laundry Independent   Kitchen Mobility Independent   Finances Independent   Home Management Independent   Shopping Independent   Prior Level Of Mobility Independent With Device in Community;Independent With Device in Home;Supervision Without Device in Home   Driving / Transportation Driving Independent   History of Falls   History of Falls Yes   Precautions   Precautions Fall Risk   Comments Pt reported GLF 4 mos ago resulting RUE fx   Vitals   Patient BP Position Sitting   Pulse Oximetry 94 %   O2 (LPM) 0   O2 Delivery Device Room air w/o2 available   Cognition    Level of Consciousness Alert   Active ROM Upper Body   Active ROM Upper Body  X   Dominant Hand Right   Rt Shoulder Flexion Degrees 90   Rt Shoulder Abduction Degrees 70   Comments [place] GLF 4 mos ago resulting in RUE fx.   Balance Assessment   Sitting Balance (Static) Fair +   Sitting Balance (Dynamic) Fair   Standing Balance (Static) Fair -   Standing Balance (Dynamic) Poor +   Weight Shift Sitting Fair   Weight Shift Standing Poor   Comments OOB FWW   Bed Mobility    Supine to Sit Supervised   Sit to Supine Supervised   ADL Assessment   Upper Body Dressing " Minimal Assist   Lower Body Dressing Maximal Assist   Toileting Maximal Assist   How much help from another person does the patient currently need...   Putting on and taking off regular lower body clothing? 2   Bathing (including washing, rinsing, and drying)? 2   Toileting, which includes using a toilet, bedpan, or urinal? 2   Putting on and taking off regular upper body clothing? 3   Taking care of personal grooming such as brushing teeth? 3   Eating meals? 4   6 Clicks Daily Activity Score 16   Functional Mobility   Sit to Stand Contact Guard Assist   Bed, Chair, Wheelchair Transfer Contact Guard Assist   Toilet Transfers Minimal Assist   Transfer Method Stand Step   Mobility CGA FWW   Distance (Feet) 15   # of Times Distance was Traveled 2   Edema / Skin Assessment   Edema / Skin  Not Assessed   Short Term Goals   Short Term Goal # 1 Mod I bed mobility (to/from supine and EOB sitting)   Short Term Goal # 2 Mod I UBCM   Short Term Goal # 3 Min assist LBCM via AE/DME PRN   Short Term Goal # 4 Sup toilet transfer/bathroom via DME   Short Term Goal # 5 Sup toileting/bathroom via AE/DME PRN   Education Group   Education Provided Role of Occupational Therapist;Activities of Daily Living;Adaptive Equipment;Use of Call Light   Role of Occupational Therapist Patient Response Patient;Acceptance;Explanation;Verbal Demonstration   ADL Patient Response Patient;Acceptance;Explanation;Demonstration;Teach Back;Verbal Demonstration;Action Demonstration;Reinforcement Needed   Adaptive Equipment Patient Response Patient;Acceptance;Explanation;Demonstration;Teach Back;Verbal Demonstration;Action Demonstration;Reinforcement Needed   Use of Call Light Patient Response Patient;Acceptance;Explanation;Demonstration;Verbal Demonstration   Occupational Therapy Initial Treatment Plan    Treatment Interventions Self Care / Activities of Daily Living;Neuro Re-Education / Balance;Therapeutic Activity;Adaptive Equipment   Treatment Frequency 3  Times per Week   Duration Until Therapy Goals Met   Problem List   Problem List Decreased Active Daily Living Skills;Decreased Functional Mobility;Decreased Activity Tolerance;Safety Awareness Deficits / Cognition;Impaired Postural Control / Balance   Anticipated Discharge Equipment and Recommendations   DC Equipment Recommendations Unable to determine at this time   Discharge Recommendations Recommend post-acute placement for additional occupational therapy services prior to discharge home

## 2024-11-11 NOTE — ASSESSMENT & PLAN NOTE
Bilateral upper lobe bulla or bleb seen on CT imaging/stroke workup.  Likely emphysema in the setting of chronic tobacco smoking.

## 2024-11-11 NOTE — ASSESSMENT & PLAN NOTE
11/10  Admit to Neuro, with continuous cardiac monitoring  CT, CT-angiography, head, neck showed no acute infarction, or hemorrhage  CT angiography shows a small aneurysm 3 x 4 mm.  I will check Echocardiography to rule out shunting  I will check MRI brain  Aspiration, Fall, and seizure precautions    Neuro checks   Speech, physical, occupational therapy evaluation ordered  I will start aspirin and high intensity statin    11/11  -DDx-central versus peripheral causes of dizziness.  Consider stroke versus TIA, less likely antidepressant discontinuation syndrome.   -Awaiting for MRI of the brain and echo.  Check TSH.  -Meclizine as needed  -PT/OT to evaluate today  -Continue aspirin and statin

## 2024-11-11 NOTE — PROGRESS NOTES
Report received from Roger VALENTINO. Care assumed of pt, hospitalist at bedside to answer questions and concerns of pt. Pt extremely anxious about plan of care and findings. Medicated per md orders

## 2024-11-11 NOTE — ED PROVIDER NOTES
"ER Provider Note    Scribed for Graham Castellanos D.O. by Lili Rae. 11/10/2024  4:36 PM    Primary Care Provider: Roger Larson M.D.    CHIEF COMPLAINT  Chief Complaint   Patient presents with    Dizziness     \"For months\" but most recent episode x2d per pt. Reports also bilat eye blurry vision. Denies CP, SOB, h/a, vomiting. Denies unilateral numbness/tingling or unilateral weakness.     Extremity Weakness     Bilat legs. Denies known fevers, dysuria.        HPI/ROS    Dina Crews is a 79 y.o. female who presents to the Emergency Department for dizziness onset four months ago and worsening three days ago. The patient states she has had intermittent room-spinning dizziness for four months, however three days ago, it became constant with difficulty walking due to feeling off-balance and difficulty using her left arm due to pain. She notes her dizziness is exacerbated by getting up, causing her to nearly fall yesterday trying to get out of bed. She denies any recent sickness, fever, cough, congestion, vomiting, or diarrhea. The patient notes she fell and hit head four months ago and was not worked up for head injuries. She denies any history of diabetes or hypertension and does not take any daily medications.    ROS as per HPI.    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 at SURGERY SAME DAY [facility] ORS    LOW ANTERIOR RESECTION ROBOTIC  1/28/2014    Performed by Ismael Rocha M.D. at SURGERY [facility] ORS    ABDOMINAL HYSTERECTOMY TOTAL      HYSTERECTOMY, VAGINAL      and BSO    OTHER ABDOMINAL SURGERY      partial colectomy    PRIMARY C SECTION      REPEAT C SECTION      SHOULDER ARTHROSCOPY         FAMILY HISTORY  Family History   Problem Relation Age of Onset    Lung Disease Mother         COPD    Cancer Mother         Thyroid cancer    Cancer Father         d. 72 Stomach cancer    Diabetes Father     Cancer Sister 40        Breast cancer    GI Disease Sister         diverticulitis    Cancer Sister         breast       SOCIAL HISTORY   reports that she has been smoking cigarettes. She started smoking about 60 years ago. She has a 60.9 pack-year smoking history. She has never used smokeless tobacco. She reports current drug use. Drugs: Oral and Marijuana. She reports that she does not drink alcohol.    CURRENT MEDICATIONS  Previous Medications    CHOLECALCIFEROL 2000 UNIT TAB    Take 4,000 Units by mouth every day.    ESCITALOPRAM (LEXAPRO) 20 MG TABLET    Take 1 Tablet by mouth every day.    LORAZEPAM (ATIVAN) 1 MG TAB    Take 1 Tablet by mouth 1 time a day as needed for Anxiety for up to 90 days.       ALLERGIES  Penicillins, Ciprofloxacin, Codeine, Morphine, and Vicodin [hydrocodone-acetaminophen]    PHYSICAL EXAM  BP (!) 148/90   Pulse 89   Temp 37.6 °C (99.6 °F) (Temporal)   Resp 16   Ht 1.6 m (5' 3\")   Wt 54 kg (119 lb)   LMP 07/12/1986   SpO2 93%   BMI 21.08 kg/m²     General: No acute distress.  HENT: Normocephalic, Mucus membranes are moist.   Chest: Lungs have even and unlabored respirations, Clear to auscultation.   Cardiovascular: Regular rate and regular rhythm, No peripheral cyanosis.  Abdomen: " Non distended.  Neuro: Awake, Conversive, Able to relay recent events. NIH stroke scale of 0.  Psychiatric: Calm and cooperative.     INITIAL ASSESSMENT  Patient has vertiginous-type dizziness intermittently for several months and constantly for three days. It is worse when she gets up and appears to be improved with immobility. Will CT to evaluate for stroke and mass. Will evaluate for anema and electrolyte imbalance.    ED Observation Status? No; Patient does not meet criteria for ED Observation.     DIAGNOSTIC STUDIES    Labs:   Results for orders placed or performed during the hospital encounter of 11/10/24   EKG    Collection Time: 11/10/24  2:49 PM   Result Value Ref Range    Report       [facility] Emergency Dept.    Test Date:  2024-11-10  Pt Name:    ALBARO SHULTZ                Department: [facility]  MRN:        [id number]                      Room:  Gender:     Female                       Technician: IMTIAZ  :        [id number]                   Requested By:ER TRIAGE PROTOCOL  Order #:    [id number]                    Reading MD: STEPHANE LARSON D.O.    Measurements  Intervals                                Axis  Rate:       81                           P:          85  OK:         140                          QRS:        74  QRSD:       92                           T:          67  QT:         389  QTc:        452    Interpretive Statements  Sinus rhythm  Probable left atrial enlargement  Compared to ECG 2016 03:06:30  T-wave abnormality no longer present  Electronically Signed On 11- 18:36:33 PST by STEPHANE LARSON D.O.     CBC WITH DIFFERENTIAL    Collection Time: 11/10/24  4:02 PM   Result Value Ref Range    WBC 8.2 4.8 - 10.8 K/uL    RBC 4.68 4.20 - 5.40 M/uL    Hemoglobin 15.0 12.0 - 16.0 g/dL    Hematocrit 44.5 37.0 - 47.0 %    MCV 95.1 81.4 - 97.8 fL    MCH 32.1 27.0 - 33.0 pg    MCHC 33.7 32.2 - 35.5 g/dL    RDW 56.9 (H) 35.9 - 50.0 fL    Platelet Count 378 164 -  446 K/uL    MPV 10.6 9.0 - 12.9 fL    Neutrophils-Polys 72.80 (H) 44.00 - 72.00 %    Lymphocytes 19.50 (L) 22.00 - 41.00 %    Monocytes 4.80 0.00 - 13.40 %    Eosinophils 1.30 0.00 - 6.90 %    Basophils 1.20 0.00 - 1.80 %    Immature Granulocytes 0.40 0.00 - 0.90 %    Nucleated RBC 0.00 0.00 - 0.20 /100 WBC    Neutrophils (Absolute) 5.94 1.82 - 7.42 K/uL    Lymphs (Absolute) 1.59 1.00 - 4.80 K/uL    Monos (Absolute) 0.39 0.00 - 0.85 K/uL    Eos (Absolute) 0.11 0.00 - 0.51 K/uL    Baso (Absolute) 0.10 0.00 - 0.12 K/uL    Immature Granulocytes (abs) 0.03 0.00 - 0.11 K/uL    NRBC (Absolute) 0.00 K/uL   COMP METABOLIC PANEL    Collection Time: 11/10/24  4:02 PM   Result Value Ref Range    Sodium 143 135 - 145 mmol/L    Potassium 3.5 (L) 3.6 - 5.5 mmol/L    Chloride 103 96 - 112 mmol/L    Co2 27 20 - 33 mmol/L    Anion Gap 13.0 7.0 - 16.0    Glucose 92 65 - 99 mg/dL    Bun 13 8 - 22 mg/dL    Creatinine 0.55 0.50 - 1.40 mg/dL    Calcium 10.1 8.4 - 10.2 mg/dL    Correct Calcium 9.4 8.5 - 10.5 mg/dL    AST(SGOT) 16 12 - 45 U/L    ALT(SGPT) 8 2 - 50 U/L    Alkaline Phosphatase 101 (H) 30 - 99 U/L    Total Bilirubin 0.5 0.1 - 1.5 mg/dL    Albumin 4.9 3.2 - 4.9 g/dL    Total Protein 8.0 6.0 - 8.2 g/dL    Globulin 3.1 1.9 - 3.5 g/dL    A-G Ratio 1.6 g/dL   PROTHROMBIN TIME    Collection Time: 11/10/24  4:02 PM   Result Value Ref Range    PT 12.9 12.0 - 14.6 sec    INR 0.95 0.87 - 1.13   APTT    Collection Time: 11/10/24  4:02 PM   Result Value Ref Range    APTT 30.4 24.7 - 36.0 sec   TROPONIN    Collection Time: 11/10/24  4:02 PM   Result Value Ref Range    Troponin T <6 6 - 19 ng/L   ESTIMATED GFR    Collection Time: 11/10/24  4:02 PM   Result Value Ref Range    GFR (CKD-EPI) 94 >60 mL/min/1.73 m 2     *Note: Due to a large number of results and/or encounters for the requested time period, some results have not been displayed. A complete set of results can be found in Results Review.        EKG:   I have independently  interpreted the above EKG.    Radiology:   The attending emergency physician has independently interpreted the diagnostic imaging associated with this visit and am waiting the final reading from the radiologist.   Preliminary interpretation is as follows: No vascular obstruction  Radiologist interpretation:   CT-CTA NECK WITH & W/O-POST PROCESSING   Final Result      1.  Negative for cervical spine fracture or subluxation      2.  Bilateral upper lobe bulla or bleb      3.  Incomplete C1 posterior ring fusion, anatomic variant            CT-CTA HEAD WITH & W/O-POST PROCESS   Final Result      1.  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      2.  No other evidence of intracranial aneurysm.      3.  No intracranial hemorrhage identified. Precontrast images.      4.  No filling defect or vessel occlusion identified.      5.  These findings were discussed with Dr. Moreira on 11/10/2024.      CT-CEREBRAL PERFUSION ANALYSIS   Final Result      1. Cerebral blood flow less than 30% possibly representing completed infarct = 0 mL. Based on distribution of this finding, this is unlikely to represent artifact.      2. T Max more than 6 seconds possibly representing combination of completed infarct and ischemia = 0 mL. Based on the distribution of this finding, this is to represent artifact.      3. Mismatched volume possibly representing ischemic brain/penumbra= 0 mL      4.  Please note that this cerebral perfusion study and report is Quantitative and targets supratentorial (cerebral) perfusion for evaluation of large vessel territory acute ischemia/infarction. For example, lacunar infarcts, and brainstem/posterior fossa    ischemia/infarction are not evaluated on this study.  Data acquisition is subject to artifacts which can yield non-anatomically plausible perfusion maps which may be due to motion, bolus timing, signal to noise ratio, or other technical factors.   "  Perfusion map abnormalities which show non-anatomic distributions are likely artifact.   This study is not \"stand-alone\" and should only be utilized for diagnosis, management/treatment in correlation with CT, CTA, and/or MRI and clinical factors.         DX-CHEST-PORTABLE (1 VIEW)   Final Result         No acute cardiac or pulmonary abnormality is identified.      EC-ECHOCARDIOGRAM COMPLETE W/O CONT    (Results Pending)   MR-BRAIN-W/O    (Results Pending)        COURSE & MEDICAL DECISION MAKING     COURSE AND PLAN  4:36 PM - Patient seen and examined at bedside. Discussed plan of care, including EKG, labs, and imaging to evaluate. Patient agrees to the plan of care. Ordered for EKG x2, CT-CTA Neck w/ and w/o, CT-CTA Head w/ and w/o, CT-Cerebral perfusion analysis, DX-Chest, Troponin, APTT, Prothrombin time, CMP, and CBC w/ diff to evaluate her symptoms.     6:39 PM - I discussed the patient's case and the above findings with Dr. Yanez (Hospitalist) who agrees to evaluate the patient for hospitalization.     ED Summary: The patient has vague nonspecific neurological concerns.  She is complaining of gait ataxia.  CTA shows no concerns for stroke at this time however due to her advanced age, and neurological symptoms there is concerns for neurological problems the patient will be admitted for further evaluation and treatment.    Decision tools and prescription drugs considered including, but not limited to: NIH Stroke Scale: 0.       DISPOSITION AND DISCUSSIONS  I have discussed management of the patient with the following physicians and VINNY's: Dr. Yanez (Hospitalist)    DISPOSITION:  Patient will be hospitalized by Dr. Yanez in guarded condition.     FINAL DIAGNOSIS  1. Dizziness    2. Ataxia        Lili HOLCOMB), am scribing for, and in the presence of, Graham Castellanos D.O..    Electronically signed by: Lili Granado), 11/10/2024    Graham HOLCOMB D.O. personally performed the " services described in this documentation, as scribed by Lili Rae in my presence, and it is both accurate and complete.     The note accurately reflects work and decisions made by me.  Graham Castellanos D.O.  11/10/2024  8:01 PM

## 2024-11-11 NOTE — PROGRESS NOTES
Hospital Medicine Daily Progress Note    Date of Service  11/11/2024    Chief Complaint  Dina Crews is a 79 y.o. female admitted 11/10/2024 with sinus    Hospital Course  Dina Crews is a 79 y.o. female with a past medical history of prediabetes, hyperlipidemia, depression, migraines, tobacco dependence who presented 11/10/2024 with dizziness, and generalized weakness.  Patient reports the dizziness started around 6 months ago, intermittently occurring and lasting for around 5 minutes each time.  Was not associated with positional changes or head turning.  No prior head trauma.  Patient did not see a doctor or took any medications during this.  Was stable until 2 days PTA when it worsened and became continuous, prompting ED visit.  Prior to her worsening symptoms patient denied any associated HA, POV, nausea/vomiting, numbness/tingling or weakness of her arms or legs, hearing loss/tinnitus.  Denies any recent URI or diarrhea, changes in her diet or appetite.     Patient denies history of MI/stroke/PAD or lung disease.  She is a chronic smoker (1 to 2 packs a day for the last 50 years).  Has known basal cell carcinoma treated with only surgery.  Patient has history of depression, states that she frequently forgets to take her home Lexapro for the last 1-2 month and has been completely off of it for the last 2-weeks.     Patient admitted for stroke workup.  CTA head/neck and CT head showed no acute infarction or hemorrhage.  CT angiography did show a small aneurysm 3 x 4 mm.  Per chart review neurology discussed with neurointerventional who recommended outpatient referral.  Awaiting MRI and echo    Interval Problem Update    11/11  Has headache today, wrapping around her whole head. Not the worst she's had (has history of migraines), no FNDS or red flags stated.   Still reports dizziness.  Reports chronic constipation, last bowel movement 5 days which is normal for her per patient.    Awaiting  Echo and MRI brain   Check TSH.   Start meclizine as needed to see if helps with dizziness  Therapy to evaluate pt today  Continue aspirin and statin  Continue routine patches and gum ordered  Plan to restart lexapro tomorrow after work up. Prn ativan ordered     Patient regarding plan of care.  The doctor about the upper lobe labs and the lung seen during her CT head/neck, likely 2/2 smoking smoking cessation counseled.    430pm - extensive discussion with pt and with her son Jorge and his wife (on the phone) regarding plan of care. Family requested that they will be updated about plan moving forward as well by medical team    I have discussed this patient's plan of care and discharge plan at IDT rounds today with Case Management, Nursing, Nursing leadership, and other members of the IDT team.    Consultants/Specialty  None    Code Status  DNAR/DNI    Disposition  The patient is not medically cleared for discharge to home or a post-acute facility.      I have placed the appropriate orders for post-discharge needs.    Review of Systems  ROS     Physical Exam  Temp:  [36.7 °C (98 °F)-36.8 °C (98.3 °F)] 36.7 °C (98 °F)  Pulse:  [64-90] 66  Resp:  [12-18] 18  BP: (118-163)/(66-93) 139/81  SpO2:  [91 %-97 %] 95 %    Physical Exam  Constitutional:       General: She is not in acute distress.     Appearance: She is not ill-appearing.   Eyes:      Extraocular Movements: Extraocular movements intact.      Pupils: Pupils are equal, round, and reactive to light.      Comments: No nystagmus   Cardiovascular:      Rate and Rhythm: Normal rate and regular rhythm.   Pulmonary:      Effort: No respiratory distress.      Breath sounds: No wheezing.   Abdominal:      General: Bowel sounds are normal.      Palpations: Abdomen is soft.      Tenderness: There is no abdominal tenderness.   Musculoskeletal:      Right lower leg: No edema.      Left lower leg: No edema.   Skin:     General: Skin is warm.      Capillary Refill: Capillary  refill takes less than 2 seconds.   Neurological:      Mental Status: She is oriented to person, place, and time.      Sensory: No sensory deficit.      Motor: No weakness.      Coordination: Coordination normal.      Deep Tendon Reflexes: Reflexes normal.   Psychiatric:      Comments: anxious         Fluids  No intake or output data in the 24 hours ending 11/11/24 1541     Laboratory  Recent Labs     11/10/24  1602 11/11/24  0321   WBC 8.2 7.6   RBC 4.68 4.10*   HEMOGLOBIN 15.0 13.0   HEMATOCRIT 44.5 38.3   MCV 95.1 93.4   MCH 32.1 31.7   MCHC 33.7 33.9   RDW 56.9* 54.9*   PLATELETCT 378 336   MPV 10.6 10.5     Recent Labs     11/10/24  1602 11/11/24  0321   SODIUM 143 141   POTASSIUM 3.5* 3.9   CHLORIDE 103 106   CO2 27 23   GLUCOSE 92 99   BUN 13 13   CREATININE 0.55 0.47*   CALCIUM 10.1 9.3     Recent Labs     11/10/24  1602   APTT 30.4   INR 0.95         Recent Labs     11/11/24  0321   TRIGLYCERIDE 98   HDL 51   *       Imaging  -      Assessment/Plan  * Ataxia- (present on admission)  Assessment & Plan  11/10  Admit to Neuro, with continuous cardiac monitoring  CT, CT-angiography, head, neck showed no acute infarction, or hemorrhage  CT angiography shows a small aneurysm 3 x 4 mm.  I will check Echocardiography to rule out shunting  I will check MRI brain  Aspiration, Fall, and seizure precautions    Neuro checks   Speech, physical, occupational therapy evaluation ordered  I will start aspirin and high intensity statin    11/11  -DDx-central versus peripheral causes of dizziness.  Consider stroke versus TIA, less likely antidepressant discontinuation syndrome.   -Awaiting for MRI of the brain and echo.  Check TSH.  -Meclizine as needed  -PT/OT to evaluate today  -Continue aspirin and statin    Basal cell carcinoma (BCC) of skin of face  Assessment & Plan  Per patient, mainly treated with surgery.  I do not see any record of it in the chart.    Hx of migraines  Assessment & Plan  As per history    Major  depressive disorder in full remission (HCC)  Assessment & Plan  On home lexapro but frequently forgets doses, has a history of suicidal attempt many years ago   Plan to restart lexapro tomorrow after work up.     Lung blebs (HCC)  Assessment & Plan  Bilateral upper lobe bulla or bleb seen on CT imaging/stroke workup.  Likely emphysema in the setting of chronic tobacco smoking.    DNR (do not resuscitate)  Assessment & Plan  Per patient preference    ACP (advance care planning)- (present on admission)  Assessment & Plan  I had a discussion with the patient and family [daughter over the phone] regarding goals of care, diagnoses, prognosis, and CODE STATUS. We discussed her prognosis and comorbidities.  The patient has advanced age of 79 years.  She has a number of chronic medical problems including tobacco dependence, hyperlipidemia, anxiety and is presenting with ataxia with CT imaging concerning for small aneurysm.  At this point the patient wants to continue as DNAR/DNI.  She is however open to medical invasive or noninvasive diagnostic and therapeutic interventions.      Hypokalemia- (present on admission)  Assessment & Plan  Continue to monitor and replace as needed    Intracranial aneurysm- (present on admission)  Assessment & Plan  CTa imaging showed evidence for a small widemouth aneurysm measuring 3 x 4 mm arises inferiorly from junction of right ICA and MCA.  I discussed the finding with patient and patient daughter who is a nurse at [employer].  Optimize blood pressure control  Neurology discussed with neurointerventional who recommended outpatient referral.    Primary insomnia- (present on admission)  Assessment & Plan  Continue melatonin as needed    Prediabetes- (present on admission)  Assessment & Plan  Without significant hyperglycemia.    Monitor blood sugars with daily labs.  I will order a follow-up glucose level.  Consider sliding scale insulin according to blood sugar trend.     Constipation-  (present on admission)  Assessment & Plan  Bowel regimen  Check TSH    Mixed hyperlipidemia- (present on admission)  Assessment & Plan  Cardiac diet.  Continue atorvastatin    Tobacco use disorder- (present on admission)  Assessment & Plan  I counseled the patient for 10 minutes regarding smoking cessation using methods such as nicotine replacement therapy with patches and gum and medications such as Wellbutrin or Chantix.  I also discussed with him breathing techniques and meditation as well as regular physical activity, which will assist him with smoking cessation.  Patient is agreeable to trial of nicotine replacement therapy.      Changed nicotine patches to scheduled.  Continue nicotine gum           VTE prophylaxis:    enoxaparin ppx      I have performed a physical exam and reviewed and updated ROS and Plan today (11/11/2024). In review of yesterday's note (11/10/2024), there are no changes except as documented above.    I spent a total of 35 minutes of non face to face time performing additional research, reviewing medical records from transferring facility, discussing plan of care with other healthcare providers. Start time: 7 45 pm. End time: 8:20 pm     Greater than 51 minutes spent prepping to see patient (e.g. review of tests) obtaining and/or reviewing separately obtained history. Performing a medically appropriate examination and evaluation.  Counseling and educating the patient/family/caregiver.  Ordering medications, tests, or procedures.  Referring and communicating with other health care professionals.  Documenting clinical information in EPIC.  Independently interpreting results and communicating results to patient/family/caregiver.  Care coordination.

## 2024-11-11 NOTE — H&P
"Hospital Medicine History & Physical Note    Date of Service  11/10/2024    Primary Care Physician  Roger Larson M.D.    Consultants  None     Code Status  DNAR/DNI    Chief Complaint  Chief Complaint   Patient presents with    Dizziness     \"For months\" but most recent episode x2d per pt. Reports also bilat eye blurry vision. Denies CP, SOB, h/a, vomiting. Denies unilateral numbness/tingling or unilateral weakness.     Extremity Weakness     Bilat legs. Denies known fevers, dysuria.      History of Presenting Illness  Dina Crews is a 79 y.o. female with a past medical history of prediabetes, hyperlipidemia, tobacco dependence who presented 11/10/2024 with dizziness, and generalized weakness.  The patient reports that she has not been feeling well over the past few months.  She has been having progressively worsening easy fatigue debility, generalized weakness and dizziness.  Those episodes got much worse over the past 3 days.  She denies having focal motor weakness.  She denies passing out.      I discussed the plan of care with emergency physician, and the patient     Review of Systems  Review of Systems   Constitutional:  Positive for malaise/fatigue. Negative for chills and fever.   Eyes:  Negative for discharge and redness.   Respiratory:  Negative for cough, shortness of breath and stridor.    Cardiovascular:  Negative for chest pain and leg swelling.   Gastrointestinal:  Negative for abdominal pain and vomiting.   Genitourinary:  Negative for flank pain.   Musculoskeletal:  Negative for myalgias.   Skin: Negative.    Neurological:  Positive for dizziness. Negative for focal weakness.        Difficulty maintaining balance   Endo/Heme/Allergies:  Does not bruise/bleed easily.   Psychiatric/Behavioral:  The patient is not nervous/anxious.      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    Surgical History   has a past surgical history that includes hysterectomy, vaginal; low anterior resection robotic (1/28/2014); malik by laparoscopy (4/20/2015); primary c section; repeat c section; other abdominal surgery; shoulder arthroscopy; and abdominal hysterectomy total.     Family History  family history includes Cancer in her father, mother, and sister; Cancer (age of onset: 40) in her sister; Diabetes in her father; GI Disease in her sister; Lung Disease in her mother.      Social History   reports that she has been smoking cigarettes. She started smoking about 60 years ago. She has a 60.9 pack-year smoking history. She has never used smokeless tobacco. She reports current drug use. Drugs: Oral and Marijuana. She reports that she does not drink alcohol.    Allergies  Allergies   Allergen Reactions    Penicillins Rash     Rxn - Late 1990's      Ciprofloxacin      dizziness    Codeine Vomiting    Morphine Rash     Localized red rash after morphine administration    Vicodin [Hydrocodone-Acetaminophen] Itching     Medications  Prior to Admission Medications   Prescriptions Last Dose Informant Patient Reported? Taking?   Cholecalciferol (D3 PO) 11/7/2024 Morning Patient Yes Yes   Sig: Take 2 Capsules by mouth every day.   LORazepam (ATIVAN) 1 MG Tab 11/10/2024 at  1:00 AM Patient No Yes   Sig: Take 1 Tablet by mouth 1 time a day as needed for Anxiety for up to 90 days.   escitalopram (LEXAPRO) 20 MG tablet 10/29/2024  Patient No Yes   Sig: Take 1 Tablet by mouth every day.   ketoconazole (NIZORAL) 2 % Cream 11/8/2024 Patient Yes Yes   Sig: Apply  topically 2 times a day as needed (Apply's on both feet).      Facility-Administered Medications: None     Physical Exam  Temp:  [37.6 °C (99.6 °F)] 37.6 °C (99.6 °F)  Pulse:  [75-90] 84  Resp:  [12-18] 16  BP: (148-163)/(71-90) 152/81  SpO2:  [93 %-97 %] 95 %  Blood Pressure : (!) 152/81   Temperature: 37.6 °C (99.6 °F)   Pulse: 84   Respiration: 16   Pulse Oximetry: 95 %     Physical Exam  Constitutional:       General: She is not in acute distress.  HENT:      Head: Normocephalic and atraumatic.      Right Ear: External ear normal.      Left Ear: External ear normal.      Nose: No congestion or rhinorrhea.      Mouth/Throat:      Mouth: Mucous membranes are moist.      Pharynx: No oropharyngeal exudate or posterior oropharyngeal erythema.   Eyes:      General: No scleral icterus.        Right eye: No discharge.         Left eye: No discharge.      Conjunctiva/sclera: Conjunctivae normal.      Pupils: Pupils are equal, round, and reactive to light.   Cardiovascular:      Rate and Rhythm: Normal rate and regular rhythm.      Heart sounds:      No friction rub. No gallop.   Pulmonary:      Effort: Pulmonary effort is normal.   Abdominal:      General: Abdomen is flat. There is no distension.      Tenderness: There is no guarding.   Musculoskeletal:         General: No swelling.      Cervical back: Neck supple. No rigidity. No muscular tenderness.      Right lower leg: No edema.      Left lower leg: No edema.   Skin:     Capillary Refill: Capillary refill takes 2 to 3 seconds.      Coloration: Skin is not jaundiced or pale.      Findings: No bruising or erythema.   Neurological:      Mental Status: She is alert and oriented to person, place, and time.   Psychiatric:         Mood and Affect: Mood normal.         Judgment: Judgment normal.       Laboratory:  Recent Labs     11/10/24  1602  "  WBC 8.2   RBC 4.68   HEMOGLOBIN 15.0   HEMATOCRIT 44.5   MCV 95.1   MCH 32.1   MCHC 33.7   RDW 56.9*   PLATELETCT 378   MPV 10.6     Recent Labs     11/10/24  1602   SODIUM 143   POTASSIUM 3.5*   CHLORIDE 103   CO2 27   GLUCOSE 92   BUN 13   CREATININE 0.55   CALCIUM 10.1     Recent Labs     11/10/24  1602   ALTSGPT 8   ASTSGOT 16   ALKPHOSPHAT 101*   TBILIRUBIN 0.5   GLUCOSE 92     Recent Labs     11/10/24  1602   APTT 30.4   INR 0.95     No results for input(s): \"NTPROBNP\" in the last 72 hours.      Recent Labs     11/10/24  1602   TROPONINT <6     Imaging:  CT-CTA NECK WITH & W/O-POST PROCESSING   Final Result      1.  Negative for cervical spine fracture or subluxation      2.  Bilateral upper lobe bulla or bleb      3.  Incomplete C1 posterior ring fusion, anatomic variant            CT-CTA HEAD WITH & W/O-POST PROCESS   Final Result      1.  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      2.  No other evidence of intracranial aneurysm.      3.  No intracranial hemorrhage identified. Precontrast images.      4.  No filling defect or vessel occlusion identified.      5.  These findings were discussed with Dr. Moreira on 11/10/2024.      CT-CEREBRAL PERFUSION ANALYSIS   Final Result      1. Cerebral blood flow less than 30% possibly representing completed infarct = 0 mL. Based on distribution of this finding, this is unlikely to represent artifact.      2. T Max more than 6 seconds possibly representing combination of completed infarct and ischemia = 0 mL. Based on the distribution of this finding, this is to represent artifact.      3. Mismatched volume possibly representing ischemic brain/penumbra= 0 mL      4.  Please note that this cerebral perfusion study and report is Quantitative and targets supratentorial (cerebral) perfusion for evaluation of large vessel territory acute ischemia/infarction. For example, lacunar infarcts, and brainstem/posterior " "fossa    ischemia/infarction are not evaluated on this study.  Data acquisition is subject to artifacts which can yield non-anatomically plausible perfusion maps which may be due to motion, bolus timing, signal to noise ratio, or other technical factors.    Perfusion map abnormalities which show non-anatomic distributions are likely artifact.   This study is not \"stand-alone\" and should only be utilized for diagnosis, management/treatment in correlation with CT, CTA, and/or MRI and clinical factors.         DX-CHEST-PORTABLE (1 VIEW)   Final Result         No acute cardiac or pulmonary abnormality is identified.      EC-ECHOCARDIOGRAM COMPLETE W/O CONT    (Results Pending)   MR-BRAIN-W/O    (Results Pending)     I have personally reviewed patient EKG shows sinus rhythm with a rate of 81.  There is no ST elevation.  There are T wave inversions in lead I and aVL.  QTc is 452.      Assessment/Plan:  Justification for Admission Status  I anticipate this patient is appropriate for observation status at this time because the patient has ataxia.  Will require further stroke/TIA workup.    Patient will need a Telemetry bed on MEDICAL service.     * Ataxia- (present on admission)  Assessment & Plan  Admit to Neuro, with continuous cardiac monitoring  CT, CT-angiography, head, neck showed no acute infarction, or hemorrhage  CT angiography shows a small aneurysm 3 x 4 mm.  I will check Echocardiography to rule out shunting  I will check MRI brain  Aspiration, Fall, and seizure precautions    Neuro checks   Speech, physical, occupational therapy evaluation ordered  I will start aspirin and high intensity statin    Intracranial aneurysm- (present on admission)  Assessment & Plan  CTa imaging showed evidence for a small widemouth aneurysm measuring 3 x 4 mm arises inferiorly from junction of right ICA and MCA.  I discussed the finding with patient and patient daughter who is a nurse at [employer].  Optimize blood pressure " control  Neurology discussed with neurointerventional who recommended outpatient referral.    Hypokalemia- (present on admission)  Assessment & Plan  Replacing, checking magnesium    Continue to monitor replace as needed     Prediabetes- (present on admission)  Assessment & Plan  Without significant hyperglycemia.    Monitor blood sugars with daily labs.  I will order a follow-up glucose level.  Consider sliding scale insulin according to blood sugar trend.     Neuropathy- (present on admission)  Assessment & Plan  I will start escitalopram    ACP (advance care planning)- (present on admission)  Assessment & Plan  I had a discussion with the patient and family [daughter over the phone] regarding goals of care, diagnoses, prognosis, and CODE STATUS. We discussed her prognosis and comorbidities.  The patient has advanced age of 79 years.  She has a number of chronic medical problems including tobacco dependence, hyperlipidemia, anxiety and is presenting with ataxia with CT imaging concerning for small aneurysm.  At this point the patient wants to continue as DNAR/DNI.  She is however open to medical invasive or noninvasive diagnostic and therapeutic interventions.      Primary insomnia- (present on admission)  Assessment & Plan  I will start melatonin as needed    Mixed hyperlipidemia- (present on admission)  Assessment & Plan  Cardiac diet.  I will start atorvastatin    Tobacco use disorder- (present on admission)  Assessment & Plan  I spent 11 minutes on Tobacco cessation education and counseling.   I Discussed the benefits of quitting smoking and risks of continued smoking including cardiovascular disease, cancer and COPD.   Discussed the option of nicotine patch, and gum       VTE prophylaxis: SCDs/TEDs and enoxaparin ppx    I had a discussion with the patient and family [daughter over the phone] regarding goals of care, diagnoses, prognosis, and CODE STATUS. We discussed her prognosis and comorbidities.  The  patient has advanced age of 79 years.  She has a number of chronic medical problems including tobacco dependence, hyperlipidemia, anxiety and is presenting with ataxia with CT imaging concerning for small aneurysm.  At this point the patient wants to continue as DNAR/DNI.  She is however open to medical invasive or noninvasive diagnostic and therapeutic interventions. I spent 18 minutes on advanced care planning in addition to the time spent managing the other medical problems.

## 2024-11-11 NOTE — THERAPY
Speech Language Pathology   Clinical Swallow Evaluation     Patient Name: Dina Crews  AGE:  79 y.o., SEX:  female  Medical Record #: [id number]  Date of Service: 11/11/2024      History of Present Illness   Pt is a 80 y/o female admitted 11/10 with dizziness and lower extremity weakness. Found to have small intracranial aneurysm from junction of right ICA and MCA.   PMHx: prediabetes, HLD, tobacco dependence.   No previous SLP notes found in EMR.     Head CTA:   1.  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  2.  No other evidence of intracranial aneurysm.  3.  No intracranial hemorrhage identified. Precontrast images.  4.  No filling defect or vessel occlusion identified.    CXR: negative.     General Information:  Vitals  O2 Delivery Device: None - Room Air  Level of Consciousness: Alert, Awake  Patient Behaviors:  (Pleasant and cooperative)  Orientation: Oriented x 4  Follows Directives: Yes    Prior Living Situation & Level of Function:  Prior Services: Home-Independent  Lives with - Patient's Self Care Capacity: Alone and Able to Care For Self  Communication: WFL  Swallowing: WFL    Oral Mechanism Evaluation:  Dentition: Upper denture, Good, Natural dentition (Lower full natural dentition; upper denture plate)   Facial Symmetry: Equal  Facial Sensation: Equal     Labial Observations: WFL   Lingual Observations: Midline  Motor Speech: WFL    Laryngeal Function:  Secretion Management: Adequate  Voice Quality: WFL  Max Phonation Time (seconds): 12  Cough: Perceptually WNL     Subjective  RN cleared patient for CSE. Patient awake, alert, sitting EOB, and pleasant and cooperative.    Assessment  Current Method of Nutrition: Oral diet (Cardiac/thins)  Positioning: Dennis's (60-90 degrees)  Bolus Administration: Patient  O2 Delivery Device: None - Room Air  Factor(s) Affecting Performance: None  Tracheostomy : No     Swallowing Trials:  Swallowing  Trials  Thin Liquid (TN0): WFL  Pureed (PU4): WFL  Soft & Bite Sized (SB6): WFL  Easy to Chew (EC7): WFL  Regular (RG7): WFL    Comments: Patient with no gross deficits during oral motor evaluation. Patient reports she does not always wear upper denture plate, but mastication of regular solids is functional with or without dentures, per her report. Patient consumed PO trials of thins via cup sip and straw, pudding, soft solids, mixed consistencies, and dry solids. Patient self-delivered all PO trials with appropriate bolus acceptance and sufficient containment. Mastication was timely with complete oral clearance achieved. No s/sx of aspiration on any trials and patient denied globus sensation when asked.     Clinical Impressions  Patient presents with grossly functional oropharyngeal swallow to continue regular diet w/ thin liquids. Patient does not appear to require any further acute or post-acute SLP services for dysphagia. Patient and RN deny any acute cognitive/speech/language deficits as well; SLP will cancel orders for same at this time. Please re-consult SLP w/ any s/sx of aspiration or difficulty noted.     Recommendations  Diet Consistency: Regular/thins  Instrumentation: None indicated at this time  Medication: Whole with liquid, As tolerated  Supervision: Independent  Positioning: Fully upright and midline during oral intake, Meals sitting upright in a chair, as tolerated  Risk Management : None  Oral Care: BID    SLP Treatment Plan  Treatment Plan: None Indicated  SLP Frequency: N/A - Evaluation Only  Estimated Duration: N/A - Evaluation Only    Anticipated Discharge Needs  Discharge Recommendations: Anticipate that the patient will have no further speech therapy needs after discharge from the hospital   Therapy Recommendations Upon DC: Not Indicated      Lelia Pitts, OLIMPIA

## 2024-11-11 NOTE — PROGRESS NOTES
Telemetry shift summary    Rhythm: sr  HR: 64-72  Ectopy: r pac, pvc    Measurements: .14 / .10 / .44    Normal values  Rhythm SR  HR   Measurements: 0.12-0.20/0.08-0.10/0.30-0.52

## 2024-11-12 ENCOUNTER — HOME HEALTH ADMISSION (OUTPATIENT)
Dept: HOME HEALTH SERVICES | Facility: HOME HEALTHCARE | Age: 79
End: 2024-11-12
Payer: MEDICARE

## 2024-11-12 VITALS
DIASTOLIC BLOOD PRESSURE: 60 MMHG | TEMPERATURE: 97.3 F | HEART RATE: 63 BPM | BODY MASS INDEX: 21.48 KG/M2 | RESPIRATION RATE: 16 BRPM | WEIGHT: 121.25 LBS | SYSTOLIC BLOOD PRESSURE: 127 MMHG | OXYGEN SATURATION: 95 % | HEIGHT: 63 IN

## 2024-11-12 LAB
ANION GAP SERPL CALC-SCNC: 14 MMOL/L (ref 7–16)
BASOPHILS # BLD AUTO: 1.1 % (ref 0–1.8)
BASOPHILS # BLD: 0.08 K/UL (ref 0–0.12)
BUN SERPL-MCNC: 16 MG/DL (ref 8–22)
CALCIUM SERPL-MCNC: 9.4 MG/DL (ref 8.4–10.2)
CHLORIDE SERPL-SCNC: 103 MMOL/L (ref 96–112)
CO2 SERPL-SCNC: 23 MMOL/L (ref 20–33)
CREAT SERPL-MCNC: 0.48 MG/DL (ref 0.5–1.4)
EOSINOPHIL # BLD AUTO: 0.16 K/UL (ref 0–0.51)
EOSINOPHIL NFR BLD: 2.2 % (ref 0–6.9)
ERYTHROCYTE [DISTWIDTH] IN BLOOD BY AUTOMATED COUNT: 54.3 FL (ref 35.9–50)
GFR SERPLBLD CREATININE-BSD FMLA CKD-EPI: 97 ML/MIN/1.73 M 2
GLUCOSE SERPL-MCNC: 90 MG/DL (ref 65–99)
HCT VFR BLD AUTO: 40.7 % (ref 37–47)
HGB BLD-MCNC: 13.7 G/DL (ref 12–16)
IMM GRANULOCYTES # BLD AUTO: 0.03 K/UL (ref 0–0.11)
IMM GRANULOCYTES NFR BLD AUTO: 0.4 % (ref 0–0.9)
LYMPHOCYTES # BLD AUTO: 1.7 K/UL (ref 1–4.8)
LYMPHOCYTES NFR BLD: 23.7 % (ref 22–41)
MAGNESIUM SERPL-MCNC: 1.9 MG/DL (ref 1.5–2.5)
MCH RBC QN AUTO: 31.6 PG (ref 27–33)
MCHC RBC AUTO-ENTMCNC: 33.7 G/DL (ref 32.2–35.5)
MCV RBC AUTO: 94 FL (ref 81.4–97.8)
MONOCYTES # BLD AUTO: 0.52 K/UL (ref 0–0.85)
MONOCYTES NFR BLD AUTO: 7.2 % (ref 0–13.4)
NEUTROPHILS # BLD AUTO: 4.69 K/UL (ref 1.82–7.42)
NEUTROPHILS NFR BLD: 65.4 % (ref 44–72)
NRBC # BLD AUTO: 0 K/UL
NRBC BLD-RTO: 0 /100 WBC (ref 0–0.2)
PLATELET # BLD AUTO: 337 K/UL (ref 164–446)
PMV BLD AUTO: 10.3 FL (ref 9–12.9)
POTASSIUM SERPL-SCNC: 4 MMOL/L (ref 3.6–5.5)
RBC # BLD AUTO: 4.33 M/UL (ref 4.2–5.4)
SODIUM SERPL-SCNC: 140 MMOL/L (ref 135–145)
WBC # BLD AUTO: 7.2 K/UL (ref 4.8–10.8)

## 2024-11-12 PROCEDURE — A9270 NON-COVERED ITEM OR SERVICE: HCPCS | Performed by: HOSPITALIST

## 2024-11-12 PROCEDURE — 80048 BASIC METABOLIC PNL TOTAL CA: CPT

## 2024-11-12 PROCEDURE — 94760 N-INVAS EAR/PLS OXIMETRY 1: CPT

## 2024-11-12 PROCEDURE — 85025 COMPLETE CBC W/AUTO DIFF WBC: CPT

## 2024-11-12 PROCEDURE — G0378 HOSPITAL OBSERVATION PER HR: HCPCS

## 2024-11-12 PROCEDURE — A9270 NON-COVERED ITEM OR SERVICE: HCPCS

## 2024-11-12 PROCEDURE — 700102 HCHG RX REV CODE 250 W/ 637 OVERRIDE(OP): Performed by: HOSPITALIST

## 2024-11-12 PROCEDURE — 83735 ASSAY OF MAGNESIUM: CPT

## 2024-11-12 PROCEDURE — 36415 COLL VENOUS BLD VENIPUNCTURE: CPT

## 2024-11-12 PROCEDURE — 99239 HOSP IP/OBS DSCHRG MGMT >30: CPT | Performed by: INTERNAL MEDICINE

## 2024-11-12 PROCEDURE — 700102 HCHG RX REV CODE 250 W/ 637 OVERRIDE(OP)

## 2024-11-12 PROCEDURE — 700111 HCHG RX REV CODE 636 W/ 250 OVERRIDE (IP): Mod: JZ | Performed by: HOSPITALIST

## 2024-11-12 RX ADMIN — ONDANSETRON 4 MG: 2 INJECTION INTRAMUSCULAR; INTRAVENOUS at 06:49

## 2024-11-12 RX ADMIN — ESCITALOPRAM OXALATE 20 MG: 10 TABLET ORAL at 05:20

## 2024-11-12 RX ADMIN — ASPIRIN 81 MG: 81 TABLET, CHEWABLE ORAL at 05:20

## 2024-11-12 RX ADMIN — POLYETHYLENE GLYCOL 3350 1 PACKET: 17 POWDER, FOR SOLUTION ORAL at 09:00

## 2024-11-12 ASSESSMENT — FIBROSIS 4 INDEX: FIB4 SCORE: 1.12

## 2024-11-12 NOTE — PROGRESS NOTES
Telemetry Shift Summary     Rhythm sr  HR Range 65-70  Ectopy pvc, pac  Measurements 0.14/0.08/0.44

## 2024-11-12 NOTE — CARE PLAN
The patient is Stable - Low risk of patient condition declining or worsening    Shift Goals  Clinical Goals: monitor VS, neuro checks  Patient Goals: sleep    Progress made toward(s) clinical / shift goals:  Q 4 neuro checks in place, no mental status changes, patient denies any feelings of anxiety or stress    Problem: Knowledge Deficit - Stroke Education  Goal: Patient's knowledge of stroke and risk factors will improve  Outcome: Progressing     Problem: Psychosocial - Patient Condition  Goal: Patient's ability to verbalize feelings about condition will improve  Outcome: Progressing     Problem: Neuro Status  Goal: Neuro status will remain stable or improve  Outcome: Progressing       Patient is not progressing towards the following goals:

## 2024-11-12 NOTE — DISCHARGE SUMMARY
"Discharge Summary    CHIEF COMPLAINT ON ADMISSION  Chief Complaint   Patient presents with    Dizziness     \"For months\" but most recent episode x2d per pt. Reports also bilat eye blurry vision. Denies CP, SOB, h/a, vomiting. Denies unilateral numbness/tingling or unilateral weakness.     Extremity Weakness     Bilat legs. Denies known fevers, dysuria.        Reason for Admission  Dizziness     Admission Date  11/10/2024    CODE STATUS  DNAR/DNI    HPI & HOSPITAL COURSE  Dina Crews is a 79 y.o. female with a past medical history of prediabetes, hyperlipidemia, depression, migraines, tobacco dependence who presented 11/10/2024 with dizziness, and generalized weakness.  Patient reports the dizziness started around 6 months ago, intermittently occurring and lasting for around 5 minutes each time.  Was not associated with positional changes or head turning.  No prior head trauma.  Patient did not see a doctor or took any medications during this.  Was stable until 2 days PTA when it worsened and became continuous, prompting ED visit.  Prior to her worsening symptoms patient denied any associated HA, POV, nausea/vomiting, numbness/tingling or weakness of her arms or legs, hearing loss/tinnitus.  Denies any recent URI or diarrhea, changes in her diet or appetite.     Patient denies history of MI/stroke/PAD or lung disease.  She is a chronic smoker (1 to 2 packs a day for the last 50 years).  Has known basal cell carcinoma treated with only surgery.  Patient has history of depression, states that she frequently forgets to take her home Lexapro for the last 1-2 month and has been completely off of it for the last 2-weeks.     Patient admitted for stroke workup.  CTA head/neck and CT head showed no acute infarction or hemorrhage.  CT angiography did show a small aneurysm 3 x 4 mm.  Per chart review neurology discussed with neurointerventional who recommended outpatient referral.  Location of the aneurysm should not " be causing the patient's symptoms.  Overall the patient is feeling better and states the dizziness has improved.  She was seen by physical and Occupational Therapy both of whom recommended skilled nursing but patient is adamant that she will not go and does not feel that she needs reevaluation by the therapist even though she is feeling better.  MRI shows no evidence of acute stroke, no mention of aneurysm on MR.  2D echocardiogram shows a normal left ventricular size with an EF of 65% with indeterminate diastolic function mild MR, trivial pericardial effusion.  SPECT her dizziness was likely decompensation related to an upper respiratory infection which may have affected her middle ear, presentation is not consistent with BPPV.  It is improving and patient is adamant that she will not go to skilled nursing and at this time agreeable to discharge with home health.  Front wheel walker also provided upon discharge.    Therefore, she is discharged in good and stable condition to home with organized home healthcare and close outpatient follow-up.      Discharge Date  11/12/2024    FOLLOW UP ITEMS POST DISCHARGE  PCP, 2 weeks    DISCHARGE DIAGNOSES  Principal Problem:    Ataxia (POA: Yes)  Active Problems:    Tobacco use disorder (POA: Yes)    Mixed hyperlipidemia (POA: Yes)    Constipation (POA: Yes)    Prediabetes (POA: Yes)    Primary insomnia (POA: Yes)    Intracranial aneurysm (POA: Yes)    Hypokalemia (POA: Yes)    ACP (advance care planning) (POA: Yes)    DNR (do not resuscitate) (POA: Unknown)    Lung blebs (HCC) (POA: Unknown)    Major depressive disorder in full remission (HCC) (POA: Unknown)    Hx of migraines (POA: Unknown)    Basal cell carcinoma (BCC) of skin of face (POA: Unknown)  Resolved Problems:    Neuropathy (POA: Yes)      FOLLOW UP  Future Appointments   Date Time Provider Department Center   11/19/2024 11:20 AM Roger Larson M.D. [facility]   4/22/2025 11:00 AM Roger KELLEY  SALLY Larson. [facility]  [address]  [place] [address]  [phone]        Roger Larson M.D.  [address]  [place] [id number] [phone]    Follow up        MEDICATIONS ON DISCHARGE     Medication List        CONTINUE taking these medications        Instructions   D3 PO   Take 2 Capsules by mouth every day.  Dose: 2 Capsule     escitalopram 20 MG tablet  Commonly known as: Lexapro   Take 1 Tablet by mouth every day.  Dose: 20 mg     ketoconazole 2 % Crea  Commonly known as: Nizoral   Apply  topically 2 times a day as needed (Apply's on both feet).     LORazepam 1 MG Tabs  Commonly known as: Ativan   Take 1 Tablet by mouth 1 time a day as needed for Anxiety for up to 90 days.  Dose: 1 mg              Allergies  Allergies   Allergen Reactions    Penicillins Rash     Rxn - Late 1990's      Ciprofloxacin      dizziness    Codeine Vomiting    Morphine Rash     Localized red rash after morphine administration    Vicodin [Hydrocodone-Acetaminophen] Itching       DIET  Orders Placed This Encounter   Procedures    Diet Order Diet: Cardiac     Standing Status:   Standing     Number of Occurrences:   1     Order Specific Question:   Diet:     Answer:   Cardiac [6]       ACTIVITY  As tolerated.  Weight bearing as tolerated    CONSULTATIONS  None    PROCEDURES  None    LABORATORY  Lab Results   Component Value Date    SODIUM 140 11/12/2024    POTASSIUM 4.0 11/12/2024    CHLORIDE 103 11/12/2024    CO2 23 11/12/2024    GLUCOSE 90 11/12/2024    BUN 16 11/12/2024    CREATININE 0.48 (L) 11/12/2024        Lab Results   Component Value Date    WBC 7.2 11/12/2024    HEMOGLOBIN 13.7 11/12/2024    HEMATOCRIT 40.7 11/12/2024    PLATELETCT 337 11/12/2024        Total time of the discharge process exceeds 35 minutes.

## 2024-11-12 NOTE — DISCHARGE PLANNING
ATTN: Case Management  RE: Referral for Home Health    As of 11/12/24, we have accepted the Home Health referral for the patient listed above.    [facility]  will contact the patient within 48 hours. If you have any questions or concerns regarding the patient’s transition to Home Health, please do not hesitate to contact us at [phone].      We look forward to collaborating with you,  [facility] Team

## 2024-11-12 NOTE — DISCHARGE PLANNING
Case Management Discharge Planning    Admission Date: 11/10/2024  GMLOS:    ALOS: 0    6-Clicks ADL Score: 16  6-Clicks Mobility Score: 20    Anticipated Discharge Dispo: Discharge Disposition: D/T to home under A care in anticipation of covered skilled care (06)    Spoke to patient at bedside. Discussed discharge orders and PT evaluation. Patient reports she does not want placement, but is agreeable to home health services. She gave me choice for [facility].

## 2024-11-12 NOTE — PROGRESS NOTES
Telemetry Shift Summary     Rhythm: SR-ST  Rate:   Measurements: 0.16/0.08/0.42  Ectopy (reported by Monitor Tech): rPVC, rPAC     Normal Values  Rhythm: Sinus  HR:   Measurements: 0.12-0.20/0.06-0.10/0.30-0.52

## 2024-11-12 NOTE — DISCHARGE PLANNING
Received Choice Form at: 123  Agency/Facility Name:  [facility]  Sent Referral per Choice Form at: 9685

## 2024-11-13 ENCOUNTER — TELEPHONE (OUTPATIENT)
Dept: HEALTH INFORMATION MANAGEMENT | Facility: OTHER | Age: 79
End: 2024-11-13
Payer: MEDICARE

## 2024-11-13 ENCOUNTER — PATIENT OUTREACH (OUTPATIENT)
Dept: MEDICAL GROUP | Facility: LAB | Age: 79
End: 2024-11-13
Payer: MEDICARE

## 2024-11-13 NOTE — PROGRESS NOTES
"Transitional Care Management  TCM Outreach Date and Time: Filed (11/13/2024  8:04 AM)    Discharge Questions  Actual Discharge Date: 11/12/24  Now that you are home, how are you feeling?: Fair (still having dizziness, but is not having blurry vision anymore. denies n/v, sob, chest pain.)  Did you receive any new prescriptions?: No  Do you have any questions about your current medications or new medications (Review Med Rec)?: No  Did you have any durable medical equipment ordered?: Yes  Did you receive it?: No, Contact the CHW (FWW - pt states didn't receive it, forwarded to CHW, DME company is [employer])  Do you have a follow up appointment scheduled with your PCP?: Yes  Appointment Date: 11/15/24  Appointment Time: 1400 (originally was scheduled for 11/19, however, rescheduled for sooner appt with alt provider in the office)  Any issues or paperwork you wish to discuss with your PCP?: Yes (Please specify)  Are you (patient) able to get to the appointment?: Yes  If Home Health was ordered, have they contacted you (Patient): No (Give phone number)  Did you have enough support after your last discharge?: Yes  Does this patient qualify for the CCM program?: No (doesnt' have any qualifying conditions)    Transitional Care  Number of attempts made to contact patient: 1  Current or previous attempts completed within two business days of discharge? : Yes  Provided education regarding treatment plan, medications, self-management, ADLs?: Yes  Has patient completed an Advanced Directive?: Yes  Is the patient's advanced directive on file?: Yes  Has the Care Manager's phone number provided?: No  Is there anything else I can help you with?: No    Discharge Summary  Chief Complaint: Dizziness \"for months\" but most recent episode x2d per pt. Reports also bilat eye blurry vision. Denies CP, SOB, h/a, vomiting. Denies unilateral numbness/tingling or unilateral weakness. Extremity weakness to bilat legs. Denies known fevers, " dysuria.  Admitting Diagnosis: dizziness  Discharge Diagnosis: ataxia

## 2024-11-14 NOTE — TELEPHONE ENCOUNTER
CHW received request from Consulting RN Ruby, to assist with DME needs. Pt was discharged without an ordered walker, pt would like an update as the walker is needed. Pt's DME company is [employer]    CHW contacted [employer], was informed that they don't have a status update, they don't handle outpatient deliveries for walkers. They only handle internal deliveries to hospitals.     CHW messaged CNA on pt's care team while in the hospital. ALLIE Herrera, was unable to provide an update or status on walker.     CHW sent Voalte to [facility], for any information available. CHW will wait for response from [facility] before moving forward.

## 2024-11-15 ENCOUNTER — APPOINTMENT (OUTPATIENT)
Dept: MEDICAL GROUP | Facility: LAB | Age: 79
End: 2024-11-15
Payer: MEDICARE

## 2024-11-15 ENCOUNTER — HOME CARE VISIT (OUTPATIENT)
Dept: HOME HEALTH SERVICES | Facility: HOME HEALTHCARE | Age: 79
End: 2024-11-15

## 2024-11-15 NOTE — PROGRESS NOTES
Spoke with patient to see if pt was able to contact insurance about FWW. Pt states she contacted insurance and was told that she'd need to pay OOP for a FWW. Pt has single point cane and feels at this time this is sufficient until her next appt with PCP, stating she still has dizzy spells, but they are improving since discharge. RN will notify PCP.

## 2024-11-15 NOTE — TELEPHONE ENCOUNTER
Outcome: Left Message    Please transfer to Patient Outreach Team at [phone] when patient returns call.      Attempt # 2

## 2024-11-22 NOTE — TELEPHONE ENCOUNTER
Community Health Worker Follow-Up    Reason for outreach: Resource Outreach    CHW Interventions: CHW contacted pt to follow up on FFW. Pt states that she is doing well. Pt discussed that it  has been going okay with the cane, dizzy spells have subsided. Pt discussed that she feels confident without the walker at this moment. CHW provided contact information to pt in case she changes her mind. Pt was thankful for check  in, states she has no other needs at this time.     Specific Resources Provided:  Housing/Shelter: n/a  Transportation: n/a  Food: n/a  Financial: na/  Social Supports: n/a  Other: n/a    Plan: CHW will not follow pt at this time. Encouraged pt to call with questions or concerns

## 2024-12-06 ENCOUNTER — OFFICE VISIT (OUTPATIENT)
Dept: MEDICAL GROUP | Facility: LAB | Age: 79
End: 2024-12-06
Payer: MEDICARE

## 2024-12-06 VITALS
TEMPERATURE: 96.2 F | RESPIRATION RATE: 14 BRPM | BODY MASS INDEX: 21.05 KG/M2 | SYSTOLIC BLOOD PRESSURE: 118 MMHG | WEIGHT: 118.8 LBS | HEIGHT: 63 IN | OXYGEN SATURATION: 97 % | HEART RATE: 79 BPM | DIASTOLIC BLOOD PRESSURE: 80 MMHG

## 2024-12-06 DIAGNOSIS — I67.1 CEREBRAL ANEURYSM: ICD-10-CM

## 2024-12-06 DIAGNOSIS — F41.1 GAD (GENERALIZED ANXIETY DISORDER): ICD-10-CM

## 2024-12-06 DIAGNOSIS — Z23 NEED FOR VACCINATION: ICD-10-CM

## 2024-12-06 DIAGNOSIS — R26.81 GAIT INSTABILITY: ICD-10-CM

## 2024-12-06 DIAGNOSIS — R42 LIGHTHEADED: ICD-10-CM

## 2024-12-06 DIAGNOSIS — Z09 HOSPITAL DISCHARGE FOLLOW-UP: Primary | ICD-10-CM

## 2024-12-06 PROCEDURE — 3074F SYST BP LT 130 MM HG: CPT | Performed by: FAMILY MEDICINE

## 2024-12-06 PROCEDURE — 90662 IIV NO PRSV INCREASED AG IM: CPT | Performed by: FAMILY MEDICINE

## 2024-12-06 PROCEDURE — 99214 OFFICE O/P EST MOD 30 MIN: CPT | Mod: 25 | Performed by: FAMILY MEDICINE

## 2024-12-06 PROCEDURE — 3079F DIAST BP 80-89 MM HG: CPT | Performed by: FAMILY MEDICINE

## 2024-12-06 PROCEDURE — G0008 ADMIN INFLUENZA VIRUS VAC: HCPCS | Performed by: FAMILY MEDICINE

## 2024-12-06 ASSESSMENT — FIBROSIS 4 INDEX: FIB4 SCORE: 1.12

## 2024-12-06 NOTE — PROGRESS NOTES
"Subjective:     CC: Follow up    HPI:   Dina presents today with for follow-up on recent hospital admission.  Her son Jorge is also present for the visit.    Admitted 11/10 to 11/12 with dizziness and generalized weakness.  She was admitted for stroke workup, which was not notable for acute findings but did see small 3 x 4 mm aneurysm on CT angiography.  Neurointerventional radiology recommended outpatient referral.  This was not thought to be the cause of her symptoms.  PT and OT recommended skilled nursing on discharge but patient declined.  On discharge dizziness was likely thought secondary to decompensation with recent upper respiratory infection.  Front wheel walker was recommended on discharge but not provided.     Dizziness has been improved and son has noted that it likely seems related to poor p.o. intake and hydration.  They are planning to have her move to [place] to be closer with family in about 6 months.    Current Outpatient Medications   Medication Sig Dispense Refill    Cholecalciferol (D3 PO) Take 2 Capsules by mouth every day.      ketoconazole (NIZORAL) 2 % Cream Apply  topically 2 times a day as needed (Apply's on both feet).      LORazepam (ATIVAN) 1 MG Tab Take 1 Tablet by mouth 1 time a day as needed for Anxiety for up to 90 days. 30 Tablet 0    escitalopram (LEXAPRO) 20 MG tablet Take 1 Tablet by mouth every day. 90 Tablet 3     No current facility-administered medications for this visit.       Medications, past medical history, allergies, and social history have been reviewed and updated.      Objective:       Exam:  /80 (BP Location: Left arm, Patient Position: Sitting, BP Cuff Size: Adult)   Pulse 79   Temp (!) 35.7 °C (96.2 °F) (Temporal)   Resp 14   Ht 1.6 m (5' 3\")   Wt 53.9 kg (118 lb 12.8 oz)   LMP 07/12/1986   SpO2 97%   BMI 21.04 kg/m²  Body mass index is 21.04 kg/m².    Constitutional: Alert. Well appearing. No distress.  Skin: Warm, dry, good turgor, no " visible rashes.  Respiratory: Normal effort. Lungs are clear to auscultation bilaterally.  Cardiovascular: Regular rate and rhythm. Normal S1/S2. No murmurs, rubs or gallops.   Neuro: Moves all four extremities. No facial droop.  Psych: Answers questions appropriately. Normal affect and mood.    Assessment & Plan:     79 y.o. female with the following -       1. Hospital discharge follow-up (Primary)    2. Lightheaded  3. Gait instability  Suspect episodic lightheadedness is multifactorial and related to poor p.o. intake and hydration.  Discussed hydration and small frequent meals.  Order placed for walker for help with fall prevention when she is having the episodic lightheadedness.  - DME Walker    4. Cerebral aneurysm  3 x 4 mm aneurysm on CT angiography.  Neurointerventional radiology recommended outpatient referral.  Discussed ER precautions.  - Referral to Interventional Radiology    5. JUNIOR (generalized anxiety disorder)  Stable.  Continue Lexapro 20 mg daily.    6. Need for vaccination  - Influenza Vaccine, High Dose (65+ Only)      Please note that this note was created using voice recognition software.    Face to Face Note  -  Durable Medical Equipment    Roger Larson M.D. - NPI: [id number]  I certify that this patient is under my care and that they had a durable medical equipment(DME)face to face encounter by myself that meets the physician DME face-to-face encounter requirements with this patient on:    Date of encounter:   Patient:                    MRN:                       YOB: 2022  Dina Adorno [facility]  [id number]  1945     The encounter with the patient was in whole, or in part, for the following medical condition, which is the primary reason for durable medical equipment:  Other - dizzy/gait instability    I certify that, based on my findings, the following durable medical equipment is medically necessary:  Walkers.      My Clinical findings support the need  for the above equipment due to:  Abnormal Gait, Other - instability, fall risk

## 2025-01-23 ENCOUNTER — APPOINTMENT (OUTPATIENT)
Dept: MEDICAL GROUP | Facility: LAB | Age: 80
End: 2025-01-23
Payer: MEDICARE

## 2025-02-21 ENCOUNTER — OFFICE VISIT (OUTPATIENT)
Dept: MEDICAL GROUP | Facility: LAB | Age: 80
End: 2025-02-21
Payer: MEDICARE

## 2025-02-21 VITALS
HEART RATE: 103 BPM | OXYGEN SATURATION: 95 % | SYSTOLIC BLOOD PRESSURE: 112 MMHG | RESPIRATION RATE: 14 BRPM | HEIGHT: 63 IN | WEIGHT: 113.8 LBS | BODY MASS INDEX: 20.16 KG/M2 | TEMPERATURE: 97.7 F | DIASTOLIC BLOOD PRESSURE: 82 MMHG

## 2025-02-21 DIAGNOSIS — G89.29 CHRONIC LEFT SHOULDER PAIN: ICD-10-CM

## 2025-02-21 DIAGNOSIS — M25.512 CHRONIC LEFT SHOULDER PAIN: ICD-10-CM

## 2025-02-21 DIAGNOSIS — J43.9 LUNG BLEBS (HCC): ICD-10-CM

## 2025-02-21 DIAGNOSIS — F33.42 RECURRENT MAJOR DEPRESSIVE DISORDER, IN FULL REMISSION (HCC): ICD-10-CM

## 2025-02-21 PROCEDURE — 3079F DIAST BP 80-89 MM HG: CPT | Performed by: FAMILY MEDICINE

## 2025-02-21 PROCEDURE — G2211 COMPLEX E/M VISIT ADD ON: HCPCS | Performed by: FAMILY MEDICINE

## 2025-02-21 PROCEDURE — 3074F SYST BP LT 130 MM HG: CPT | Performed by: FAMILY MEDICINE

## 2025-02-21 PROCEDURE — 99214 OFFICE O/P EST MOD 30 MIN: CPT | Performed by: FAMILY MEDICINE

## 2025-02-21 ASSESSMENT — PATIENT HEALTH QUESTIONNAIRE - PHQ9: CLINICAL INTERPRETATION OF PHQ2 SCORE: 0

## 2025-02-21 ASSESSMENT — FIBROSIS 4 INDEX: FIB4 SCORE: 1.12

## 2025-02-21 NOTE — PROGRESS NOTES
"Subjective:     CC: Left arm/shoulder pain    HPI:   Dina presents today with concern for left arm/shoulder pain.  This is a chronic issue but worse recently.  Especially worse in the morning and feels like her shoulder is in a \"vice\" and will get better throughout the day.  Reports remote history of shoulder arthroscopy x 2.  Pain also worsens with movements including overhead movements and will come down the left upper arm.    Current Outpatient Medications   Medication Sig Dispense Refill    LORazepam (ATIVAN) 1 MG Tab TAKE 1 TABLET BY MOUTH ONCE DAILY AS NEEDED FOR ANXIETY FOR 90 DAYS 30 Tablet 0    Cholecalciferol (D3 PO) Take 2 Capsules by mouth every day.      ketoconazole (NIZORAL) 2 % Cream Apply  topically 2 times a day as needed (Apply's on both feet).      escitalopram (LEXAPRO) 20 MG tablet Take 1 Tablet by mouth every day. 90 Tablet 3     No current facility-administered medications for this visit.       Medications, past medical history, allergies, and social history have been reviewed and updated.      Objective:       Exam:  /82 (BP Location: Left arm, Patient Position: Sitting, BP Cuff Size: Adult)   Pulse (!) 103   Temp 36.5 °C (97.7 °F) (Temporal)   Resp 14   Ht 1.6 m (5' 3\")   Wt 51.6 kg (113 lb 12.8 oz)   LMP 07/12/1986   SpO2 95%   BMI 20.16 kg/m²  Body mass index is 20.16 kg/m².    Constitutional: Alert. Well appearing. No distress.  Skin: Warm, dry, good turgor, no visible rashes.  ENMT: Moist mucous membranes.   Respiratory: Normal effort.   MSK: Passive and active range of motion to the left shoulder is limited with overhead movements.  There is bony swelling present.  Pain is elicited with full rotator cuff test and with Neer's test.  Psych: Answers questions appropriately. Normal affect and mood.    Assessment & Plan:     79 y.o. female with the following -     1. Chronic left shoulder pain  Suspect osteoarthritis +/- rotator cuff tendinopathy.  X-ray ordered, discussed " anti-inflammatories.  Referral to PT.  - DX-SHOULDER 2+ LEFT; Future  - Referral to Physical Therapy    2. Lung blebs (HCC)  Incidental finding on prior imaging.  Likely secondary to smoking history.    3. Recurrent major depressive disorder, in full remission (HCC)  Stable, continue Lexapro 20 mg daily.    Today's visit is associated with medical care services that serve as the continuing focal point for all necessary health care services.  This includes providing services to the patient on an ongoing basis that results in care that is collaborative and personalized to the patient.       Please note that this note was created using voice recognition software.

## 2025-03-05 ENCOUNTER — HOSPITAL ENCOUNTER (OUTPATIENT)
Dept: RADIOLOGY | Facility: MEDICAL CENTER | Age: 80
End: 2025-03-05
Attending: FAMILY MEDICINE
Payer: MEDICARE

## 2025-03-05 ENCOUNTER — RESULTS FOLLOW-UP (OUTPATIENT)
Dept: MEDICAL GROUP | Facility: LAB | Age: 80
End: 2025-03-05
Payer: MEDICARE

## 2025-03-05 DIAGNOSIS — G89.29 CHRONIC LEFT SHOULDER PAIN: ICD-10-CM

## 2025-03-05 DIAGNOSIS — M25.512 CHRONIC LEFT SHOULDER PAIN: ICD-10-CM

## 2025-03-05 PROCEDURE — 73030 X-RAY EXAM OF SHOULDER: CPT | Mod: LT
